# Patient Record
Sex: MALE | Race: WHITE | NOT HISPANIC OR LATINO | Employment: OTHER | URBAN - METROPOLITAN AREA
[De-identification: names, ages, dates, MRNs, and addresses within clinical notes are randomized per-mention and may not be internally consistent; named-entity substitution may affect disease eponyms.]

---

## 2017-02-10 RX ORDER — PEN NEEDLE, DIABETIC 29 G X1/2"
NEEDLE, DISPOSABLE MISCELLANEOUS
Qty: 100 EACH | Refills: 3 | Status: SHIPPED | OUTPATIENT
Start: 2017-02-10 | End: 2018-03-23 | Stop reason: SDUPTHER

## 2017-02-17 RX ORDER — INSULIN GLARGINE 100 [IU]/ML
INJECTION, SOLUTION SUBCUTANEOUS
Qty: 10 VIAL | Refills: 11 | Status: SHIPPED | OUTPATIENT
Start: 2017-02-17 | End: 2018-03-14 | Stop reason: SDUPTHER

## 2017-02-25 RX ORDER — AMLODIPINE BESYLATE 5 MG/1
TABLET ORAL
Qty: 90 TABLET | Refills: 3 | Status: SHIPPED | OUTPATIENT
Start: 2017-02-25 | End: 2018-02-22 | Stop reason: SDUPTHER

## 2017-02-25 RX ORDER — ATORVASTATIN CALCIUM 40 MG/1
TABLET, FILM COATED ORAL
Qty: 90 TABLET | Refills: 3 | Status: SHIPPED | OUTPATIENT
Start: 2017-02-25 | End: 2018-02-22 | Stop reason: SDUPTHER

## 2017-03-06 ENCOUNTER — TELEPHONE (OUTPATIENT)
Dept: INTERNAL MEDICINE | Facility: CLINIC | Age: 77
End: 2017-03-06

## 2017-03-06 NOTE — TELEPHONE ENCOUNTER
----- Message from Ellie Shirley sent at 3/6/2017 11:16 AM CST -----  Contact: Gurmeet from [facility]   _  1st Request  _  2nd Request  _  3rd Request        Who: Gurmeet from [facility]     Why: Gurmeet would like a order for patient diabetic supplies for lancets and strips faxed over to [phone]. Thanks!    What Number to Call Back: [phone]    When to Expect a call back: (Before the end of the day)   -- if the call is after 12:00, the call back will be tomorrow.

## 2017-03-13 DIAGNOSIS — E11.9 TYPE 2 DIABETES MELLITUS WITHOUT COMPLICATION: ICD-10-CM

## 2017-04-09 RX ORDER — FENOFIBRATE 145 MG/1
TABLET, FILM COATED ORAL
Qty: 90 TABLET | Refills: 3 | Status: SHIPPED | OUTPATIENT
Start: 2017-04-09 | End: 2018-04-03 | Stop reason: SDUPTHER

## 2017-04-27 DIAGNOSIS — I10 ESSENTIAL HYPERTENSION: ICD-10-CM

## 2017-05-02 RX ORDER — HYDRALAZINE HYDROCHLORIDE 100 MG/1
TABLET, FILM COATED ORAL
Qty: 60 TABLET | Refills: 6 | Status: SHIPPED | OUTPATIENT
Start: 2017-05-02 | End: 2017-06-27 | Stop reason: SDUPTHER

## 2017-05-23 DIAGNOSIS — N18.4 CHRONIC KIDNEY DISEASE, STAGE IV (SEVERE): Primary | ICD-10-CM

## 2017-05-29 ENCOUNTER — LAB VISIT (OUTPATIENT)
Dept: LAB | Facility: OTHER | Age: 77
End: 2017-05-29
Attending: INTERNAL MEDICINE
Payer: MEDICARE

## 2017-05-29 ENCOUNTER — OFFICE VISIT (OUTPATIENT)
Dept: INTERNAL MEDICINE | Facility: CLINIC | Age: 77
End: 2017-05-29
Payer: MEDICARE

## 2017-05-29 VITALS
HEART RATE: 74 BPM | SYSTOLIC BLOOD PRESSURE: 132 MMHG | HEIGHT: 62 IN | OXYGEN SATURATION: 98 % | BODY MASS INDEX: 35.22 KG/M2 | DIASTOLIC BLOOD PRESSURE: 84 MMHG | WEIGHT: 191.38 LBS

## 2017-05-29 DIAGNOSIS — M10.9 GOUT, ARTHRITIS: ICD-10-CM

## 2017-05-29 DIAGNOSIS — E11.21 DIABETIC NEPHROPATHY ASSOCIATED WITH TYPE 2 DIABETES MELLITUS: Chronic | ICD-10-CM

## 2017-05-29 DIAGNOSIS — N18.4 CKD (CHRONIC KIDNEY DISEASE) STAGE 4, GFR 15-29 ML/MIN: ICD-10-CM

## 2017-05-29 DIAGNOSIS — I10 ESSENTIAL HYPERTENSION: Primary | Chronic | ICD-10-CM

## 2017-05-29 DIAGNOSIS — N40.1 BPH WITH URINARY OBSTRUCTION: ICD-10-CM

## 2017-05-29 DIAGNOSIS — E78.01 HYPERLIPIDEMIA TYPE II: ICD-10-CM

## 2017-05-29 DIAGNOSIS — N13.8 BPH WITH URINARY OBSTRUCTION: ICD-10-CM

## 2017-05-29 LAB
ALBUMIN SERPL BCP-MCNC: 3.6 G/DL
ALP SERPL-CCNC: 28 U/L
ALT SERPL W/O P-5'-P-CCNC: 16 U/L
ANION GAP SERPL CALC-SCNC: 10 MMOL/L
AST SERPL-CCNC: 24 U/L
BASOPHILS # BLD AUTO: 0.01 K/UL
BASOPHILS NFR BLD: 0.1 %
BILIRUB SERPL-MCNC: 0.6 MG/DL
BUN SERPL-MCNC: 38 MG/DL
CALCIUM SERPL-MCNC: 9.9 MG/DL
CHLORIDE SERPL-SCNC: 104 MMOL/L
CO2 SERPL-SCNC: 25 MMOL/L
CREAT SERPL-MCNC: 2.3 MG/DL
DIFFERENTIAL METHOD: ABNORMAL
EOSINOPHIL # BLD AUTO: 0.1 K/UL
EOSINOPHIL NFR BLD: 1.5 %
ERYTHROCYTE [DISTWIDTH] IN BLOOD BY AUTOMATED COUNT: 14.4 %
EST. GFR  (AFRICAN AMERICAN): 31 ML/MIN/1.73 M^2
EST. GFR  (NON AFRICAN AMERICAN): 27 ML/MIN/1.73 M^2
GLUCOSE SERPL-MCNC: 105 MG/DL
HCT VFR BLD AUTO: 46.9 %
HGB BLD-MCNC: 15.8 G/DL
LYMPHOCYTES # BLD AUTO: 2.5 K/UL
LYMPHOCYTES NFR BLD: 31.1 %
MCH RBC QN AUTO: 31.2 PG
MCHC RBC AUTO-ENTMCNC: 33.7 %
MCV RBC AUTO: 93 FL
MONOCYTES # BLD AUTO: 0.7 K/UL
MONOCYTES NFR BLD: 7.9 %
NEUTROPHILS # BLD AUTO: 4.9 K/UL
NEUTROPHILS NFR BLD: 59.3 %
PLATELET # BLD AUTO: 226 K/UL
PMV BLD AUTO: 10.6 FL
POTASSIUM SERPL-SCNC: 4.3 MMOL/L
PROT SERPL-MCNC: 7 G/DL
RBC # BLD AUTO: 5.06 M/UL
SODIUM SERPL-SCNC: 139 MMOL/L
WBC # BLD AUTO: 8.18 K/UL

## 2017-05-29 PROCEDURE — 36415 COLL VENOUS BLD VENIPUNCTURE: CPT

## 2017-05-29 PROCEDURE — 85025 COMPLETE CBC W/AUTO DIFF WBC: CPT

## 2017-05-29 PROCEDURE — 1159F MED LIST DOCD IN RCRD: CPT | Mod: S$GLB,,, | Performed by: INTERNAL MEDICINE

## 2017-05-29 PROCEDURE — 99214 OFFICE O/P EST MOD 30 MIN: CPT | Mod: S$GLB,,, | Performed by: INTERNAL MEDICINE

## 2017-05-29 PROCEDURE — 3066F NEPHROPATHY DOC TX: CPT | Mod: S$GLB,,, | Performed by: INTERNAL MEDICINE

## 2017-05-29 PROCEDURE — 80053 COMPREHEN METABOLIC PANEL: CPT

## 2017-05-29 PROCEDURE — 99999 PR PBB SHADOW E&M-EST. PATIENT-LVL III: CPT | Mod: PBBFAC,,, | Performed by: INTERNAL MEDICINE

## 2017-05-29 PROCEDURE — 83036 HEMOGLOBIN GLYCOSYLATED A1C: CPT

## 2017-05-29 PROCEDURE — 99499 UNLISTED E&M SERVICE: CPT | Mod: S$PBB,,, | Performed by: INTERNAL MEDICINE

## 2017-05-29 PROCEDURE — 3045F PR MOST RECENT HEMOGLOBIN A1C LEVEL 7.0-9.0%: CPT | Mod: S$GLB,,, | Performed by: INTERNAL MEDICINE

## 2017-05-29 PROCEDURE — 1126F AMNT PAIN NOTED NONE PRSNT: CPT | Mod: S$GLB,,, | Performed by: INTERNAL MEDICINE

## 2017-05-29 RX ORDER — TAMSULOSIN HYDROCHLORIDE 0.4 MG/1
0.4 CAPSULE ORAL 2 TIMES DAILY
Refills: 0 | COMMUNITY
Start: 2017-03-17 | End: 2017-09-21 | Stop reason: SDUPTHER

## 2017-05-29 NOTE — PROGRESS NOTES
Subjective:       Patient ID: Giovanni Guerrero is a 77 y.o. male.    Chief Complaint: Diabetes and Hypertension    Pt here for f/u. Feeling well and has no c/o. Counseled on exercise goals.     DM2 is well controlled. Most recent A1C was 7.2. Pt reports sugars are in 90s-110s fasting, prandial sugars are generally less than 140. He has not had lows. He has scheduled eye exam. He does have nephropathy with CKD IV and microalbuminuria and his creatinine has been stable at 2.2-2.5. He is followed by nephrology but is due for f/u. He does not have neuropathy but is not bothersome and manages without meds.      Pt's BP is well controlled. Tolerating meds well. Home readings are also controlled. Pt denies cp/sob/ha/vision or neuro changes.     HLD is tx with tricor and zocor which he tolerates well. Prior labs reviewed.     Gout has been quiet. No recent attacks.      He has BPH with urinary obstruction but does well with flomax twice daily. He follows with urology regularly.         Diabetes   Pertinent negatives for hypoglycemia include no headaches. Pertinent negatives for diabetes include no chest pain and no polyuria.   Hypertension   Pertinent negatives include no chest pain, headaches or shortness of breath.     Review of Systems   Constitutional: Negative for appetite change, fever and unexpected weight change.   Eyes: Negative for visual disturbance.   Respiratory: Negative for shortness of breath.    Cardiovascular: Negative for chest pain.   Gastrointestinal: Negative for abdominal pain, blood in stool, constipation and diarrhea.   Endocrine: Negative for polyuria.   Genitourinary: Negative for frequency.   Neurological: Negative for light-headedness and headaches.   Psychiatric/Behavioral: Negative for dysphoric mood.       Objective:      Physical Exam   Constitutional: He is oriented to person, place, and time. He appears well-developed and well-nourished.   HENT:   Head: Normocephalic and atraumatic.   Eyes:  EOM are normal. Pupils are equal, round, and reactive to light.   Neck: Neck supple. Carotid bruit is not present. No thyromegaly present.   Cardiovascular: Normal rate, regular rhythm, S1 normal, S2 normal and normal heart sounds.    No murmur heard.  Pulmonary/Chest: Effort normal and breath sounds normal. He has no wheezes.   Abdominal: Soft. Bowel sounds are normal. He exhibits no mass. There is no hepatosplenomegaly. There is no tenderness.   Musculoskeletal: He exhibits no edema.   Protective Sensation (w/ 10 gram monofilament):  Right: Intact at 6/6 sites tested  Left: Intact at 4/6 sites tested    Visual Inspection:  Normal -  Bilateral    Pedal Pulses:   Right: Present  Left: Present    Posterior tibialis:   Right:Present  Left: Present     Lymphadenopathy:     He has no cervical adenopathy.   Neurological: He is alert and oriented to person, place, and time. No cranial nerve deficit.   Psychiatric: He has a normal mood and affect. His behavior is normal.       Assessment:       1. Essential hypertension    2. BPH with urinary obstruction    3. CKD (chronic kidney disease) stage 4, GFR 15-29 ml/min    4. Diabetic nephropathy associated with type 2 diabetes mellitus    5. Gout, arthritis    6. Hyperlipidemia type II        Plan:       1. Appropriate labs     2. Continue current meds  3. Continue with home BS and BP readings  4. Keep f/u with specialists  5. F/u 6 mos with NP

## 2017-05-30 ENCOUNTER — TELEPHONE (OUTPATIENT)
Dept: INTERNAL MEDICINE | Facility: CLINIC | Age: 77
End: 2017-05-30

## 2017-05-30 LAB
ESTIMATED AVG GLUCOSE: 143 MG/DL
HBA1C MFR BLD HPLC: 6.6 %

## 2017-05-30 NOTE — TELEPHONE ENCOUNTER
Spoke with pt  Advised per Dr. Webster  that labs look good. Kidney function is impaired but stable. He should keep f/u with nephrology but no other changes are needed. Pt verbalized understanding

## 2017-05-30 NOTE — TELEPHONE ENCOUNTER
Inform pt that labs look good. Kidney function is impaired but stable. He should keep f/u with nephrology but no other changes are needed.

## 2017-06-27 DIAGNOSIS — I10 ESSENTIAL HYPERTENSION: ICD-10-CM

## 2017-06-29 RX ORDER — HYDRALAZINE HYDROCHLORIDE 100 MG/1
100 TABLET, FILM COATED ORAL EVERY 12 HOURS
Qty: 60 TABLET | Refills: 6 | Status: SHIPPED | OUTPATIENT
Start: 2017-06-29 | End: 2018-07-16 | Stop reason: SDUPTHER

## 2017-07-28 ENCOUNTER — LAB VISIT (OUTPATIENT)
Dept: LAB | Facility: OTHER | Age: 77
End: 2017-07-28
Attending: INTERNAL MEDICINE
Payer: MEDICARE

## 2017-07-28 DIAGNOSIS — N18.4 CHRONIC KIDNEY DISEASE, STAGE IV (SEVERE): ICD-10-CM

## 2017-07-28 LAB
BILIRUB UR QL STRIP: NEGATIVE
CLARITY UR: CLEAR
COLOR UR: YELLOW
CREAT UR-MCNC: 87.1 MG/DL
GLUCOSE UR QL STRIP: NEGATIVE
HGB UR QL STRIP: NEGATIVE
KETONES UR QL STRIP: NEGATIVE
LEUKOCYTE ESTERASE UR QL STRIP: NEGATIVE
NITRITE UR QL STRIP: NEGATIVE
PH UR STRIP: 6 [PH] (ref 5–8)
PROT UR QL STRIP: ABNORMAL
PROT UR-MCNC: 26 MG/DL
PROT/CREAT RATIO, UR: 0.3
SP GR UR STRIP: 1.02 (ref 1–1.03)
URN SPEC COLLECT METH UR: ABNORMAL
UROBILINOGEN UR STRIP-ACNC: 1 EU/DL

## 2017-07-28 PROCEDURE — 84156 ASSAY OF PROTEIN URINE: CPT

## 2017-07-28 PROCEDURE — 81003 URINALYSIS AUTO W/O SCOPE: CPT

## 2017-08-01 ENCOUNTER — OFFICE VISIT (OUTPATIENT)
Dept: NEPHROLOGY | Facility: CLINIC | Age: 77
End: 2017-08-01
Payer: MEDICARE

## 2017-08-01 VITALS
BODY MASS INDEX: 35.25 KG/M2 | SYSTOLIC BLOOD PRESSURE: 134 MMHG | HEIGHT: 62 IN | WEIGHT: 191.56 LBS | OXYGEN SATURATION: 97 % | HEART RATE: 87 BPM | DIASTOLIC BLOOD PRESSURE: 80 MMHG

## 2017-08-01 DIAGNOSIS — I10 ESSENTIAL HYPERTENSION: ICD-10-CM

## 2017-08-01 DIAGNOSIS — R80.9 PROTEINURIA, UNSPECIFIED TYPE: ICD-10-CM

## 2017-08-01 DIAGNOSIS — N18.30 CHRONIC KIDNEY DISEASE (CKD), STAGE III (MODERATE): Primary | ICD-10-CM

## 2017-08-01 DIAGNOSIS — E11.21 DIABETIC NEPHROPATHY ASSOCIATED WITH TYPE 2 DIABETES MELLITUS: ICD-10-CM

## 2017-08-01 DIAGNOSIS — N40.0 BENIGN PROSTATIC HYPERPLASIA, PRESENCE OF LOWER URINARY TRACT SYMPTOMS UNSPECIFIED: ICD-10-CM

## 2017-08-01 PROCEDURE — 99999 PR PBB SHADOW E&M-EST. PATIENT-LVL III: CPT | Mod: PBBFAC,,, | Performed by: INTERNAL MEDICINE

## 2017-08-01 PROCEDURE — 99499 UNLISTED E&M SERVICE: CPT | Mod: S$GLB,,, | Performed by: INTERNAL MEDICINE

## 2017-08-01 NOTE — PROGRESS NOTES
Patient is here today for lab data review; baseline 2.2-2.5 mg/dl; His most recent lab (7/28/17) Cr 2.0 mg/dl; eGFR; 32 ml/min; potassium 4.1 mmol.

## 2017-08-19 RX ORDER — ATENOLOL 100 MG/1
TABLET ORAL
Qty: 90 TABLET | Refills: 3 | Status: SHIPPED | OUTPATIENT
Start: 2017-08-19 | End: 2018-09-19 | Stop reason: SDUPTHER

## 2017-09-21 ENCOUNTER — OFFICE VISIT (OUTPATIENT)
Dept: UROLOGY | Facility: CLINIC | Age: 77
End: 2017-09-21
Payer: MEDICARE

## 2017-09-21 VITALS
DIASTOLIC BLOOD PRESSURE: 67 MMHG | HEART RATE: 82 BPM | BODY MASS INDEX: 34.57 KG/M2 | WEIGHT: 189 LBS | SYSTOLIC BLOOD PRESSURE: 139 MMHG

## 2017-09-21 DIAGNOSIS — N13.8 BPH WITH URINARY OBSTRUCTION: Primary | ICD-10-CM

## 2017-09-21 DIAGNOSIS — N40.1 BPH WITH URINARY OBSTRUCTION: Primary | ICD-10-CM

## 2017-09-21 PROCEDURE — 99999 PR PBB SHADOW E&M-EST. PATIENT-LVL III: CPT | Mod: PBBFAC,,, | Performed by: NURSE PRACTITIONER

## 2017-09-21 PROCEDURE — 3078F DIAST BP <80 MM HG: CPT | Mod: S$GLB,,, | Performed by: NURSE PRACTITIONER

## 2017-09-21 PROCEDURE — 3008F BODY MASS INDEX DOCD: CPT | Mod: S$GLB,,, | Performed by: NURSE PRACTITIONER

## 2017-09-21 PROCEDURE — 99214 OFFICE O/P EST MOD 30 MIN: CPT | Mod: S$GLB,,, | Performed by: NURSE PRACTITIONER

## 2017-09-21 PROCEDURE — 1159F MED LIST DOCD IN RCRD: CPT | Mod: S$GLB,,, | Performed by: NURSE PRACTITIONER

## 2017-09-21 PROCEDURE — 3075F SYST BP GE 130 - 139MM HG: CPT | Mod: S$GLB,,, | Performed by: NURSE PRACTITIONER

## 2017-09-21 PROCEDURE — 1126F AMNT PAIN NOTED NONE PRSNT: CPT | Mod: S$GLB,,, | Performed by: NURSE PRACTITIONER

## 2017-09-21 RX ORDER — TAMSULOSIN HYDROCHLORIDE 0.4 MG/1
0.4 CAPSULE ORAL 2 TIMES DAILY
Qty: 180 CAPSULE | Refills: 3 | Status: SHIPPED | OUTPATIENT
Start: 2017-09-21 | End: 2018-12-04 | Stop reason: SDUPTHER

## 2017-09-21 NOTE — PATIENT INSTRUCTIONS
BPH (Enlarged Prostate)  The prostate is a gland at the base of the bladder. As some men get older, the prostate may get bigger in size. This problem is called benign prostatic hyperplasia (BPH). BPH puts pressure on the urethra. This is the tube that carries urine from the bladder to the penis. It may interfere with the flow of urine. It may also keep the bladder from emptying fully.    Symptoms of BPH include trouble starting urination and feeling as though the bladder isnt emptying all the way. It also includes a weak urine stream, dribbling and leaking of urine, and frequent and urgent urination (especially at night). BPH can increase the risk of urinary infections. It can also block off urine flow completely. If this occurs, a thin tube (catheter) may be passed into the bladder to help drain urine.  If symptoms are mild, no treatment may be needed right now. If symptoms are more severe, treatment is likely needed. The goal of treatment is to improve urine flow and reduce symptoms. Treatments can include medicine and procedures. Your healthcare provider will discuss treatment options with you as needed.  Home care  The following guidelines will help you care for yourself at home:  · Urinate as soon as you feel the urge. Don't try to hold your urine.  · Don't limit your fluid intake during the day. Drink 6 to 8 glasses of water or liquids a day. This prevents bacteria from building up in the bladder.  · Avoid drinking fluids after dinner to help reduce urination during the night.  · Avoid medicines that can worsen your symptoms. These include certain cold and allergy medicines and antidepressants. Diuretics used for high blood pressure can also worsen symptoms. Talk to your doctor about the medicines you take. Other choices may work better for you.  Prostate cancer screening  BPH does not increase the risk of prostate cancer. But because prostate cancer is a common cancer in men, screening is sometimes  recommended. This may help detect the cancer in its early stages when treatment is most effective. Factors that can increase the risk of prostate cancer include being -American or having a father or brother who had prostate cancer. A high-fat diet may also increase the risk of prostate cancer. Talk to your healthcare provider to see whether you should be screened for prostate cancer.  Follow-up care  Follow up with your healthcare provider, or as advised  To learn more, go to:  · National Kidney & Urologic Diseases Information Clearinghouse  kidney.niddk.nih.gov, 127.304.1407  When to seek medical advice  Call your healthcare provider right away if any of these occur:  · Fever of 100.4°F (38.0°C) or higher, or as advised  · Unable to pass urine for 8 hours  · Increasing pressure or pain in your bladder (lower abdomen)  · Blood in the urine  · Increasing low back pain, not related to injury  · Symptoms of urinary infection (increased urge to urinate, burning when passing urine, foul-smelling urine)  Date Last Reviewed: 7/1/2016 © 2000-2017 [employer]. [address] [place] [address], [place] [address]. All rights reserved. This information is not intended as a substitute for professional medical care. Always follow your healthcare professional's instructions.

## 2017-09-21 NOTE — PROGRESS NOTES
Subjective:       Patient ID: Giovanni Guerrero is a 77 y.o. male.    Chief Complaint: Benign Prostatic Hypertrophy and Annual Exam    Giovanni Guerrero is a 77 y.o. Male with BPH with VANN.  Last office visit was 09/21/2016    He has been taking Flomax TWICE DAILY   FOS is good.  Nocturia 0-1x.  Overall doing well.    He lost his wife in Dec '15; 52 yrs of marriage.  Very active with his kids and grandkids                                  PSA                      1.1                 04/15/2014                 PSA                      1.88                08/22/2012                 PSA                      1.4                 08/08/2011                 PSA                      1.2                 08/05/2010                 PSA                      1.5                 08/07/2009                 PSA                      1.3                 08/29/2008                 PSA                      1.5                 01/08/2007                 PSA                      1.2                 03/06/2006                    Past Medical History:  No date: Allergy  No date: Colon polyp  No date: Diabetes mellitus type II  No date: Gout, unspecified  No date: Hyperlipidemia  No date: Hypertension  No date: Renal insufficiency    Past Surgical History:  No date: APPENDECTOMY  No date: HERNIA REPAIR  No date: TONSILLECTOMY    Review of patient's family history indicates:    Cancer                         Mother                      Comment: ovarian cancer    Heart disease                  Father                      Comment: MI at 57    Diabetes                       Father                    Cerebral aneurysm              Sister                    Heart disease                  Brother                     Comment: MI at 60    Heart disease                  Brother                     Comment: CABG      Social History    Marital status:              Spouse name:                       Years of education:                 Number of  children:               Occupational History    None on file    Social History Main Topics    Smoking status: Never Smoker                                                                   Smokeless tobacco: Not on file                       Alcohol use: No              Drug use: No              Sexual activity: Not Currently        Other Topics            Concern    None on file    Social History Narrative     52 yrs, 3 childre (two living), 11 grandchildren & 4 great grandchildren        Allergies:  Actos (pioglitazone)    Medications:  Current Outpatient Prescriptions:   amlodipine (NORVASC) 5 MG tablet, take 1 tablet by mouth once daily, Disp: 90 tablet, Rfl: 3  aspirin (ECOTRIN) 81 MG EC tablet, Take 81 mg by mouth once daily., Disp: , Rfl:   atenolol (TENORMIN) 100 MG tablet, take 1 tablet by mouth once daily, Disp: 90 tablet, Rfl: 3  atorvastatin (LIPITOR) 40 MG tablet, take 1 tablet by mouth once daily, Disp: 90 tablet, Rfl: 3  BD INSULIN SYRINGE ULTRA-FINE 0.5 mL 31 gauge x 5/16 Syrg, USE WITH LANTUS INSULIN, Disp: 100 each, Rfl: 3  blood sugar diagnostic Strp, Check sugar tid as directed, Disp: 300 each, Rfl: 3  cholecalciferol, vitamin D3, (VITAMIN D3) 2,000 unit Tab, Take by mouth once daily., Disp: , Rfl:   fenofibrate (TRICOR) 145 MG tablet, take 1 tablet by mouth once daily, Disp: 90 tablet, Rfl: 3  hydrALAZINE (APRESOLINE) 100 MG tablet, Take 1 tablet (100 mg total) by mouth every 12 (twelve) hours., Disp: 60 tablet, Rfl: 6  LANTUS 100 unit/mL injection, INJECT 15 UNITS INTO THE SKIN EVERY EVENING, Disp: 10 vial, Rfl: 11  OMEGA-3 ACID ETHYL ESTERS (OMACOR) 1 gram Cap, Take 1 g by mouth. 1 Capsule Oral Twice a day, Disp: , Rfl:   tamsulosin (FLOMAX) 0.4 mg Cp24, Take 0.4 mg by mouth 2 (two) times daily. , Disp: , Rfl: 0  blood-glucose meter Misc, 1 kit by Misc.(Non-Drug; Combo Route) route 3 (three) times daily. Pt is to test blood sugar TID, Disp: 1 each, Rfl: 0          Review of  Systems   Constitutional: Negative.  Negative for activity change, appetite change and fever.   HENT: Negative.  Negative for facial swelling and trouble swallowing.    Eyes: Negative.    Respiratory: Negative.  Negative for shortness of breath.    Cardiovascular: Negative.  Negative for chest pain and palpitations.   Gastrointestinal: Negative for abdominal pain, constipation, diarrhea, nausea and vomiting.   Genitourinary: Positive for nocturia. Negative for difficulty urinating, dysuria, enuresis, flank pain, frequency, genital sores, hematuria, penile pain, scrotal swelling, testicular pain and urgency.        He is pleased with how he urinates.  Nocturia 1x     Musculoskeletal: Negative for back pain, gait problem and neck stiffness.   Skin: Negative.  Negative for wound.   Neurological: Negative for dizziness, tremors, seizures, syncope, speech difficulty, light-headedness and headaches.   Hematological: Does not bruise/bleed easily.   Psychiatric/Behavioral: Negative for confusion and hallucinations. The patient is not nervous/anxious.        Objective:      Physical Exam   Nursing note and vitals reviewed.  Constitutional: He is oriented to person, place, and time. Vital signs are normal. He appears well-developed and well-nourished. He is active and cooperative.  Non-toxic appearance. He does not have a sickly appearance.   Urine dipped clear of infection in the office today.     HENT:   Head: Normocephalic and atraumatic.   Right Ear: External ear normal.   Left Ear: External ear normal.   Nose: Nose normal.   Mouth/Throat: Mucous membranes are normal.   Eyes: Conjunctivae and lids are normal. No scleral icterus.   Neck: Trachea normal, normal range of motion and full passive range of motion without pain. Neck supple. No JVD present. No tracheal deviation present.   Cardiovascular: Normal rate, regular rhythm, S1 normal and S2 normal.    Pulmonary/Chest: Effort normal and breath sounds normal. No  respiratory distress. He exhibits no tenderness.   Abdominal: Soft. Normal appearance and bowel sounds are normal. There is no hepatosplenomegaly. There is no tenderness. There is no rebound, no guarding and no CVA tenderness.   Genitourinary: Rectum normal, testes normal and penis normal. Rectal exam shows no external hemorrhoid, no mass and no tenderness. Prostate is enlarged. Prostate is not tender. No penile tenderness.       Musculoskeletal: Normal range of motion.   Lymphadenopathy: No inguinal adenopathy noted on the right or left side.   Neurological: He is alert and oriented to person, place, and time. He has normal strength.   Skin: Skin is warm, dry and intact.     Psychiatric: He has a normal mood and affect. His behavior is normal. Judgment and thought content normal.       Assessment:       1. BPH with urinary obstruction        Plan:         I spent 25 minutes with the patient of which more than half was spent in direct consultation with the patient in regards to our treatment and plan.    Education and recommendations of today's plan of care including home remedies.  We discussed diet modifications; avoiding bladder irritants.  He is very active with his children and grandchildren.  Will continue flomax twice a day.  Any issues then RTC.  Voiced appreciation

## 2017-11-10 DIAGNOSIS — E11.9 TYPE 2 DIABETES MELLITUS WITHOUT COMPLICATION: ICD-10-CM

## 2017-11-15 ENCOUNTER — TELEPHONE (OUTPATIENT)
Dept: INTERNAL MEDICINE | Facility: CLINIC | Age: 77
End: 2017-11-15

## 2017-11-15 ENCOUNTER — PATIENT OUTREACH (OUTPATIENT)
Dept: INTERNAL MEDICINE | Facility: CLINIC | Age: 77
End: 2017-11-15

## 2017-11-15 NOTE — PROGRESS NOTES
[facility] is committed to your overall health.  To help you get the most out of each of your visits, we will review your information to make sure you are up to date on all of your recommended tests and/or procedures.       Your PCP  Alan Webster MD   found that you may be due for:       Health Maintenance Due   Topic Date Due    TETANUS VACCINE  02/09/1960    Zoster Vaccine  02/09/2002    Eye Exam  11/06/2016    Influenza Vaccine  08/01/2017    Hemoglobin A1c  08/29/2017    Lipid Panel  11/01/2017     You will be scheduled for a eye cam (eye exam) retina scan on the same day of your scheduled visit with your Alan Webster MD.  NO eye drop dilation will take place. You can drive after the scan.  This will take no longer than ten minutes. This will take place in the same office area as your visit.   Medicare does not cover all immunizations to be given in the clinic. Check your benefits to ensure that you do not need to receive your immunizations at the pharmacy.  You are more than welcome to visit our pharmacy here on campus to receive your vaccinations on the same day of your upcoming scheduled visit.           If you have had any of the above done at another facility, please bring the records or information with you so that your record at [facility] will be complete.  If you would like to schedule any of these, please contact me.     If you are currently taking medication, please bring it with you to your appointment for review.     Also, if you have any type of Advanced Directives, please bring them with you to your office visit so we may scan them into your chart.     Thank you for Choosing [facility] for your healthcare needs.      Additional Information  If you have questions, you can email [email or web address] or call [phone]  to talk to our [facility] staff. Remember, [facility] is NOT to be used for urgent needs. For medical emergencies, dial 911.

## 2017-11-27 ENCOUNTER — CLINICAL SUPPORT (OUTPATIENT)
Dept: INTERNAL MEDICINE | Facility: CLINIC | Age: 77
End: 2017-11-27
Payer: MEDICARE

## 2017-11-27 PROCEDURE — 90662 IIV NO PRSV INCREASED AG IM: CPT | Mod: S$GLB,,, | Performed by: INTERNAL MEDICINE

## 2017-11-27 PROCEDURE — G0008 ADMIN INFLUENZA VIRUS VAC: HCPCS | Mod: S$GLB,,, | Performed by: INTERNAL MEDICINE

## 2017-11-27 NOTE — PROGRESS NOTES
"Patient was given vaccine information sheet for the Flu Vaccine. The area of injection was palpated using the acromion process as a landmark. This area was cleaned with alcohol. Using a 25g 1" safety needle, 0.5mL of the vaccine was placed into the left deltoid muscle. The injection site was dressed with a bandage. Patient experienced no complications and was discharged in stable condition. Fluzone vaccine Lot: [id number] Exp: [id number]    "

## 2017-11-29 ENCOUNTER — OFFICE VISIT (OUTPATIENT)
Dept: INTERNAL MEDICINE | Facility: CLINIC | Age: 77
End: 2017-11-29
Payer: MEDICARE

## 2017-11-29 ENCOUNTER — LAB VISIT (OUTPATIENT)
Dept: LAB | Facility: OTHER | Age: 77
End: 2017-11-29
Attending: INTERNAL MEDICINE
Payer: MEDICARE

## 2017-11-29 ENCOUNTER — TELEPHONE (OUTPATIENT)
Dept: INTERNAL MEDICINE | Facility: CLINIC | Age: 77
End: 2017-11-29

## 2017-11-29 VITALS
OXYGEN SATURATION: 97 % | SYSTOLIC BLOOD PRESSURE: 110 MMHG | BODY MASS INDEX: 33.55 KG/M2 | HEIGHT: 63 IN | HEART RATE: 84 BPM | WEIGHT: 189.38 LBS | DIASTOLIC BLOOD PRESSURE: 76 MMHG

## 2017-11-29 DIAGNOSIS — N40.1 BPH WITH URINARY OBSTRUCTION: ICD-10-CM

## 2017-11-29 DIAGNOSIS — I10 ESSENTIAL HYPERTENSION: Primary | Chronic | ICD-10-CM

## 2017-11-29 DIAGNOSIS — I10 ESSENTIAL HYPERTENSION: Chronic | ICD-10-CM

## 2017-11-29 DIAGNOSIS — E78.01 HYPERLIPIDEMIA TYPE II: ICD-10-CM

## 2017-11-29 DIAGNOSIS — N18.4 CKD (CHRONIC KIDNEY DISEASE) STAGE 4, GFR 15-29 ML/MIN: ICD-10-CM

## 2017-11-29 DIAGNOSIS — E11.9 TYPE 2 DIABETES MELLITUS WITHOUT COMPLICATION: ICD-10-CM

## 2017-11-29 DIAGNOSIS — E11.42 DIABETIC POLYNEUROPATHY ASSOCIATED WITH TYPE 2 DIABETES MELLITUS: Chronic | ICD-10-CM

## 2017-11-29 DIAGNOSIS — M10.9 GOUT, ARTHRITIS: ICD-10-CM

## 2017-11-29 DIAGNOSIS — E11.21 DIABETIC NEPHROPATHY ASSOCIATED WITH TYPE 2 DIABETES MELLITUS: Chronic | ICD-10-CM

## 2017-11-29 DIAGNOSIS — N13.8 BPH WITH URINARY OBSTRUCTION: ICD-10-CM

## 2017-11-29 LAB
ANION GAP SERPL CALC-SCNC: 6 MMOL/L
BUN SERPL-MCNC: 38 MG/DL
CALCIUM SERPL-MCNC: 9.2 MG/DL
CHLORIDE SERPL-SCNC: 104 MMOL/L
CHOLEST SERPL-MCNC: 103 MG/DL
CHOLEST/HDLC SERPL: 4.3 {RATIO}
CO2 SERPL-SCNC: 29 MMOL/L
CREAT SERPL-MCNC: 2.1 MG/DL
EST. GFR  (AFRICAN AMERICAN): 35 ML/MIN/1.73 M^2
EST. GFR  (NON AFRICAN AMERICAN): 30 ML/MIN/1.73 M^2
ESTIMATED AVG GLUCOSE: 137 MG/DL
GLUCOSE SERPL-MCNC: 102 MG/DL
HBA1C MFR BLD HPLC: 6.4 %
HDLC SERPL-MCNC: 24 MG/DL
HDLC SERPL: 23.3 %
LDLC SERPL CALC-MCNC: 60.8 MG/DL
NONHDLC SERPL-MCNC: 79 MG/DL
POTASSIUM SERPL-SCNC: 4.1 MMOL/L
SODIUM SERPL-SCNC: 139 MMOL/L
TRIGL SERPL-MCNC: 91 MG/DL

## 2017-11-29 PROCEDURE — 80061 LIPID PANEL: CPT

## 2017-11-29 PROCEDURE — 99999 PR PBB SHADOW E&M-EST. PATIENT-LVL III: CPT | Mod: PBBFAC,,, | Performed by: INTERNAL MEDICINE

## 2017-11-29 PROCEDURE — 83036 HEMOGLOBIN GLYCOSYLATED A1C: CPT

## 2017-11-29 PROCEDURE — 99499 UNLISTED E&M SERVICE: CPT | Mod: S$PBB,,, | Performed by: INTERNAL MEDICINE

## 2017-11-29 PROCEDURE — 36415 COLL VENOUS BLD VENIPUNCTURE: CPT

## 2017-11-29 PROCEDURE — 99214 OFFICE O/P EST MOD 30 MIN: CPT | Mod: S$GLB,,, | Performed by: INTERNAL MEDICINE

## 2017-11-29 PROCEDURE — 80048 BASIC METABOLIC PNL TOTAL CA: CPT

## 2017-11-29 NOTE — TELEPHONE ENCOUNTER
Spoke with pt to inform him that labs look good and no changes needed. Pt verbalized understanding no concerns or questions.

## 2017-11-29 NOTE — PROGRESS NOTES
Subjective:       Patient ID: Giovanni Guerrero is a 77 y.o. male.    Chief Complaint: Hypertension and Diabetes    Pt here for f/u. Feeling well and has no c/o. Counseled on exercise goals.     DM2 is well controlled. Most recent A1C was 6.6. Pt reports sugars are in 90s-100s fasting, prandial sugars are generally less than 140. He has not had lows. He is on lantus monotherapy at 15 units qhs. He has scheduled eye exam. He does have nephropathy with CKD IV and microalbuminuria and his creatinine has been stable at 2.2-2.5. He is followed by nephrology but is due for f/u. He does have neuropathy but is not bothersome and manages without meds.      Pt's BP is well controlled. Tolerating meds well. Home readings are also controlled. Pt denies cp/sob/ha/vision or neuro changes.     HLD is tx with tricor and zocor which he tolerates well. Prior labs reviewed.     Gout has been quiet. No recent attacks.      He has BPH with urinary obstruction but does well with flomax twice daily. He follows with urology regularly.         Diabetes   Pertinent negatives for hypoglycemia include no headaches. Pertinent negatives for diabetes include no chest pain and no polyuria.   Hypertension   Pertinent negatives include no chest pain, headaches or shortness of breath.     Review of Systems   Constitutional: Negative for appetite change, fever and unexpected weight change.   Eyes: Negative for visual disturbance.   Respiratory: Negative for shortness of breath.    Cardiovascular: Negative for chest pain.   Gastrointestinal: Negative for abdominal pain, blood in stool, constipation and diarrhea.   Endocrine: Negative for polyuria.   Genitourinary: Negative for frequency.   Neurological: Negative for light-headedness and headaches.   Psychiatric/Behavioral: Negative for dysphoric mood.       Objective:      Physical Exam   Constitutional: He is oriented to person, place, and time. He appears well-developed and well-nourished.   HENT:    Head: Normocephalic and atraumatic.   Eyes: EOM are normal. Pupils are equal, round, and reactive to light.   Neck: Neck supple. Carotid bruit is not present. No thyromegaly present.   Cardiovascular: Normal rate, regular rhythm, S1 normal, S2 normal and normal heart sounds.    No murmur heard.  Pulmonary/Chest: Effort normal and breath sounds normal. He has no wheezes.   Abdominal: Soft. Bowel sounds are normal. He exhibits no mass. There is no hepatosplenomegaly. There is no tenderness.   Musculoskeletal: He exhibits no edema.   Lymphadenopathy:     He has no cervical adenopathy.   Neurological: He is alert and oriented to person, place, and time. No cranial nerve deficit.   Psychiatric: He has a normal mood and affect. His behavior is normal.       Assessment:       1. Essential hypertension    2. Hyperlipidemia type II    3. BPH with urinary obstruction    4. CKD (chronic kidney disease) stage 4, GFR 15-29 ml/min    5. Diabetic nephropathy associated with type 2 diabetes mellitus    6. Gout, arthritis    7. Diabetic polyneuropathy associated with type 2 diabetes mellitus        Plan:       1. Appropriate labs     2. Continue current meds  3. Continue with home BS and BP readings  4. Keep f/u with specialists

## 2017-12-05 ENCOUNTER — OFFICE VISIT (OUTPATIENT)
Dept: NEPHROLOGY | Facility: CLINIC | Age: 77
End: 2017-12-05
Payer: MEDICARE

## 2017-12-05 VITALS
HEIGHT: 63 IN | HEART RATE: 68 BPM | WEIGHT: 190.69 LBS | SYSTOLIC BLOOD PRESSURE: 110 MMHG | BODY MASS INDEX: 33.79 KG/M2 | OXYGEN SATURATION: 98 % | DIASTOLIC BLOOD PRESSURE: 70 MMHG

## 2017-12-05 DIAGNOSIS — E11.21 DIABETIC NEPHROPATHY ASSOCIATED WITH TYPE 2 DIABETES MELLITUS: ICD-10-CM

## 2017-12-05 DIAGNOSIS — I10 ESSENTIAL HYPERTENSION: ICD-10-CM

## 2017-12-05 DIAGNOSIS — N18.30 CHRONIC KIDNEY DISEASE (CKD), STAGE III (MODERATE): Primary | ICD-10-CM

## 2017-12-05 DIAGNOSIS — R80.9 PROTEINURIA, UNSPECIFIED TYPE: ICD-10-CM

## 2017-12-05 PROCEDURE — 99213 OFFICE O/P EST LOW 20 MIN: CPT | Mod: S$GLB,,, | Performed by: INTERNAL MEDICINE

## 2017-12-05 PROCEDURE — 99999 PR PBB SHADOW E&M-EST. PATIENT-LVL III: CPT | Mod: PBBFAC,,, | Performed by: INTERNAL MEDICINE

## 2017-12-05 PROCEDURE — 99499 UNLISTED E&M SERVICE: CPT | Mod: S$PBB,,, | Performed by: INTERNAL MEDICINE

## 2017-12-05 NOTE — PROGRESS NOTES
Patient is here today for follow up evaluation of CKD III. Last seen in renal office 8/1/17 Baseline  Cr 2.2-2.5 mg/dl His most recent lab( 11/29/17) Cr 2.1 mg/dl; eGFR; 30 ml/min; potassium 4.1 mmol/L There is a hx of diabetes; complicated by nephropathy; last A1c; 6.4%. Today he has no new complaints    ROS;   Pleasant gentleman; no acute distress; oriented x 3  Mood and Affect;  Appropriate  Otherwise non-contributory  Exam  HEENT; Grossly Intact  CHEST; Clear P&A; no rales or rhonchi  HEART; RR; S1&S2 no murmur rub gallop  ABD; BS(+) non-tender; (-)CVAT  EXT; (-) Edema    Impression  CKD III Stable  Hypertension Satisfactory control  Diabetes At or below goal A1c    Plan  Return Visit; 4 mo

## 2018-02-16 ENCOUNTER — HOSPITAL ENCOUNTER (EMERGENCY)
Facility: OTHER | Age: 78
Discharge: HOME | End: 2018-02-17
Attending: EMERGENCY MEDICINE
Payer: MEDICARE

## 2018-02-16 VITALS
WEIGHT: 180 LBS | OXYGEN SATURATION: 96 % | HEIGHT: 63 IN | BODY MASS INDEX: 31.89 KG/M2 | DIASTOLIC BLOOD PRESSURE: 74 MMHG | RESPIRATION RATE: 14 BRPM | TEMPERATURE: 98 F | HEART RATE: 83 BPM | SYSTOLIC BLOOD PRESSURE: 150 MMHG

## 2018-02-16 DIAGNOSIS — R33.9 URINARY RETENTION: Primary | ICD-10-CM

## 2018-02-16 LAB
BILIRUB UR QL STRIP: NEGATIVE
CLARITY UR: CLEAR
COLOR UR: YELLOW
GLUCOSE UR QL STRIP: NEGATIVE
HGB UR QL STRIP: NEGATIVE
KETONES UR QL STRIP: NEGATIVE
LEUKOCYTE ESTERASE UR QL STRIP: NEGATIVE
NITRITE UR QL STRIP: NEGATIVE
PH UR STRIP: 7 [PH] (ref 5–8)
PROT UR QL STRIP: NEGATIVE
SP GR UR STRIP: 1.01 (ref 1–1.03)
URN SPEC COLLECT METH UR: NORMAL
UROBILINOGEN UR STRIP-ACNC: NEGATIVE EU/DL

## 2018-02-16 PROCEDURE — 51702 INSERT TEMP BLADDER CATH: CPT

## 2018-02-16 PROCEDURE — 99283 EMERGENCY DEPT VISIT LOW MDM: CPT

## 2018-02-16 PROCEDURE — 81003 URINALYSIS AUTO W/O SCOPE: CPT

## 2018-02-17 NOTE — ED NOTES
Pt c/o urinary retention >12 hrs. Pt states this happens from time to time, last time was 3 yrs ago. Last time he saw his urologist was <6 months ago and everything was fine. Pt states he does not have an enlarged prostate. NO trauma. Pt is A & O x 3, denies SOB, fever, chills and N/V/D. Skin is warm, dry and pink. VS. BRIDGET x 3mm. BBS- CTA. Abd- distended and tender to palpation. PSM x 4 exts. Pt immediately catheterized w/ #16 fr lester. Pt states he felt immediate relief. MD @ BS, states we will send urine to lab and if everything is good, pt will be discharged w/ a leg bag. Pt is smiling and agreeable to MD orders. Will continue to monitor closely.

## 2018-02-17 NOTE — ED PROVIDER NOTES
Encounter Date: 2/16/2018       History     Chief Complaint   Patient presents with    Urinary Retention     Patient c/o urinary retention for approx 3-4 hrs.  Patient stated this happened before and he had to get a catheter and followup with a urologist.      A 78-year-old male with history of urinary retention 3 years ago presents with urinary retention over the last 12 hours.  Patient describes significant lower abdominal pain and cramping.She had nausea vomiting or chest pain.  Nothing made this better.  He is unable to void.          Review of patient's allergies indicates:   Allergen Reactions    Actos [pioglitazone] Other (See Comments)     constipation     Past Medical History:   Diagnosis Date    Allergy     Colon polyp     Diabetes mellitus type II     Gout, unspecified     Hyperlipidemia     Hypertension     Renal insufficiency      Past Surgical History:   Procedure Laterality Date    APPENDECTOMY      HERNIA REPAIR      TONSILLECTOMY       Family History   Problem Relation Age of Onset    Cancer Mother      ovarian cancer    Heart disease Father      MI at 57    Diabetes Father     Cerebral aneurysm Sister     Heart disease Brother      MI at 60    Heart disease Brother      CABG     Social History   Substance Use Topics    Smoking status: Never Smoker    Smokeless tobacco: Never Used    Alcohol use No     Review of Systems   Constitutional: Negative for activity change, appetite change, chills and fever.   HENT: Negative for congestion and sinus pressure.    Eyes: Negative for discharge.   Respiratory: Negative for cough, choking, chest tightness and shortness of breath.    Cardiovascular: Negative for chest pain.   Gastrointestinal: Negative for abdominal pain, diarrhea and vomiting.   Genitourinary: Positive for difficulty urinating. Negative for dysuria.   Musculoskeletal: Negative for arthralgias.   Skin: Negative for color change.   Neurological: Negative for dizziness and  headaches.   Hematological: Does not bruise/bleed easily.       Physical Exam     Initial Vitals [02/16/18 2302]   BP Pulse Resp Temp SpO2   (!) 150/74 83 14 97.6 °F (36.4 °C) 96 %      MAP       99.33         Physical Exam    Nursing note and vitals reviewed.  Constitutional: He appears well-developed and well-nourished.  Non-toxic appearance. He does not have a sickly appearance. No distress.   HENT:   Head: Normocephalic and atraumatic.   Mouth/Throat: Oropharynx is clear and moist.   Eyes: Conjunctivae, EOM and lids are normal. Pupils are equal, round, and reactive to light. Right eye exhibits no nystagmus. Left eye exhibits no nystagmus.   Neck: Trachea normal, normal range of motion and phonation normal. Neck supple.   Cardiovascular: Normal rate, regular rhythm and normal heart sounds. Exam reveals no gallop and no friction rub.    No murmur heard.  Pulmonary/Chest: Breath sounds normal. No respiratory distress. He has no wheezes. He has no rhonchi. He has no rales.   Abdominal: Soft. Normal appearance and bowel sounds are normal. There is no tenderness. There is no rigidity, no rebound, no guarding, no tenderness at McBurney's point and negative Ford's sign.   Musculoskeletal: Normal range of motion.   Neurological: He is alert and oriented to person, place, and time. He has normal strength and normal reflexes. He displays normal reflexes. No cranial nerve deficit or sensory deficit. He displays a negative Romberg sign.   Skin: Skin is warm, dry and intact. Capillary refill takes less than 2 seconds. No rash noted. No pallor.         ED Course   Procedures  Labs Reviewed   URINALYSIS             Medical Decision Making:   ED Management:  78-year-old male with urinary retention.  When I got the bedside Lozano catheter just been placed.  The patient stated all his pain is relieved.  The urinalysis.  Do not feel any blood work is indicated.  This happened 3 years ago.  He sees Dr. Vargas for urology.  Have him  follow-up as an outpatient.    11:55 PM urinalysis is negative.  We'll discharge the patient with leg bag to follow-up with urology. Cindi Evans NP.                      Clinical Impression:     1. Urinary retention                                Roge Peña,   02/16/18 1628

## 2018-02-22 RX ORDER — ATORVASTATIN CALCIUM 40 MG/1
TABLET, FILM COATED ORAL
Qty: 90 TABLET | Refills: 3 | Status: SHIPPED | OUTPATIENT
Start: 2018-02-22 | End: 2019-03-08 | Stop reason: SDUPTHER

## 2018-02-22 RX ORDER — AMLODIPINE BESYLATE 5 MG/1
TABLET ORAL
Qty: 90 TABLET | Refills: 3 | Status: SHIPPED | OUTPATIENT
Start: 2018-02-22 | End: 2019-03-08 | Stop reason: SDUPTHER

## 2018-02-23 ENCOUNTER — OFFICE VISIT (OUTPATIENT)
Dept: UROLOGY | Facility: CLINIC | Age: 78
End: 2018-02-23
Payer: MEDICARE

## 2018-02-23 VITALS
DIASTOLIC BLOOD PRESSURE: 87 MMHG | BODY MASS INDEX: 34.48 KG/M2 | HEIGHT: 62 IN | HEART RATE: 71 BPM | SYSTOLIC BLOOD PRESSURE: 140 MMHG | WEIGHT: 187.38 LBS

## 2018-02-23 DIAGNOSIS — R33.9 URINARY RETENTION: ICD-10-CM

## 2018-02-23 DIAGNOSIS — N13.8 BPH WITH URINARY OBSTRUCTION: Primary | ICD-10-CM

## 2018-02-23 DIAGNOSIS — N40.1 BPH WITH URINARY OBSTRUCTION: Primary | ICD-10-CM

## 2018-02-23 DIAGNOSIS — Z46.6 ENCOUNTER FOR FOLEY CATHETER REMOVAL: ICD-10-CM

## 2018-02-23 PROCEDURE — 1126F AMNT PAIN NOTED NONE PRSNT: CPT | Mod: S$GLB,,, | Performed by: NURSE PRACTITIONER

## 2018-02-23 PROCEDURE — 1159F MED LIST DOCD IN RCRD: CPT | Mod: S$GLB,,, | Performed by: NURSE PRACTITIONER

## 2018-02-23 PROCEDURE — 99999 PR PBB SHADOW E&M-EST. PATIENT-LVL III: CPT | Mod: PBBFAC,,, | Performed by: NURSE PRACTITIONER

## 2018-02-23 PROCEDURE — 99214 OFFICE O/P EST MOD 30 MIN: CPT | Mod: S$GLB,,, | Performed by: NURSE PRACTITIONER

## 2018-02-23 PROCEDURE — 3008F BODY MASS INDEX DOCD: CPT | Mod: S$GLB,,, | Performed by: NURSE PRACTITIONER

## 2018-02-23 NOTE — PROGRESS NOTES
Subjective:       Patient ID: Giovanni Guerrero is a 78 y.o. male.    Chief Complaint: Urinary Retention (lester removal) and Benign Prostatic Hypertrophy    Giovanni Guerrero is a 78 y.o. Male with BPH with VANN.  History of AUR;  He managing his BPH/VANN with Flomax TWICE DAILY   Normally FOS is good; nocturia 1-2x.  Last office visit was 09/21/2016    Here today after being seen in ER for Urinary rentention and lester placement.  He states he started taking Robitussin DM every 3-4 hour for cough.  FOS became weaker and then unable to urinate.  Lester placed and [facility]; urine was clear of infection.    Here today for lester removal.   No complaints.  Lester draining well.        He lost his wife in Dec '15; 52 yrs of marriage.  His grandson lives with him.  Very active with his kids and grandkids                                  PSA                      1.1                 04/15/2014                 PSA                      1.88                08/22/2012                 PSA                      1.4                 08/08/2011                 PSA                      1.2                 08/05/2010                 PSA                      1.5                 08/07/2009                 PSA                      1.3                 08/29/2008                 PSA                      1.5                 01/08/2007                 PSA                      1.2                 03/06/2006                    Past Medical History:  No date: Allergy  No date: Colon polyp  No date: Diabetes mellitus type II  No date: Gout, unspecified  No date: Hyperlipidemia  No date: Hypertension  No date: Renal insufficiency    Past Surgical History:  No date: APPENDECTOMY  No date: HERNIA REPAIR  No date: TONSILLECTOMY    Review of patient's family history indicates:    Cancer                         Mother                      Comment: ovarian cancer    Heart disease                  Father                      Comment: MI at 57    Diabetes                        Father                    Cerebral aneurysm              Sister                    Heart disease                  Brother                     Comment: MI at 60    Heart disease                  Brother                     Comment: CABG      Social History    Marital status:              Spouse name:                       Years of education:                 Number of children:               Occupational History    None on file    Social History Main Topics    Smoking status: Never Smoker                                                                   Smokeless tobacco: Not on file                       Alcohol use: No              Drug use: No              Sexual activity: Not Currently        Other Topics            Concern    None on file    Social History Narrative     52 yrs, 3 childre (two living), 11 grandchildren & 4 great grandchildren        Allergies:  Actos (pioglitazone)    Medications:  Current Outpatient Prescriptions:   amlodipine (NORVASC) 5 MG tablet, take 1 tablet by mouth once daily, Disp: 90 tablet, Rfl: 3  aspirin (ECOTRIN) 81 MG EC tablet, Take 81 mg by mouth once daily., Disp: , Rfl:   atenolol (TENORMIN) 100 MG tablet, take 1 tablet by mouth once daily, Disp: 90 tablet, Rfl: 3  atorvastatin (LIPITOR) 40 MG tablet, take 1 tablet by mouth once daily, Disp: 90 tablet, Rfl: 3  BD INSULIN SYRINGE ULTRA-FINE 0.5 mL 31 gauge x 5/16 Syrg, USE WITH LANTUS INSULIN, Disp: 100 each, Rfl: 3  blood sugar diagnostic Strp, Check sugar tid as directed, Disp: 300 each, Rfl: 3  cholecalciferol, vitamin D3, (VITAMIN D3) 2,000 unit Tab, Take by mouth once daily., Disp: , Rfl:   fenofibrate (TRICOR) 145 MG tablet, take 1 tablet by mouth once daily, Disp: 90 tablet, Rfl: 3  hydrALAZINE (APRESOLINE) 100 MG tablet, Take 1 tablet (100 mg total) by mouth every 12 (twelve) hours., Disp: 60 tablet, Rfl: 6  LANTUS 100 unit/mL injection, INJECT 15 UNITS INTO THE SKIN EVERY  EVENING, Disp: 10 vial, Rfl: 11  OMEGA-3 ACID ETHYL ESTERS (OMACOR) 1 gram Cap, Take 1 g by mouth. 1 Capsule Oral Twice a day, Disp: , Rfl:   tamsulosin (FLOMAX) 0.4 mg Cp24, Take 0.4 mg by mouth 2 (two) times daily. , Disp: , Rfl: 0  blood-glucose meter Misc, 1 kit by Misc.(Non-Drug; Combo Route) route 3 (three) times daily. Pt is to test blood sugar TID, Disp: 1 each, Rfl: 0          Review of Systems   Constitutional: Negative for activity change, appetite change, chills and fever.   HENT: Negative for facial swelling and trouble swallowing.    Eyes: Negative for visual disturbance.   Respiratory: Negative for chest tightness and shortness of breath.    Cardiovascular: Negative for chest pain and palpitations.   Gastrointestinal: Negative.  Negative for abdominal pain, constipation, diarrhea, nausea and vomiting.   Genitourinary: Positive for difficulty urinating. Negative for dysuria, flank pain, hematuria, penile pain, penile swelling, scrotal swelling and testicular pain.        Lozano draining well     Musculoskeletal: Negative for back pain, gait problem, myalgias and neck stiffness.   Skin: Negative for rash.   Neurological: Negative for dizziness and speech difficulty.   Hematological: Does not bruise/bleed easily.   Psychiatric/Behavioral: Negative for behavioral problems.       Objective:      Physical Exam   Nursing note and vitals reviewed.  Constitutional: He is oriented to person, place, and time. Vital signs are normal. He appears well-developed and well-nourished. He is active and cooperative.  Non-toxic appearance. He does not have a sickly appearance.   HENT:   Head: Normocephalic and atraumatic.   Right Ear: External ear normal.   Left Ear: External ear normal.   Nose: Nose normal.   Mouth/Throat: Mucous membranes are normal.   Eyes: Conjunctivae and lids are normal. No scleral icterus.   Neck: Trachea normal, normal range of motion and full passive range of motion without pain. Neck supple.  No JVD present. No tracheal deviation present.   Cardiovascular: Normal rate, S1 normal and S2 normal.    Pulmonary/Chest: Effort normal. No respiratory distress. He exhibits no tenderness.   Abdominal: Soft. Normal appearance and bowel sounds are normal. There is no hepatosplenomegaly. There is no tenderness. There is no CVA tenderness.   Genitourinary: Testes normal and penis normal. No penile tenderness.   Musculoskeletal: Normal range of motion.   Neurological: He is alert and oriented to person, place, and time. He has normal strength.   Skin: Skin is warm, dry and intact.     Psychiatric: He has a normal mood and affect. His behavior is normal. Judgment and thought content normal.       Assessment:       1. BPH with urinary obstruction    2. Encounter for Lester catheter removal    3. Urinary retention        Plan:         I spent 30 minutes with the patient of which more than half was spent in direct consultation with the patient in regards to our treatment and plan.    Education and recommendations of today's plan of care including home remedies.  VT done in the office and failed.  We discussed the possible contributory factors for his AUR; mostly like cold meds/Robitussin DM every 3 hours.  Continue flomax BID  Expectations after lester removal.  F/u as previously scheduled.  Voiced appreciation.

## 2018-03-14 ENCOUNTER — TELEPHONE (OUTPATIENT)
Dept: INTERNAL MEDICINE | Facility: CLINIC | Age: 78
End: 2018-03-14

## 2018-03-14 RX ORDER — INSULIN DEGLUDEC 100 U/ML
15 INJECTION, SOLUTION SUBCUTANEOUS NIGHTLY
Qty: 9 ML | Refills: 3 | Status: SHIPPED | OUTPATIENT
Start: 2018-03-14 | End: 2018-07-27 | Stop reason: SDUPTHER

## 2018-03-14 RX ORDER — INSULIN GLARGINE 100 [IU]/ML
INJECTION, SOLUTION SUBCUTANEOUS
Qty: 10 ML | Refills: 3 | Status: SHIPPED | OUTPATIENT
Start: 2018-03-14 | End: 2018-03-14

## 2018-03-14 NOTE — TELEPHONE ENCOUNTER
----- Message from Mayela Hernadez sent at 3/14/2018  3:06 PM CDT -----  Contact: self  x_  1st Request  _  2nd Request  _  3rd Request    Who: pt    Why: pr is calling to speak with a nurse in regards to which insulin medication is covered by insurance... Please advise...    What Number to Call Back: [phone]  When to Expect a call back: (Before the end of the day)   -- if call after 3:00 call back will be tomorrow.

## 2018-03-14 NOTE — TELEPHONE ENCOUNTER
Pt informed of rx change.  States verbal understanding.  Patient has no further questions or concerns.

## 2018-03-14 NOTE — TELEPHONE ENCOUNTER
----- Message from Mayela Hernadez sent at 3/14/2018  3:06 PM CDT -----  Contact: self  x_  1st Request  _  2nd Request  _  3rd Request    Who: pt    Why: pr is calling to speak with a nurse in regards to which insulin medication is covered by insurance... Please advise...    What Number to Call Back: [phone]  When to Expect a call back: (Before the end of the day)   -- if call after 3:00 call back will be tomorrow.

## 2018-03-23 RX ORDER — PEN NEEDLE, DIABETIC 29 G X1/2"
NEEDLE, DISPOSABLE MISCELLANEOUS
Qty: 100 EACH | Refills: 3 | Status: SHIPPED | OUTPATIENT
Start: 2018-03-23

## 2018-04-03 RX ORDER — FENOFIBRATE 145 MG/1
TABLET, FILM COATED ORAL
Qty: 90 TABLET | Refills: 3 | Status: SHIPPED | OUTPATIENT
Start: 2018-04-03 | End: 2019-01-16

## 2018-05-10 ENCOUNTER — LAB VISIT (OUTPATIENT)
Dept: LAB | Facility: OTHER | Age: 78
End: 2018-05-10
Attending: INTERNAL MEDICINE
Payer: MEDICARE

## 2018-05-10 ENCOUNTER — OFFICE VISIT (OUTPATIENT)
Dept: INTERNAL MEDICINE | Facility: CLINIC | Age: 78
End: 2018-05-10
Payer: MEDICARE

## 2018-05-10 ENCOUNTER — TELEPHONE (OUTPATIENT)
Dept: INTERNAL MEDICINE | Facility: CLINIC | Age: 78
End: 2018-05-10

## 2018-05-10 VITALS
BODY MASS INDEX: 35.06 KG/M2 | HEART RATE: 72 BPM | SYSTOLIC BLOOD PRESSURE: 130 MMHG | WEIGHT: 190.5 LBS | DIASTOLIC BLOOD PRESSURE: 70 MMHG | HEIGHT: 62 IN

## 2018-05-10 DIAGNOSIS — E11.42 TYPE 2 DIABETES MELLITUS WITH DIABETIC POLYNEUROPATHY, WITH LONG-TERM CURRENT USE OF INSULIN: ICD-10-CM

## 2018-05-10 DIAGNOSIS — N18.4 CKD (CHRONIC KIDNEY DISEASE) STAGE 4, GFR 15-29 ML/MIN: ICD-10-CM

## 2018-05-10 DIAGNOSIS — E11.42 DIABETIC POLYNEUROPATHY ASSOCIATED WITH TYPE 2 DIABETES MELLITUS: Primary | ICD-10-CM

## 2018-05-10 DIAGNOSIS — Z79.4 TYPE 2 DIABETES MELLITUS WITH DIABETIC POLYNEUROPATHY, WITH LONG-TERM CURRENT USE OF INSULIN: ICD-10-CM

## 2018-05-10 DIAGNOSIS — I10 ESSENTIAL HYPERTENSION: Chronic | ICD-10-CM

## 2018-05-10 DIAGNOSIS — N40.1 BPH WITH URINARY OBSTRUCTION: ICD-10-CM

## 2018-05-10 DIAGNOSIS — E11.21 DIABETIC NEPHROPATHY ASSOCIATED WITH TYPE 2 DIABETES MELLITUS: Chronic | ICD-10-CM

## 2018-05-10 DIAGNOSIS — M10.9 GOUT, ARTHRITIS: ICD-10-CM

## 2018-05-10 DIAGNOSIS — E11.42 DIABETIC POLYNEUROPATHY ASSOCIATED WITH TYPE 2 DIABETES MELLITUS: ICD-10-CM

## 2018-05-10 DIAGNOSIS — E78.01 HYPERLIPIDEMIA TYPE II: ICD-10-CM

## 2018-05-10 DIAGNOSIS — N13.8 BPH WITH URINARY OBSTRUCTION: ICD-10-CM

## 2018-05-10 LAB
ALBUMIN SERPL BCP-MCNC: 3.4 G/DL
ALP SERPL-CCNC: 27 U/L
ALT SERPL W/O P-5'-P-CCNC: 16 U/L
ANION GAP SERPL CALC-SCNC: 9 MMOL/L
AST SERPL-CCNC: 26 U/L
BASOPHILS # BLD AUTO: 0.03 K/UL
BASOPHILS NFR BLD: 0.5 %
BILIRUB SERPL-MCNC: 0.7 MG/DL
BUN SERPL-MCNC: 37 MG/DL
CALCIUM SERPL-MCNC: 9.5 MG/DL
CHLORIDE SERPL-SCNC: 102 MMOL/L
CO2 SERPL-SCNC: 25 MMOL/L
CREAT SERPL-MCNC: 2.2 MG/DL
DIFFERENTIAL METHOD: ABNORMAL
EOSINOPHIL # BLD AUTO: 0.2 K/UL
EOSINOPHIL NFR BLD: 2.8 %
ERYTHROCYTE [DISTWIDTH] IN BLOOD BY AUTOMATED COUNT: 14.8 %
EST. GFR  (AFRICAN AMERICAN): 32 ML/MIN/1.73 M^2
EST. GFR  (NON AFRICAN AMERICAN): 28 ML/MIN/1.73 M^2
ESTIMATED AVG GLUCOSE: 131 MG/DL
GLUCOSE SERPL-MCNC: 105 MG/DL
HBA1C MFR BLD HPLC: 6.2 %
HCT VFR BLD AUTO: 46.1 %
HGB BLD-MCNC: 15.5 G/DL
LYMPHOCYTES # BLD AUTO: 2.5 K/UL
LYMPHOCYTES NFR BLD: 39.1 %
MCH RBC QN AUTO: 31.6 PG
MCHC RBC AUTO-ENTMCNC: 33.6 G/DL
MCV RBC AUTO: 94 FL
MONOCYTES # BLD AUTO: 0.5 K/UL
MONOCYTES NFR BLD: 7.6 %
NEUTROPHILS # BLD AUTO: 3.2 K/UL
NEUTROPHILS NFR BLD: 49.8 %
PLATELET # BLD AUTO: 230 K/UL
PMV BLD AUTO: 10.1 FL
POTASSIUM SERPL-SCNC: 4.3 MMOL/L
PROT SERPL-MCNC: 6.8 G/DL
RBC # BLD AUTO: 4.91 M/UL
SODIUM SERPL-SCNC: 136 MMOL/L
WBC # BLD AUTO: 6.35 K/UL

## 2018-05-10 PROCEDURE — 99999 PR PBB SHADOW E&M-EST. PATIENT-LVL III: CPT | Mod: PBBFAC,,, | Performed by: INTERNAL MEDICINE

## 2018-05-10 PROCEDURE — 83036 HEMOGLOBIN GLYCOSYLATED A1C: CPT

## 2018-05-10 PROCEDURE — 85025 COMPLETE CBC W/AUTO DIFF WBC: CPT

## 2018-05-10 PROCEDURE — 80053 COMPREHEN METABOLIC PANEL: CPT

## 2018-05-10 PROCEDURE — 36415 COLL VENOUS BLD VENIPUNCTURE: CPT

## 2018-05-10 PROCEDURE — 3075F SYST BP GE 130 - 139MM HG: CPT | Mod: CPTII,S$GLB,, | Performed by: INTERNAL MEDICINE

## 2018-05-10 PROCEDURE — 3078F DIAST BP <80 MM HG: CPT | Mod: CPTII,S$GLB,, | Performed by: INTERNAL MEDICINE

## 2018-05-10 PROCEDURE — 99214 OFFICE O/P EST MOD 30 MIN: CPT | Mod: S$GLB,,, | Performed by: INTERNAL MEDICINE

## 2018-05-10 NOTE — PROGRESS NOTES
Subjective:       Patient ID: Giovanni Guerrero is a 78 y.o. male.    Chief Complaint: Diabetes    Pt here for f/u. Feeling well and has no c/o. Counseled on exercise goals.     DM2 is well controlled. Most recent A1C was 6.4. Pt reports sugars are in 90s-100s fasting, prandial sugars are generally less than 130. He has not had lows. He is on tresiba monotherapy at 15 units qhs. He has scheduled eye exam. He does have nephropathy with CKD IV and microalbuminuria and his creatinine has been stable at 2.2-2.5. He is followed by nephrology but is due for f/u. He does have neuropathy but is not bothersome and manages without meds.      Pt's BP is well controlled. Tolerating meds well. Home readings are also controlled. Pt denies cp/sob/ha/vision or neuro changes.     HLD is tx with tricor and zocor which he tolerates well. Prior labs reviewed.     Gout has been quiet. No recent attacks.      He has BPH with urinary obstruction but does well with flomax twice daily. He follows with urology regularly.         Diabetes   Pertinent negatives for hypoglycemia include no headaches. Pertinent negatives for diabetes include no chest pain and no polyuria.   Hypertension   Pertinent negatives include no chest pain, headaches or shortness of breath.     Review of Systems   Constitutional: Negative for appetite change, fever and unexpected weight change.   Eyes: Negative for visual disturbance.   Respiratory: Negative for shortness of breath.    Cardiovascular: Negative for chest pain.   Gastrointestinal: Negative for abdominal pain, blood in stool, constipation and diarrhea.   Endocrine: Negative for polyuria.   Genitourinary: Negative for frequency.   Neurological: Negative for light-headedness and headaches.   Psychiatric/Behavioral: Negative for dysphoric mood.       Objective:      Physical Exam   Constitutional: He is oriented to person, place, and time. He appears well-developed and well-nourished.   HENT:   Head:  Normocephalic and atraumatic.   Eyes: EOM are normal. Pupils are equal, round, and reactive to light.   Neck: Neck supple. Carotid bruit is not present. No thyromegaly present.   Cardiovascular: Normal rate, regular rhythm, S1 normal, S2 normal and normal heart sounds.    No murmur heard.  Pulmonary/Chest: Effort normal and breath sounds normal. He has no wheezes.   Abdominal: Soft. Bowel sounds are normal. He exhibits no mass. There is no hepatosplenomegaly. There is no tenderness.   Musculoskeletal: He exhibits no edema.   Protective Sensation (w/ 10 gram monofilament):  Right: Intact  Left: Intact    Visual Inspection:  Normal -  Bilateral    Pedal Pulses:   Right: Present  Left: Present    Posterior tibialis:   Right:Present  Left: Present     Lymphadenopathy:     He has no cervical adenopathy.   Neurological: He is alert and oriented to person, place, and time. No cranial nerve deficit.   Psychiatric: He has a normal mood and affect. His behavior is normal.       Assessment:       1. Diabetic polyneuropathy associated with type 2 diabetes mellitus    2. Essential hypertension    3. Hyperlipidemia type II    4. BPH with urinary obstruction    5. CKD (chronic kidney disease) stage 4, GFR 15-29 ml/min    6. Diabetic nephropathy associated with type 2 diabetes mellitus    7. Gout, arthritis        Plan:       1. Appropriate labs     2. Continue current meds  3. Continue with home BS and BP readings  4. Keep f/u with specialists

## 2018-06-13 ENCOUNTER — OFFICE VISIT (OUTPATIENT)
Dept: NEPHROLOGY | Facility: CLINIC | Age: 78
End: 2018-06-13
Payer: MEDICARE

## 2018-06-13 VITALS
HEART RATE: 72 BPM | OXYGEN SATURATION: 95 % | HEIGHT: 62 IN | DIASTOLIC BLOOD PRESSURE: 68 MMHG | SYSTOLIC BLOOD PRESSURE: 118 MMHG | WEIGHT: 189.81 LBS | BODY MASS INDEX: 34.93 KG/M2

## 2018-06-13 DIAGNOSIS — I10 ESSENTIAL HYPERTENSION: ICD-10-CM

## 2018-06-13 DIAGNOSIS — N18.30 CHRONIC KIDNEY DISEASE (CKD), STAGE III (MODERATE): Primary | ICD-10-CM

## 2018-06-13 DIAGNOSIS — E11.21 DIABETIC NEPHROPATHY ASSOCIATED WITH TYPE 2 DIABETES MELLITUS: ICD-10-CM

## 2018-06-13 PROCEDURE — 99499 UNLISTED E&M SERVICE: CPT | Mod: S$GLB,,, | Performed by: INTERNAL MEDICINE

## 2018-06-13 PROCEDURE — 99213 OFFICE O/P EST LOW 20 MIN: CPT | Mod: S$GLB,,, | Performed by: INTERNAL MEDICINE

## 2018-06-13 PROCEDURE — 3074F SYST BP LT 130 MM HG: CPT | Mod: CPTII,S$GLB,, | Performed by: INTERNAL MEDICINE

## 2018-06-13 PROCEDURE — 99999 PR PBB SHADOW E&M-EST. PATIENT-LVL III: CPT | Mod: PBBFAC,,, | Performed by: INTERNAL MEDICINE

## 2018-06-13 PROCEDURE — 3078F DIAST BP <80 MM HG: CPT | Mod: CPTII,S$GLB,, | Performed by: INTERNAL MEDICINE

## 2018-06-13 NOTE — PROGRESS NOTES
Patient is here today is here today for follow up evaluation of CKD III. Last seen in renal office 12/5/17. Baseline Cr 2.2-2.5 mg/dl His most recent lab is listed below;   Lab Results   Component Value Date    K 4.3 05/10/2018    CREATININE 2.2 (H) 05/10/2018    ESTGFRAFRICA 32 (A) 05/10/2018    EGFRNONAA 28 (A) 05/10/2018   There is a hx of hypertension and diabetes complicated by nephropathy; last A1c; 6.2%.  Today he has no new complaints  Physical Exam  Elderly gentleman; no acute distress; oriented x 3  HEENT; Grossly Intact  CHEST; Clear P&A; no rales or rhonchi  HEART; RR; S1&S2 no murmur rub gallop  ABD; bs(+) non-tender; (-)CVAT;   EXT; (-)Edema    Impression:  CKD III. At baseline; excellent glycemic control  Hypertension Satisfactory control    Plan  Return Visit; 6 mo

## 2018-07-16 ENCOUNTER — TELEPHONE (OUTPATIENT)
Dept: NEPHROLOGY | Facility: CLINIC | Age: 78
End: 2018-07-16

## 2018-07-16 DIAGNOSIS — I10 ESSENTIAL HYPERTENSION: ICD-10-CM

## 2018-07-16 RX ORDER — HYDRALAZINE HYDROCHLORIDE 100 MG/1
100 TABLET, FILM COATED ORAL EVERY 12 HOURS
Qty: 60 TABLET | Refills: 6 | Status: SHIPPED | OUTPATIENT
Start: 2018-07-16 | End: 2019-01-16 | Stop reason: SDUPTHER

## 2018-07-16 NOTE — TELEPHONE ENCOUNTER
Pt was schedule too soon. Pt was just seen last month 6/2018. Pt only made this visit due to medication refill. I talk with pt to inform him that he was returning too early. Pt said he know its just he wanted a refill on his medication and he was nerves and worry. So pt appt got cancel for todays visit and a refill 7/16/18

## 2018-07-27 RX ORDER — INSULIN DEGLUDEC 100 U/ML
INJECTION, SOLUTION SUBCUTANEOUS
Qty: 9 ML | Refills: 1 | Status: SHIPPED | OUTPATIENT
Start: 2018-07-27 | End: 2018-12-07 | Stop reason: SDUPTHER

## 2018-09-19 DIAGNOSIS — I10 ESSENTIAL HYPERTENSION: Primary | Chronic | ICD-10-CM

## 2018-09-19 RX ORDER — ATENOLOL 100 MG/1
100 TABLET ORAL DAILY
Qty: 90 TABLET | Refills: 3 | Status: SHIPPED | OUTPATIENT
Start: 2018-09-19 | End: 2019-08-13 | Stop reason: SDUPTHER

## 2018-09-19 NOTE — TELEPHONE ENCOUNTER
----- Message from Dinorah Shepherd sent at 9/19/2018 11:27 AM CDT -----  Contact: Pt   Can the clinic reply in [facility]:LU[facility]MA [[id number]]      Please refill the medication(s) listed below. Please call the patient when the prescription(s) is ready for  at the phone number [phone]    Medication #1:atenolol (TENORMIN) 100 MG tablet    Medication #2:      Preferred Pharmacy: [facility] [address] [place] [address] [address] [employer] [employer]

## 2018-10-25 DIAGNOSIS — E11.42 TYPE 2 DIABETES MELLITUS WITH DIABETIC POLYNEUROPATHY, WITH LONG-TERM CURRENT USE OF INSULIN: Primary | ICD-10-CM

## 2018-10-25 DIAGNOSIS — Z79.4 TYPE 2 DIABETES MELLITUS WITH DIABETIC POLYNEUROPATHY, WITH LONG-TERM CURRENT USE OF INSULIN: Primary | ICD-10-CM

## 2018-11-20 ENCOUNTER — OFFICE VISIT (OUTPATIENT)
Dept: INTERNAL MEDICINE | Facility: CLINIC | Age: 78
End: 2018-11-20
Payer: MEDICARE

## 2018-11-20 VITALS
SYSTOLIC BLOOD PRESSURE: 110 MMHG | DIASTOLIC BLOOD PRESSURE: 70 MMHG | BODY MASS INDEX: 35.22 KG/M2 | HEART RATE: 76 BPM | WEIGHT: 191.38 LBS | HEIGHT: 62 IN

## 2018-11-20 DIAGNOSIS — E11.42 TYPE 2 DIABETES MELLITUS WITH DIABETIC POLYNEUROPATHY, WITH LONG-TERM CURRENT USE OF INSULIN: ICD-10-CM

## 2018-11-20 DIAGNOSIS — N18.4 CKD (CHRONIC KIDNEY DISEASE) STAGE 4, GFR 15-29 ML/MIN: ICD-10-CM

## 2018-11-20 DIAGNOSIS — N13.8 BPH WITH URINARY OBSTRUCTION: ICD-10-CM

## 2018-11-20 DIAGNOSIS — N40.1 BPH WITH URINARY OBSTRUCTION: ICD-10-CM

## 2018-11-20 DIAGNOSIS — E11.21 DIABETIC NEPHROPATHY ASSOCIATED WITH TYPE 2 DIABETES MELLITUS: Chronic | ICD-10-CM

## 2018-11-20 DIAGNOSIS — M10.9 GOUT, ARTHRITIS: ICD-10-CM

## 2018-11-20 DIAGNOSIS — Z79.4 TYPE 2 DIABETES MELLITUS WITH DIABETIC POLYNEUROPATHY, WITH LONG-TERM CURRENT USE OF INSULIN: ICD-10-CM

## 2018-11-20 DIAGNOSIS — I10 ESSENTIAL HYPERTENSION: Chronic | ICD-10-CM

## 2018-11-20 DIAGNOSIS — E78.01 HYPERLIPIDEMIA TYPE II: ICD-10-CM

## 2018-11-20 DIAGNOSIS — E11.42 DIABETIC POLYNEUROPATHY ASSOCIATED WITH TYPE 2 DIABETES MELLITUS: Primary | Chronic | ICD-10-CM

## 2018-11-20 PROCEDURE — 3074F SYST BP LT 130 MM HG: CPT | Mod: CPTII,S$GLB,, | Performed by: INTERNAL MEDICINE

## 2018-11-20 PROCEDURE — 1101F PT FALLS ASSESS-DOCD LE1/YR: CPT | Mod: CPTII,S$GLB,, | Performed by: INTERNAL MEDICINE

## 2018-11-20 PROCEDURE — 99999 PR PBB SHADOW E&M-EST. PATIENT-LVL III: CPT | Mod: PBBFAC,,, | Performed by: INTERNAL MEDICINE

## 2018-11-20 PROCEDURE — 3078F DIAST BP <80 MM HG: CPT | Mod: CPTII,S$GLB,, | Performed by: INTERNAL MEDICINE

## 2018-11-20 PROCEDURE — 99214 OFFICE O/P EST MOD 30 MIN: CPT | Mod: S$GLB,,, | Performed by: INTERNAL MEDICINE

## 2018-11-20 PROCEDURE — 99499 UNLISTED E&M SERVICE: CPT | Mod: S$GLB,,, | Performed by: INTERNAL MEDICINE

## 2018-11-20 NOTE — PROGRESS NOTES
Subjective:       Patient ID: Giovanni Guerrero is a 78 y.o. male.    Chief Complaint: Diabetes    Pt here for f/u. Feeling well and has no c/o. Counseled on exercise goals.     DM2 is well controlled. Most recent A1C was 6.2. Pt reports sugars are in 90s-100s fasting, prandial sugars are generally less than 130. He has not had lows. He is on tresiba monotherapy at 15 units qhs. He does have nephropathy with CKD IV and microalbuminuria and his creatinine has been stable at 2.2-2.5. He is followed by nephrology but is due for f/u. He does have neuropathy but is not bothersome and manages without meds.      Pt's BP is well controlled. Tolerating meds well. Home readings are also controlled. Pt denies cp/sob/ha/vision or neuro changes.     HLD is tx with tricor and zocor which he tolerates well.    Gout has been quiet. No recent attacks.      He has BPH with urinary obstruction but does well with flomax twice daily. He follows with urology regularly.         Diabetes   Pertinent negatives for hypoglycemia include no headaches. Pertinent negatives for diabetes include no chest pain and no polyuria.   Hypertension   Pertinent negatives include no chest pain, headaches or shortness of breath.     Review of Systems   Constitutional: Negative for appetite change, fever and unexpected weight change.   Eyes: Negative for visual disturbance.   Respiratory: Negative for shortness of breath.    Cardiovascular: Negative for chest pain.   Gastrointestinal: Negative for abdominal pain, blood in stool, constipation and diarrhea.   Endocrine: Negative for polyuria.   Genitourinary: Negative for frequency.   Neurological: Negative for light-headedness and headaches.   Psychiatric/Behavioral: Negative for dysphoric mood.       Objective:      Physical Exam   Constitutional: He is oriented to person, place, and time. He appears well-developed and well-nourished.   HENT:   Head: Normocephalic and atraumatic.   Eyes: EOM are normal. Pupils  are equal, round, and reactive to light.   Neck: Neck supple. Carotid bruit is not present. No thyromegaly present.   Cardiovascular: Normal rate, regular rhythm, S1 normal, S2 normal and normal heart sounds.   No murmur heard.  Pulmonary/Chest: Effort normal and breath sounds normal. He has no wheezes.   Abdominal: Soft. Bowel sounds are normal. He exhibits no mass. There is no hepatosplenomegaly. There is no tenderness.   Musculoskeletal: He exhibits no edema.        Lymphadenopathy:     He has no cervical adenopathy.   Neurological: He is alert and oriented to person, place, and time. No cranial nerve deficit.   Psychiatric: He has a normal mood and affect. His behavior is normal.       Assessment:       1. Diabetic polyneuropathy associated with type 2 diabetes mellitus    2. Essential hypertension    3. Hyperlipidemia type II    4. BPH with urinary obstruction    5. CKD (chronic kidney disease) stage 4, GFR 15-29 ml/min    6. Diabetic nephropathy associated with type 2 diabetes mellitus    7. Type 2 diabetes mellitus with diabetic polyneuropathy, with long-term current use of insulin    8. Gout, arthritis        Plan:       1. Appropriate labs     2. Continue current meds  3. Continue with home BS and BP readings  4. Keep f/u with specialists

## 2018-11-20 NOTE — PROGRESS NOTES
Patient, Giovanni Guerrero (MRN #[id number]), presented with a recorded BMI of 35 kg/m^2 and a documented comorbidity(s):  - Diabetes Mellitus Type 2  - Hypertension  - Hyperlipidemia  to which the severe obesity is a contributing factor. This is consistent with the definition of severe obesity (BMI 35.0-39.9) with comorbidity (ICD-10 E66.01, Z68.35). The patient's severe obesity was monitored, evaluated, addressed and/or treated. This addendum to the medical record is made on 11/20/2018.

## 2018-11-21 ENCOUNTER — TELEPHONE (OUTPATIENT)
Dept: INTERNAL MEDICINE | Facility: CLINIC | Age: 78
End: 2018-11-21

## 2018-11-21 ENCOUNTER — LAB VISIT (OUTPATIENT)
Dept: LAB | Facility: OTHER | Age: 78
End: 2018-11-21
Attending: INTERNAL MEDICINE
Payer: MEDICARE

## 2018-11-21 DIAGNOSIS — N18.30 CHRONIC KIDNEY DISEASE (CKD), STAGE III (MODERATE): ICD-10-CM

## 2018-11-21 LAB
ALBUMIN SERPL BCP-MCNC: 3.3 G/DL
ANION GAP SERPL CALC-SCNC: 9 MMOL/L
BUN SERPL-MCNC: 40 MG/DL
CALCIUM SERPL-MCNC: 9.5 MG/DL
CHLORIDE SERPL-SCNC: 104 MMOL/L
CO2 SERPL-SCNC: 26 MMOL/L
CREAT SERPL-MCNC: 2 MG/DL
EST. GFR  (AFRICAN AMERICAN): 36 ML/MIN/1.73 M^2
EST. GFR  (NON AFRICAN AMERICAN): 31 ML/MIN/1.73 M^2
GLUCOSE SERPL-MCNC: 62 MG/DL
PHOSPHATE SERPL-MCNC: 3.1 MG/DL
POTASSIUM SERPL-SCNC: 4.1 MMOL/L
SODIUM SERPL-SCNC: 139 MMOL/L

## 2018-11-21 PROCEDURE — 80069 RENAL FUNCTION PANEL: CPT

## 2018-11-21 NOTE — TELEPHONE ENCOUNTER
Called pt and informed him that cholesterol looks good. No changes are needed at this time.  pt showed verbal understanding

## 2018-12-03 ENCOUNTER — IMMUNIZATION (OUTPATIENT)
Dept: PHARMACY | Facility: CLINIC | Age: 78
End: 2018-12-03
Payer: MEDICARE

## 2018-12-04 DIAGNOSIS — N40.1 BPH WITH URINARY OBSTRUCTION: ICD-10-CM

## 2018-12-04 DIAGNOSIS — N13.8 BPH WITH URINARY OBSTRUCTION: ICD-10-CM

## 2018-12-04 RX ORDER — TAMSULOSIN HYDROCHLORIDE 0.4 MG/1
CAPSULE ORAL
Qty: 180 CAPSULE | Refills: 3 | Status: SHIPPED | OUTPATIENT
Start: 2018-12-04 | End: 2019-11-11 | Stop reason: SDUPTHER

## 2018-12-07 RX ORDER — INSULIN DEGLUDEC 100 U/ML
INJECTION, SOLUTION SUBCUTANEOUS
Qty: 9 ML | Refills: 3 | Status: SHIPPED | OUTPATIENT
Start: 2018-12-07 | End: 2019-08-13 | Stop reason: SDUPTHER

## 2019-01-16 ENCOUNTER — OFFICE VISIT (OUTPATIENT)
Dept: NEPHROLOGY | Facility: CLINIC | Age: 79
End: 2019-01-16
Payer: MEDICARE

## 2019-01-16 VITALS
WEIGHT: 189 LBS | DIASTOLIC BLOOD PRESSURE: 80 MMHG | BODY MASS INDEX: 34.78 KG/M2 | HEIGHT: 62 IN | HEART RATE: 70 BPM | OXYGEN SATURATION: 97 % | SYSTOLIC BLOOD PRESSURE: 112 MMHG

## 2019-01-16 DIAGNOSIS — N18.4 CKD (CHRONIC KIDNEY DISEASE) STAGE 4, GFR 15-29 ML/MIN: Primary | ICD-10-CM

## 2019-01-16 DIAGNOSIS — N13.8 BPH WITH URINARY OBSTRUCTION: ICD-10-CM

## 2019-01-16 DIAGNOSIS — I10 ESSENTIAL HYPERTENSION: Chronic | ICD-10-CM

## 2019-01-16 DIAGNOSIS — E78.01 HYPERLIPIDEMIA TYPE II: ICD-10-CM

## 2019-01-16 DIAGNOSIS — M89.8X9 METABOLIC BONE DISEASE: ICD-10-CM

## 2019-01-16 DIAGNOSIS — N40.1 BPH WITH URINARY OBSTRUCTION: ICD-10-CM

## 2019-01-16 PROCEDURE — 99999 PR PBB SHADOW E&M-EST. PATIENT-LVL III: CPT | Mod: PBBFAC,,, | Performed by: INTERNAL MEDICINE

## 2019-01-16 PROCEDURE — 3079F DIAST BP 80-89 MM HG: CPT | Mod: CPTII,S$GLB,, | Performed by: INTERNAL MEDICINE

## 2019-01-16 PROCEDURE — 3079F PR MOST RECENT DIASTOLIC BLOOD PRESSURE 80-89 MM HG: ICD-10-PCS | Mod: CPTII,S$GLB,, | Performed by: INTERNAL MEDICINE

## 2019-01-16 PROCEDURE — 99999 PR PBB SHADOW E&M-EST. PATIENT-LVL III: ICD-10-PCS | Mod: PBBFAC,,, | Performed by: INTERNAL MEDICINE

## 2019-01-16 PROCEDURE — 3074F PR MOST RECENT SYSTOLIC BLOOD PRESSURE < 130 MM HG: ICD-10-PCS | Mod: CPTII,S$GLB,, | Performed by: INTERNAL MEDICINE

## 2019-01-16 PROCEDURE — 99213 PR OFFICE/OUTPT VISIT, EST, LEVL III, 20-29 MIN: ICD-10-PCS | Mod: S$GLB,,, | Performed by: INTERNAL MEDICINE

## 2019-01-16 PROCEDURE — 99499 RISK ADDL DX/OHS AUDIT: ICD-10-PCS | Mod: S$GLB,,, | Performed by: INTERNAL MEDICINE

## 2019-01-16 PROCEDURE — 99499 UNLISTED E&M SERVICE: CPT | Mod: S$GLB,,, | Performed by: INTERNAL MEDICINE

## 2019-01-16 PROCEDURE — 99213 OFFICE O/P EST LOW 20 MIN: CPT | Mod: S$GLB,,, | Performed by: INTERNAL MEDICINE

## 2019-01-16 PROCEDURE — 3074F SYST BP LT 130 MM HG: CPT | Mod: CPTII,S$GLB,, | Performed by: INTERNAL MEDICINE

## 2019-01-16 PROCEDURE — 1101F PT FALLS ASSESS-DOCD LE1/YR: CPT | Mod: CPTII,S$GLB,, | Performed by: INTERNAL MEDICINE

## 2019-01-16 PROCEDURE — 1101F PR PT FALLS ASSESS DOC 0-1 FALLS W/OUT INJ PAST YR: ICD-10-PCS | Mod: CPTII,S$GLB,, | Performed by: INTERNAL MEDICINE

## 2019-01-16 RX ORDER — HYDRALAZINE HYDROCHLORIDE 100 MG/1
100 TABLET, FILM COATED ORAL EVERY 12 HOURS
Qty: 60 TABLET | Refills: 6 | Status: SHIPPED | OUTPATIENT
Start: 2019-01-16 | End: 2019-02-12 | Stop reason: SDUPTHER

## 2019-01-16 RX ORDER — PEN NEEDLE, DIABETIC 31 GX5/16"
NEEDLE, DISPOSABLE MISCELLANEOUS
Refills: 3 | COMMUNITY
Start: 2019-01-06 | End: 2019-05-10 | Stop reason: SDUPTHER

## 2019-01-16 NOTE — PROGRESS NOTES
Subjective:       Patient ID: Giovanni Guerrero is a 78 y.o. White male who presents for follow-up evaluation of renal function  Patient of Dr. Welsh , first visit with me.    HPI   77 yo WM with HTN, DM, CKD 3- 4  Baseline crt 2.1-2.2 gfr 30 cc/min, currently 2.0 gfr 36  proteinuria Hbg A1c 6.4  bilateral medical disease on renal US 2014 with L>R kidney, UPRCT 0.3 in 2017  Not on RAAS inhibition and on fenofibrate 145 mg daily   The patient has no symptoms of fatigue, shortness of breath, chest pain, peripheral edema, flank pain, dysuria, hematuria, foamy urine, orange colored urine, nephrolithiasis,  diarrhea, constipation, lower extremity leg cramping, headache, nausea and vomiting, or weakness.            Review of Systems   Constitutional: Negative for activity change, appetite change, chills, diaphoresis, fatigue, fever and unexpected weight change.   HENT: Negative for congestion, ear discharge, ear pain, facial swelling, hearing loss, nosebleeds, sinus pressure, sore throat and trouble swallowing.    Eyes: Negative for photophobia, pain, discharge, redness, itching and visual disturbance.   Respiratory: Negative for apnea, cough, chest tightness, shortness of breath and wheezing.    Cardiovascular: Negative for chest pain, palpitations and leg swelling.   Gastrointestinal: Negative for abdominal distention, abdominal pain, constipation, diarrhea and vomiting.   Endocrine: Negative for cold intolerance, heat intolerance, polydipsia and polyuria.   Genitourinary: Negative for decreased urine volume, difficulty urinating, dysuria, flank pain, frequency, hematuria, scrotal swelling, testicular pain and urgency.   Musculoskeletal: Negative for arthralgias, back pain, gait problem, joint swelling, myalgias, neck pain and neck stiffness.   Skin: Negative for color change, pallor, rash and wound.   Allergic/Immunologic: Negative for environmental allergies and food allergies.   Neurological: Negative for dizziness,  "tremors, seizures, syncope, facial asymmetry, speech difficulty, weakness, light-headedness, numbness and headaches.   Hematological: Negative for adenopathy. Does not bruise/bleed easily.   Psychiatric/Behavioral: Negative for agitation, behavioral problems, dysphoric mood and sleep disturbance. The patient is not nervous/anxious.        Objective:     Blood pressure 112/80, pulse 70, height 5' 2" (1.575 m), weight 85.7 kg (189 lb), SpO2 97 %.      Physical Exam   Constitutional: He is oriented to person, place, and time. He appears well-developed and well-nourished. No distress.   nad    HENT:   Head: Normocephalic and atraumatic.   Mouth/Throat: Oropharynx is clear and moist. No oropharyngeal exudate.   Eyes: Conjunctivae and EOM are normal. Pupils are equal, round, and reactive to light. Right eye exhibits no discharge. Left eye exhibits no discharge. No scleral icterus.   Neck: Normal range of motion. Neck supple. No JVD present. No tracheal deviation present. No thyromegaly present.   Cardiovascular: Normal rate, regular rhythm and normal heart sounds. Exam reveals no gallop and no friction rub.   No murmur heard.  No peripheral edema    Pulmonary/Chest: Effort normal and breath sounds normal. No stridor. No respiratory distress. He has no wheezes. He has no rales. He exhibits no tenderness.   Abdominal: Soft. Bowel sounds are normal. He exhibits no distension and no mass. There is no tenderness. There is no rebound and no guarding. No hernia.   Musculoskeletal: Normal range of motion. He exhibits no edema or tenderness.   Lymphadenopathy:     He has no cervical adenopathy.   Neurological: He is alert and oriented to person, place, and time. No cranial nerve deficit. He exhibits normal muscle tone. Coordination normal.   Skin: Skin is warm and dry. No rash noted. He is not diaphoretic. No erythema. No pallor.   Psychiatric: He has a normal mood and affect. His behavior is normal. Judgment and thought content " normal.   Nursing note and vitals reviewed.      Assessment:       No diagnosis found.    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

## 2019-01-16 NOTE — PATIENT INSTRUCTIONS
Stop fenofibrate     Speak with PCP about alternative medicines    Labs in 4 mo    ad rtc in 4 mo

## 2019-02-12 ENCOUNTER — TELEPHONE (OUTPATIENT)
Dept: NEPHROLOGY | Facility: CLINIC | Age: 79
End: 2019-02-12

## 2019-02-12 DIAGNOSIS — I10 ESSENTIAL HYPERTENSION: Chronic | ICD-10-CM

## 2019-02-12 RX ORDER — HYDRALAZINE HYDROCHLORIDE 100 MG/1
100 TABLET, FILM COATED ORAL EVERY 12 HOURS
Qty: 60 TABLET | Refills: 0 | Status: SHIPPED | OUTPATIENT
Start: 2019-02-12 | End: 2019-05-15 | Stop reason: SDUPTHER

## 2019-02-12 NOTE — TELEPHONE ENCOUNTER
"Pharmacy Authorization   Message Contents   Kristen PRESTON Staff   Caller: pt (Today,  9:49 AM)             OUT OF MED     FEELING EFFECTS OF BEING WITHOUT  MED     THIS WAS OK'ED IN January   BUT DIDN'T TRANSMIT    Says "Print"     Please call in today     ___________________________________________________________________________   hydrALAZINE (APRESOLINE) 100 MG tablet   Medication   Date: 1/16/2019 Department: [facility] - Nephrology Ordering/Authorizing: Emily Parker MD   Order Providers     Prescribing Provider Encounter Provider   MD Emily Walton MD   Medication Detail     Disp Refills Start End   hydrALAZINE (APRESOLINE) 100 MG tablet 60 tablet 6 1/16/2019   Sig - Route: Take 1 tablet (100 mg total) by mouth every 12 (twelve) hours. - Oral   Class: Print   Associated Diagnoses     Essential hypertension     Pharmacy     [id number] - [place] [address] AT [address] [facility] Gave pharmacy a call placed on hold several times VM was left for pharmacy medication refill 3 times medication orders was escript on 1/16 pharmacy stated they never received med escript or vm for refill.       "

## 2019-03-08 RX ORDER — AMLODIPINE BESYLATE 5 MG/1
5 TABLET ORAL DAILY
Qty: 90 TABLET | Refills: 3 | Status: SHIPPED | OUTPATIENT
Start: 2019-03-08 | End: 2019-09-30

## 2019-03-08 RX ORDER — ATORVASTATIN CALCIUM 40 MG/1
40 TABLET, FILM COATED ORAL DAILY
Qty: 90 TABLET | Refills: 3 | Status: SHIPPED | OUTPATIENT
Start: 2019-03-08 | End: 2020-03-10

## 2019-03-14 ENCOUNTER — PES CALL (OUTPATIENT)
Dept: ADMINISTRATIVE | Facility: CLINIC | Age: 79
End: 2019-03-14

## 2019-04-30 ENCOUNTER — TELEPHONE (OUTPATIENT)
Dept: INTERNAL MEDICINE | Facility: CLINIC | Age: 79
End: 2019-04-30

## 2019-04-30 NOTE — TELEPHONE ENCOUNTER
----- Message from Mirna Connor sent at 4/30/2019  9:49 AM CDT -----  Contact: Jenna/CHAVA  Name of Who is Calling:Jenna        What is the request in detail: Rep is requesting pt's clinic note, medical necessity and order for diabetic supplies.           Can the clinic reply by [facility]: N    What Number to Call Back if not in [facility]: Fax [phone]

## 2019-05-09 ENCOUNTER — PATIENT MESSAGE (OUTPATIENT)
Dept: ADMINISTRATIVE | Facility: HOSPITAL | Age: 79
End: 2019-05-09

## 2019-05-10 RX ORDER — PEN NEEDLE, DIABETIC 31 GX5/16"
NEEDLE, DISPOSABLE MISCELLANEOUS
Qty: 100 EACH | Refills: 0 | Status: SHIPPED | OUTPATIENT
Start: 2019-05-10 | End: 2019-08-13 | Stop reason: SDUPTHER

## 2019-05-15 DIAGNOSIS — I10 ESSENTIAL HYPERTENSION: Chronic | ICD-10-CM

## 2019-05-15 RX ORDER — HYDRALAZINE HYDROCHLORIDE 100 MG/1
100 TABLET, FILM COATED ORAL EVERY 12 HOURS
Qty: 180 TABLET | Refills: 3 | Status: SHIPPED | OUTPATIENT
Start: 2019-05-15 | End: 2019-09-30

## 2019-05-23 ENCOUNTER — OFFICE VISIT (OUTPATIENT)
Dept: INTERNAL MEDICINE | Facility: CLINIC | Age: 79
End: 2019-05-23
Payer: MEDICARE

## 2019-05-23 ENCOUNTER — LAB VISIT (OUTPATIENT)
Dept: LAB | Facility: OTHER | Age: 79
End: 2019-05-23
Attending: INTERNAL MEDICINE
Payer: MEDICARE

## 2019-05-23 VITALS
SYSTOLIC BLOOD PRESSURE: 132 MMHG | HEIGHT: 62 IN | BODY MASS INDEX: 35.62 KG/M2 | DIASTOLIC BLOOD PRESSURE: 84 MMHG | HEART RATE: 67 BPM | WEIGHT: 193.56 LBS

## 2019-05-23 DIAGNOSIS — E78.01 HYPERLIPIDEMIA TYPE II: ICD-10-CM

## 2019-05-23 DIAGNOSIS — N18.4 CKD (CHRONIC KIDNEY DISEASE) STAGE 4, GFR 15-29 ML/MIN: ICD-10-CM

## 2019-05-23 DIAGNOSIS — E11.42 DIABETIC POLYNEUROPATHY ASSOCIATED WITH TYPE 2 DIABETES MELLITUS: Chronic | ICD-10-CM

## 2019-05-23 DIAGNOSIS — M89.8X9 METABOLIC BONE DISEASE: ICD-10-CM

## 2019-05-23 DIAGNOSIS — E11.21 DIABETIC NEPHROPATHY ASSOCIATED WITH TYPE 2 DIABETES MELLITUS: Chronic | ICD-10-CM

## 2019-05-23 DIAGNOSIS — E11.42 TYPE 2 DIABETES MELLITUS WITH DIABETIC POLYNEUROPATHY, WITH LONG-TERM CURRENT USE OF INSULIN: ICD-10-CM

## 2019-05-23 DIAGNOSIS — M10.9 GOUT, ARTHRITIS: ICD-10-CM

## 2019-05-23 DIAGNOSIS — N13.8 BPH WITH URINARY OBSTRUCTION: ICD-10-CM

## 2019-05-23 DIAGNOSIS — Z79.4 TYPE 2 DIABETES MELLITUS WITH DIABETIC POLYNEUROPATHY, WITH LONG-TERM CURRENT USE OF INSULIN: ICD-10-CM

## 2019-05-23 DIAGNOSIS — I10 ESSENTIAL HYPERTENSION: Chronic | ICD-10-CM

## 2019-05-23 DIAGNOSIS — N40.1 BPH WITH URINARY OBSTRUCTION: ICD-10-CM

## 2019-05-23 DIAGNOSIS — I10 ESSENTIAL HYPERTENSION: Primary | Chronic | ICD-10-CM

## 2019-05-23 LAB
ALBUMIN SERPL BCP-MCNC: 3.6 G/DL (ref 3.5–5.2)
ALP SERPL-CCNC: 87 U/L (ref 55–135)
ALT SERPL W/O P-5'-P-CCNC: 20 U/L (ref 10–44)
ANION GAP SERPL CALC-SCNC: 8 MMOL/L (ref 8–16)
AST SERPL-CCNC: 20 U/L (ref 10–40)
BASOPHILS # BLD AUTO: 0.03 K/UL (ref 0–0.2)
BASOPHILS NFR BLD: 0.4 % (ref 0–1.9)
BILIRUB SERPL-MCNC: 0.9 MG/DL (ref 0.1–1)
BUN SERPL-MCNC: 33 MG/DL (ref 8–23)
CALCIUM SERPL-MCNC: 9.6 MG/DL (ref 8.7–10.5)
CHLORIDE SERPL-SCNC: 101 MMOL/L (ref 95–110)
CHOLEST SERPL-MCNC: 100 MG/DL (ref 120–199)
CHOLEST/HDLC SERPL: 3.3 {RATIO} (ref 2–5)
CO2 SERPL-SCNC: 27 MMOL/L (ref 23–29)
CREAT SERPL-MCNC: 1.8 MG/DL (ref 0.5–1.4)
DIFFERENTIAL METHOD: ABNORMAL
EOSINOPHIL # BLD AUTO: 0.2 K/UL (ref 0–0.5)
EOSINOPHIL NFR BLD: 3.3 % (ref 0–8)
ERYTHROCYTE [DISTWIDTH] IN BLOOD BY AUTOMATED COUNT: 13.3 % (ref 11.5–14.5)
EST. GFR  (AFRICAN AMERICAN): 41 ML/MIN/1.73 M^2
EST. GFR  (NON AFRICAN AMERICAN): 36 ML/MIN/1.73 M^2
ESTIMATED AVG GLUCOSE: 131 MG/DL (ref 68–131)
GLUCOSE SERPL-MCNC: 121 MG/DL (ref 70–110)
HBA1C MFR BLD HPLC: 6.2 % (ref 4–5.6)
HCT VFR BLD AUTO: 45.7 % (ref 40–54)
HDLC SERPL-MCNC: 30 MG/DL (ref 40–75)
HDLC SERPL: 30 % (ref 20–50)
HGB BLD-MCNC: 15.6 G/DL (ref 14–18)
LDLC SERPL CALC-MCNC: 47.6 MG/DL (ref 63–159)
LYMPHOCYTES # BLD AUTO: 2.7 K/UL (ref 1–4.8)
LYMPHOCYTES NFR BLD: 37.5 % (ref 18–48)
MCH RBC QN AUTO: 31.3 PG (ref 27–31)
MCHC RBC AUTO-ENTMCNC: 34.1 G/DL (ref 32–36)
MCV RBC AUTO: 92 FL (ref 82–98)
MONOCYTES # BLD AUTO: 0.9 K/UL (ref 0.3–1)
MONOCYTES NFR BLD: 11.8 % (ref 4–15)
NEUTROPHILS # BLD AUTO: 3.4 K/UL (ref 1.8–7.7)
NEUTROPHILS NFR BLD: 46.9 % (ref 38–73)
NONHDLC SERPL-MCNC: 70 MG/DL
PLATELET # BLD AUTO: 193 K/UL (ref 150–350)
PMV BLD AUTO: 9.9 FL (ref 9.2–12.9)
POTASSIUM SERPL-SCNC: 4.2 MMOL/L (ref 3.5–5.1)
PROT SERPL-MCNC: 7.1 G/DL (ref 6–8.4)
RBC # BLD AUTO: 4.98 M/UL (ref 4.6–6.2)
SODIUM SERPL-SCNC: 136 MMOL/L (ref 136–145)
TRIGL SERPL-MCNC: 112 MG/DL (ref 30–150)
WBC # BLD AUTO: 7.18 K/UL (ref 3.9–12.7)

## 2019-05-23 PROCEDURE — 1101F PR PT FALLS ASSESS DOC 0-1 FALLS W/OUT INJ PAST YR: ICD-10-PCS | Mod: CPTII,S$GLB,, | Performed by: INTERNAL MEDICINE

## 2019-05-23 PROCEDURE — 36415 COLL VENOUS BLD VENIPUNCTURE: CPT

## 2019-05-23 PROCEDURE — 99999 PR PBB SHADOW E&M-EST. PATIENT-LVL III: ICD-10-PCS | Mod: PBBFAC,,, | Performed by: INTERNAL MEDICINE

## 2019-05-23 PROCEDURE — 1101F PT FALLS ASSESS-DOCD LE1/YR: CPT | Mod: CPTII,S$GLB,, | Performed by: INTERNAL MEDICINE

## 2019-05-23 PROCEDURE — 83036 HEMOGLOBIN GLYCOSYLATED A1C: CPT

## 2019-05-23 PROCEDURE — 3075F PR MOST RECENT SYSTOLIC BLOOD PRESS GE 130-139MM HG: ICD-10-PCS | Mod: CPTII,S$GLB,, | Performed by: INTERNAL MEDICINE

## 2019-05-23 PROCEDURE — 3079F DIAST BP 80-89 MM HG: CPT | Mod: CPTII,S$GLB,, | Performed by: INTERNAL MEDICINE

## 2019-05-23 PROCEDURE — 99999 PR PBB SHADOW E&M-EST. PATIENT-LVL III: CPT | Mod: PBBFAC,,, | Performed by: INTERNAL MEDICINE

## 2019-05-23 PROCEDURE — 99214 OFFICE O/P EST MOD 30 MIN: CPT | Mod: S$GLB,,, | Performed by: INTERNAL MEDICINE

## 2019-05-23 PROCEDURE — 99214 PR OFFICE/OUTPT VISIT, EST, LEVL IV, 30-39 MIN: ICD-10-PCS | Mod: S$GLB,,, | Performed by: INTERNAL MEDICINE

## 2019-05-23 PROCEDURE — 99499 UNLISTED E&M SERVICE: CPT | Mod: S$GLB,,, | Performed by: INTERNAL MEDICINE

## 2019-05-23 PROCEDURE — 3075F SYST BP GE 130 - 139MM HG: CPT | Mod: CPTII,S$GLB,, | Performed by: INTERNAL MEDICINE

## 2019-05-23 PROCEDURE — 80053 COMPREHEN METABOLIC PANEL: CPT

## 2019-05-23 PROCEDURE — 3079F PR MOST RECENT DIASTOLIC BLOOD PRESSURE 80-89 MM HG: ICD-10-PCS | Mod: CPTII,S$GLB,, | Performed by: INTERNAL MEDICINE

## 2019-05-23 PROCEDURE — 80061 LIPID PANEL: CPT

## 2019-05-23 PROCEDURE — 85025 COMPLETE CBC W/AUTO DIFF WBC: CPT

## 2019-05-23 PROCEDURE — 99499 RISK ADDL DX/OHS AUDIT: ICD-10-PCS | Mod: S$GLB,,, | Performed by: INTERNAL MEDICINE

## 2019-05-23 NOTE — PROGRESS NOTES
Patient, Giovanni Guerrero (MRN #[id number]), presented with a recorded BMI of 35.4 kg/m^2 and a documented comorbidity(s):  - Diabetes Mellitus Type 2  - Hypertension  - Hyperlipidemia  to which the severe obesity is a contributing factor. This is consistent with the definition of severe obesity (BMI 35.0-39.9) with comorbidity (ICD-10 E66.01, Z68.35). The patient's severe obesity was monitored, evaluated, addressed and/or treated. This addendum to the medical record is made on 05/23/2019.

## 2019-06-26 ENCOUNTER — HOSPITAL ENCOUNTER (EMERGENCY)
Facility: OTHER | Age: 79
Discharge: HOME | End: 2019-06-26
Attending: EMERGENCY MEDICINE
Payer: MEDICARE

## 2019-06-26 VITALS
WEIGHT: 191 LBS | BODY MASS INDEX: 35.15 KG/M2 | OXYGEN SATURATION: 95 % | DIASTOLIC BLOOD PRESSURE: 77 MMHG | TEMPERATURE: 98 F | SYSTOLIC BLOOD PRESSURE: 144 MMHG | HEIGHT: 62 IN | RESPIRATION RATE: 22 BRPM | HEART RATE: 84 BPM

## 2019-06-26 DIAGNOSIS — R19.7 DIARRHEA, UNSPECIFIED TYPE: ICD-10-CM

## 2019-06-26 DIAGNOSIS — R33.9 URINARY RETENTION: Primary | ICD-10-CM

## 2019-06-26 LAB
ALBUMIN SERPL BCP-MCNC: 3.7 G/DL (ref 3.5–5.2)
ALP SERPL-CCNC: 103 U/L (ref 55–135)
ALT SERPL W/O P-5'-P-CCNC: 25 U/L (ref 10–44)
ANION GAP SERPL CALC-SCNC: 13 MMOL/L (ref 8–16)
AST SERPL-CCNC: 25 U/L (ref 10–40)
BASOPHILS # BLD AUTO: 0.03 K/UL (ref 0–0.2)
BASOPHILS NFR BLD: 0.3 % (ref 0–1.9)
BILIRUB SERPL-MCNC: 1.2 MG/DL (ref 0.1–1)
BILIRUB UR QL STRIP: NEGATIVE
BUN SERPL-MCNC: 39 MG/DL (ref 8–23)
CALCIUM SERPL-MCNC: 10.1 MG/DL (ref 8.7–10.5)
CHLORIDE SERPL-SCNC: 100 MMOL/L (ref 95–110)
CLARITY UR: CLEAR
CO2 SERPL-SCNC: 20 MMOL/L (ref 23–29)
COLOR UR: YELLOW
CREAT SERPL-MCNC: 1.8 MG/DL (ref 0.5–1.4)
DIFFERENTIAL METHOD: ABNORMAL
EOSINOPHIL # BLD AUTO: 0.1 K/UL (ref 0–0.5)
EOSINOPHIL NFR BLD: 0.7 % (ref 0–8)
ERYTHROCYTE [DISTWIDTH] IN BLOOD BY AUTOMATED COUNT: 13.4 % (ref 11.5–14.5)
EST. GFR  (AFRICAN AMERICAN): 41 ML/MIN/1.73 M^2
EST. GFR  (NON AFRICAN AMERICAN): 36 ML/MIN/1.73 M^2
GLUCOSE SERPL-MCNC: 177 MG/DL (ref 70–110)
GLUCOSE UR QL STRIP: NEGATIVE
HCT VFR BLD AUTO: 47 % (ref 40–54)
HGB BLD-MCNC: 16.5 G/DL (ref 14–18)
HGB UR QL STRIP: NEGATIVE
KETONES UR QL STRIP: NEGATIVE
LEUKOCYTE ESTERASE UR QL STRIP: NEGATIVE
LYMPHOCYTES # BLD AUTO: 1.7 K/UL (ref 1–4.8)
LYMPHOCYTES NFR BLD: 14.9 % (ref 18–48)
MCH RBC QN AUTO: 30.7 PG (ref 27–31)
MCHC RBC AUTO-ENTMCNC: 35.1 G/DL (ref 32–36)
MCV RBC AUTO: 88 FL (ref 82–98)
MONOCYTES # BLD AUTO: 0.6 K/UL (ref 0.3–1)
MONOCYTES NFR BLD: 5.1 % (ref 4–15)
NEUTROPHILS # BLD AUTO: 8.9 K/UL (ref 1.8–7.7)
NEUTROPHILS NFR BLD: 79 % (ref 38–73)
NITRITE UR QL STRIP: NEGATIVE
PH UR STRIP: 5 [PH] (ref 5–8)
PLATELET # BLD AUTO: 217 K/UL (ref 150–350)
PMV BLD AUTO: 10.1 FL (ref 9.2–12.9)
POTASSIUM SERPL-SCNC: 4.3 MMOL/L (ref 3.5–5.1)
PROT SERPL-MCNC: 7.7 G/DL (ref 6–8.4)
PROT UR QL STRIP: NEGATIVE
RBC # BLD AUTO: 5.37 M/UL (ref 4.6–6.2)
SODIUM SERPL-SCNC: 133 MMOL/L (ref 136–145)
SP GR UR STRIP: 1.02 (ref 1–1.03)
URN SPEC COLLECT METH UR: ABNORMAL
UROBILINOGEN UR STRIP-ACNC: ABNORMAL EU/DL
WBC # BLD AUTO: 11.31 K/UL (ref 3.9–12.7)

## 2019-06-26 PROCEDURE — 51702 INSERT TEMP BLADDER CATH: CPT

## 2019-06-26 PROCEDURE — 85025 COMPLETE CBC W/AUTO DIFF WBC: CPT

## 2019-06-26 PROCEDURE — 96360 HYDRATION IV INFUSION INIT: CPT | Mod: 59

## 2019-06-26 PROCEDURE — 99284 EMERGENCY DEPT VISIT MOD MDM: CPT | Mod: 25

## 2019-06-26 PROCEDURE — 25000003 PHARM REV CODE 250: Performed by: EMERGENCY MEDICINE

## 2019-06-26 PROCEDURE — 81003 URINALYSIS AUTO W/O SCOPE: CPT

## 2019-06-26 PROCEDURE — 80053 COMPREHEN METABOLIC PANEL: CPT

## 2019-06-26 RX ORDER — CIPROFLOXACIN 500 MG/1
500 TABLET ORAL 2 TIMES DAILY
Qty: 10 TABLET | Refills: 0 | Status: SHIPPED | OUTPATIENT
Start: 2019-06-26 | End: 2019-07-01

## 2019-06-26 RX ORDER — LOPERAMIDE HYDROCHLORIDE 2 MG/1
2 CAPSULE ORAL 4 TIMES DAILY PRN
Qty: 12 CAPSULE | Refills: 0 | Status: SHIPPED | OUTPATIENT
Start: 2019-06-26 | End: 2019-07-06

## 2019-06-26 RX ORDER — LOPERAMIDE HYDROCHLORIDE 2 MG/1
4 CAPSULE ORAL
Status: COMPLETED | OUTPATIENT
Start: 2019-06-26 | End: 2019-06-26

## 2019-06-26 RX ADMIN — LOPERAMIDE HYDROCHLORIDE 4 MG: 2 CAPSULE ORAL at 03:06

## 2019-06-26 RX ADMIN — SODIUM CHLORIDE 1000 ML: 0.9 INJECTION, SOLUTION INTRAVENOUS at 01:06

## 2019-06-26 NOTE — ED PROVIDER NOTES
"Encounter Date: 6/26/2019    SCRIBE #1 NOTE: I, Nigel Pike, am scribing for, and in the presence of, Dr. Shetty.       History     Chief Complaint   Patient presents with    Urinary Retention     " The last time I could pee was at 3AM this morning. I had diarrhea every hour since 3AM too". Denies abd pains, cramping or discomfort. No fever or chills     Time seen by provider: 1:11 PM    This is a 79 y.o. male who presents with complaint of urinary retention that began approximately ten hours ago. The patient reports that he has had similar symptoms approximately one year ago. He states that his rentention was caused by his enlarged prostate. He reports that he was given a catheter and had it in for nine days. The patient reports that she is also experiencing diarrhea that began today. He denies eating anything abnormal, recent travel, and antibiotic use. She denies fever, sore throat, chest pain, shortness of breath, nausea, and dysuria.     The history is provided by the patient.     Review of patient's allergies indicates:   Allergen Reactions    Actos [pioglitazone] Other (See Comments)     constipation     Past Medical History:   Diagnosis Date    Allergy     Colon polyp     Diabetes mellitus type II     Gout, unspecified     Hyperlipidemia     Hypertension     Renal insufficiency      Past Surgical History:   Procedure Laterality Date    APPENDECTOMY      EYE SURGERY      cataract    HERNIA REPAIR      TONSILLECTOMY       Family History   Problem Relation Age of Onset    Cancer Mother         ovarian cancer    Heart disease Father         MI at 57    Diabetes Father     Cerebral aneurysm Sister     Heart disease Brother         MI at 60    Heart disease Brother         CABG     Social History     Tobacco Use    Smoking status: Never Smoker    Smokeless tobacco: Never Used   Substance Use Topics    Alcohol use: No    Drug use: No     Review of Systems   Constitutional: Negative for " fever.   HENT: Negative for sore throat.    Respiratory: Negative for shortness of breath.    Cardiovascular: Negative for chest pain.   Gastrointestinal: Negative for nausea.   Genitourinary: Negative for dysuria.        Positive for urinary retention.    Musculoskeletal: Negative for back pain.   Skin: Negative for rash.   Neurological: Negative for weakness.   Hematological: Does not bruise/bleed easily.       Physical Exam     Initial Vitals [06/26/19 1300]   BP Pulse Resp Temp SpO2   (!) 141/64 84 (!) 22 97.6 °F (36.4 °C) 95 %      MAP       --         Physical Exam    Nursing note and vitals reviewed.  Constitutional: He appears well-developed and well-nourished. He is not diaphoretic. No distress.   HENT:   Head: Normocephalic and atraumatic.   Mouth/Throat: Oropharynx is clear and moist.   Mildly dehydrated.    Eyes: Conjunctivae and EOM are normal. Pupils are equal, round, and reactive to light.   Neck: Normal range of motion.   Cardiovascular: Normal rate, regular rhythm and normal heart sounds. Exam reveals no gallop and no friction rub.    No murmur heard.  Pulmonary/Chest: Breath sounds normal. No respiratory distress. He has no wheezes. He has no rhonchi. He has no rales.   Abdominal: Soft. There is no tenderness. There is no rebound and no guarding.   Fullness of the lower abdomen.    Musculoskeletal: Normal range of motion. He exhibits no edema or tenderness.   Neurological: He is alert and oriented to person, place, and time.   Skin: Skin is warm and dry. No rash and no abscess noted. No erythema. No pallor.   Psychiatric: He has a normal mood and affect. His behavior is normal. Judgment and thought content normal.         ED Course   Procedures  Labs Reviewed   CBC W/ AUTO DIFFERENTIAL - Abnormal; Notable for the following components:       Result Value    Gran # (ANC) 8.9 (*)     Gran% 79.0 (*)     Lymph% 14.9 (*)     All other components within normal limits   COMPREHENSIVE METABOLIC PANEL -  Abnormal; Notable for the following components:    Sodium 133 (*)     CO2 20 (*)     Glucose 177 (*)     BUN, Bld 39 (*)     Creatinine 1.8 (*)     Total Bilirubin 1.2 (*)     eGFR if  41 (*)     eGFR if non  36 (*)     All other components within normal limits   URINALYSIS, REFLEX TO URINE CULTURE - Abnormal; Notable for the following components:    Urobilinogen, UA 2.0-3.0 (*)     All other components within normal limits    Narrative:     Preferred Collection Type->Urine, Clean Catch          Imaging Results    None                     Scribe Attestation:   Scribe #1: I performed the above scribed service and the documentation accurately describes the services I performed. I attest to the accuracy of the note.    Attending Attestation:           Physician Attestation for Scribe:  Physician Attestation Statement for Scribe #1: I, Dr. Shetty, reviewed documentation, as scribed by Nigel Pike  in my presence, and it is both accurate and complete.         Attending ED Notes:   Patient presents with urinary retention which appears to be a recurrent problem. Will check urinalysis and ask nursing to put in a catheter. Patient is also complaining with mild dehydration, will check basic labs and fluids. Abdomen is benign and looks non toxic. No history of recent travel or use of antibiotics.              Clinical Impression:     1. Urinary retention    2. Diarrhea, unspecified type    '    Disposition:   Disposition: Discharged  Condition: Stable                        Chau Shetty MD  06/26/19 1919

## 2019-07-03 ENCOUNTER — OFFICE VISIT (OUTPATIENT)
Dept: UROLOGY | Facility: CLINIC | Age: 79
End: 2019-07-03
Payer: MEDICARE

## 2019-07-03 VITALS
SYSTOLIC BLOOD PRESSURE: 123 MMHG | BODY MASS INDEX: 35.09 KG/M2 | DIASTOLIC BLOOD PRESSURE: 76 MMHG | HEART RATE: 67 BPM | HEIGHT: 62 IN | WEIGHT: 190.69 LBS

## 2019-07-03 DIAGNOSIS — R33.9 URINARY RETENTION: Primary | ICD-10-CM

## 2019-07-03 DIAGNOSIS — N40.1 BPH WITH OBSTRUCTION/LOWER URINARY TRACT SYMPTOMS: ICD-10-CM

## 2019-07-03 DIAGNOSIS — N13.8 BPH WITH OBSTRUCTION/LOWER URINARY TRACT SYMPTOMS: ICD-10-CM

## 2019-07-03 DIAGNOSIS — Z97.8 FOLEY CATHETER PRESENT: ICD-10-CM

## 2019-07-03 DIAGNOSIS — Z46.6 ENCOUNTER FOR FOLEY CATHETER REMOVAL: ICD-10-CM

## 2019-07-03 PROCEDURE — 3078F PR MOST RECENT DIASTOLIC BLOOD PRESSURE < 80 MM HG: ICD-10-PCS | Mod: CPTII,S$GLB,, | Performed by: NURSE PRACTITIONER

## 2019-07-03 PROCEDURE — 99999 PR PBB SHADOW E&M-EST. PATIENT-LVL III: CPT | Mod: PBBFAC,,, | Performed by: NURSE PRACTITIONER

## 2019-07-03 PROCEDURE — 1101F PT FALLS ASSESS-DOCD LE1/YR: CPT | Mod: CPTII,S$GLB,, | Performed by: NURSE PRACTITIONER

## 2019-07-03 PROCEDURE — 99214 PR OFFICE/OUTPT VISIT, EST, LEVL IV, 30-39 MIN: ICD-10-PCS | Mod: 25,S$GLB,, | Performed by: NURSE PRACTITIONER

## 2019-07-03 PROCEDURE — 51700 PR IRRIGATION, BLADDER: ICD-10-PCS | Mod: S$GLB,,, | Performed by: NURSE PRACTITIONER

## 2019-07-03 PROCEDURE — 51700 IRRIGATION OF BLADDER: CPT | Mod: S$GLB,,, | Performed by: NURSE PRACTITIONER

## 2019-07-03 PROCEDURE — 3078F DIAST BP <80 MM HG: CPT | Mod: CPTII,S$GLB,, | Performed by: NURSE PRACTITIONER

## 2019-07-03 PROCEDURE — 99214 OFFICE O/P EST MOD 30 MIN: CPT | Mod: 25,S$GLB,, | Performed by: NURSE PRACTITIONER

## 2019-07-03 PROCEDURE — 1101F PR PT FALLS ASSESS DOC 0-1 FALLS W/OUT INJ PAST YR: ICD-10-PCS | Mod: CPTII,S$GLB,, | Performed by: NURSE PRACTITIONER

## 2019-07-03 PROCEDURE — 99999 PR PBB SHADOW E&M-EST. PATIENT-LVL III: ICD-10-PCS | Mod: PBBFAC,,, | Performed by: NURSE PRACTITIONER

## 2019-07-03 PROCEDURE — 3074F SYST BP LT 130 MM HG: CPT | Mod: CPTII,S$GLB,, | Performed by: NURSE PRACTITIONER

## 2019-07-03 PROCEDURE — 3074F PR MOST RECENT SYSTOLIC BLOOD PRESSURE < 130 MM HG: ICD-10-PCS | Mod: CPTII,S$GLB,, | Performed by: NURSE PRACTITIONER

## 2019-07-03 NOTE — PATIENT INSTRUCTIONS
Patient was instructed to drink plenty of fluids today.    Informed patient to return to clinic or emergency department (if after clinic hours) to have lester catheter put back in if unable to urinate within 5 hours of lester catheter removal or starts to experience bladder pressure/pain, decrease flow, straining/difficulty urinating.      -Continue flomax twice a day  -Avoid constipation  -Avoid Bladder Irritants: Tea, coffee, caffeine, alcohol, artificial sweeteners, citrus, spicy foods, acidic foods,chocolate, tomato-based foods, smoking

## 2019-07-03 NOTE — PROGRESS NOTES
CHIEF COMPLAINT:    Mr. Guerrero is a 79 y.o. male presenting for voiding trial and urinary retention.   PRESENTING ILLNESS:    Giovanni Guerrero is a 79 y.o. male with a PMH of urinary retention and BPH who presents for voiding trial and urinary retention. His last clinic visit was 2/23/18 with DAYANA Evans NP.    Hx of BPH with VANN, AUR  He manages BPH with flomax BID  Normally FOS is good; nocturia x 2-3  He follows nephrology for decreased kidney function.    Patient was seen in ED 6/26/19 for diarrhea and difficulty voiding. He received IVF in ED for possible dehydration. Lester catheter was placed for retention. Negative UA for infection.     Today he reports lester catheter has been draining without difficulty. Denies hematuria, flank pain, fever or chills, abdominal pain, or bladder spasms. Denies leaking of catheter. No complaints today in clinic. Was last seen for retention was 2/2018.      REVIEW OF SYSTEMS:    Review of Systems    Constitutional: Negative for fever and chills.   HENT: Negative for hearing loss.   Eyes: Wears glasses. Negative for eye discharge.   Respiratory: Negative for shortness of breath.   Cardiovascular: Negative for chest pain.   Gastrointestinal: Negative for nausea, vomiting, and constipation.   Genitourinary: See above  Neurological: Negative for dizziness.   Hematological: Does not bruise/bleed easily.   Psychiatric/Behavioral: Negative for confusion.       PATIENT HISTORY:    Past Medical History:   Diagnosis Date    Allergy     Colon polyp     Diabetes mellitus type II     Gout, unspecified     Hyperlipidemia     Hypertension     Renal insufficiency        Past Surgical History:   Procedure Laterality Date    APPENDECTOMY      EYE SURGERY      cataract    HERNIA REPAIR      TONSILLECTOMY         Family History   Problem Relation Age of Onset    Cancer Mother         ovarian cancer    Heart disease Father         MI at 57    Diabetes Father     Cerebral aneurysm Sister      Heart disease Brother         MI at 60    Heart disease Brother         CABG       Social History     Socioeconomic History    Marital status:      Spouse name: Not on file    Number of children: Not on file    Years of education: Not on file    Highest education level: Not on file   Occupational History    Not on file   Social Needs    Financial resource strain: Not on file    Food insecurity:     Worry: Not on file     Inability: Not on file    Transportation needs:     Medical: Not on file     Non-medical: Not on file   Tobacco Use    Smoking status: Never Smoker    Smokeless tobacco: Never Used   Substance and Sexual Activity    Alcohol use: No    Drug use: No    Sexual activity: Not Currently   Lifestyle    Physical activity:     Days per week: Not on file     Minutes per session: Not on file    Stress: Not on file   Relationships    Social connections:     Talks on phone: Not on file     Gets together: Not on file     Attends [employer] service: Not on file     Active member of club or organization: Not on file     Attends meetings of clubs or organizations: Not on file     Relationship status: Not on file   Other Topics Concern    Not on file   Social History Narrative     52 yrs, 3 childre (two living), 11 grandchildren & 4 great grandchildren       Allergies:  Actos [pioglitazone]    Medications:    Current Outpatient Medications:     amLODIPine (NORVASC) 5 MG tablet, Take 1 tablet (5 mg total) by mouth once daily., Disp: 90 tablet, Rfl: 3    aspirin (ECOTRIN) 81 MG EC tablet, Take 81 mg by mouth once daily., Disp: , Rfl:     atenolol (TENORMIN) 100 MG tablet, Take 1 tablet (100 mg total) by mouth once daily., Disp: 90 tablet, Rfl: 3    atorvastatin (LIPITOR) 40 MG tablet, Take 1 tablet (40 mg total) by mouth once daily., Disp: 90 tablet, Rfl: 3    BD INSULIN SYRINGE ULTRA-FINE 0.5 mL 31 gauge x 5/16 Syrg, USE WITH LANTUS INSULIN ONCE DAILY, Disp: 100 each, Rfl: 3     "BD ULTRA-FINE SHORT PEN NEEDLE 31 gauge x 5/16" Ndle, USE WITH LANTUS INSULIN ONCE DAILY, Disp: 100 each, Rfl: 0    blood sugar diagnostic Strp, Check sugar tid as directed, Disp: 300 each, Rfl: 3    cholecalciferol, vitamin D3, (VITAMIN D3) 2,000 unit Tab, Take by mouth once daily., Disp: , Rfl:     hydrALAZINE (APRESOLINE) 100 MG tablet, Take 1 tablet (100 mg total) by mouth every 12 (twelve) hours., Disp: 180 tablet, Rfl: 3    loperamide (IMODIUM) 2 mg capsule, Take 1 capsule (2 mg total) by mouth 4 (four) times daily as needed for Diarrhea., Disp: 12 capsule, Rfl: 0    OMEGA-3 ACID ETHYL ESTERS (OMACOR) 1 gram Cap, Take 1 g by mouth. 1 Capsule Oral Twice a day, Disp: , Rfl:     tamsulosin (FLOMAX) 0.4 mg Cap, TAKE 1 CAPSULE BY MOUTH TWICE A DAY, Disp: 180 capsule, Rfl: 3    TRESIBA FLEXTOUCH U-100 100 unit/mL (3 mL) InPn, INJECT 15 UNITS SUBCUTANEOUSLY EVERY EVENING, Disp: 9 mL, Rfl: 3    blood-glucose meter Misc, 1 kit by Misc.(Non-Drug; Combo Route) route 3 (three) times daily. Pt is to test blood sugar TID, Disp: 1 each, Rfl: 0    PHYSICAL EXAMINATION:    Constitutional: He is oriented to person, place, and time. He appears well-developed and well-nourished.  He is in no apparent distress.    Neck: Normal ROM.     Cardiovascular: Normal rate.      Pulmonary/Chest: Effort normal. No respiratory distress.     Abdominal:  He exhibits no distension or tenderness.  There is no CVA tenderness.     Lymphadenopathy:        Right: No supraclavicular adenopathy present.        Left: No supraclavicular adenopathy present.     Neurological: He is alert and oriented to person, place, and time.     Skin: Skin is warm and dry.     Extremities: Normal ROM    Psych: Cooperative with normal affect.    Genitourinary: Indwelling lester catheter in place.    Physical Exam      LABS:    Lab Results   Component Value Date    PSA 1.1 04/15/2014    PSA 1.88 08/22/2012    PSA 1.4 08/08/2011       IMPRESSION:    Encounter " Diagnoses   Name Primary?    Urinary retention Yes    BPH with obstruction/lower urinary tract symptoms     Encounter for Lester catheter removal     Lester catheter present          PLAN:  -Voiding trial performed by Nurse Thu. 240 ml of sterile water was instilled into bladder.  Lester catheter was removed. Patient urinated 200 ml without difficulty.  Voiding trial passed.  Patient was instructed to drink plenty of fluids today.  Informed patient to return to clinic or emergency department (if after clinic hours) to have lester catheter put back in if unable to urinate within 5 hours of lester catheter removal or starts to experience bladder pressure/pain, decrease flow, straining/difficulty urinating.    Patient voiced understanding  -Continue flomax twice a day  -Avoid constipation  -Avoid Bladder Irritants: Tea, coffee, caffeine, alcohol, artificial sweeteners, citrus, spicy foods, acidic foods,chocolate, tomato-based foods, smoking  -RTC 4 weeks for PVR check and prostate exam    I spent 40 minutes with the patient of which more than half was spent in coordinating the patient's care as well as in direct consultation with the patient in regards to our treatment and plan.

## 2019-07-04 ENCOUNTER — HOSPITAL ENCOUNTER (EMERGENCY)
Facility: OTHER | Age: 79
Discharge: HOME | End: 2019-07-04
Attending: EMERGENCY MEDICINE
Payer: MEDICARE

## 2019-07-04 VITALS
DIASTOLIC BLOOD PRESSURE: 68 MMHG | WEIGHT: 190 LBS | BODY MASS INDEX: 34.75 KG/M2 | TEMPERATURE: 98 F | OXYGEN SATURATION: 96 % | HEART RATE: 64 BPM | SYSTOLIC BLOOD PRESSURE: 147 MMHG | RESPIRATION RATE: 20 BRPM

## 2019-07-04 DIAGNOSIS — R33.8 ACUTE URINARY RETENTION: Primary | ICD-10-CM

## 2019-07-04 LAB
BILIRUB UR QL STRIP: NEGATIVE
CLARITY UR: CLEAR
COLOR UR: YELLOW
GLUCOSE UR QL STRIP: NEGATIVE
HGB UR QL STRIP: ABNORMAL
KETONES UR QL STRIP: NEGATIVE
LEUKOCYTE ESTERASE UR QL STRIP: NEGATIVE
NITRITE UR QL STRIP: NEGATIVE
PH UR STRIP: 6 [PH] (ref 5–8)
PROT UR QL STRIP: NEGATIVE
SP GR UR STRIP: 1.01 (ref 1–1.03)
URN SPEC COLLECT METH UR: ABNORMAL
UROBILINOGEN UR STRIP-ACNC: 1 EU/DL

## 2019-07-04 PROCEDURE — P9612 CATHETERIZE FOR URINE SPEC: HCPCS

## 2019-07-04 PROCEDURE — 81003 URINALYSIS AUTO W/O SCOPE: CPT

## 2019-07-04 PROCEDURE — 51798 US URINE CAPACITY MEASURE: CPT

## 2019-07-04 PROCEDURE — 99283 EMERGENCY DEPT VISIT LOW MDM: CPT | Mod: 25

## 2019-07-04 NOTE — ED PROVIDER NOTES
Encounter Date: 7/4/2019    SCRIBE #1 NOTE: I, María Webber, am scribing for, and in the presence of, Dr. Butler.       History     Chief Complaint   Patient presents with    Urinary Retention     The patient states that he had a catheter removed today and is having trouble passing urine again. He is scheduled for a cystoscope on August 1st. He is having bladder pain at this time. His last urination around 1430. He states that it trikled out. Started having pain around 2200     Time seen by provider: 1:12 AM    This is a 79 y.o. male who presents with complaint of urinary retention today. Pt was seen in this ED on 6/26 for urinary retention and had lester catheter placed at that time. He had it removed today in urology clinic, passing a voiding trial at that time. Pt states that he has not been able to urinate since going home. He reports drinking tea and water. He feels the urge to urinate with pain/pressure to the bladder.  He reports no hematuria. Of note, pt recently had diarrhea treated and resolved with imodium.     The history is provided by the patient, medical records and a relative.     Review of patient's allergies indicates:   Allergen Reactions    Actos [pioglitazone] Other (See Comments)     constipation     Past Medical History:   Diagnosis Date    Allergy     Colon polyp     Diabetes mellitus type II     Gout, unspecified     Hyperlipidemia     Hypertension     Renal insufficiency      Past Surgical History:   Procedure Laterality Date    APPENDECTOMY      EYE SURGERY      cataract    HERNIA REPAIR      TONSILLECTOMY       Family History   Problem Relation Age of Onset    Cancer Mother         ovarian cancer    Heart disease Father         MI at 57    Diabetes Father     Cerebral aneurysm Sister     Heart disease Brother         MI at 60    Heart disease Brother         CABG     Social History     Tobacco Use    Smoking status: Never Smoker    Smokeless tobacco: Never Used    Substance Use Topics    Alcohol use: No    Drug use: No     ROS: As per HPI and below:   General: No fever.   HENT: No facial pain.   Eyes: Negative for eye pain.   Cardiovascular: No chest pain.   Respiratory:  No dyspnea.   GI: Positive for suprapubic abdominal pain/pressure. No nausea. No vomiting. No diarrhea.   : Positive for urinary retention. No hematuria.   Skin: No rashes.   Neuro:  No syncope.  No focal deficits.   Musculoskeletal: No extremity pain.  All other systems reviewed and are negative.    Physical Exam     Initial Vitals [07/04/19 0049]   BP Pulse Resp Temp SpO2   116/74 79 20 97.9 °F (36.6 °C) 97 %      MAP       --         Nursing note and vitals reviewed.  BP (!) 147/68   Pulse 64   Temp 97.9 °F (36.6 °C) (Oral)   Resp 20   Wt 86.2 kg (190 lb)   SpO2 96%   BMI 34.75 kg/m²   Constitutional: AAOx3. No distress.  Eyes: EOMI. No discharge. Anicteric.  HENT:   Mouth/Throat: Oropharynx is clear and moist. Uvula midline.    Neck: Normal range of motion. Neck supple.  Cardiovascular: Normal rate. No murmur, no gallop and no friction rub heard.   Pulmonary/Chest: No respiratory distress. Effort normal. No wheezes, no rales, no rhonchi  Abdominal: Bowel sounds normal. Soft. No distension and no mass. There is no tenderness. There is no rebound, no guarding, no tenderness at McBurney's point and negative Ford's sign.   Genitourinary: Penis normal. Cremasteric reflex is present. Right testis shows no mass, no swelling and no tenderness. Left testis shows no mass, no swelling and no tenderness. No phimosis, paraphimosis or penile erythema. No discharge found.  Exam chaperoned by tech or RN.   Musculoskeletal: Normal range of motion.   Neurological: GCS 15. Alert and oriented to person, place, and time. No gross cranial nerve, light touch or strength deficit. Coordination normal.   Skin: Skin is warm and dry.   Psychiatric: Behavior is normal. Judgment normal.      ED Course   Procedures  Labs  Reviewed   URINALYSIS, REFLEX TO URINE CULTURE - Abnormal; Notable for the following components:       Result Value    Occult Blood UA Trace (*)     All other components within normal limits    Narrative:     Preferred Collection Type->Urine, Clean Catch          Imaging Results    None          Medical Decision Making:   Clinical Tests:   Lab Tests: Ordered and Reviewed            Scribe Attestation:   Scribe #1: I performed the above scribed service and the documentation accurately describes the services I performed. I attest to the accuracy of the note.    Attending Attestation:           Physician Attestation for Scribe:  Physician Attestation Statement for Scribe #1: I, Dr. Butler, reviewed documentation, as scribed by María Webber in my presence, and it is both accurate and complete.         Attending ED Notes:   Patient is a 79-year-old male with hypertension, diabetes, BPH who presents with acute urinary retention after passing trial of void at urology office earlier today.  Presentation suspicious for acute retention.  Differential also included acute urinary tract infection, acute obstruction due to clot of the patient denies any recent hematuria.  Patient had relief of his symptoms after Lozano catheter placement.  We will again discharge patient home with Lozano catheter, have the patient follow up with Urology.  I discussed with patient and/or guardian/caretaker that this evaluation in the ED does not suggest any emergent or life threatening medical condition requiring admission or immediate intervention beyond what was provided in the ED. Regardless, an unremarkable evaluation in the ED does not preclude the development or presence of a serious or life threatening condition. As such, patient was instructed to return immediately for any worsening or change in current symptoms.     I had a detailed discussion with patient  and/or guardian/caretaker regarding findings, plan, return precautions, importance  of medication adherence, need to follow-up with a PCP and specialist. All questions answered.     Note was created using voice recognition software. Note may have occasional typographical errors that may not have been identified and edited despite initial review prior to signing.               Clinical Impression:     1. Acute urinary retention                                 Nathan Butler MD  07/04/19 0457

## 2019-07-04 NOTE — DISCHARGE INSTRUCTIONS
-Continue flomax twice a day  -Avoid constipation  -Avoid Bladder Irritants: Tea, coffee, caffeine, alcohol, artificial sweeteners, citrus, spicy foods, acidic foods,chocolate, tomato-based foods, smoking      Call your primary care doctor to make the first available appointment.     Keep all your medical appointments.     Take your regular medication as prescribed. Contact your primary care provider before running out of medication, or for any problems obtaining them.    Do not drive or operate heavy machinery while taking opioid or muscle relaxing medications. Examples include norco, percocet, xanax, valium, flexeril.     Overuse or long term use of pain and sedating medication may lead to addiction, dependence, organ failure, family and work problems, legal problems, accidental overdose and death.    If you do not have health insurance, you probably qualify for heavily discounted rates:  Call [phone] ([facility] hotline) or go to [email or web address].gov    Your evaluation in the ED does not suggest any emergent or life threatening medical condition requiring admission or immediate intervention beyond that provided in the ED.   However, the signs of a serious problem sometimes take more time to appear.   RETURN TO THE ER if any of the following occur:    Weakness, dizziness, fainting, or loss of consciousness   Fever of 100.4ºF (38ºC) or higher  Any worse symptoms  Any new or concerning symptoms

## 2019-07-11 ENCOUNTER — OFFICE VISIT (OUTPATIENT)
Dept: UROLOGY | Facility: CLINIC | Age: 79
End: 2019-07-11
Payer: MEDICARE

## 2019-07-11 VITALS
BODY MASS INDEX: 34.98 KG/M2 | HEIGHT: 62 IN | WEIGHT: 190.06 LBS | HEART RATE: 73 BPM | DIASTOLIC BLOOD PRESSURE: 69 MMHG | SYSTOLIC BLOOD PRESSURE: 132 MMHG

## 2019-07-11 DIAGNOSIS — N13.8 BPH WITH OBSTRUCTION/LOWER URINARY TRACT SYMPTOMS: Primary | ICD-10-CM

## 2019-07-11 DIAGNOSIS — N40.1 BPH WITH OBSTRUCTION/LOWER URINARY TRACT SYMPTOMS: Primary | ICD-10-CM

## 2019-07-11 DIAGNOSIS — R33.8 ACUTE URINARY RETENTION: ICD-10-CM

## 2019-07-11 PROCEDURE — 99999 PR PBB SHADOW E&M-EST. PATIENT-LVL IV: ICD-10-PCS | Mod: PBBFAC,,, | Performed by: NURSE PRACTITIONER

## 2019-07-11 PROCEDURE — 1101F PT FALLS ASSESS-DOCD LE1/YR: CPT | Mod: CPTII,S$GLB,, | Performed by: NURSE PRACTITIONER

## 2019-07-11 PROCEDURE — 99215 OFFICE O/P EST HI 40 MIN: CPT | Mod: S$GLB,,, | Performed by: NURSE PRACTITIONER

## 2019-07-11 PROCEDURE — 1101F PR PT FALLS ASSESS DOC 0-1 FALLS W/OUT INJ PAST YR: ICD-10-PCS | Mod: CPTII,S$GLB,, | Performed by: NURSE PRACTITIONER

## 2019-07-11 PROCEDURE — 3075F PR MOST RECENT SYSTOLIC BLOOD PRESS GE 130-139MM HG: ICD-10-PCS | Mod: CPTII,S$GLB,, | Performed by: NURSE PRACTITIONER

## 2019-07-11 PROCEDURE — 3075F SYST BP GE 130 - 139MM HG: CPT | Mod: CPTII,S$GLB,, | Performed by: NURSE PRACTITIONER

## 2019-07-11 PROCEDURE — 99215 PR OFFICE/OUTPT VISIT, EST, LEVL V, 40-54 MIN: ICD-10-PCS | Mod: S$GLB,,, | Performed by: NURSE PRACTITIONER

## 2019-07-11 PROCEDURE — 3078F DIAST BP <80 MM HG: CPT | Mod: CPTII,S$GLB,, | Performed by: NURSE PRACTITIONER

## 2019-07-11 PROCEDURE — 99999 PR PBB SHADOW E&M-EST. PATIENT-LVL IV: CPT | Mod: PBBFAC,,, | Performed by: NURSE PRACTITIONER

## 2019-07-11 PROCEDURE — 3078F PR MOST RECENT DIASTOLIC BLOOD PRESSURE < 80 MM HG: ICD-10-PCS | Mod: CPTII,S$GLB,, | Performed by: NURSE PRACTITIONER

## 2019-07-11 RX ORDER — FINASTERIDE 5 MG/1
5 TABLET, FILM COATED ORAL DAILY
Qty: 90 TABLET | Refills: 3 | Status: SHIPPED | OUTPATIENT
Start: 2019-07-11 | End: 2020-08-03 | Stop reason: SDUPTHER

## 2019-07-11 NOTE — PROGRESS NOTES
CHIEF COMPLAINT:    Mr. Guerrero is a 79 y.o. male presenting for VT.    PRESENTING ILLNESS:    Giovanni Guerrero is a 79 y.o. male new patient to me (records of past medical, family and social history personally reviewed by me), w/ h/o DM, HTN, HLD, CKD, gout, and BPH w/ obstruction (on tamsulosin 0.8mg).    8/11/14 cysto w/ SUDS revealed BPH w/ outlet obstruction.    Pt last seen in clinic 7/3/19 w/ DEMETRIA Hensley for urinary retention and VT. Passed VT. Pt subsequently presented to ED on 7/4/19 w/ acute urinary retention. Lester catheter re-inserted.    Today pt returns to clinic w/ daughter (ana cristina) for VT. Reports recalling slow urinary stream x 2-3 wks prior to initial ED visit late June. Reports adherence to tamsulosin as rx'd. Reports lester catheter draining into leg bag appropriately. Denies GH/clots. Denies pelvic/flank pain. Reports BMs q2-3 days.    REVIEW OF SYSTEMS:    Goivanni Guerrero denies headache, blurred vision, fever, nausea, vomiting, chills, abdominal pain, bleeding per rectum, cough, SOB, recent loss of consciousness, recent mental status changes, seizures, dizziness, or upper or lower extremity weakness.    PATIENT HISTORY:    Past Medical History:   Diagnosis Date    Allergy     Colon polyp     Diabetes mellitus type II     Gout, unspecified     Hyperlipidemia     Hypertension     Renal insufficiency        Past Surgical History:   Procedure Laterality Date    APPENDECTOMY      EYE SURGERY      cataract    HERNIA REPAIR      TONSILLECTOMY         Family History   Problem Relation Age of Onset    Cancer Mother         ovarian cancer    Heart disease Father         MI at 57    Diabetes Father     Cerebral aneurysm Sister     Heart disease Brother         MI at 60    Heart disease Brother         CABG       Social History     Socioeconomic History    Marital status:      Spouse name: Not on file    Number of children: Not on file    Years of education: Not on file     "Highest education level: Not on file   Occupational History    Not on file   Social Needs    Financial resource strain: Not on file    Food insecurity:     Worry: Not on file     Inability: Not on file    Transportation needs:     Medical: Not on file     Non-medical: Not on file   Tobacco Use    Smoking status: Never Smoker    Smokeless tobacco: Never Used   Substance and Sexual Activity    Alcohol use: No    Drug use: No    Sexual activity: Not Currently   Lifestyle    Physical activity:     Days per week: Not on file     Minutes per session: Not on file    Stress: Not on file   Relationships    Social connections:     Talks on phone: Not on file     Gets together: Not on file     Attends [employer] service: Not on file     Active member of club or organization: Not on file     Attends meetings of clubs or organizations: Not on file     Relationship status: Not on file   Other Topics Concern    Not on file   Social History Narrative     52 yrs, 3 childre (two living), 11 grandchildren & 4 great grandchildren       Allergies:  Actos [pioglitazone]    Medications:    Current Outpatient Medications:     amLODIPine (NORVASC) 5 MG tablet, Take 1 tablet (5 mg total) by mouth once daily., Disp: 90 tablet, Rfl: 3    aspirin (ECOTRIN) 81 MG EC tablet, Take 81 mg by mouth once daily., Disp: , Rfl:     atenolol (TENORMIN) 100 MG tablet, Take 1 tablet (100 mg total) by mouth once daily., Disp: 90 tablet, Rfl: 3    atorvastatin (LIPITOR) 40 MG tablet, Take 1 tablet (40 mg total) by mouth once daily., Disp: 90 tablet, Rfl: 3    BD INSULIN SYRINGE ULTRA-FINE 0.5 mL 31 gauge x 5/16 Syrg, USE WITH LANTUS INSULIN ONCE DAILY, Disp: 100 each, Rfl: 3    BD ULTRA-FINE SHORT PEN NEEDLE 31 gauge x 5/16" Ndle, USE WITH LANTUS INSULIN ONCE DAILY, Disp: 100 each, Rfl: 0    blood sugar diagnostic Strp, Check sugar tid as directed, Disp: 300 each, Rfl: 3    blood-glucose meter Misc, 1 kit by Misc.(Non-Drug; Combo " Route) route 3 (three) times daily. Pt is to test blood sugar TID, Disp: 1 each, Rfl: 0    cholecalciferol, vitamin D3, (VITAMIN D3) 2,000 unit Tab, Take by mouth once daily., Disp: , Rfl:     finasteride (PROSCAR) 5 mg tablet, Take 1 tablet (5 mg total) by mouth once daily., Disp: 90 tablet, Rfl: 3    hydrALAZINE (APRESOLINE) 100 MG tablet, Take 1 tablet (100 mg total) by mouth every 12 (twelve) hours., Disp: 180 tablet, Rfl: 3    OMEGA-3 ACID ETHYL ESTERS (OMACOR) 1 gram Cap, Take 1 g by mouth. 1 Capsule Oral Twice a day, Disp: , Rfl:     tamsulosin (FLOMAX) 0.4 mg Cap, TAKE 1 CAPSULE BY MOUTH TWICE A DAY, Disp: 180 capsule, Rfl: 3    TRESIBA FLEXTOUCH U-100 100 unit/mL (3 mL) InPn, INJECT 15 UNITS SUBCUTANEOUSLY EVERY EVENING, Disp: 9 mL, Rfl: 3    PHYSICAL EXAMINATION:    The patient generally appears in good health, is appropriately interactive, and is in no apparent distress.     Eyes: anicteric sclerae, moist conjunctivae; no lid-lag; PERRLA     HENT: Atraumatic; oropharynx clear with moist mucous membranes and no mucosal ulcerations;normal hard and soft palate.  No evidence of lymphadenopathy.    Neck: Trachea midline.  No thyromegaly.    Musculoskeletal: No abnormal gait.    Skin: No lesions.    Mental: Cooperative with normal affect.  Is oriented to time, place, and person.    Neuro: Grossly intact.    Chest: Normal inspiratory effort.   No accessory muscles.  No audible wheezes.  Respirations symmetric on inspiration and expiration.    Heart: Regular rhythm.      Abdomen:  Soft, non-tender. No masses or organomegaly. Bladder is not palpable. No evidence of flank discomfort. No evidence of inguinal hernia.    Genitourinary: The penis is uncircumcised with no evidence of plaques or induration. The urethral meatus is normal. 16 Fr lester catheter in place, draining clear yellow urine. The testes, epididymides, and cord structures are normal in size and contour bilaterally. The scrotum is normal in size  and contour.    The prostate is smooth, symmetrical, without nodule or induration. The seminal vesicles were normal and nonpalpable. The prostate was not tender or boggy. Rectal tone was normal no rectal mass was palpable.    Extremities: No clubbing, cyanosis, or edema.    LABS:    Lab Results   Component Value Date    CREATININE 1.8 (H) 06/26/2019     Lab Results   Component Value Date    PSA 1.1 04/15/2014    PSA 1.88 08/22/2012    PSA 1.4 08/08/2011     Hemoglobin A1C   Date Value Ref Range Status   05/23/2019 6.2 (H) 4.0 - 5.6 % Final     Comment:     ADA Screening Guidelines:  5.7-6.4%  Consistent with prediabetes  >or=6.5%  Consistent with diabetes  High levels of fetal hemoglobin interfere with the HbA1C  assay. Heterozygous hemoglobin variants (HbS, HgC, etc)do  not significantly interfere with this assay.   However, presence of multiple variants may affect accuracy.     05/10/2018 6.2 (H) 4.0 - 5.6 % Final     Comment:     According to ADA guidelines, hemoglobin A1c <7.0% represents  optimal control in non-pregnant diabetic patients. Different  metrics may apply to specific patient populations.   Standards of Medical Care in Diabetes-2016.  For the purpose of screening for the presence of diabetes:  <5.7%     Consistent with the absence of diabetes  5.7-6.4%  Consistent with increasing risk for diabetes   (prediabetes)  >or=6.5%  Consistent with diabetes  Currently, no consensus exists for use of hemoglobin A1c  for diagnosis of diabetes for children.  This Hemoglobin A1c assay has significant interference with fetal   hemoglobin   (HbF). The results are invalid for patients with abnormal amounts of   HbF,   including those with known Hereditary Persistence   of Fetal Hemoglobin. Heterozygous hemoglobin variants (HbAS, HbAC,   HbAD, HbAE, HbA2) do not significantly interfere with this assay;   however, presence of multiple variants in a sample may impact the %   interference.     11/29/2017 6.4 (H) 4.0 -  5.6 % Final     Comment:     According to ADA guidelines, hemoglobin A1c <7.0% represents  optimal control in non-pregnant diabetic patients. Different  metrics may apply to specific patient populations.   Standards of Medical Care in Diabetes-2016.  For the purpose of screening for the presence of diabetes:  <5.7%     Consistent with the absence of diabetes  5.7-6.4%  Consistent with increasing risk for diabetes   (prediabetes)  >or=6.5%  Consistent with diabetes  Currently, no consensus exists for use of hemoglobin A1c  for diagnosis of diabetes for children.  This Hemoglobin A1c assay has significant interference with fetal   hemoglobin   (HbF). The results are invalid for patients with abnormal amounts of   HbF,   including those with known Hereditary Persistence   of Fetal Hemoglobin. Heterozygous hemoglobin variants (HbAS, HbAC,   HbAD, HbAE, HbA2) do not significantly interfere with this assay;   however, presence of multiple variants in a sample may impact the %   interference.       IMPRESSION:    Encounter Diagnoses   Name Primary?    BPH with obstruction/lower urinary tract symptoms Yes    Acute urinary retention      PLAN:    I spent 40 minutes with the patient of which more than half was spent in direct consultation with the patient in regards to our treatment and plan.    Discussed and reviewed BPH, and urinary retention. Etiologies and tx options. Recommend continue w/ tamsulosin as rx'd. Discussed trial w/ finasteride, may take up to 6-12 months to reach full efficacy. Pt amenable to plan. Reviewed potential adv effects. Discussed and recommend bowel prep.    Discussed VT process and procedure. Pt amenable to delaying VT until next wk given weather forecast for this weekend. He does not want to end up in ED.    Education sheets provided.    F/u 1 wk for VT.    Patient verbalized and expressed understanding, and agree w/ plan.

## 2019-07-11 NOTE — PATIENT INSTRUCTIONS
Urinary Retention (Male)  Urinary retention is the medical term for difficulty or inability to pass urine, even though your bladder is full.  Causes  The most common cause of urinary retention in men is the bladder outlet being blocked. This can be due to an enlarged prostate gland or a bladder infection. Certain medicines can also cause this problem. This condition is more likely to occur as men get older.    This condition is treated by insertion of a catheter into the bladder to drain the urine. This provides immediate relief. The catheter may need to remain in place for a few days to prevent a recurrence. The catheter has a balloon on the tip which was inflated after insertion. This prevents the catheter from falling out.  Symptoms  Common symptoms of urinary retention include:  · Pain (not experienced by everyone)  · Frequent urination  · Feeling that the bladder is still full after urinating  · Incontinence (not being able to control the release of urine)  · Swollen abdomen  Treatment  This condition is treated by inserting a tube (catheter) into the bladder to drain the urine. This provides immediate relief. The catheter may need to stay in place for a few days. The catheter has a balloon on the tip, which is inflated after insertion. This prevents the catheter from falling out.  Home care  · If you were given antibiotics, take them until they are used up, or your healthcare provider tells you to stop. It is important to finish the antibiotics even though you feel better. This is to make sure your infection has cleared.  · If a catheter was left in place, it is important to keep bacteria from getting into the collection bag. Do not disconnect the catheter from the collection bag.  · Use a leg band to secure the drainage tube, so it does not pull on the catheter. Drain the collection bag when it becomes full using the drain spout at the bottom of the bag.  · Do not pull on or try to remove your catheter.  This will injure your urethra. The catheter must be removed by a healthcare provider.  Follow-up care  Follow up with your healthcare provider, or as advised.  If a catheter was left in place, it can usually be removed within 3 to 7 days. Some conditions require the catheter to stay in longer. Your healthcare provider will tell you when to return to have the catheter removed.  When to seek medical advice  Call your healthcare provider right away if any of these occur:  · Fever of 100.4ºF (38ºC) or higher, or as directed by your healthcare provider  · Bladder or lower-abdominal pain or fullness  · Abdominal swelling, nausea, vomiting, or back pain  · Blood or urine leakage around the catheter  · Bloody urine coming from the catheter (if a new symptom)  · Weakness, dizziness, or fainting  · Confusion or change in usual level of alertness  · If a catheter was left in place, return if:  ¨ Catheter falls out  ¨ Catheter stops draining for 6 hours  Date Last Reviewed: 7/26/2015  © 0089-0530 [employer]. [address] [place] [address]. All rights reserved. This information is not intended as a substitute for professional medical care. Always follow your healthcare professional's instructions.        BPH (Enlarged Prostate)  The prostate is a gland at the base of the bladder. As some men get older, the prostate may get bigger in size. This problem is called benign prostatic hyperplasia (BPH). BPH puts pressure on the urethra. This is the tube that carries urine from the bladder to the penis. It may interfere with the flow of urine. It may also keep the bladder from emptying fully.    Symptoms of BPH include trouble starting urination and feeling as though the bladder isnt emptying all the way. It also includes a weak urine stream, dribbling and leaking of urine, and frequent and urgent urination (especially at night). BPH can increase the risk of urinary infections. It can also block off urine flow  completely. If this occurs, a thin tube (catheter) may be passed into the bladder to help drain urine.  If symptoms are mild, no treatment may be needed right now. If symptoms are more severe, treatment is likely needed. The goal of treatment is to improve urine flow and reduce symptoms. Treatments can include medicine and procedures. Your healthcare provider will discuss treatment options with you as needed.  Home care  The following guidelines will help you care for yourself at home:  · Urinate as soon as you feel the urge. Don't try to hold your urine.  · Don't limit your fluid intake during the day. Drink 6 to 8 glasses of water or liquids a day. This prevents bacteria from building up in the bladder.  · Avoid drinking fluids after dinner to help reduce urination during the night.  · Avoid medicines that can worsen your symptoms. These include certain cold and allergy medicines and antidepressants. Diuretics used for high blood pressure can also worsen symptoms. Talk to your doctor about the medicines you take. Other choices may work better for you.  Prostate cancer screening  BPH does not increase the risk of prostate cancer. But because prostate cancer is a common cancer in men, screening is sometimes recommended. This may help detect the cancer in its early stages when treatment is most effective. Factors that can increase the risk of prostate cancer include being -American or having a father or brother who had prostate cancer. A high-fat diet may also increase the risk of prostate cancer. Talk to your healthcare provider to see whether you should be screened for prostate cancer.  Follow-up care  Follow up with your healthcare provider, or as advised  To learn more, go to:  · National Kidney & Urologic Diseases Information Clearinghouse  kidney.niddk.nih.gov, 631.937.1897  When to seek medical advice  Call your healthcare provider right away if any of these occur:  · Fever of 100.4°F (38.0°C) or  higher, or as advised  · Unable to pass urine for 8 hours  · Increasing pressure or pain in your bladder (lower abdomen)  · Blood in the urine  · Increasing low back pain, not related to injury  · Symptoms of urinary infection (increased urge to urinate, burning when passing urine, foul-smelling urine)  Date Last Reviewed: 7/1/2016  © 9783-2065 [employer]. [address] [place] [address], [place] [id number]. All rights reserved. This information is not intended as a substitute for professional medical care. Always follow your healthcare professional's instructions.

## 2019-07-18 PROBLEM — R33.8 ACUTE URINARY RETENTION: Status: ACTIVE | Noted: 2019-07-18

## 2019-07-18 NOTE — PROGRESS NOTES
CHIEF COMPLAINT:    Mr. Guerrero is a 79 y.o. male presenting for VT.     PRESENTING ILLNESS:    Giovanni Guerrero is a 79 y.o. male w/ h/o DM, HTN, HLD, CKD, gout, and BPH w/ obstruction (on tamsulosin 0.8mg).    8/11/14 cysto w/ SUDS revealed BPH w/ outlet obstruction.    Pt seen in clinic 7/3/19 w/ Shellie NP for urinary retention and VT. Passed VT. Pt subsequently presented to ED on 7/4/19 w/ acute urinary retention. Lester catheter re-inserted.    Pt last seen in clinic 7/11/19 for VT, however it was decided to delay VT given ominous weather forecast.    Today pt returns to clinic w/ daughter (ana cristina) for VT. Reports adherence to tamsulosin as rx'd. Reports lester catheter draining into leg bag appropriately. Denies GH/clots. Denies pelvic/flank pain. Reports BMs q2-3 days.    REVIEW OF SYSTEMS:    Giovanni Guerrero denies headache, blurred vision, fever, nausea, vomiting, chills, abdominal pain, bleeding per rectum, cough, SOB, recent loss of consciousness, recent mental status changes, seizures, dizziness, or upper or lower extremity weakness.    PATIENT HISTORY:    Past Medical History:   Diagnosis Date    Allergy     Colon polyp     Diabetes mellitus type II     Gout, unspecified     Hyperlipidemia     Hypertension     Renal insufficiency        Past Surgical History:   Procedure Laterality Date    APPENDECTOMY      EYE SURGERY      cataract    HERNIA REPAIR      TONSILLECTOMY         Family History   Problem Relation Age of Onset    Cancer Mother         ovarian cancer    Heart disease Father         MI at 57    Diabetes Father     Cerebral aneurysm Sister     Heart disease Brother         MI at 60    Heart disease Brother         CABG       Social History     Socioeconomic History    Marital status:      Spouse name: Not on file    Number of children: Not on file    Years of education: Not on file    Highest education level: Not on file   Occupational History    Not on file  "  Social Needs    Financial resource strain: Not on file    Food insecurity:     Worry: Not on file     Inability: Not on file    Transportation needs:     Medical: Not on file     Non-medical: Not on file   Tobacco Use    Smoking status: Never Smoker    Smokeless tobacco: Never Used   Substance and Sexual Activity    Alcohol use: No    Drug use: No    Sexual activity: Not Currently   Lifestyle    Physical activity:     Days per week: Not on file     Minutes per session: Not on file    Stress: Not on file   Relationships    Social connections:     Talks on phone: Not on file     Gets together: Not on file     Attends [employer] service: Not on file     Active member of club or organization: Not on file     Attends meetings of clubs or organizations: Not on file     Relationship status: Not on file   Other Topics Concern    Not on file   Social History Narrative     52 yrs, 3 childre (two living), 11 grandchildren & 4 great grandchildren     Allergies:  Actos [pioglitazone]    Medications:    Current Outpatient Medications:     amLODIPine (NORVASC) 5 MG tablet, Take 1 tablet (5 mg total) by mouth once daily., Disp: 90 tablet, Rfl: 3    aspirin (ECOTRIN) 81 MG EC tablet, Take 81 mg by mouth once daily., Disp: , Rfl:     atenolol (TENORMIN) 100 MG tablet, Take 1 tablet (100 mg total) by mouth once daily., Disp: 90 tablet, Rfl: 3    atorvastatin (LIPITOR) 40 MG tablet, Take 1 tablet (40 mg total) by mouth once daily., Disp: 90 tablet, Rfl: 3    BD INSULIN SYRINGE ULTRA-FINE 0.5 mL 31 gauge x 5/16 Syrg, USE WITH LANTUS INSULIN ONCE DAILY, Disp: 100 each, Rfl: 3    BD ULTRA-FINE SHORT PEN NEEDLE 31 gauge x 5/16" Ndle, USE WITH LANTUS INSULIN ONCE DAILY, Disp: 100 each, Rfl: 0    blood sugar diagnostic Strp, Check sugar tid as directed, Disp: 300 each, Rfl: 3    blood-glucose meter Misc, 1 kit by Misc.(Non-Drug; Combo Route) route 3 (three) times daily. Pt is to test blood sugar TID, Disp: 1 each, " Rfl: 0    cholecalciferol, vitamin D3, (VITAMIN D3) 2,000 unit Tab, Take by mouth once daily., Disp: , Rfl:     finasteride (PROSCAR) 5 mg tablet, Take 1 tablet (5 mg total) by mouth once daily., Disp: 90 tablet, Rfl: 3    hydrALAZINE (APRESOLINE) 100 MG tablet, Take 1 tablet (100 mg total) by mouth every 12 (twelve) hours., Disp: 180 tablet, Rfl: 3    OMEGA-3 ACID ETHYL ESTERS (OMACOR) 1 gram Cap, Take 1 g by mouth. 1 Capsule Oral Twice a day, Disp: , Rfl:     tamsulosin (FLOMAX) 0.4 mg Cap, TAKE 1 CAPSULE BY MOUTH TWICE A DAY, Disp: 180 capsule, Rfl: 3    TRESIBA FLEXTOUCH U-100 100 unit/mL (3 mL) InPn, INJECT 15 UNITS SUBCUTANEOUSLY EVERY EVENING, Disp: 9 mL, Rfl: 3    PHYSICAL EXAMINATION:    The patient generally appears in good health, is appropriately interactive, and is in no apparent distress.     Eyes: anicteric sclerae, moist conjunctivae; no lid-lag; PERRLA     HENT: Atraumatic; oropharynx clear with moist mucous membranes and no mucosal ulcerations;normal hard and soft palate.  No evidence of lymphadenopathy.    Neck: Trachea midline.  No thyromegaly.    Musculoskeletal: No abnormal gait.    Skin: No lesions.    Mental: Cooperative with normal affect.  Is oriented to time, place, and person.    Neuro: Grossly intact.    Chest: Normal inspiratory effort.   No accessory muscles.  No audible wheezes.  Respirations symmetric on inspiration and expiration.    Heart: Regular rhythm.      Abdomen:  Soft, non-tender. No masses or organomegaly. Bladder is not palpable. No evidence of flank discomfort. No evidence of inguinal hernia.    Genitourinary: The penis is uncircumcised with no evidence of plaques or induration. The urethral meatus is normal. 16 Fr lester catheter in place, draining clear yellow urine. The testes, epididymides, and cord structures are normal in size and contour bilaterally. The scrotum is normal in size and contour.    Extremities: No clubbing, cyanosis, or edema.    LABS:    Lab  Results   Component Value Date    CREATININE 1.8 (H) 06/26/2019     Lab Results   Component Value Date    PSA 1.1 04/15/2014    PSA 1.88 08/22/2012    PSA 1.4 08/08/2011     Hemoglobin A1C   Date Value Ref Range Status   05/23/2019 6.2 (H) 4.0 - 5.6 % Final     Comment:     ADA Screening Guidelines:  5.7-6.4%  Consistent with prediabetes  >or=6.5%  Consistent with diabetes  High levels of fetal hemoglobin interfere with the HbA1C  assay. Heterozygous hemoglobin variants (HbS, HgC, etc)do  not significantly interfere with this assay.   However, presence of multiple variants may affect accuracy.     05/10/2018 6.2 (H) 4.0 - 5.6 % Final     Comment:     According to ADA guidelines, hemoglobin A1c <7.0% represents  optimal control in non-pregnant diabetic patients. Different  metrics may apply to specific patient populations.   Standards of Medical Care in Diabetes-2016.  For the purpose of screening for the presence of diabetes:  <5.7%     Consistent with the absence of diabetes  5.7-6.4%  Consistent with increasing risk for diabetes   (prediabetes)  >or=6.5%  Consistent with diabetes  Currently, no consensus exists for use of hemoglobin A1c  for diagnosis of diabetes for children.  This Hemoglobin A1c assay has significant interference with fetal   hemoglobin   (HbF). The results are invalid for patients with abnormal amounts of   HbF,   including those with known Hereditary Persistence   of Fetal Hemoglobin. Heterozygous hemoglobin variants (HbAS, HbAC,   HbAD, HbAE, HbA2) do not significantly interfere with this assay;   however, presence of multiple variants in a sample may impact the %   interference.     11/29/2017 6.4 (H) 4.0 - 5.6 % Final     Comment:     According to ADA guidelines, hemoglobin A1c <7.0% represents  optimal control in non-pregnant diabetic patients. Different  metrics may apply to specific patient populations.   Standards of Medical Care in Diabetes-2016.  For the purpose of screening for the  presence of diabetes:  <5.7%     Consistent with the absence of diabetes  5.7-6.4%  Consistent with increasing risk for diabetes   (prediabetes)  >or=6.5%  Consistent with diabetes  Currently, no consensus exists for use of hemoglobin A1c  for diagnosis of diabetes for children.  This Hemoglobin A1c assay has significant interference with fetal   hemoglobin   (HbF). The results are invalid for patients with abnormal amounts of   HbF,   including those with known Hereditary Persistence   of Fetal Hemoglobin. Heterozygous hemoglobin variants (HbAS, HbAC,   HbAD, HbAE, HbA2) do not significantly interfere with this assay;   however, presence of multiple variants in a sample may impact the %   interference.       IMPRESSION:    Encounter Diagnoses   Name Primary?    CKD (chronic kidney disease) stage 4, GFR 15-29 ml/min Yes    BPH with urinary obstruction     Acute urinary retention      PLAN:    I spent 40 minutes with the patient of which more than half was spent in direct consultation with the patient in regards to our treatment and plan.    Discussed and reviewed BPH, and urinary retention. Etiologies and tx options. Recommend continue w/ tamsulosin as rx'd. Discussed trial w/ finasteride, may take up to 6-12 months to reach full efficacy. Pt amenable to plan. Reviewed potential adv effects. Discussed and recommend bowel prep.    Discussed and reviewed voiding trial process and procedure.   Discussed and reviewed post-op expections.  Voiding trial performed by Nurse Telles.  0ml of sterile water was instilled into bladder, as pt was having immediate bladder spasms.  Lozano catheter was removed. Pt did not urinate. Patient was instructed to drink plenty of fluids today.  Instructed patient to call to give an update on urine output.  If unable to urinate by 3pm this afternoon to RTC.  Informed patient to return to clinic or emergency department (if after clinic hours) if unable to urinate or starts to experience  bladder pressure/pain, decrease flow, straining/difficulty urinating.    Education sheets provided.    F/u 1 month for urinary retention w/ PVR.    Patient verbalized and expressed understanding, and agree w/ plan.    Addendum:  Pt returned to clinic 2:30pm.  Reports was able to urinate prior to returning to clinic. Reports had good stream. Denies dysuria.  Bladder scan: 201mL.  Offered to re-insert lester catheter, CIC, or return home and wait and see how things go.  Pt opt for returning home, and knows to return to ED emergency department (if after clinic hours) if unable to urinate or starts to experience bladder pressure/pain, decrease flow, straining/difficulty urinating.

## 2019-07-19 ENCOUNTER — OFFICE VISIT (OUTPATIENT)
Dept: UROLOGY | Facility: CLINIC | Age: 79
End: 2019-07-19
Payer: MEDICARE

## 2019-07-19 VITALS
SYSTOLIC BLOOD PRESSURE: 121 MMHG | BODY MASS INDEX: 35.01 KG/M2 | HEIGHT: 62 IN | HEART RATE: 59 BPM | WEIGHT: 190.25 LBS | DIASTOLIC BLOOD PRESSURE: 63 MMHG

## 2019-07-19 DIAGNOSIS — N18.4 CKD (CHRONIC KIDNEY DISEASE) STAGE 4, GFR 15-29 ML/MIN: Primary | ICD-10-CM

## 2019-07-19 DIAGNOSIS — N13.8 BPH WITH URINARY OBSTRUCTION: ICD-10-CM

## 2019-07-19 DIAGNOSIS — N40.1 BPH WITH URINARY OBSTRUCTION: ICD-10-CM

## 2019-07-19 DIAGNOSIS — R33.8 ACUTE URINARY RETENTION: ICD-10-CM

## 2019-07-19 PROCEDURE — 99499 UNLISTED E&M SERVICE: CPT | Mod: S$GLB,,, | Performed by: NURSE PRACTITIONER

## 2019-07-19 PROCEDURE — 3074F SYST BP LT 130 MM HG: CPT | Mod: CPTII,S$GLB,, | Performed by: NURSE PRACTITIONER

## 2019-07-19 PROCEDURE — 51700 PR IRRIGATION, BLADDER: ICD-10-PCS | Mod: S$GLB,,, | Performed by: NURSE PRACTITIONER

## 2019-07-19 PROCEDURE — 99215 PR OFFICE/OUTPT VISIT, EST, LEVL V, 40-54 MIN: ICD-10-PCS | Mod: 25,S$GLB,, | Performed by: NURSE PRACTITIONER

## 2019-07-19 PROCEDURE — 1101F PR PT FALLS ASSESS DOC 0-1 FALLS W/OUT INJ PAST YR: ICD-10-PCS | Mod: CPTII,S$GLB,, | Performed by: NURSE PRACTITIONER

## 2019-07-19 PROCEDURE — 99999 PR PBB SHADOW E&M-EST. PATIENT-LVL III: CPT | Mod: PBBFAC,,, | Performed by: NURSE PRACTITIONER

## 2019-07-19 PROCEDURE — 99999 PR PBB SHADOW E&M-EST. PATIENT-LVL III: ICD-10-PCS | Mod: PBBFAC,,, | Performed by: NURSE PRACTITIONER

## 2019-07-19 PROCEDURE — 3078F DIAST BP <80 MM HG: CPT | Mod: CPTII,S$GLB,, | Performed by: NURSE PRACTITIONER

## 2019-07-19 PROCEDURE — 99215 OFFICE O/P EST HI 40 MIN: CPT | Mod: 25,S$GLB,, | Performed by: NURSE PRACTITIONER

## 2019-07-19 PROCEDURE — 3074F PR MOST RECENT SYSTOLIC BLOOD PRESSURE < 130 MM HG: ICD-10-PCS | Mod: CPTII,S$GLB,, | Performed by: NURSE PRACTITIONER

## 2019-07-19 PROCEDURE — 99499 RISK ADDL DX/OHS AUDIT: ICD-10-PCS | Mod: S$GLB,,, | Performed by: NURSE PRACTITIONER

## 2019-07-19 PROCEDURE — 3078F PR MOST RECENT DIASTOLIC BLOOD PRESSURE < 80 MM HG: ICD-10-PCS | Mod: CPTII,S$GLB,, | Performed by: NURSE PRACTITIONER

## 2019-07-19 PROCEDURE — 1101F PT FALLS ASSESS-DOCD LE1/YR: CPT | Mod: CPTII,S$GLB,, | Performed by: NURSE PRACTITIONER

## 2019-07-19 PROCEDURE — 51700 IRRIGATION OF BLADDER: CPT | Mod: S$GLB,,, | Performed by: NURSE PRACTITIONER

## 2019-07-19 NOTE — PATIENT INSTRUCTIONS
BPH (Enlarged Prostate)  The prostate is a gland at the base of the bladder. As some men get older, the prostate may get bigger in size. This problem is called benign prostatic hyperplasia (BPH). BPH puts pressure on the urethra. This is the tube that carries urine from the bladder to the penis. It may interfere with the flow of urine. It may also keep the bladder from emptying fully.    Symptoms of BPH include trouble starting urination and feeling as though the bladder isnt emptying all the way. It also includes a weak urine stream, dribbling and leaking of urine, and frequent and urgent urination (especially at night). BPH can increase the risk of urinary infections. It can also block off urine flow completely. If this occurs, a thin tube (catheter) may be passed into the bladder to help drain urine.  If symptoms are mild, no treatment may be needed right now. If symptoms are more severe, treatment is likely needed. The goal of treatment is to improve urine flow and reduce symptoms. Treatments can include medicine and procedures. Your healthcare provider will discuss treatment options with you as needed.  Home care  The following guidelines will help you care for yourself at home:  · Urinate as soon as you feel the urge. Don't try to hold your urine.  · Don't limit your fluid intake during the day. Drink 6 to 8 glasses of water or liquids a day. This prevents bacteria from building up in the bladder.  · Avoid drinking fluids after dinner to help reduce urination during the night.  · Avoid medicines that can worsen your symptoms. These include certain cold and allergy medicines and antidepressants. Diuretics used for high blood pressure can also worsen symptoms. Talk to your doctor about the medicines you take. Other choices may work better for you.  Prostate cancer screening  BPH does not increase the risk of prostate cancer. But because prostate cancer is a common cancer in men, screening is sometimes  recommended. This may help detect the cancer in its early stages when treatment is most effective. Factors that can increase the risk of prostate cancer include being -American or having a father or brother who had prostate cancer. A high-fat diet may also increase the risk of prostate cancer. Talk to your healthcare provider to see whether you should be screened for prostate cancer.  Follow-up care  Follow up with your healthcare provider, or as advised  To learn more, go to:  · National Kidney & Urologic Diseases Information Clearinghouse  kidney.niddk.nih.gov, 284.119.1362  When to seek medical advice  Call your healthcare provider right away if any of these occur:  · Fever of 100.4°F (38.0°C) or higher, or as advised  · Unable to pass urine for 8 hours  · Increasing pressure or pain in your bladder (lower abdomen)  · Blood in the urine  · Increasing low back pain, not related to injury  · Symptoms of urinary infection (increased urge to urinate, burning when passing urine, foul-smelling urine)  Date Last Reviewed: 7/1/2016 © 2000-2017 [employer]. [address] [place] [address]. All rights reserved. This information is not intended as a substitute for professional medical care. Always follow your healthcare professional's instructions.        Urinary Retention (Male)  Urinary retention is the medical term for difficulty or inability to pass urine, even though your bladder is full.  Causes  The most common cause of urinary retention in men is the bladder outlet being blocked. This can be due to an enlarged prostate gland or a bladder infection. Certain medicines can also cause this problem. This condition is more likely to occur as men get older.    This condition is treated by insertion of a catheter into the bladder to drain the urine. This provides immediate relief. The catheter may need to remain in place for a few days to prevent a recurrence. The catheter has a balloon on  the tip which was inflated after insertion. This prevents the catheter from falling out.  Symptoms  Common symptoms of urinary retention include:  · Pain (not experienced by everyone)  · Frequent urination  · Feeling that the bladder is still full after urinating  · Incontinence (not being able to control the release of urine)  · Swollen abdomen  Treatment  This condition is treated by inserting a tube (catheter) into the bladder to drain the urine. This provides immediate relief. The catheter may need to stay in place for a few days. The catheter has a balloon on the tip, which is inflated after insertion. This prevents the catheter from falling out.  Home care  · If you were given antibiotics, take them until they are used up, or your healthcare provider tells you to stop. It is important to finish the antibiotics even though you feel better. This is to make sure your infection has cleared.  · If a catheter was left in place, it is important to keep bacteria from getting into the collection bag. Do not disconnect the catheter from the collection bag.  · Use a leg band to secure the drainage tube, so it does not pull on the catheter. Drain the collection bag when it becomes full using the drain spout at the bottom of the bag.  · Do not pull on or try to remove your catheter. This will injure your urethra. The catheter must be removed by a healthcare provider.  Follow-up care  Follow up with your healthcare provider, or as advised.  If a catheter was left in place, it can usually be removed within 3 to 7 days. Some conditions require the catheter to stay in longer. Your healthcare provider will tell you when to return to have the catheter removed.  When to seek medical advice  Call your healthcare provider right away if any of these occur:  · Fever of 100.4ºF (38ºC) or higher, or as directed by your healthcare provider  · Bladder or lower-abdominal pain or fullness  · Abdominal swelling, nausea, vomiting, or back  pain  · Blood or urine leakage around the catheter  · Bloody urine coming from the catheter (if a new symptom)  · Weakness, dizziness, or fainting  · Confusion or change in usual level of alertness  · If a catheter was left in place, return if:  ¨ Catheter falls out  ¨ Catheter stops draining for 6 hours  Date Last Reviewed: 7/26/2015  © 4393-8747 [employer]. [address] [place] [address], [place] [id number]. All rights reserved. This information is not intended as a substitute for professional medical care. Always follow your healthcare professional's instructions.

## 2019-08-13 DIAGNOSIS — I10 ESSENTIAL HYPERTENSION: Chronic | ICD-10-CM

## 2019-08-13 RX ORDER — PEN NEEDLE, DIABETIC 31 GX5/16"
NEEDLE, DISPOSABLE MISCELLANEOUS
Qty: 100 EACH | Refills: 0 | Status: SHIPPED | OUTPATIENT
Start: 2019-08-13 | End: 2019-12-03 | Stop reason: SDUPTHER

## 2019-08-13 RX ORDER — ATENOLOL 100 MG/1
TABLET ORAL
Qty: 90 TABLET | Refills: 2 | Status: ON HOLD | OUTPATIENT
Start: 2019-08-13 | End: 2020-01-28

## 2019-08-13 RX ORDER — ATENOLOL 100 MG/1
TABLET ORAL
Qty: 90 TABLET | Refills: 3 | Status: SHIPPED | OUTPATIENT
Start: 2019-08-13 | End: 2019-08-13

## 2019-08-13 RX ORDER — INSULIN DEGLUDEC 100 U/ML
INJECTION, SOLUTION SUBCUTANEOUS
Qty: 9 ML | Refills: 0 | Status: SHIPPED | OUTPATIENT
Start: 2019-08-13 | End: 2019-10-12 | Stop reason: SDUPTHER

## 2019-09-04 ENCOUNTER — OFFICE VISIT (OUTPATIENT)
Dept: UROLOGY | Facility: CLINIC | Age: 79
End: 2019-09-04
Payer: MEDICARE

## 2019-09-04 VITALS
BODY MASS INDEX: 34.52 KG/M2 | DIASTOLIC BLOOD PRESSURE: 66 MMHG | WEIGHT: 188.69 LBS | SYSTOLIC BLOOD PRESSURE: 128 MMHG | HEART RATE: 74 BPM

## 2019-09-04 DIAGNOSIS — N18.4 CKD (CHRONIC KIDNEY DISEASE) STAGE 4, GFR 15-29 ML/MIN: ICD-10-CM

## 2019-09-04 DIAGNOSIS — N40.1 BPH WITH URINARY OBSTRUCTION: ICD-10-CM

## 2019-09-04 DIAGNOSIS — R33.8 ACUTE URINARY RETENTION: Primary | ICD-10-CM

## 2019-09-04 DIAGNOSIS — N13.8 BPH WITH URINARY OBSTRUCTION: ICD-10-CM

## 2019-09-04 PROCEDURE — 51798 US URINE CAPACITY MEASURE: CPT | Mod: S$GLB,,, | Performed by: NURSE PRACTITIONER

## 2019-09-04 PROCEDURE — 3074F PR MOST RECENT SYSTOLIC BLOOD PRESSURE < 130 MM HG: ICD-10-PCS | Mod: CPTII,S$GLB,, | Performed by: NURSE PRACTITIONER

## 2019-09-04 PROCEDURE — 99214 OFFICE O/P EST MOD 30 MIN: CPT | Mod: 25,S$GLB,, | Performed by: NURSE PRACTITIONER

## 2019-09-04 PROCEDURE — 99999 PR PBB SHADOW E&M-EST. PATIENT-LVL III: ICD-10-PCS | Mod: PBBFAC,,, | Performed by: NURSE PRACTITIONER

## 2019-09-04 PROCEDURE — 99214 PR OFFICE/OUTPT VISIT, EST, LEVL IV, 30-39 MIN: ICD-10-PCS | Mod: 25,S$GLB,, | Performed by: NURSE PRACTITIONER

## 2019-09-04 PROCEDURE — 99999 PR PBB SHADOW E&M-EST. PATIENT-LVL III: CPT | Mod: PBBFAC,,, | Performed by: NURSE PRACTITIONER

## 2019-09-04 PROCEDURE — 1101F PT FALLS ASSESS-DOCD LE1/YR: CPT | Mod: CPTII,S$GLB,, | Performed by: NURSE PRACTITIONER

## 2019-09-04 PROCEDURE — 99499 RISK ADDL DX/OHS AUDIT: ICD-10-PCS | Mod: S$GLB,,, | Performed by: NURSE PRACTITIONER

## 2019-09-04 PROCEDURE — 51798 PR MEAS,POST-VOID RES,US,NON-IMAGING: ICD-10-PCS | Mod: S$GLB,,, | Performed by: NURSE PRACTITIONER

## 2019-09-04 PROCEDURE — 99499 UNLISTED E&M SERVICE: CPT | Mod: S$GLB,,, | Performed by: NURSE PRACTITIONER

## 2019-09-04 PROCEDURE — 3074F SYST BP LT 130 MM HG: CPT | Mod: CPTII,S$GLB,, | Performed by: NURSE PRACTITIONER

## 2019-09-04 PROCEDURE — 3078F DIAST BP <80 MM HG: CPT | Mod: CPTII,S$GLB,, | Performed by: NURSE PRACTITIONER

## 2019-09-04 PROCEDURE — 1101F PR PT FALLS ASSESS DOC 0-1 FALLS W/OUT INJ PAST YR: ICD-10-PCS | Mod: CPTII,S$GLB,, | Performed by: NURSE PRACTITIONER

## 2019-09-04 PROCEDURE — 3078F PR MOST RECENT DIASTOLIC BLOOD PRESSURE < 80 MM HG: ICD-10-PCS | Mod: CPTII,S$GLB,, | Performed by: NURSE PRACTITIONER

## 2019-09-04 NOTE — PROGRESS NOTES
CHIEF COMPLAINT:    Mr. Guerrero is a 79 y.o. male presenting for f/u 1 month PVR s/p urinary retention.    PRESENTING ILLNESS:    Giovanni Guerrero is a 79 y.o. male w/ h/o DM, HTN, HLD, CKD, gout, and BPH w/ obstruction (on tamsulosin 0.8mg).    8/11/14 cysto w/ SUDS revealed BPH w/ outlet obstruction.    Pt last seen in clinic 7/19/19 for VT. Pt returned to clinic same afternoon. Bladder scan revealed 201mL at that time. Offered to re-insert lester catheter, CIC, or return home and wait and see how things go. Pt opt for returning home, and knows to return to ED emergency department (if after clinic hours) if unable to urinate or starts to experience bladder pressure/pain, decrease flow, straining/difficulty urinating.    Today pt returns to clinic for f/u and reports feeling well since last visit. Reports has been voiding w/o irritative/obstructive urinary sxs. Reports feeling the effects of finasteride. FOS has improved significantly.   Reports adherence to tamsulosin as well. Denies dysuria, GH/clots, pelvic/flank pain. Reports taking miralax QOD, and staying regular.    PVR in clinic today: 72mL.    REVIEW OF SYSTEMS:    Giovanni Guerrero denies headache, blurred vision, fever, nausea, vomiting, chills, abdominal pain, bleeding per rectum, cough, SOB, recent loss of consciousness, recent mental status changes, seizures, dizziness, or upper or lower extremity weakness.    PATIENT HISTORY:    Past Medical History:   Diagnosis Date    Allergy     Colon polyp     Diabetes mellitus type II     Gout, unspecified     Hyperlipidemia     Hypertension     Renal insufficiency        Past Surgical History:   Procedure Laterality Date    APPENDECTOMY      EYE SURGERY      cataract    HERNIA REPAIR      TONSILLECTOMY         Family History   Problem Relation Age of Onset    Cancer Mother         ovarian cancer    Heart disease Father         MI at 57    Diabetes Father     Cerebral aneurysm Sister     Heart disease  Brother         MI at 60    Heart disease Brother         CABG       Social History     Socioeconomic History    Marital status:      Spouse name: Not on file    Number of children: Not on file    Years of education: Not on file    Highest education level: Not on file   Occupational History    Not on file   Social Needs    Financial resource strain: Not on file    Food insecurity:     Worry: Not on file     Inability: Not on file    Transportation needs:     Medical: Not on file     Non-medical: Not on file   Tobacco Use    Smoking status: Never Smoker    Smokeless tobacco: Never Used   Substance and Sexual Activity    Alcohol use: No    Drug use: No    Sexual activity: Not Currently   Lifestyle    Physical activity:     Days per week: Not on file     Minutes per session: Not on file    Stress: Not on file   Relationships    Social connections:     Talks on phone: Not on file     Gets together: Not on file     Attends Religion service: Not on file     Active member of club or organization: Not on file     Attends meetings of clubs or organizations: Not on file     Relationship status: Not on file   Other Topics Concern    Not on file   Social History Narrative     52 yrs, 3 childre (two living), 11 grandchildren & 4 great grandchildren     Allergies:  Actos [pioglitazone]    Medications:    Current Outpatient Medications:     amLODIPine (NORVASC) 5 MG tablet, Take 1 tablet (5 mg total) by mouth once daily., Disp: 90 tablet, Rfl: 3    aspirin (ECOTRIN) 81 MG EC tablet, Take 81 mg by mouth once daily., Disp: , Rfl:     atenolol (TENORMIN) 100 MG tablet, TAKE 1 TABLET BY MOUTH DAILY, Disp: 90 tablet, Rfl: 2    atorvastatin (LIPITOR) 40 MG tablet, Take 1 tablet (40 mg total) by mouth once daily., Disp: 90 tablet, Rfl: 3    BD INSULIN SYRINGE ULTRA-FINE 0.5 mL 31 gauge x 5/16 Syrg, USE WITH LANTUS INSULIN ONCE DAILY, Disp: 100 each, Rfl: 3    BD ULTRA-FINE SHORT PEN NEEDLE 31 gauge  "x 5/16" Ndle, USE WITH LANTUS INSULIN ONCE DAILY, Disp: 100 each, Rfl: 0    blood sugar diagnostic Strp, Check sugar tid as directed, Disp: 300 each, Rfl: 3    cholecalciferol, vitamin D3, (VITAMIN D3) 2,000 unit Tab, Take by mouth once daily., Disp: , Rfl:     finasteride (PROSCAR) 5 mg tablet, Take 1 tablet (5 mg total) by mouth once daily., Disp: 90 tablet, Rfl: 3    hydrALAZINE (APRESOLINE) 100 MG tablet, Take 1 tablet (100 mg total) by mouth every 12 (twelve) hours., Disp: 180 tablet, Rfl: 3    OMEGA-3 ACID ETHYL ESTERS (OMACOR) 1 gram Cap, Take 1 g by mouth. 1 Capsule Oral Twice a day, Disp: , Rfl:     tamsulosin (FLOMAX) 0.4 mg Cap, TAKE 1 CAPSULE BY MOUTH TWICE A DAY, Disp: 180 capsule, Rfl: 3    TRESIBA FLEXTOUCH U-100 100 unit/mL (3 mL) InPn, INJECT 15 UNITS UNDER THE SKIN EVERY EVENING, Disp: 9 mL, Rfl: 0    blood-glucose meter Misc, 1 kit by Misc.(Non-Drug; Combo Route) route 3 (three) times daily. Pt is to test blood sugar TID, Disp: 1 each, Rfl: 0    PHYSICAL EXAMINATION:    The patient generally appears in good health, is appropriately interactive, and is in no apparent distress.     Eyes: anicteric sclerae, moist conjunctivae; no lid-lag; PERRLA     HENT: Atraumatic; oropharynx clear with moist mucous membranes and no mucosal ulcerations;normal hard and soft palate.  No evidence of lymphadenopathy.    Neck: Trachea midline.  No thyromegaly.    Musculoskeletal: No abnormal gait.    Skin: No lesions.    Mental: Cooperative with normal affect.  Is oriented to time, place, and person.    Neuro: Grossly intact.    Chest: Normal inspiratory effort.   No accessory muscles.  No audible wheezes.  Respirations symmetric on inspiration and expiration.    Heart: Regular rhythm.      Extremities: No clubbing, cyanosis, or edema.    LABS:    Lab Results   Component Value Date    CREATININE 1.8 (H) 06/26/2019     Lab Results   Component Value Date    PSA 1.1 04/15/2014    PSA 1.88 08/22/2012    PSA 1.4 " 08/08/2011     Hemoglobin A1C   Date Value Ref Range Status   05/23/2019 6.2 (H) 4.0 - 5.6 % Final     Comment:     ADA Screening Guidelines:  5.7-6.4%  Consistent with prediabetes  >or=6.5%  Consistent with diabetes  High levels of fetal hemoglobin interfere with the HbA1C  assay. Heterozygous hemoglobin variants (HbS, HgC, etc)do  not significantly interfere with this assay.   However, presence of multiple variants may affect accuracy.     05/10/2018 6.2 (H) 4.0 - 5.6 % Final     Comment:     According to ADA guidelines, hemoglobin A1c <7.0% represents  optimal control in non-pregnant diabetic patients. Different  metrics may apply to specific patient populations.   Standards of Medical Care in Diabetes-2016.  For the purpose of screening for the presence of diabetes:  <5.7%     Consistent with the absence of diabetes  5.7-6.4%  Consistent with increasing risk for diabetes   (prediabetes)  >or=6.5%  Consistent with diabetes  Currently, no consensus exists for use of hemoglobin A1c  for diagnosis of diabetes for children.  This Hemoglobin A1c assay has significant interference with fetal   hemoglobin   (HbF). The results are invalid for patients with abnormal amounts of   HbF,   including those with known Hereditary Persistence   of Fetal Hemoglobin. Heterozygous hemoglobin variants (HbAS, HbAC,   HbAD, HbAE, HbA2) do not significantly interfere with this assay;   however, presence of multiple variants in a sample may impact the %   interference.     11/29/2017 6.4 (H) 4.0 - 5.6 % Final     Comment:     According to ADA guidelines, hemoglobin A1c <7.0% represents  optimal control in non-pregnant diabetic patients. Different  metrics may apply to specific patient populations.   Standards of Medical Care in Diabetes-2016.  For the purpose of screening for the presence of diabetes:  <5.7%     Consistent with the absence of diabetes  5.7-6.4%  Consistent with increasing risk for diabetes   (prediabetes)  >or=6.5%   Consistent with diabetes  Currently, no consensus exists for use of hemoglobin A1c  for diagnosis of diabetes for children.  This Hemoglobin A1c assay has significant interference with fetal   hemoglobin   (HbF). The results are invalid for patients with abnormal amounts of   HbF,   including those with known Hereditary Persistence   of Fetal Hemoglobin. Heterozygous hemoglobin variants (HbAS, HbAC,   HbAD, HbAE, HbA2) do not significantly interfere with this assay;   however, presence of multiple variants in a sample may impact the %   interference.       IMPRESSION:    Encounter Diagnoses   Name Primary?    Acute urinary retention Yes    CKD (chronic kidney disease) stage 4, GFR 15-29 ml/min     BPH with urinary obstruction      PLAN:    I spent 25 minutes with the patient of which more than half was spent in direct consultation with the patient in regards to our treatment and plan.    Discussed and reviewed BPH, and urinary retention. Recommend to continue w/ tamsulosin and finasteride as rx'd. Pt amenable to plan, he is satisfied w/ results thus far. Reviewed potential adv effects. Discussed and recommend adherence to bowel prep.    Education sheets provided.    F/u 3 months for urinary retention and BPH.    Patient verbalized and expressed understanding, and agree w/ plan.

## 2019-09-04 NOTE — PATIENT INSTRUCTIONS
Urinary Retention (Male)  Urinary retention is the medical term for difficulty or inability to pass urine, even though your bladder is full.  Causes  The most common cause of urinary retention in men is the bladder outlet being blocked. This can be due to an enlarged prostate gland or a bladder infection. Certain medicines can also cause this problem. This condition is more likely to occur as men get older.    This condition is treated by insertion of a catheter into the bladder to drain the urine. This provides immediate relief. The catheter may need to remain in place for a few days to prevent a recurrence. The catheter has a balloon on the tip which was inflated after insertion. This prevents the catheter from falling out.  Symptoms  Common symptoms of urinary retention include:  · Pain (not experienced by everyone)  · Frequent urination  · Feeling that the bladder is still full after urinating  · Incontinence (not being able to control the release of urine)  · Swollen abdomen  Treatment  This condition is treated by inserting a tube (catheter) into the bladder to drain the urine. This provides immediate relief. The catheter may need to stay in place for a few days. The catheter has a balloon on the tip, which is inflated after insertion. This prevents the catheter from falling out.  Home care  · If you were given antibiotics, take them until they are used up, or your healthcare provider tells you to stop. It is important to finish the antibiotics even though you feel better. This is to make sure your infection has cleared.  · If a catheter was left in place, it is important to keep bacteria from getting into the collection bag. Do not disconnect the catheter from the collection bag.  · Use a leg band to secure the drainage tube, so it does not pull on the catheter. Drain the collection bag when it becomes full using the drain spout at the bottom of the bag.  · Do not pull on or try to remove your catheter.  This will injure your urethra. The catheter must be removed by a healthcare provider.  Follow-up care  Follow up with your healthcare provider, or as advised.  If a catheter was left in place, it can usually be removed within 3 to 7 days. Some conditions require the catheter to stay in longer. Your healthcare provider will tell you when to return to have the catheter removed.  When to seek medical advice  Call your healthcare provider right away if any of these occur:  · Fever of 100.4ºF (38ºC) or higher, or as directed by your healthcare provider  · Bladder or lower-abdominal pain or fullness  · Abdominal swelling, nausea, vomiting, or back pain  · Blood or urine leakage around the catheter  · Bloody urine coming from the catheter (if a new symptom)  · Weakness, dizziness, or fainting  · Confusion or change in usual level of alertness  · If a catheter was left in place, return if:  ¨ Catheter falls out  ¨ Catheter stops draining for 6 hours  Date Last Reviewed: 7/26/2015  © 7238-3843 [employer]. [address] [place] [address], [place] [address]. All rights reserved. This information is not intended as a substitute for professional medical care. Always follow your healthcare professional's instructions.

## 2019-09-06 ENCOUNTER — TELEPHONE (OUTPATIENT)
Dept: NEPHROLOGY | Facility: CLINIC | Age: 79
End: 2019-09-06

## 2019-09-20 ENCOUNTER — TELEPHONE (OUTPATIENT)
Dept: NEPHROLOGY | Facility: CLINIC | Age: 79
End: 2019-09-20

## 2019-09-26 ENCOUNTER — PATIENT OUTREACH (OUTPATIENT)
Dept: ADMINISTRATIVE | Facility: OTHER | Age: 79
End: 2019-09-26

## 2019-09-26 ENCOUNTER — TELEPHONE (OUTPATIENT)
Dept: NEPHROLOGY | Facility: CLINIC | Age: 79
End: 2019-09-26

## 2019-09-26 DIAGNOSIS — N18.4 STAGE 4 CHRONIC KIDNEY DISEASE: Primary | ICD-10-CM

## 2019-09-27 ENCOUNTER — HOSPITAL ENCOUNTER (EMERGENCY)
Facility: OTHER | Age: 79
Discharge: HOME | End: 2019-09-27
Attending: EMERGENCY MEDICINE
Payer: MEDICARE

## 2019-09-27 VITALS
HEIGHT: 62 IN | OXYGEN SATURATION: 95 % | DIASTOLIC BLOOD PRESSURE: 73 MMHG | SYSTOLIC BLOOD PRESSURE: 139 MMHG | HEART RATE: 60 BPM | WEIGHT: 180 LBS | TEMPERATURE: 98 F | BODY MASS INDEX: 33.13 KG/M2 | RESPIRATION RATE: 18 BRPM

## 2019-09-27 DIAGNOSIS — R33.9 URINARY RETENTION: Primary | ICD-10-CM

## 2019-09-27 LAB
ALBUMIN SERPL BCP-MCNC: 3.5 G/DL (ref 3.5–5.2)
ANION GAP SERPL CALC-SCNC: 15 MMOL/L (ref 8–16)
ANION GAP SERPL CALC-SCNC: 15 MMOL/L (ref 8–16)
BILIRUB UR QL STRIP: NEGATIVE
BUN SERPL-MCNC: 35 MG/DL (ref 8–23)
BUN SERPL-MCNC: 35 MG/DL (ref 8–23)
CALCIUM SERPL-MCNC: 9.8 MG/DL (ref 8.7–10.5)
CALCIUM SERPL-MCNC: 9.8 MG/DL (ref 8.7–10.5)
CHLORIDE SERPL-SCNC: 98 MMOL/L (ref 95–110)
CHLORIDE SERPL-SCNC: 98 MMOL/L (ref 95–110)
CLARITY UR: CLEAR
CO2 SERPL-SCNC: 20 MMOL/L (ref 23–29)
CO2 SERPL-SCNC: 20 MMOL/L (ref 23–29)
COLOR UR: YELLOW
CREAT SERPL-MCNC: 1.7 MG/DL (ref 0.5–1.4)
CREAT SERPL-MCNC: 1.7 MG/DL (ref 0.5–1.4)
CREAT UR-MCNC: 21.7 MG/DL (ref 23–375)
EST. GFR  (AFRICAN AMERICAN): 44 ML/MIN/1.73 M^2
EST. GFR  (AFRICAN AMERICAN): 44 ML/MIN/1.73 M^2
EST. GFR  (NON AFRICAN AMERICAN): 38 ML/MIN/1.73 M^2
EST. GFR  (NON AFRICAN AMERICAN): 38 ML/MIN/1.73 M^2
GLUCOSE SERPL-MCNC: 134 MG/DL (ref 70–110)
GLUCOSE SERPL-MCNC: 134 MG/DL (ref 70–110)
GLUCOSE UR QL STRIP: NEGATIVE
HGB UR QL STRIP: NEGATIVE
KETONES UR QL STRIP: NEGATIVE
LEUKOCYTE ESTERASE UR QL STRIP: NEGATIVE
NITRITE UR QL STRIP: NEGATIVE
PH UR STRIP: 7 [PH] (ref 5–8)
PHOSPHATE SERPL-MCNC: 3.6 MG/DL (ref 2.7–4.5)
POTASSIUM SERPL-SCNC: 4 MMOL/L (ref 3.5–5.1)
POTASSIUM SERPL-SCNC: 4 MMOL/L (ref 3.5–5.1)
PROT UR QL STRIP: NEGATIVE
PROT UR-MCNC: <7 MG/DL (ref 0–15)
PROT/CREAT UR: ABNORMAL MG/G{CREAT} (ref 0–0.2)
SODIUM SERPL-SCNC: 133 MMOL/L (ref 136–145)
SODIUM SERPL-SCNC: 133 MMOL/L (ref 136–145)
SP GR UR STRIP: <=1.005 (ref 1–1.03)
URN SPEC COLLECT METH UR: ABNORMAL
UROBILINOGEN UR STRIP-ACNC: NEGATIVE EU/DL

## 2019-09-27 PROCEDURE — 51798 US URINE CAPACITY MEASURE: CPT

## 2019-09-27 PROCEDURE — 82570 ASSAY OF URINE CREATININE: CPT

## 2019-09-27 PROCEDURE — 51702 INSERT TEMP BLADDER CATH: CPT

## 2019-09-27 PROCEDURE — 36415 COLL VENOUS BLD VENIPUNCTURE: CPT

## 2019-09-27 PROCEDURE — 81003 URINALYSIS AUTO W/O SCOPE: CPT

## 2019-09-27 PROCEDURE — 80069 RENAL FUNCTION PANEL: CPT

## 2019-09-27 PROCEDURE — 99283 EMERGENCY DEPT VISIT LOW MDM: CPT | Mod: 25

## 2019-09-27 NOTE — ED TRIAGE NOTES
"Pt presents to ER w/ reports of + urinary retention since this AM at 0100. Pt reports frequent urinary retention and hx of prostate issues. + bilateral lower abdominal pains described as pressure".   "

## 2019-09-27 NOTE — ED PROVIDER NOTES
Encounter Date: 9/27/2019       History     Chief Complaint   Patient presents with    Urinary Retention     Unable to urinate since 0100 this am.      Patient is a 79 y.o. male presenting to the emergency department with complaints of inability to urinate.  The patient states he has been unable to urinate since about 1:00 a.m. this morning.  He states he feels urge to go, but cannot.  He admits he has had similar symptoms in the past and has been at least 2 months.  He states that he sees Urology and has known prostate problem.  He denies any new medication use.  He denies any fever chills. He does report abdominal pressure. This is the extent of the patient's complaints at this time.       The history is provided by the patient.     Review of patient's allergies indicates:   Allergen Reactions    Actos [pioglitazone] Other (See Comments)     constipation     Past Medical History:   Diagnosis Date    Allergy     Colon polyp     Diabetes mellitus type II     Gout, unspecified     Hyperlipidemia     Hypertension     Renal insufficiency      Past Surgical History:   Procedure Laterality Date    APPENDECTOMY      EYE SURGERY      cataract    HERNIA REPAIR      TONSILLECTOMY       Family History   Problem Relation Age of Onset    Cancer Mother         ovarian cancer    Heart disease Father         MI at 57    Diabetes Father     Cerebral aneurysm Sister     Heart disease Brother         MI at 60    Heart disease Brother         CABG     Social History     Tobacco Use    Smoking status: Never Smoker    Smokeless tobacco: Never Used   Substance Use Topics    Alcohol use: No    Drug use: No     Review of Systems   Constitutional: Negative for activity change, chills, fatigue and fever.   HENT: Negative for congestion, rhinorrhea and sore throat.    Eyes: Negative for photophobia and visual disturbance.   Respiratory: Negative for cough and shortness of breath.    Cardiovascular: Negative for chest  pain.   Gastrointestinal: Negative for abdominal pain, diarrhea, nausea and vomiting.   Genitourinary: Positive for difficulty urinating. Negative for dysuria, hematuria and urgency.        Retention   Musculoskeletal: Negative for back pain, myalgias and neck pain.   Skin: Negative for color change and wound.   Neurological: Negative for weakness and headaches.   Psychiatric/Behavioral: Negative for agitation and confusion.       Physical Exam     Initial Vitals [09/27/19 0901]   BP Pulse Resp Temp SpO2   123/72 68 18 97.9 °F (36.6 °C) 98 %      MAP       --         Physical Exam    Nursing note and vitals reviewed.  Constitutional: He appears well-developed and well-nourished. He is not diaphoretic. He is cooperative.  Non-toxic appearance. He does not have a sickly appearance. No distress.   Well-appearing,  male accompanied the emergency room.  Speaking clearly in full sentences.  No acute distress.   HENT:   Head: Normocephalic and atraumatic.   Right Ear: External ear normal.   Left Ear: External ear normal.   Nose: Nose normal.   Mouth/Throat: Oropharynx is clear and moist.   Eyes: Conjunctivae and EOM are normal.   Neck: Normal range of motion. Neck supple.   Cardiovascular: Normal rate, regular rhythm and normal heart sounds.   Pulmonary/Chest: Breath sounds normal. No respiratory distress. He has no wheezes.   Abdominal: Soft. Bowel sounds are normal. He exhibits no distension. There is no tenderness. There is no rebound.   Musculoskeletal: Normal range of motion.   Neurological: He is alert and oriented to person, place, and time.   Skin: Skin is warm.   Psychiatric: He has a normal mood and affect. His behavior is normal. Judgment and thought content normal.         ED Course   Procedures  Labs Reviewed   URINALYSIS, REFLEX TO URINE CULTURE - Abnormal; Notable for the following components:       Result Value    Specific Gravity, UA <=1.005 (*)     All other components within normal limits     Narrative:     Preferred Collection Type->Urine, Clean Catch   RENAL FUNCTION PANEL - Abnormal; Notable for the following components:    Glucose 134 (*)     Sodium 133 (*)     CO2 20 (*)     BUN, Bld 35 (*)     Creatinine 1.7 (*)     eGFR if  44 (*)     eGFR if non  38 (*)     All other components within normal limits   BASIC METABOLIC PANEL - Abnormal; Notable for the following components:    Sodium 133 (*)     CO2 20 (*)     Glucose 134 (*)     BUN, Bld 35 (*)     Creatinine 1.7 (*)     eGFR if  44 (*)     eGFR if non  38 (*)     All other components within normal limits   PROTEIN / CREATININE RATIO, URINE             Medical Decision Making:   Initial Assessment:     Urgent evaluation of a 79-year-old male with a past medical history of diabetes, hypertension, presenting to the emergency department for evaluation of urinary retention.  Patient is afebrile, nontoxic appearing, hemodynamically stable. Bladder scan upon arrival to the emergency department revealed over 600 mL of urine in the bladder.  Catheter was placed.  Upon my assessment, patient reported feeling significant improvement.  Will send UA and labs the patient was scheduled for earlier today.    Clinical Tests:   Lab Tests: Ordered and Reviewed  ED Management:    UA shows no evidence of infection.  Chemistry shows elevated BUN creatinine however consistent with patient's baseline.  At this point, do not feel any further testing imaging is warranted.  Patient be discharged home with a leg bag, urged to obtain close follow-up with Urology.  Discharge in stable condition.The patient was instructed to follow up with a primary care provider in 2 days or to return to the emergency department for worsening symptoms. The treatment plan was discussed with the patient who demonstrated understanding and comfort with plan.      This note was created using [employer] Direct. There may be  typographical errors secondary to dictation.                         Clinical Impression:     1. Urinary retention         Disposition:   Disposition: Discharged  Condition: Stable                        Blanquita Henderson PA-C  09/27/19 1000

## 2019-09-27 NOTE — ED NOTES
Patient moved to ED room 9 via triage nurse, patient assisted onto stretcher and changed into a gown. Patient placed on  continuous pulse oximetry and automatic blood pressure cuff. Bed placed in low locked position, side rails up x 2, call light is within reach of patient or family, orientation to room and explanation of wait provided to family and patient, alarms set and turned on for monitor and pulse ox, awaiting MD evaluation and orders, will continue to monitor.

## 2019-09-27 NOTE — ED NOTES
"Patient identifiers verified and correct for Giovanni Guerrero.    LOC: The patient is awake, alert and aware of environment with an appropriate affect, the patient is oriented x 3 and speaking appropriately.  APPEARANCE: Patient resting comfortably and in no acute distress, patient is clean and well groomed, patient's clothing is properly fastened.  SKIN: The skin is warm and dry, color consistent with ethnicity, patient has normal skin turgor and moist mucus membranes, skin intact, no breakdown or bruising noted.  MUSCULOSKELETAL: Patient moving all extremities spontaneously, no obvious swelling or deformities noted.  RESPIRATORY: Airway is open and patent, respirations are spontaneous, patient has a normal effort and rate, no accessory muscle use noted, bilateral breath sounds clear in all lobes.  CARDIAC: Patient has a normal rate and regular rhythm, no periphreal edema noted, capillary refill < 3 seconds.  ABDOMEN: Soft and non tender to palpation. + bilateral lower abdominal pains described as "pressure".   NEUROLOGIC: PERRL, 2 mm bilaterally, eyes open spontaneously, behavior appropriate to situation, follows commands, facial expression symmetrical, bilateral hand grasp equal and even, purposeful motor response noted, normal sensation in all extremities when touched with a finger.  Fall risk band applied to patient.   "

## 2019-09-30 ENCOUNTER — OFFICE VISIT (OUTPATIENT)
Dept: NEPHROLOGY | Facility: CLINIC | Age: 79
End: 2019-09-30
Payer: MEDICARE

## 2019-09-30 VITALS
DIASTOLIC BLOOD PRESSURE: 60 MMHG | HEIGHT: 62 IN | WEIGHT: 185.19 LBS | BODY MASS INDEX: 34.08 KG/M2 | SYSTOLIC BLOOD PRESSURE: 128 MMHG | HEART RATE: 81 BPM | OXYGEN SATURATION: 96 %

## 2019-09-30 DIAGNOSIS — N18.9 ANEMIA OF CHRONIC RENAL FAILURE, UNSPECIFIED CKD STAGE: ICD-10-CM

## 2019-09-30 DIAGNOSIS — E11.21 DIABETIC NEPHROPATHY ASSOCIATED WITH TYPE 2 DIABETES MELLITUS: ICD-10-CM

## 2019-09-30 DIAGNOSIS — N18.30 CHRONIC KIDNEY DISEASE, STAGE III (MODERATE): Primary | ICD-10-CM

## 2019-09-30 DIAGNOSIS — E87.20 ACIDOSIS, METABOLIC: ICD-10-CM

## 2019-09-30 DIAGNOSIS — D63.1 ANEMIA OF CHRONIC RENAL FAILURE, UNSPECIFIED CKD STAGE: ICD-10-CM

## 2019-09-30 DIAGNOSIS — I10 ESSENTIAL HYPERTENSION: ICD-10-CM

## 2019-09-30 PROCEDURE — 1101F PT FALLS ASSESS-DOCD LE1/YR: CPT | Mod: CPTII,S$GLB,, | Performed by: INTERNAL MEDICINE

## 2019-09-30 PROCEDURE — 1101F PR PT FALLS ASSESS DOC 0-1 FALLS W/OUT INJ PAST YR: ICD-10-PCS | Mod: CPTII,S$GLB,, | Performed by: INTERNAL MEDICINE

## 2019-09-30 PROCEDURE — 99999 PR PBB SHADOW E&M-EST. PATIENT-LVL IV: ICD-10-PCS | Mod: PBBFAC,,, | Performed by: INTERNAL MEDICINE

## 2019-09-30 PROCEDURE — 3078F PR MOST RECENT DIASTOLIC BLOOD PRESSURE < 80 MM HG: ICD-10-PCS | Mod: CPTII,S$GLB,, | Performed by: INTERNAL MEDICINE

## 2019-09-30 PROCEDURE — 3074F SYST BP LT 130 MM HG: CPT | Mod: CPTII,S$GLB,, | Performed by: INTERNAL MEDICINE

## 2019-09-30 PROCEDURE — 99499 UNLISTED E&M SERVICE: CPT | Mod: S$GLB,,, | Performed by: INTERNAL MEDICINE

## 2019-09-30 PROCEDURE — 3078F DIAST BP <80 MM HG: CPT | Mod: CPTII,S$GLB,, | Performed by: INTERNAL MEDICINE

## 2019-09-30 PROCEDURE — 99999 PR PBB SHADOW E&M-EST. PATIENT-LVL IV: CPT | Mod: PBBFAC,,, | Performed by: INTERNAL MEDICINE

## 2019-09-30 PROCEDURE — 99214 PR OFFICE/OUTPT VISIT, EST, LEVL IV, 30-39 MIN: ICD-10-PCS | Mod: S$GLB,,, | Performed by: INTERNAL MEDICINE

## 2019-09-30 PROCEDURE — 3074F PR MOST RECENT SYSTOLIC BLOOD PRESSURE < 130 MM HG: ICD-10-PCS | Mod: CPTII,S$GLB,, | Performed by: INTERNAL MEDICINE

## 2019-09-30 PROCEDURE — 99499 RISK ADDL DX/OHS AUDIT: ICD-10-PCS | Mod: S$GLB,,, | Performed by: INTERNAL MEDICINE

## 2019-09-30 PROCEDURE — 99214 OFFICE O/P EST MOD 30 MIN: CPT | Mod: S$GLB,,, | Performed by: INTERNAL MEDICINE

## 2019-09-30 RX ORDER — SODIUM BICARBONATE 325 MG/1
650 TABLET ORAL 2 TIMES DAILY
Qty: 120 TABLET | Refills: 6 | COMMUNITY
Start: 2019-09-30 | End: 2021-06-18

## 2019-09-30 RX ORDER — AMLODIPINE BESYLATE 5 MG/1
5 TABLET ORAL DAILY
Qty: 90 TABLET | Refills: 3 | Status: SHIPPED | OUTPATIENT
Start: 2019-09-30 | End: 2020-12-07

## 2019-09-30 RX ORDER — LISINOPRIL 20 MG/1
10 TABLET ORAL DAILY
Qty: 45 TABLET | Refills: 11 | Status: ON HOLD | OUTPATIENT
Start: 2019-09-30 | End: 2020-02-01 | Stop reason: HOSPADM

## 2019-09-30 NOTE — PATIENT INSTRUCTIONS
Stop Hydralazine and start Lisinopril, check BMP in 1 week, other lab test in 6 months before next visit.

## 2019-09-30 NOTE — PROGRESS NOTES
"Progress Note  Nephrology      Referring physician: Alan Webster MD    Reason for visit: CKD     SUBJECTIVE:   79 y.o. male new to me  has a past medical history of Allergy, Colon polyp, Diabetes mellitus type II, Gout, unspecified, Hyperlipidemia, Hypertension, and Renal insufficiency. who has been following up in renal clinic for ckd by Dr. Welsh, his renal function has been syable for years, currently is being better, suffering from urinary retention for a year or so, went to ER last week and they inserted a lester catheter, has appointment late this week with urology to take it out and follow up, ED reports > 600 cc of urine retention, no NSAIDs intake         OBJECTIVE:     Vitals:    09/30/19 0845   BP: 128/60   Pulse: 81          Physical Exam:  General: no distress, well nourished  HENT: PERRLA, Normal mouth nose and ears.  Neck: no JVD and thyroid not enlarged, symmetric, no tenderness/mass/nodules  Lungs: clear to auscultation bilaterally and normal respiratory effort  Cardiovascular: regular rate and rhythm, S1, S2 normal, no murmur, click, rub or gallop.   Abdomen: soft, non-tender non-distented; bowel sounds normal  Skin: No rashes or lesions  Musculoskeletal:no edema in LE, no deformities.   Lymph Nodes: No cervical or supraclavicular adenopathy  Neurologic: Normal strength and tone. No focal numbness or weakness    Dialysis Access: Not applicable.        Medications:    Current Outpatient Medications:     aspirin (ECOTRIN) 81 MG EC tablet, Take 81 mg by mouth once daily., Disp: , Rfl:     atenolol (TENORMIN) 100 MG tablet, TAKE 1 TABLET BY MOUTH DAILY, Disp: 90 tablet, Rfl: 2    atorvastatin (LIPITOR) 40 MG tablet, Take 1 tablet (40 mg total) by mouth once daily., Disp: 90 tablet, Rfl: 3    BD INSULIN SYRINGE ULTRA-FINE 0.5 mL 31 gauge x 5/16 Syrg, USE WITH LANTUS INSULIN ONCE DAILY, Disp: 100 each, Rfl: 3    BD ULTRA-FINE SHORT PEN NEEDLE 31 gauge x 5/16" Ndle, USE WITH LANTUS INSULIN ONCE " DAILY, Disp: 100 each, Rfl: 0    blood sugar diagnostic Strp, Check sugar tid as directed, Disp: 300 each, Rfl: 3    cholecalciferol, vitamin D3, (VITAMIN D3) 2,000 unit Tab, Take by mouth once daily., Disp: , Rfl:     finasteride (PROSCAR) 5 mg tablet, Take 1 tablet (5 mg total) by mouth once daily., Disp: 90 tablet, Rfl: 3    OMEGA-3 ACID ETHYL ESTERS (OMACOR) 1 gram Cap, Take 1 g by mouth. 1 Capsule Oral Twice a day, Disp: , Rfl:     tamsulosin (FLOMAX) 0.4 mg Cap, TAKE 1 CAPSULE BY MOUTH TWICE A DAY, Disp: 180 capsule, Rfl: 3    TRESIBA FLEXTOUCH U-100 100 unit/mL (3 mL) InPn, INJECT 15 UNITS UNDER THE SKIN EVERY EVENING, Disp: 9 mL, Rfl: 0    amLODIPine (NORVASC) 5 MG tablet, Take 1 tablet (5 mg total) by mouth once daily., Disp: 90 tablet, Rfl: 3    blood-glucose meter Misc, 1 kit by Misc.(Non-Drug; Combo Route) route 3 (three) times daily. Pt is to test blood sugar TID, Disp: 1 each, Rfl: 0    lisinopril (PRINIVIL,ZESTRIL) 20 MG tablet, Take 0.5 tablets (10 mg total) by mouth once daily., Disp: 45 tablet, Rfl: 11    sodium bicarbonate 325 MG tablet, Take 2 tablets (650 mg total) by mouth 2 (two) times daily., Disp: 120 tablet, Rfl: 6         Laboratory:  Lab Results   Component Value Date    CREATININE 1.7 (H) 09/27/2019    CREATININE 1.7 (H) 09/27/2019       Prot/Creat Ratio, Ur   Date Value Ref Range Status   09/27/2019 Unable to calculate 0.00 - 0.20 Final   07/28/2017 0.30 (H) 0.00 - 0.20 Final   01/22/2016 0.26 (H) 0.00 - 0.20 Final       Lab Results   Component Value Date     (L) 09/27/2019     (L) 09/27/2019    K 4.0 09/27/2019    K 4.0 09/27/2019    CO2 20 (L) 09/27/2019    CO2 20 (L) 09/27/2019       last PTH   Lab Results   Component Value Date    CALCIUM 9.8 09/27/2019    CALCIUM 9.8 09/27/2019    PHOS 3.6 09/27/2019       Lab Results   Component Value Date    HGB 16.5 06/26/2019        Lab Results   Component Value Date    HGBA1C 6.2 (H) 05/23/2019       Lab Results    Component Value Date    LDLCALC 47.6 (L) 05/23/2019       Other Labs were reviewed      ASSESSMENT/PLAN:     CKD III B1 - IV  -likely from DMII, HTN and probably recurrent urinary retention from BPH  -Cr baseline ~ 1.7-2.0 with eGFR ~ 35-40 ml/min  -Had Microalbuminuria in the past  -Renal US remotely with ckd and stones    HTN  -well controlled    Anemia  -from ckd  -Hgb goal ~ 10  -Iron stores not available    Secondary Hyperparathyroidism  -Phos/Ca acceptable  -PTH na  -Vit D na    Acid/Base  -Metabolic acidosis    BPH  -with recurrent urinary retention in the last year or so, has lester now, will follow with urology might end up with self cath.            PLAN:  -Stop Hydralazine and start Lisinopril, check BMP in 1 week  -Start Nabicarb  -Avoid NSAIDs intake        RTC in 6 months with labs      JESSE MCNULTY MD  NEPHROLOGY ATTENDING

## 2019-10-01 ENCOUNTER — TELEPHONE (OUTPATIENT)
Dept: UROLOGY | Facility: CLINIC | Age: 79
End: 2019-10-01

## 2019-10-01 NOTE — TELEPHONE ENCOUNTER
----- Message from Nicol Spain sent at 10/1/2019  9:16 AM CDT -----  Contact: [email or web address]  Patient called regarding catherer removal please call patient to schedule.

## 2019-10-03 ENCOUNTER — HOSPITAL ENCOUNTER (EMERGENCY)
Facility: OTHER | Age: 79
Discharge: HOME | End: 2019-10-03
Attending: EMERGENCY MEDICINE
Payer: MEDICARE

## 2019-10-03 VITALS
DIASTOLIC BLOOD PRESSURE: 63 MMHG | WEIGHT: 185 LBS | OXYGEN SATURATION: 98 % | SYSTOLIC BLOOD PRESSURE: 135 MMHG | BODY MASS INDEX: 34.04 KG/M2 | RESPIRATION RATE: 18 BRPM | HEART RATE: 68 BPM | HEIGHT: 62 IN | TEMPERATURE: 98 F

## 2019-10-03 DIAGNOSIS — T83.9XXA FOLEY CATHETER PROBLEM, INITIAL ENCOUNTER: Primary | ICD-10-CM

## 2019-10-03 PROCEDURE — 99282 EMERGENCY DEPT VISIT SF MDM: CPT

## 2019-10-03 NOTE — ED PROVIDER NOTES
Encounter Date: 10/3/2019    SCRIBE #1 NOTE: IMaría am scribing for, and in the presence of, Dr. Robison.       History     Chief Complaint   Patient presents with    catheter check     Pt has catheter with a leg bag. He woke up this am with sheets and pants wet. Pt unsure if bag leaked or if he was leaking around the catheter. Urine is currently flowing well into leg bag. Denies hematuria     Time seen by provider: 8:39 AM    This is a 79 y.o. male with hx of BPH who presents with complaint of lester catheter problem this morning. He states that he emptied the catheter bag about nine hours ago when he urinated last night. He woke up three hours ago with wet underwear and bed. He states that catheter bag was empty. He reports no visible issue with catheter connection. Dr. Vargas is his urologist. He denies any recent surgery or procedure. He denies any abdominal pain, penile pain, hematuria, and dysuria. He reports no recent fever, chills, cough, or sore throat.    The history is provided by the patient.     Review of patient's allergies indicates:   Allergen Reactions    Actos [pioglitazone] Other (See Comments)     constipation     Past Medical History:   Diagnosis Date    Allergy     Colon polyp     Diabetes mellitus type II     Gout, unspecified     Hyperlipidemia     Hypertension     Renal insufficiency      Past Surgical History:   Procedure Laterality Date    APPENDECTOMY      EYE SURGERY      cataract    HERNIA REPAIR      TONSILLECTOMY       Family History   Problem Relation Age of Onset    Cancer Mother         ovarian cancer    Heart disease Father         MI at 57    Diabetes Father     Cerebral aneurysm Sister     Heart disease Brother         MI at 60    Heart disease Brother         CABG     Social History     Tobacco Use    Smoking status: Never Smoker    Smokeless tobacco: Never Used   Substance Use Topics    Alcohol use: No    Drug use: No     Review of Systems    Constitutional: Negative for chills and fever.   HENT: Negative for congestion and sore throat.    Eyes: Negative for visual disturbance.   Respiratory: Negative for cough and shortness of breath.    Cardiovascular: Negative for chest pain and palpitations.   Gastrointestinal: Negative for abdominal pain, diarrhea and vomiting.   Genitourinary: Negative for dysuria, frequency and hematuria.        Positive for leaking lester catheter.   Musculoskeletal: Negative for joint swelling, neck pain and neck stiffness.   Skin: Negative for rash and wound.   Neurological: Negative for weakness, numbness and headaches.   Psychiatric/Behavioral: Negative for behavioral problems and confusion.       Physical Exam     Initial Vitals [10/03/19 0803]   BP Pulse Resp Temp SpO2   135/63 68 18 97.6 °F (36.4 °C) 98 %      MAP       --         Physical Exam    Nursing note and vitals reviewed.  Constitutional: He appears well-developed and well-nourished. No distress.   HENT:   Head: Normocephalic and atraumatic.   Mouth/Throat: Oropharynx is clear and moist.   Eyes: Conjunctivae and EOM are normal. Pupils are equal, round, and reactive to light.   Neck: Normal range of motion. Neck supple.   Cardiovascular: Normal rate, regular rhythm and normal heart sounds.   No murmur heard.  Pulmonary/Chest: Breath sounds normal. No respiratory distress. He has no wheezes. He has no rhonchi. He has no rales.   Abdominal: Soft. Bowel sounds are normal. There is no tenderness.   Genitourinary: Penis normal.   Genitourinary Comments: Leg bag with clear yellow urine.   Musculoskeletal: Normal range of motion.   Neurological: He is alert and oriented to person, place, and time. He has normal strength. No cranial nerve deficit or sensory deficit.   Skin: Skin is warm and dry. No rash noted.   Psychiatric: He has a normal mood and affect. His behavior is normal.         ED Course   Procedures  Labs Reviewed - No data to display          Medical Decision  Making:   History:   Old Medical Records: I decided to obtain old medical records.  ED Management:  Emergent evaluation of 79-year-old male here for Lozano catheter problem.  He reports leakage of urine in the bed into his clothing throughout the night.  At time of my evaluation, Lozano catheter is in place and draining clear yellow urine into the leg bag.  Nursing staff stated 1 of the connections was loose, but they tightened it.  At this time, no urine is draining around the Lozano and everything seems appropriate.  Abdomen soft and nontender. Patient was reassured and discharged in good condition.  He is encouraged close follow-up with Urology to return to the ED for new or worsening symptoms.            Scribe Attestation:   Scribe #1: I performed the above scribed service and the documentation accurately describes the services I performed. I attest to the accuracy of the note.    Attending Attestation:           Physician Attestation for Scribe:  Physician Attestation Statement for Scribe #1: I, Dr. Robison, reviewed documentation, as scribed by María Webber in my presence, and it is both accurate and complete.                    Clinical Impression:     1. Lozano catheter problem, initial encounter                                   Che Robison MD  10/03/19 [id number]

## 2019-10-03 NOTE — ED TRIAGE NOTES
Pt reports that he had a catheter placed last week with a leg bag for urinary retention. He is currently seeing urology. He states he woke up this am with wetness to the sheets and to his pants. He is unsure if his catheter was leaking from the bag or around the catheter. Pt denies hematuria or pain

## 2019-10-07 ENCOUNTER — PATIENT OUTREACH (OUTPATIENT)
Dept: ADMINISTRATIVE | Facility: OTHER | Age: 79
End: 2019-10-07

## 2019-10-07 ENCOUNTER — LAB VISIT (OUTPATIENT)
Dept: LAB | Facility: OTHER | Age: 79
End: 2019-10-07
Payer: MEDICARE

## 2019-10-07 DIAGNOSIS — N18.9 ANEMIA OF CHRONIC RENAL FAILURE, UNSPECIFIED CKD STAGE: ICD-10-CM

## 2019-10-07 DIAGNOSIS — E11.21 DIABETIC NEPHROPATHY ASSOCIATED WITH TYPE 2 DIABETES MELLITUS: ICD-10-CM

## 2019-10-07 DIAGNOSIS — D63.1 ANEMIA OF CHRONIC RENAL FAILURE, UNSPECIFIED CKD STAGE: ICD-10-CM

## 2019-10-07 DIAGNOSIS — E87.20 ACIDOSIS, METABOLIC: ICD-10-CM

## 2019-10-07 DIAGNOSIS — I10 ESSENTIAL HYPERTENSION: ICD-10-CM

## 2019-10-07 DIAGNOSIS — N18.30 CHRONIC KIDNEY DISEASE, STAGE III (MODERATE): ICD-10-CM

## 2019-10-07 LAB
ANION GAP SERPL CALC-SCNC: 9 MMOL/L (ref 8–16)
BUN SERPL-MCNC: 35 MG/DL (ref 8–23)
CALCIUM SERPL-MCNC: 9.3 MG/DL (ref 8.7–10.5)
CHLORIDE SERPL-SCNC: 102 MMOL/L (ref 95–110)
CO2 SERPL-SCNC: 25 MMOL/L (ref 23–29)
CREAT SERPL-MCNC: 1.8 MG/DL (ref 0.5–1.4)
EST. GFR  (AFRICAN AMERICAN): 41 ML/MIN/1.73 M^2
EST. GFR  (NON AFRICAN AMERICAN): 36 ML/MIN/1.73 M^2
GLUCOSE SERPL-MCNC: 99 MG/DL (ref 70–110)
POTASSIUM SERPL-SCNC: 4.5 MMOL/L (ref 3.5–5.1)
SODIUM SERPL-SCNC: 136 MMOL/L (ref 136–145)

## 2019-10-07 PROCEDURE — 80048 BASIC METABOLIC PNL TOTAL CA: CPT

## 2019-10-07 PROCEDURE — 36415 COLL VENOUS BLD VENIPUNCTURE: CPT

## 2019-10-09 NOTE — PROGRESS NOTES
CHIEF COMPLAINT:    Mr. Guerrero is a 79 y.o. male presenting for potential VT s/p acute urinary retention.    PRESENTING ILLNESS:    Giovanni Guerrero is a 79 y.o. male w/ h/o DM, HTN, HLD, CKD, gout, and BPH w/ obstruction (on tamsulosin 0.8mg).    8/11/14 cysto w/ SUDS revealed BPH w/ outlet obstruction.    Pt last seen in clinic 9/4/19 for 1 month f/u w/ PVR s/p urinary retention, at that time PVR 72mL. Advised to continue w/ tamsulosin and finasteride. Has had 2 VTs. Pt subsequently presented to ED 9/27/19 for inability to urinary, BS revealed 600mL, lester catheter placed.    Today pt returns to clinic w/ daughter for reporting irritation w/ lester catheter. Reports lester catheter draining appropriately w/o GH/clots. Reports adherence to finasteride and tamsulosin. Reports taking miralax QOD, and staying regular.    REVIEW OF SYSTEMS:    Giovanni Guerrero denies headache, blurred vision, fever, nausea, vomiting, chills, abdominal pain, bleeding per rectum, cough, SOB, recent loss of consciousness, recent mental status changes, seizures, dizziness, or upper or lower extremity weakness.    PATIENT HISTORY:    Past Medical History:   Diagnosis Date    Allergy     Colon polyp     Diabetes mellitus type II     Gout, unspecified     Hyperlipidemia     Hypertension     Renal insufficiency        Past Surgical History:   Procedure Laterality Date    APPENDECTOMY      CATARACT EXTRACTION Left 2018    EYE SURGERY      cataract    HERNIA REPAIR      TONSILLECTOMY         Family History   Problem Relation Age of Onset    Cancer Mother         ovarian cancer    Heart disease Father         MI at 57    Diabetes Father     Cerebral aneurysm Sister     Heart disease Brother         MI at 60    Heart disease Brother         CABG       Social History     Socioeconomic History    Marital status:      Spouse name: Not on file    Number of children: Not on file    Years of education: Not on file    Highest  "education level: Not on file   Occupational History    Not on file   Social Needs    Financial resource strain: Not on file    Food insecurity:     Worry: Not on file     Inability: Not on file    Transportation needs:     Medical: Not on file     Non-medical: Not on file   Tobacco Use    Smoking status: Never Smoker    Smokeless tobacco: Never Used   Substance and Sexual Activity    Alcohol use: No    Drug use: No    Sexual activity: Not Currently   Lifestyle    Physical activity:     Days per week: Not on file     Minutes per session: Not on file    Stress: Not on file   Relationships    Social connections:     Talks on phone: Not on file     Gets together: Not on file     Attends Congregational service: Not on file     Active member of club or organization: Not on file     Attends meetings of clubs or organizations: Not on file     Relationship status: Not on file   Other Topics Concern    Not on file   Social History Narrative     52 yrs, 3 childre (two living), 11 grandchildren & 4 great grandchildren     Allergies:  Actos [pioglitazone]    Medications:    Current Outpatient Medications:     amLODIPine (NORVASC) 5 MG tablet, Take 1 tablet (5 mg total) by mouth once daily., Disp: 90 tablet, Rfl: 3    aspirin (ECOTRIN) 81 MG EC tablet, Take 81 mg by mouth once daily., Disp: , Rfl:     atenolol (TENORMIN) 100 MG tablet, TAKE 1 TABLET BY MOUTH DAILY, Disp: 90 tablet, Rfl: 2    atorvastatin (LIPITOR) 40 MG tablet, Take 1 tablet (40 mg total) by mouth once daily., Disp: 90 tablet, Rfl: 3    BD INSULIN SYRINGE ULTRA-FINE 0.5 mL 31 gauge x 5/16 Syrg, USE WITH LANTUS INSULIN ONCE DAILY, Disp: 100 each, Rfl: 3    BD ULTRA-FINE SHORT PEN NEEDLE 31 gauge x 5/16" Ndle, USE WITH LANTUS INSULIN ONCE DAILY, Disp: 100 each, Rfl: 0    blood sugar diagnostic Strp, Check sugar tid as directed, Disp: 300 each, Rfl: 3    cholecalciferol, vitamin D3, (VITAMIN D3) 2,000 unit Tab, Take by mouth once daily., Disp: " , Rfl:     lisinopril (PRINIVIL,ZESTRIL) 20 MG tablet, Take 0.5 tablets (10 mg total) by mouth once daily., Disp: 45 tablet, Rfl: 11    OMEGA-3 ACID ETHYL ESTERS (OMACOR) 1 gram Cap, Take 1 g by mouth. 1 Capsule Oral Twice a day, Disp: , Rfl:     sodium bicarbonate 325 MG tablet, Take 2 tablets (650 mg total) by mouth 2 (two) times daily., Disp: 120 tablet, Rfl: 6    tamsulosin (FLOMAX) 0.4 mg Cap, TAKE 1 CAPSULE BY MOUTH TWICE A DAY, Disp: 180 capsule, Rfl: 3    TRESIBA FLEXTOUCH U-100 100 unit/mL (3 mL) InPn, INJECT 15 UNITS UNDER THE SKIN EVERY EVENING, Disp: 9 mL, Rfl: 0    blood-glucose meter Misc, 1 kit by Misc.(Non-Drug; Combo Route) route 3 (three) times daily. Pt is to test blood sugar TID, Disp: 1 each, Rfl: 0    finasteride (PROSCAR) 5 mg tablet, Take 1 tablet (5 mg total) by mouth once daily., Disp: 90 tablet, Rfl: 3    PHYSICAL EXAMINATION:    The patient generally appears in good health, is appropriately interactive, and is in no apparent distress.     Eyes: anicteric sclerae, moist conjunctivae; no lid-lag; PERRLA     HENT: Atraumatic; oropharynx clear with moist mucous membranes and no mucosal ulcerations;normal hard and soft palate.  No evidence of lymphadenopathy.    Neck: Trachea midline.  No thyromegaly.    Musculoskeletal: No abnormal gait.    Skin: No lesions.    Mental: Cooperative with normal affect.  Is oriented to time, place, and person.    Neuro: Grossly intact.    Chest: Normal inspiratory effort.   No accessory muscles.  No audible wheezes.  Respirations symmetric on inspiration and expiration.    Heart: Regular rhythm.      Penis is circumcised. No penile discharge. Meatus w/o stenosis or lesions. 18 Fr lester catheter in place, draining yellow urine, w/o GH/clots. Phallus unremarkable. No scrotal enlargement/edema/swelling/lesions. Epididymis unremarkable bilaterally. Testicles normal w/o nodules or tenderness. No inguinal hernias or lymphadenopathy.    Extremities: No clubbing,  cyanosis, or edema.    LABS:    Lab Results   Component Value Date    CREATININE 1.8 (H) 10/07/2019     Lab Results   Component Value Date    PSA 1.1 04/15/2014    PSA 1.88 08/22/2012    PSA 1.4 08/08/2011     Hemoglobin A1C   Date Value Ref Range Status   05/23/2019 6.2 (H) 4.0 - 5.6 % Final     Comment:     ADA Screening Guidelines:  5.7-6.4%  Consistent with prediabetes  >or=6.5%  Consistent with diabetes  High levels of fetal hemoglobin interfere with the HbA1C  assay. Heterozygous hemoglobin variants (HbS, HgC, etc)do  not significantly interfere with this assay.   However, presence of multiple variants may affect accuracy.     05/10/2018 6.2 (H) 4.0 - 5.6 % Final     Comment:     According to ADA guidelines, hemoglobin A1c <7.0% represents  optimal control in non-pregnant diabetic patients. Different  metrics may apply to specific patient populations.   Standards of Medical Care in Diabetes-2016.  For the purpose of screening for the presence of diabetes:  <5.7%     Consistent with the absence of diabetes  5.7-6.4%  Consistent with increasing risk for diabetes   (prediabetes)  >or=6.5%  Consistent with diabetes  Currently, no consensus exists for use of hemoglobin A1c  for diagnosis of diabetes for children.  This Hemoglobin A1c assay has significant interference with fetal   hemoglobin   (HbF). The results are invalid for patients with abnormal amounts of   HbF,   including those with known Hereditary Persistence   of Fetal Hemoglobin. Heterozygous hemoglobin variants (HbAS, HbAC,   HbAD, HbAE, HbA2) do not significantly interfere with this assay;   however, presence of multiple variants in a sample may impact the %   interference.     11/29/2017 6.4 (H) 4.0 - 5.6 % Final     Comment:     According to ADA guidelines, hemoglobin A1c <7.0% represents  optimal control in non-pregnant diabetic patients. Different  metrics may apply to specific patient populations.   Standards of Medical Care in  Diabetes-2016.  For the purpose of screening for the presence of diabetes:  <5.7%     Consistent with the absence of diabetes  5.7-6.4%  Consistent with increasing risk for diabetes   (prediabetes)  >or=6.5%  Consistent with diabetes  Currently, no consensus exists for use of hemoglobin A1c  for diagnosis of diabetes for children.  This Hemoglobin A1c assay has significant interference with fetal   hemoglobin   (HbF). The results are invalid for patients with abnormal amounts of   HbF,   including those with known Hereditary Persistence   of Fetal Hemoglobin. Heterozygous hemoglobin variants (HbAS, HbAC,   HbAD, HbAE, HbA2) do not significantly interfere with this assay;   however, presence of multiple variants in a sample may impact the %   interference.       IMPRESSION:    Encounter Diagnoses   Name Primary?    Acute urinary retention Yes    CKD (chronic kidney disease) stage 4, GFR 15-29 ml/min     BPH with urinary obstruction     Encounter for Lester catheter replacement      PLAN:    I spent 50 minutes with the patient of which more than half was spent in direct consultation with the patient in regards to our treatment and plan.    Discussed and reviewed BPH, and urinary retention.  Recommend SUDS cysto for further evaluation.  Discussed changing lester catheter given pt's discomfort, and need to collect cx prior to procedures.  Old 18 Fr lester catheter removed w/o difficulty.  New 16 Fr lester catheter placed in standard sterile fashion w/o difficulty.  Catheterized urine collected, and sent for culture.  Recommend to continue w/ tamsulosin and finasteride as rx'd.  Discussed and recommend adherence to bowel prep.    Education sheets provided.    F/u w/ SUDS cysto w/ Dr. Marquis.    Patient verbalized and expressed understanding, and agree w/ plan.

## 2019-10-10 ENCOUNTER — OFFICE VISIT (OUTPATIENT)
Dept: UROLOGY | Facility: CLINIC | Age: 79
End: 2019-10-10
Payer: MEDICARE

## 2019-10-10 VITALS
SYSTOLIC BLOOD PRESSURE: 108 MMHG | HEIGHT: 62 IN | DIASTOLIC BLOOD PRESSURE: 70 MMHG | WEIGHT: 183.88 LBS | BODY MASS INDEX: 33.84 KG/M2 | HEART RATE: 72 BPM

## 2019-10-10 DIAGNOSIS — Z46.6 ENCOUNTER FOR FOLEY CATHETER REPLACEMENT: ICD-10-CM

## 2019-10-10 DIAGNOSIS — R33.8 ACUTE URINARY RETENTION: Primary | ICD-10-CM

## 2019-10-10 DIAGNOSIS — N13.8 BPH WITH URINARY OBSTRUCTION: ICD-10-CM

## 2019-10-10 DIAGNOSIS — N18.4 CKD (CHRONIC KIDNEY DISEASE) STAGE 4, GFR 15-29 ML/MIN: ICD-10-CM

## 2019-10-10 DIAGNOSIS — N40.1 BPH WITH URINARY OBSTRUCTION: ICD-10-CM

## 2019-10-10 PROCEDURE — 87077 CULTURE AEROBIC IDENTIFY: CPT

## 2019-10-10 PROCEDURE — 3074F PR MOST RECENT SYSTOLIC BLOOD PRESSURE < 130 MM HG: ICD-10-PCS | Mod: CPTII,S$GLB,, | Performed by: NURSE PRACTITIONER

## 2019-10-10 PROCEDURE — 1101F PR PT FALLS ASSESS DOC 0-1 FALLS W/OUT INJ PAST YR: ICD-10-PCS | Mod: CPTII,S$GLB,, | Performed by: NURSE PRACTITIONER

## 2019-10-10 PROCEDURE — 87086 URINE CULTURE/COLONY COUNT: CPT

## 2019-10-10 PROCEDURE — 3078F DIAST BP <80 MM HG: CPT | Mod: CPTII,S$GLB,, | Performed by: NURSE PRACTITIONER

## 2019-10-10 PROCEDURE — 51702 INSERT TEMP BLADDER CATH: CPT | Mod: S$GLB,,, | Performed by: NURSE PRACTITIONER

## 2019-10-10 PROCEDURE — 3074F SYST BP LT 130 MM HG: CPT | Mod: CPTII,S$GLB,, | Performed by: NURSE PRACTITIONER

## 2019-10-10 PROCEDURE — 99999 PR PBB SHADOW E&M-EST. PATIENT-LVL IV: CPT | Mod: PBBFAC,,, | Performed by: NURSE PRACTITIONER

## 2019-10-10 PROCEDURE — 99215 PR OFFICE/OUTPT VISIT, EST, LEVL V, 40-54 MIN: ICD-10-PCS | Mod: 25,S$GLB,, | Performed by: NURSE PRACTITIONER

## 2019-10-10 PROCEDURE — 87186 SC STD MICRODIL/AGAR DIL: CPT

## 2019-10-10 PROCEDURE — 99215 OFFICE O/P EST HI 40 MIN: CPT | Mod: 25,S$GLB,, | Performed by: NURSE PRACTITIONER

## 2019-10-10 PROCEDURE — 51702 PR INSERTION OF TEMPORARY INDWELLING BLADDER CATHETER, SIMPLE: ICD-10-PCS | Mod: S$GLB,,, | Performed by: NURSE PRACTITIONER

## 2019-10-10 PROCEDURE — 1101F PT FALLS ASSESS-DOCD LE1/YR: CPT | Mod: CPTII,S$GLB,, | Performed by: NURSE PRACTITIONER

## 2019-10-10 PROCEDURE — 99499 UNLISTED E&M SERVICE: CPT | Mod: S$GLB,,, | Performed by: NURSE PRACTITIONER

## 2019-10-10 PROCEDURE — 87088 URINE BACTERIA CULTURE: CPT

## 2019-10-10 PROCEDURE — 99999 PR PBB SHADOW E&M-EST. PATIENT-LVL IV: ICD-10-PCS | Mod: PBBFAC,,, | Performed by: NURSE PRACTITIONER

## 2019-10-10 PROCEDURE — 3078F PR MOST RECENT DIASTOLIC BLOOD PRESSURE < 80 MM HG: ICD-10-PCS | Mod: CPTII,S$GLB,, | Performed by: NURSE PRACTITIONER

## 2019-10-10 PROCEDURE — 99499 RISK ADDL DX/OHS AUDIT: ICD-10-PCS | Mod: S$GLB,,, | Performed by: NURSE PRACTITIONER

## 2019-10-10 RX ORDER — CIPROFLOXACIN 250 MG/1
500 TABLET, FILM COATED ORAL ONCE
Status: CANCELLED | OUTPATIENT
Start: 2019-10-10 | End: 2019-10-10

## 2019-10-10 RX ORDER — LIDOCAINE HYDROCHLORIDE 20 MG/ML
JELLY TOPICAL ONCE
Status: CANCELLED | OUTPATIENT
Start: 2019-10-10 | End: 2019-10-10

## 2019-10-10 NOTE — PATIENT INSTRUCTIONS
Urinary Retention (Male)  Urinary retention is the medical term for difficulty or inability to pass urine, even though your bladder is full.  Causes  The most common cause of urinary retention in men is the bladder outlet being blocked. This can be due to an enlarged prostate gland or a bladdeSIMPLE URODYNAMIC STUDY (SUDS) & CYSTOSCOPY  UROLOGY CLINIC DISCHARGE INSTRUCTIONS    You have had a procedure that will require time to properly heal. Follow the instructions you have been given on how to care for yourself once you are home. Below is additional information to help in your recovery.    ACTIVITY  · There are no restrictions in activity. Start doing again the things you did before the procedure.  · You may experience a slight burning sensation. You may notice a small amount of blood in your urine. This will clear up within a day. Call the clinic if this continues beyond 48 hours.    DIET  · Continue your normal diet. You may eat the same foods you ate before your procedure.  · Drink plenty of fluids during the first 24-48 hours following your procedure.    MEDICATIONS  · Resume all other previous medications from your prescribing physician.  · Continue any pre=procedure antibiotics until they are all gone.    SIGNS AND SYMPTOMS TO REPORT TO THE DOCTOR  · Chills or fever greater than 101° F within 24 hours of procedure.  · Changes in urination, such as increased bleeding, foul smell, cloudy urine, or painful urination.  · Call your doctor with any questions or concerns.    For any emergency situation, call 911 immediately or go to your nearest emergency room.    [facility]  [phone]    Cystoscopy    Cystoscopy is a procedure that lets your doctor look directly inside your urethra and bladder. It can be used to:  · Help diagnose a problem with your urethra, bladder, or kidneys.  · Take a sample (biopsy) of bladder or urethral tissue.  · Treat certain problems (such as removing kidney  stones).  · Place a stent to bypass an obstruction.  · Take special X-rays of the kidneys.  Based on the findings, your doctor may recommend other tests or treatments.  What is a cystoscope?  A cystoscope is a telescope-like instrument that contains lenses and fiberoptics (small glass wires that make bright light). The cystoscope may be straight and rigid, or flexible to bend around curves in the urethra. The doctor may look directly into the cystoscope, or project the image onto a monitor.  Getting ready  · Ask your doctor if you should stop taking any medicines before the procedure.  · Ask whether you should avoid eating or drinking anything after midnight before the procedure.  · Follow any other instructions your doctor gives you.  Tell your doctor before the exam if you:  · Take any medicines, such as aspirin or blood thinners  · Have allergies to any medicines  · Are pregnant   The procedure  Cystoscopy is done in the doctors office, surgery center, or hospital. The doctor and a nurse are present during the procedure. It takes only a few minutes, longer if a biopsy, X-ray, or treatment needs to be done.  During the procedure:  · You lie on an exam table on your back, knees bent and legs apart. You are covered with a drape.  · Your urethra and the area around it are washed. Anesthetic jelly may be applied to numb the urethra. Other pain medicine is usually not needed. In some cases, you may be offered a mild sedative to help you relax. If a more extensive procedure is to be done, such as a biopsy or kidney stone removal, general anesthesia may be needed.  · The cystoscope is inserted. A sterile fluid is put into the bladder to expand it. You may feel pressure from this fluid.  · When the procedure is done, the cystoscope is removed.  After the procedure  If you had a sedative, general anesthesia, or spinal anesthesia, you must have someone drive you home. Once youre home:  · Drink plenty of fluids.  · You may  have burning or light bleeding when you urinate--this is normal.  · Medicines may be prescribed to ease any discomfort or prevent infection. Take these as directed.  · Call your doctor if you have heavy bleeding or blood clots, burning that lasts more than a day, a fever over 100°F  (38° C), or trouble urinating.  Date Last Reviewed: 1/1/2017  © 4601-5089 [employer]. [address] [place] [address], [place] [address]. All rights reserved. This information is not intended as a substitute for professional medical care. Always follow your healthcare professional's instructions.      r infection. Certain medicines can also cause this problem. This condition is more likely to occur as men get older.    This condition is treated by insertion of a catheter into the bladder to drain the urine. This provides immediate relief. The catheter may need to remain in place for a few days to prevent a recurrence. The catheter has a balloon on the tip which was inflated after insertion. This prevents the catheter from falling out.  Symptoms  Common symptoms of urinary retention include:  · Pain (not experienced by everyone)  · Frequent urination  · Feeling that the bladder is still full after urinating  · Incontinence (not being able to control the release of urine)  · Swollen abdomen  Treatment  This condition is treated by inserting a tube (catheter) into the bladder to drain the urine. This provides immediate relief. The catheter may need to stay in place for a few days. The catheter has a balloon on the tip, which is inflated after insertion. This prevents the catheter from falling out.  Home care  · If you were given antibiotics, take them until they are used up, or your healthcare provider tells you to stop. It is important to finish the antibiotics even though you feel better. This is to make sure your infection has cleared.  · If a catheter was left in place, it is important to keep bacteria from getting into the  collection bag. Do not disconnect the catheter from the collection bag.  · Use a leg band to secure the drainage tube, so it does not pull on the catheter. Drain the collection bag when it becomes full using the drain spout at the bottom of the bag.  · Do not pull on or try to remove your catheter. This will injure your urethra. The catheter must be removed by a healthcare provider.  Follow-up care  Follow up with your healthcare provider, or as advised.  If a catheter was left in place, it can usually be removed within 3 to 7 days. Some conditions require the catheter to stay in longer. Your healthcare provider will tell you when to return to have the catheter removed.  When to seek medical advice  Call your healthcare provider right away if any of these occur:  · Fever of 100.4ºF (38ºC) or higher, or as directed by your healthcare provider  · Bladder or lower-abdominal pain or fullness  · Abdominal swelling, nausea, vomiting, or back pain  · Blood or urine leakage around the catheter  · Bloody urine coming from the catheter (if a new symptom)  · Weakness, dizziness, or fainting  · Confusion or change in usual level of alertness  · If a catheter was left in place, return if:  ¨ Catheter falls out  ¨ Catheter stops draining for 6 hours  Date Last Reviewed: 7/26/2015  © 3768-3259 [employer]. [address] [place] [address], [place] [address]. All rights reserved. This information is not intended as a substitute for professional medical care. Always follow your healthcare professional's instructions.          Urodynamics Studies     The bladder holds urine until it leaves the body through the urethra.     Urodynamics studies are a series of tests that give your doctor a close look at the working of your bladder and urethra. The tests can help your doctor learn about any problems storing urine or voiding (eliminating) urine from your body.  Understanding the lower urinary tract  The lower part of the urinary  tract has several parts.  · The bladder stores urine until youre ready to release it.  · The urethra is the tube that carries urine from the bladder out of the body.  · The sphincter is made up of muscles around the opening of the bladder. The sphincter muscles tighten to hold urine in the bladder. They relax to let urine flow. Signals from the brain tell the sphincter when to tighten and relax. These signals also tell the bladder when to contract to let urine flow out of the body.  Why you need a urodynamics study  This test may be ordered if you:  · Are incontinent (leak urine)  · Have a bladder that does not empty all the way.  · Have symptoms such as the need to urinate often or a constant strong need to urinate  · Have intermittent or weak urine stream  · Have persistent urinary tract infections  Preparing for the study  · Tell your doctor about any medicine youre taking. Ask if you should stop them before the study.  · Keep a diary of your bathroom habits. Do this for a few days before the study. This diary can be a helpful part of the evaluation.  · Ask if you need to arrive for the study with a full bladder.  Date Last Reviewed: 1/1/2017  © 8500-1955 [employer]. [address] [place] [address]. All rights reserved. This information is not intended as a substitute for professional medical care. Always follow your healthcare professional's instructions.          Urodynamic Studies     The equipment used for the study varies depending upon the facility and what tests are done.     Urodynamic studies may be done in your doctors office, a clinic, or a hospital. The studies may take up to an hour or more. This depends on which tests your doctor does. The tests are generally painless. You wont need sedating medicine.  Tests that may be done  Uroflowmetry. This measures the amount and speed of urine you void from your bladder. You urinate into a funnel. Its attached to a computer that  records your urine flow over time. The amount of urine left in your bladder after you void may also be measured right after this test.  Cystometry. This test evaluates how much your bladder can hold. It also measures how strong your bladder muscle is and how well the signals work that tell you when your bladder is full. Your healthcare provider fills your bladder with sterile water or saline solution, through a catheter. Your doctor will instruct you to report any sensations you feel. Mention if theyre similar to symptoms youve felt at home. Your doctor may ask you to cough, stand and walk, or bear down during this test.  Electromyogram. This helps evaluate the muscle contractions that control urination, such as sphincter muscle contractions. Your healthcare provider may place electrode patches or wires near your rectum or urethra to make the recording. He or she may ask you to try to tighten or relax your sphincter muscles during this test.  Pressure flow study. This test measures your detrusor, urethral, and abdominal pressures. Detrusor is the muscle surrounding the bladder walls that relaxes to allow your bladder to fill, and and contracts to squeeze out urine. A pressure flow study is often done after cystometry. Youre asked to urinate while a probe in your urethra measures pressures.  Video cystourethrography. This takes video pictures of urine flow through your urinary tract. It can help identify blockages or other problems. The bladder is filled with an X-ray contrast fluid. Then X-ray video pictures are taken as the fluid is urinated out. Ultrasound imaging may also be combined with routine urodynamic studies.  Ambulatory urodynamics. This test can be used to evaluate you while doing usual activities.  Getting your results  After the study, youll get dressed and return to the consultation room. Test results may be ready soon after the study is finished. Or, you may return to your doctors office in a  few days for your results. Your doctor can talk with you about the study report and your options.   Date Last Reviewed: 1/1/2017  © 4914-8986 [employer]. [address] [place] [address], [place] [address]. All rights reserved. This information is not intended as a substitute for professional medical care. Always follow your healthcare professional's instructions.

## 2019-10-12 RX ORDER — INSULIN DEGLUDEC 100 U/ML
INJECTION, SOLUTION SUBCUTANEOUS
Qty: 9 ML | Refills: 0 | Status: SHIPPED | OUTPATIENT
Start: 2019-10-12 | End: 2019-12-03 | Stop reason: SDUPTHER

## 2019-10-13 LAB — BACTERIA UR CULT: ABNORMAL

## 2019-10-14 DIAGNOSIS — N30.00 ACUTE CYSTITIS WITHOUT HEMATURIA: Primary | ICD-10-CM

## 2019-10-14 RX ORDER — DOXYCYCLINE 100 MG/1
100 CAPSULE ORAL EVERY 12 HOURS
Qty: 14 CAPSULE | Refills: 0 | Status: SHIPPED | OUTPATIENT
Start: 2019-10-14 | End: 2019-10-21

## 2019-10-21 ENCOUNTER — PROCEDURE VISIT (OUTPATIENT)
Dept: UROLOGY | Facility: CLINIC | Age: 79
End: 2019-10-21
Payer: MEDICARE

## 2019-10-21 ENCOUNTER — HOSPITAL ENCOUNTER (EMERGENCY)
Facility: OTHER | Age: 79
Discharge: HOME | End: 2019-10-21
Attending: EMERGENCY MEDICINE
Payer: MEDICARE

## 2019-10-21 VITALS
TEMPERATURE: 97 F | HEART RATE: 74 BPM | WEIGHT: 183 LBS | BODY MASS INDEX: 33.68 KG/M2 | DIASTOLIC BLOOD PRESSURE: 68 MMHG | HEIGHT: 62 IN | SYSTOLIC BLOOD PRESSURE: 128 MMHG

## 2019-10-21 VITALS
BODY MASS INDEX: 33.68 KG/M2 | HEART RATE: 62 BPM | DIASTOLIC BLOOD PRESSURE: 70 MMHG | TEMPERATURE: 98 F | RESPIRATION RATE: 16 BRPM | WEIGHT: 183 LBS | HEIGHT: 62 IN | SYSTOLIC BLOOD PRESSURE: 131 MMHG | OXYGEN SATURATION: 97 %

## 2019-10-21 DIAGNOSIS — R33.9 URINARY RETENTION: Primary | ICD-10-CM

## 2019-10-21 DIAGNOSIS — N13.8 BPH WITH URINARY OBSTRUCTION: ICD-10-CM

## 2019-10-21 DIAGNOSIS — N40.1 BPH WITH URINARY OBSTRUCTION: ICD-10-CM

## 2019-10-21 DIAGNOSIS — R33.8 ACUTE URINARY RETENTION: ICD-10-CM

## 2019-10-21 DIAGNOSIS — N18.4 CKD (CHRONIC KIDNEY DISEASE) STAGE 4, GFR 15-29 ML/MIN: ICD-10-CM

## 2019-10-21 PROCEDURE — 99283 EMERGENCY DEPT VISIT LOW MDM: CPT | Mod: 25

## 2019-10-21 PROCEDURE — 51728 CYSTOMETROGRAM W/VP: CPT | Mod: 26,S$GLB,, | Performed by: UROLOGY

## 2019-10-21 PROCEDURE — 52000 CYSTOURETHROSCOPY: CPT | Mod: 59,S$GLB,, | Performed by: UROLOGY

## 2019-10-21 PROCEDURE — 51741 ELECTRO-UROFLOWMETRY FIRST: CPT | Mod: 26,51,S$GLB, | Performed by: UROLOGY

## 2019-10-21 PROCEDURE — 51702 INSERT TEMP BLADDER CATH: CPT

## 2019-10-21 PROCEDURE — 51728 PR COMPLEX CYSTOMETROGRAM VOIDING PRESSURE STUDIES: ICD-10-PCS | Mod: 26,S$GLB,, | Performed by: UROLOGY

## 2019-10-21 PROCEDURE — 51784 ANAL/URINARY MUSCLE STUDY: CPT | Mod: 26,51,S$GLB, | Performed by: UROLOGY

## 2019-10-21 PROCEDURE — 51741 PR UROFLOWMETRY, COMPLEX: ICD-10-PCS | Mod: 26,51,S$GLB, | Performed by: UROLOGY

## 2019-10-21 PROCEDURE — 51784 PR ANAL/URINARY MUSCLE STUDY: ICD-10-PCS | Mod: 26,51,S$GLB, | Performed by: UROLOGY

## 2019-10-21 PROCEDURE — 52000 PR CYSTOURETHROSCOPY: ICD-10-PCS | Mod: 59,S$GLB,, | Performed by: UROLOGY

## 2019-10-21 RX ORDER — LIDOCAINE HYDROCHLORIDE 20 MG/ML
JELLY TOPICAL ONCE
Status: COMPLETED | OUTPATIENT
Start: 2019-10-21 | End: 2019-10-21

## 2019-10-21 RX ORDER — CIPROFLOXACIN 250 MG/1
500 TABLET, FILM COATED ORAL ONCE
Status: COMPLETED | OUTPATIENT
Start: 2019-10-21 | End: 2019-10-21

## 2019-10-21 RX ADMIN — CIPROFLOXACIN 500 MG: 250 TABLET, FILM COATED ORAL at 01:10

## 2019-10-21 RX ADMIN — LIDOCAINE HYDROCHLORIDE: 20 JELLY TOPICAL at 01:10

## 2019-10-21 NOTE — ED TRIAGE NOTES
Patient presents to ER c/o urinary retention.  Pt states he had a procedure done by Dr. Marquis earlier today where he had a indwelling catheter removed.  Pt states he was told to come to the ER if unable to void after being discharge.  Pt states he was last able to void at 3:15pm.  Pt states he feels like he can void but can't.

## 2019-10-21 NOTE — PROCEDURES
Simple Urodynamics  Date/Time: 10/21/2019 12:45 PM  Performed by: Rafael Marquis Jr., MD  Authorized by: Rafael Marquis Jr., MD   Preparation: Patient was prepped and draped in the usual sterile fashion.  Local anesthesia used: no    Anesthesia:  Local anesthesia used: no    Sedation:  Patient sedated: no    Patient tolerance: Patient tolerated the procedure well with no immediate complications       Preop diagnosis BPH without obstruction and elevated residual urine.  Postop diagnosis same  Procedure simple urodynamics and cystoscopy  Surgeon Dr. Rafael Marquis  The 2 catheters cm G study was performed using subtracted of rectal pressure monitoring.  Sterile water was uses a medium at 30 cc/minute.  First sensation was at 100 cc 1st desire at 1:35 a.m. strong desire at 2:04 a.m. capacity was 200 and 89 cc.  Patient then voided with a maximum flow rate of 39.4 cc/second and a maximum detrusor pressure of 77 cm of water.  He voided to completion.  Compliance appeared normal.  There were no uninhibited bladder contractions.  Flexible cystoscopy was then performed.  The anterior urethra was normal.  No strictures were noted.  Prostatic urethra was 4 cm with lateral lobe obstruction.  There were grade 2 trabeculations with a few cellules.  Both ureteral orifices were normal size shape and position. There were no stones tumors form  Bodies are active infections.  Impression BPH without obstruction  Recommendations patient. I clear goes into and out of urinary retention.  He will continue taking his medications and he would like to have the catheter out.  I will see him back in 2 weeks and we can replace the catheter sooner p.r.n..  He was given a Cipro and has been taking doxycycline prior to today's visit.  I would recommend a laser TURP for this patient.  We will discuss this in clinic

## 2019-10-21 NOTE — ED NOTES
Pt with 118 mls on highest bladder scan. Pt has yet to void. Pt reports he would like catheter replaced where he doesn't end up back here at 2 am. Provider notified

## 2019-10-21 NOTE — ED PROVIDER NOTES
"Encounter Date: 10/21/2019    SCRIBE #1 NOTE: I, Nigel Pike, am scribing for, and in the presence of, Dr. Givens.       History     Chief Complaint   Patient presents with    Urinary Retention     since this morning. Hx of prostate issues. Pt had indewelling cath taken out today because he thought he didn't need it anymore, but wants it placed back in     Time seen by provider: 6:00 PM    This is a 79 y.o. male with a history of DM, HTN, and HLD who presents with complaint of urinary retention that began approximately three hours ago. The patient's daughter reports that he has a history of an enlarged prostate. He was seen by his urologist Dr. Meade today and had his catheter removed. The patient states Dr. Meade telling him to come back is his retention returned. He denies fever, sore throat, chest pain, shortness of breath, abdominal pain, abdominal distension, vomiting, nausea, hematuria, and dysuria. His blood sugar has been running "great" and is compliant with his insulin. He denies smoking, drinking, and illicit drug use.     The history is provided by the patient.     Review of patient's allergies indicates:   Allergen Reactions    Actos [pioglitazone] Other (See Comments)     constipation     Past Medical History:   Diagnosis Date    Allergy     Colon polyp     Diabetes mellitus type II     Gout, unspecified     Hyperlipidemia     Hypertension     Renal insufficiency      Past Surgical History:   Procedure Laterality Date    APPENDECTOMY      CATARACT EXTRACTION Left 2018    EYE SURGERY      cataract    HERNIA REPAIR      TONSILLECTOMY       Family History   Problem Relation Age of Onset    Cancer Mother         ovarian cancer    Heart disease Father         MI at 57    Diabetes Father     Cerebral aneurysm Sister     Heart disease Brother         MI at 60    Heart disease Brother         CABG     Social History     Tobacco Use    Smoking status: Never Smoker    Smokeless tobacco: " Never Used   Substance Use Topics    Alcohol use: No    Drug use: No     Review of Systems   Constitutional: Negative for fever.   HENT: Negative for sore throat.    Respiratory: Negative for shortness of breath.    Cardiovascular: Negative for chest pain.   Gastrointestinal: Negative for nausea.   Genitourinary: Negative for dysuria.   Musculoskeletal: Negative for back pain.   Skin: Negative for rash.   Neurological: Negative for weakness.   Hematological: Does not bruise/bleed easily.       Physical Exam     Initial Vitals [10/21/19 1754]   BP Pulse Resp Temp SpO2   131/70 62 16 97.6 °F (36.4 °C) 97 %      MAP       --         Physical Exam    Nursing note and vitals reviewed.  Constitutional: He appears well-developed and well-nourished. He is not diaphoretic. No distress.   HENT:   Head: Normocephalic and atraumatic.   Right Ear: External ear normal.   Left Ear: External ear normal.   Moist mucous membranes.   Eyes: EOM are normal. Pupils are equal, round, and reactive to light. Right eye exhibits no discharge. Left eye exhibits no discharge.   No pallor or icterus.   Neck: Normal range of motion.   Cardiovascular: Normal rate, regular rhythm and normal heart sounds. Exam reveals no gallop and no friction rub.    No murmur heard.  Pulmonary/Chest: Breath sounds normal. No respiratory distress. He has no wheezes. He has no rhonchi. He has no rales.   Abdominal: Soft. He exhibits no distension. There is tenderness in the suprapubic area. There is no rebound and no guarding.   Mild suprapubic tenderness.    Musculoskeletal: Normal range of motion. He exhibits no edema or tenderness.   Neurological: He is alert and oriented to person, place, and time.   Skin: Skin is warm and dry. No rash and no abscess noted. No erythema. No pallor.   Psychiatric: He has a normal mood and affect. His behavior is normal. Judgment and thought content normal.         ED Course   Procedures  Labs Reviewed - No data to display        Imaging Results    None                     Scribe Attestation:   Scribe #1: I performed the above scribed service and the documentation accurately describes the services I performed. I attest to the accuracy of the note.    Attending Attestation:           Physician Attestation for Scribe:  Physician Attestation Statement for Scribe #1: I, Dr. Givens, reviewed documentation, as scribed by Nigel Pike  in my presence, and it is both accurate and complete.                 ED Course as of Oct 21 2008   Mon Oct 21, 2019   1804 Giovanni Guerrero, 79 y.o.  presented to the ED with c/o urinary retention s/p urology procedure.  Will obtain post residual void bladder scan to determine if catheter is needed to be placed.     Patient seen and medically screened by the Physician Assistant in Triage due to ED crowding.  Appropriate tests and/or medications ordered.  A medical screening exam has been performed.  The care will be assumed by myself or another provider when treatment space becomes available.  I am not assuming care of this patient at this time. 6:04 PM. MML        [MM]      ED Course User Index  [MM] Luci Marie PA-C     Patient presents after seeing his urologist earlier today, having catheter removed.  Over the past 5 hr he has felt the urge to urinate but has not been able to do so.  He is concerned that he has urinary retention again, is requesting replacement of the Lozano catheter.  States that the urologist, Dr. hansen encouraged him to keep the catheter in but he wanted to give a trial of being on his own in reading Urology does note it does indicate that the catheter may be replaced p.r.n. exam he had only 120 cc of urine however is not able to void here and he and his daughter adamant they do not want this to be worsened have to come back in the middle the night will therefore replace the Lozano catheter.  He has not been having any blood in the urine is requesting a smaller gauge due  to irritation he had with the last 1 placed in the ER.  He has follow-up with Dr. Marquis.  Understands return precautions.    Clinical Impression:     1. Urinary retention                                 Matt Givens II, MD  10/21/19 2009

## 2019-10-21 NOTE — PATIENT INSTRUCTIONS
SIMPLE URODYNAMIC STUDY (SUDS) & CYSTOSCOPY  UROLOGY CLINIC DISCHARGE INSTRUCTIONS    You have had a procedure that will require time to properly heal. Follow the instructions you have been given on how to care for yourself once you are home. Below is additional information to help in your recovery.    ACTIVITY  · There are no restrictions in activity. Start doing again the things you did before the procedure.  · You may experience a slight burning sensation. You may notice a small amount of blood in your urine. This will clear up within a day. Call the clinic if this continues beyond 48 hours.    DIET  · Continue your normal diet. You may eat the same foods you ate before your procedure.  · Drink plenty of fluids during the first 24-48 hours following your procedure.    MEDICATIONS  · Resume all other previous medications from your prescribing physician.  · Continue any pre=procedure antibiotics until they are all gone.    SIGNS AND SYMPTOMS TO REPORT TO THE DOCTOR  · Chills or fever greater than 101° F within 24 hours of procedure.  · Changes in urination, such as increased bleeding, foul smell, cloudy urine, or painful urination.  · Call your doctor with any questions or concerns.    For any emergency situation, call [phone] immediately or go to your nearest emergency room.    [facility]  [phone]    _                                                                                                                                                                                             If any problems after hours or weekends, you may call [phone] and ask for the urology resident on call.

## 2019-10-23 ENCOUNTER — PATIENT OUTREACH (OUTPATIENT)
Dept: ADMINISTRATIVE | Facility: OTHER | Age: 79
End: 2019-10-23

## 2019-10-23 DIAGNOSIS — Z12.11 ENCOUNTER FOR COLONOSCOPY DUE TO HISTORY OF COLONIC POLYP: Primary | ICD-10-CM

## 2019-10-23 DIAGNOSIS — Z86.010 ENCOUNTER FOR COLONOSCOPY DUE TO HISTORY OF COLONIC POLYP: Primary | ICD-10-CM

## 2019-10-31 ENCOUNTER — PATIENT OUTREACH (OUTPATIENT)
Dept: ADMINISTRATIVE | Facility: OTHER | Age: 79
End: 2019-10-31

## 2019-11-05 ENCOUNTER — TELEPHONE (OUTPATIENT)
Dept: UROLOGY | Facility: CLINIC | Age: 79
End: 2019-11-05

## 2019-11-05 ENCOUNTER — OFFICE VISIT (OUTPATIENT)
Dept: UROLOGY | Facility: CLINIC | Age: 79
End: 2019-11-05
Payer: MEDICARE

## 2019-11-05 VITALS
HEIGHT: 62 IN | SYSTOLIC BLOOD PRESSURE: 112 MMHG | WEIGHT: 185.19 LBS | HEART RATE: 70 BPM | BODY MASS INDEX: 34.08 KG/M2 | DIASTOLIC BLOOD PRESSURE: 68 MMHG

## 2019-11-05 DIAGNOSIS — N13.8 BPH WITH URINARY OBSTRUCTION: Primary | ICD-10-CM

## 2019-11-05 DIAGNOSIS — N40.1 BPH WITH URINARY OBSTRUCTION: Primary | ICD-10-CM

## 2019-11-05 DIAGNOSIS — N39.0 URINARY TRACT INFECTION WITHOUT HEMATURIA, SITE UNSPECIFIED: Primary | ICD-10-CM

## 2019-11-05 PROCEDURE — 3074F SYST BP LT 130 MM HG: CPT | Mod: CPTII,S$GLB,, | Performed by: UROLOGY

## 2019-11-05 PROCEDURE — 1159F PR MEDICATION LIST DOCUMENTED IN MEDICAL RECORD: ICD-10-PCS | Mod: S$GLB,,, | Performed by: UROLOGY

## 2019-11-05 PROCEDURE — 1101F PR PT FALLS ASSESS DOC 0-1 FALLS W/OUT INJ PAST YR: ICD-10-PCS | Mod: CPTII,S$GLB,, | Performed by: UROLOGY

## 2019-11-05 PROCEDURE — 99213 PR OFFICE/OUTPT VISIT, EST, LEVL III, 20-29 MIN: ICD-10-PCS | Mod: S$GLB,,, | Performed by: UROLOGY

## 2019-11-05 PROCEDURE — 1126F PR PAIN SEVERITY QUANTIFIED, NO PAIN PRESENT: ICD-10-PCS | Mod: S$GLB,,, | Performed by: UROLOGY

## 2019-11-05 PROCEDURE — 3074F PR MOST RECENT SYSTOLIC BLOOD PRESSURE < 130 MM HG: ICD-10-PCS | Mod: CPTII,S$GLB,, | Performed by: UROLOGY

## 2019-11-05 PROCEDURE — 1126F AMNT PAIN NOTED NONE PRSNT: CPT | Mod: S$GLB,,, | Performed by: UROLOGY

## 2019-11-05 PROCEDURE — 1101F PT FALLS ASSESS-DOCD LE1/YR: CPT | Mod: CPTII,S$GLB,, | Performed by: UROLOGY

## 2019-11-05 PROCEDURE — 1159F MED LIST DOCD IN RCRD: CPT | Mod: S$GLB,,, | Performed by: UROLOGY

## 2019-11-05 PROCEDURE — 99213 OFFICE O/P EST LOW 20 MIN: CPT | Mod: S$GLB,,, | Performed by: UROLOGY

## 2019-11-05 PROCEDURE — 3078F PR MOST RECENT DIASTOLIC BLOOD PRESSURE < 80 MM HG: ICD-10-PCS | Mod: CPTII,S$GLB,, | Performed by: UROLOGY

## 2019-11-05 PROCEDURE — 99999 PR PBB SHADOW E&M-EST. PATIENT-LVL IV: ICD-10-PCS | Mod: PBBFAC,,, | Performed by: UROLOGY

## 2019-11-05 PROCEDURE — 3078F DIAST BP <80 MM HG: CPT | Mod: CPTII,S$GLB,, | Performed by: UROLOGY

## 2019-11-05 PROCEDURE — 99999 PR PBB SHADOW E&M-EST. PATIENT-LVL IV: CPT | Mod: PBBFAC,,, | Performed by: UROLOGY

## 2019-11-05 NOTE — PROGRESS NOTES
Subjective:       Patient ID: Giovanni Guerrero is a 79 y.o. male.    Chief Complaint: No chief complaint on file.    HPI   Giovanni Guerrero is a 79 y.o. male with a PMHx of T2DM, BPH with urinary obstruction, CKD, and acute urinary retention who presents to the clinic s/p SUDS/Cysto for acute urinary retention on 10/21/19 here for a 2 week evaluation. Patient currently has a catheter in and does not wish to keep catheterizing. His AUA Sx score is a 16.      Past Medical History:   Diagnosis Date    Allergy     Colon polyp     Diabetes mellitus type II     Gout, unspecified     Hyperlipidemia     Hypertension     Renal insufficiency        Past Surgical History:   Procedure Laterality Date    APPENDECTOMY      CATARACT EXTRACTION Left 2018    EYE SURGERY      cataract    HERNIA REPAIR      TONSILLECTOMY         Family History   Problem Relation Age of Onset    Cancer Mother         ovarian cancer    Heart disease Father         MI at 57    Diabetes Father     Cerebral aneurysm Sister     Heart disease Brother         MI at 60    Heart disease Brother         CABG       Social History     Socioeconomic History    Marital status:      Spouse name: Not on file    Number of children: Not on file    Years of education: Not on file    Highest education level: Not on file   Occupational History    Not on file   Social Needs    Financial resource strain: Not on file    Food insecurity:     Worry: Not on file     Inability: Not on file    Transportation needs:     Medical: Not on file     Non-medical: Not on file   Tobacco Use    Smoking status: Never Smoker    Smokeless tobacco: Never Used   Substance and Sexual Activity    Alcohol use: No    Drug use: No    Sexual activity: Not Currently   Lifestyle    Physical activity:     Days per week: Not on file     Minutes per session: Not on file    Stress: Not on file   Relationships    Social connections:     Talks on phone: Not on file      "Gets together: Not on file     Attends Anabaptist service: Not on file     Active member of club or organization: Not on file     Attends meetings of clubs or organizations: Not on file     Relationship status: Not on file   Other Topics Concern    Not on file   Social History Narrative     52 yrs, 3 childre (two living), 11 grandchildren & 4 great grandchildren       Allergies:  Actos [pioglitazone]    Medications:    Current Outpatient Medications:     amLODIPine (NORVASC) 5 MG tablet, Take 1 tablet (5 mg total) by mouth once daily., Disp: 90 tablet, Rfl: 3    aspirin (ECOTRIN) 81 MG EC tablet, Take 81 mg by mouth once daily., Disp: , Rfl:     atenolol (TENORMIN) 100 MG tablet, TAKE 1 TABLET BY MOUTH DAILY, Disp: 90 tablet, Rfl: 2    atorvastatin (LIPITOR) 40 MG tablet, Take 1 tablet (40 mg total) by mouth once daily., Disp: 90 tablet, Rfl: 3    BD INSULIN SYRINGE ULTRA-FINE 0.5 mL 31 gauge x 5/16 Syrg, USE WITH LANTUS INSULIN ONCE DAILY, Disp: 100 each, Rfl: 3    BD ULTRA-FINE SHORT PEN NEEDLE 31 gauge x 5/16" Ndle, USE WITH LANTUS INSULIN ONCE DAILY, Disp: 100 each, Rfl: 0    blood sugar diagnostic Strp, Check sugar tid as directed, Disp: 300 each, Rfl: 3    cholecalciferol, vitamin D3, (VITAMIN D3) 2,000 unit Tab, Take by mouth once daily., Disp: , Rfl:     lisinopril (PRINIVIL,ZESTRIL) 20 MG tablet, Take 0.5 tablets (10 mg total) by mouth once daily., Disp: 45 tablet, Rfl: 11    OMEGA-3 ACID ETHYL ESTERS (OMACOR) 1 gram Cap, Take 1 g by mouth. 1 Capsule Oral Twice a day, Disp: , Rfl:     sodium bicarbonate 325 MG tablet, Take 2 tablets (650 mg total) by mouth 2 (two) times daily., Disp: 120 tablet, Rfl: 6    tamsulosin (FLOMAX) 0.4 mg Cap, TAKE 1 CAPSULE BY MOUTH TWICE A DAY, Disp: 180 capsule, Rfl: 3    TRESIBA FLEXTOUCH U-100 100 unit/mL (3 mL) InPn, INJECT 15 UNITS UNDER THE SKIN EVERY EVENING, Disp: 9 mL, Rfl: 0    blood-glucose meter Misc, 1 kit by Misc.(Non-Drug; Combo Route) route 3 " (three) times daily. Pt is to test blood sugar TID, Disp: 1 each, Rfl: 0    finasteride (PROSCAR) 5 mg tablet, Take 1 tablet (5 mg total) by mouth once daily., Disp: 90 tablet, Rfl: 3    Review of Systems   Constitutional: Negative for activity change, appetite change, chills, diaphoresis, fatigue, fever and unexpected weight change.   HENT: Negative for congestion, dental problem, hearing loss, mouth sores, postnasal drip, rhinorrhea, sinus pressure and trouble swallowing.    Eyes: Negative for pain, discharge and itching.   Respiratory: Negative for apnea, cough, choking, chest tightness, shortness of breath and wheezing.    Cardiovascular: Negative for chest pain, palpitations and leg swelling.   Gastrointestinal: Negative for abdominal distention, abdominal pain, anal bleeding, blood in stool, constipation, diarrhea, nausea, rectal pain and vomiting.   Endocrine: Negative for polydipsia and polyuria.   Genitourinary: Negative for decreased urine volume, difficulty urinating, discharge, dysuria, enuresis, flank pain, frequency, genital sores, hematuria, penile pain, penile swelling, scrotal swelling and urgency.   Musculoskeletal: Negative for arthralgias and back pain.   Skin: Negative for color change, rash and wound.   Neurological: Negative for dizziness, syncope, speech difficulty, light-headedness and headaches.   Hematological: Negative for adenopathy. Does not bruise/bleed easily.   Psychiatric/Behavioral: Negative for behavioral problems and confusion.       Objective:      Physical Exam   Constitutional: He appears well-developed.   HENT:   Head: Normocephalic.   Neck: Neck supple.   Cardiovascular: Normal rate.    Pulmonary/Chest: Effort normal.   Abdominal: Soft.   Neurological: He is alert.   Skin: Skin is warm.     Psychiatric: He has a normal mood and affect.         Procedures  SUDS/Cysto 10/21/19      Preop diagnosis BPH without obstruction and elevated residual urine.  Postop diagnosis  same  Procedure simple urodynamics and cystoscopy  Surgeon Dr. Rafael Marquis  The 2 catheters cm G study was performed using subtracted of rectal pressure monitoring.  Sterile water was uses a medium at 30 cc/minute.  First sensation was at 100 cc 1st desire at 1:35 a.m. strong desire at 2:04 a.m. capacity was 200 and 89 cc.  Patient then voided with a maximum flow rate of 39.4 cc/second and a maximum detrusor pressure of 77 cm of water.  He voided to completion.  Compliance appeared normal.  There were no uninhibited bladder contractions.  Flexible cystoscopy was then performed.  The anterior urethra was normal.  No strictures were noted.  Prostatic urethra was 4 cm with lateral lobe obstruction.  There were grade 2 trabeculations with a few cellules.  Both ureteral orifices were normal size shape and position. There were no stones tumors form  Bodies are active infections.  Impression BPH without obstruction  Recommendations patient. I clear goes into and out of urinary retention.  He will continue taking his medications and he would like to have the catheter out.  I will see him back in 2 weeks and we can replace the catheter sooner p.r.n..  He was given a Cipro and has been taking doxycycline prior to today's visit.  I would recommend a laser TURP for this patient.  We will discuss this in clinic  Assessment:       1. Urinary tract infection without hematuria, site unspecified        Plan:       Diagnoses and all orders for this visit:    Urinary tract infection without hematuria, site unspecified  -     Urine culture; Future          Send urine for culture  Discussed options of treatment with patient, including continuing use of a catheter vs. laser TURP or bipolar turp  Schedule laser TURP.  All risks explained  Elida RAHMAN, am acting as a scribe on this patient encounter in the presence and under the supervision of Dr. Marquis.    11/05/2019 9:10 AM    Dr. Benitez RAHMAN, personally performed the services  described in this documentation.   All medical record entries made by the scribe were at my direction and in my presence.   I have reviewed the chart and agree that the record is accurate and complete.   Rafael Marquis MD.  9:10 AM 11/05/2019

## 2019-11-06 ENCOUNTER — PATIENT OUTREACH (OUTPATIENT)
Dept: ADMINISTRATIVE | Facility: OTHER | Age: 79
End: 2019-11-06

## 2019-11-06 ENCOUNTER — ANESTHESIA EVENT (OUTPATIENT)
Dept: SURGERY | Facility: HOSPITAL | Age: 79
End: 2019-11-06
Payer: MEDICARE

## 2019-11-06 ENCOUNTER — TELEPHONE (OUTPATIENT)
Dept: INTERNAL MEDICINE | Facility: CLINIC | Age: 79
End: 2019-11-06

## 2019-11-06 DIAGNOSIS — E11.8 TYPE 2 DIABETES MELLITUS WITH UNSPECIFIED COMPLICATIONS: ICD-10-CM

## 2019-11-06 DIAGNOSIS — Z01.818 PREOP TESTING: Primary | ICD-10-CM

## 2019-11-06 NOTE — PRE-PROCEDURE INSTRUCTIONS
Spoke to pt reviewed meds. Pt sounded winded on phone but denies shortness of breath. Messaged Dr. Webster for a clearance. Pt was going to hold all his meds until surgery, explained. I will send him a list of what to take & what to hold. Pt does not use portal.

## 2019-11-06 NOTE — ANESTHESIA PREPROCEDURE EVALUATION
iu    Anesthesia Assessment: Preoperative EQUATION     Planned Procedure: Procedure(s) (LRB):  TURP, USING LASER (N/A)  Requested Anesthesia Type:General  Surgeon: Rafael Marquis Jr., MD  Service: Urology  Known or anticipated Date of Surgery:11/15/2019     Surgeon notes: reviewed     Electronic QUestionnaire Assessment completed via nurse interview with patient.         No Aq           Triage considerations:      The patient has no apparent active cardiac condition (No unstable coronary Syndrome such as severe unstable angina or recent [<1 month] myocardial infarction, decompensated CHF, severe valvular   disease or significant arrhythmia)     Previous anesthesia records:Not available     Last PCP note: 3-6 months ago , within [facility] Dr. VICKIE Webster     Subspecialty notes: Nephrology, urology     Other important co-morbidities:    Allergy      Colon polyp      Diabetes mellitus type II      Gout, unspecified      Hyperlipidemia      Hypertension      Renal insufficiency           Tests already available:  Available tests,  within 1 month , within [facility] .                            Instructions given. (See in Nurse's note)     Optimization:  Anesthesia Preop Clinic Assessment  Indicated    Medical Opinion Indicated                                        Plan:    Testing:  Hematology Profile, A1C and EKG   Pre-anesthesia  visit                                        Visit focus: none                           Consultation:Patient's PCP for a statement of optimization                             Navigation: Tests Scheduled.                         Consults scheduled.                        Results will be tracked by Preop Clinic.                            [facility]  Anesthesia Pre-Operative Evaluation         Patient Name: Giovanni Guerrero  YOB: 1940  MRN: [id number]    SUBJECTIVE:     Pre-operative evaluation for Procedure(s) (LRB):  TURP, USING LASER (N/A)    "  11/11/2019    Giovanni Guerrero is a 79 y.o. male w/ a significant PMHx of DM2 (well-controlled, gout, HTN, HLD, CKD3, BPH with obstruction. He has intermittently required the use of an indwelling catheter for several years, but the need for one has become more frequent. He does not perform self-catheterization.    Patient now presents for the above procedure(s).      LDA:        Urethral Catheter 10/21/19 1929 Double-lumen 16 Fr. (Active)   Number of days: 20       Prev airway: None documented.    Drips: None documented.      Patient Active Problem List   Diagnosis    Essential hypertension    Hyperlipidemia type II    Gout, arthritis    Diabetic nephropathy associated with type 2 diabetes mellitus    CKD (chronic kidney disease) stage 4, GFR 15-29 ml/min    BPH with urinary obstruction    Diabetic polyneuropathy associated with type 2 diabetes mellitus    Type 2 diabetes mellitus with diabetic polyneuropathy, with long-term current use of insulin    Metabolic bone disease    Acute urinary retention       Review of patient's allergies indicates:   Allergen Reactions    Actos [pioglitazone] Other (See Comments)     constipation       Current Inpatient Medications:      Current Outpatient Medications on File Prior to Encounter   Medication Sig Dispense Refill    amLODIPine (NORVASC) 5 MG tablet Take 1 tablet (5 mg total) by mouth once daily. 90 tablet 3    aspirin (ECOTRIN) 81 MG EC tablet Take 81 mg by mouth once daily.      atenolol (TENORMIN) 100 MG tablet TAKE 1 TABLET BY MOUTH DAILY 90 tablet 2    atorvastatin (LIPITOR) 40 MG tablet Take 1 tablet (40 mg total) by mouth once daily. 90 tablet 3    BD INSULIN SYRINGE ULTRA-FINE 0.5 mL 31 gauge x 5/16 Syrg USE WITH LANTUS INSULIN ONCE DAILY 100 each 3    BD ULTRA-FINE SHORT PEN NEEDLE 31 gauge x 5/16" Ndle USE WITH LANTUS INSULIN ONCE DAILY 100 each 0    blood sugar diagnostic Strp Check sugar tid as directed 300 each 3    blood-glucose meter Misc " 1 kit by Misc.(Non-Drug; Combo Route) route 3 (three) times daily. Pt is to test blood sugar TID 1 each 0    cholecalciferol, vitamin D3, (VITAMIN D3) 2,000 unit Tab Take by mouth once daily.      finasteride (PROSCAR) 5 mg tablet Take 1 tablet (5 mg total) by mouth once daily. 90 tablet 3    lisinopril (PRINIVIL,ZESTRIL) 20 MG tablet Take 0.5 tablets (10 mg total) by mouth once daily. 45 tablet 11    OMEGA-3 ACID ETHYL ESTERS (OMACOR) 1 gram Cap Take 1 g by mouth. 1 Capsule Oral Twice a day      sodium bicarbonate 325 MG tablet Take 2 tablets (650 mg total) by mouth 2 (two) times daily. 120 tablet 6    tamsulosin (FLOMAX) 0.4 mg Cap TAKE 1 CAPSULE BY MOUTH TWICE A  capsule 3    TRESIBA FLEXTOUCH U-100 100 unit/mL (3 mL) InPn INJECT 15 UNITS UNDER THE SKIN EVERY EVENING 9 mL 0     No current facility-administered medications on file prior to encounter.        Past Surgical History:   Procedure Laterality Date    APPENDECTOMY      CATARACT EXTRACTION Left 2018    EYE SURGERY      cataract    HERNIA REPAIR      TONSILLECTOMY         Social History     Socioeconomic History    Marital status:      Spouse name: Not on file    Number of children: Not on file    Years of education: Not on file    Highest education level: Not on file   Occupational History    Not on file   Social Needs    Financial resource strain: Not on file    Food insecurity:     Worry: Not on file     Inability: Not on file    Transportation needs:     Medical: Not on file     Non-medical: Not on file   Tobacco Use    Smoking status: Never Smoker    Smokeless tobacco: Never Used   Substance and Sexual Activity    Alcohol use: No    Drug use: No    Sexual activity: Not Currently   Lifestyle    Physical activity:     Days per week: Not on file     Minutes per session: Not on file    Stress: Not on file   Relationships    Social connections:     Talks on phone: Not on file     Gets together: Not on file      Attends Advent service: Not on file     Active member of club or organization: Not on file     Attends meetings of clubs or organizations: Not on file     Relationship status: Not on file   Other Topics Concern    Not on file   Social History Narrative     52 yrs, 3 childre (two living), 11 grandchildren & 4 great grandchildren       OBJECTIVE:     Vital Signs Range (Last 24H):         Significant Labs:  Lab Results   Component Value Date    WBC 11.31 06/26/2019    HGB 16.5 06/26/2019    HCT 47.0 06/26/2019     06/26/2019    CHOL 100 (L) 05/23/2019    TRIG 112 05/23/2019    HDL 30 (L) 05/23/2019    ALT 25 06/26/2019    AST 25 06/26/2019     10/07/2019    K 4.5 10/07/2019     10/07/2019    CREATININE 1.8 (H) 10/07/2019    BUN 35 (H) 10/07/2019    CO2 25 10/07/2019    TSH 2.736 11/01/2016    PSA 1.1 04/15/2014    GLUF 138 (H) 09/19/2007    HGBA1C 6.2 (H) 05/23/2019       Diagnostic Studies: No relevant studies.    EKG: No results found for this or any previous visit.    ECHOCARDIOGRAM:  No results found for this or any previous visit.        ASSESSMENT/PLAN:         Anesthesia Evaluation    I have reviewed the Patient Summary Reports.        Review of Systems  Anesthesia Hx:  No problems with previous Anesthesia  History of prior surgery of interest to airway management or planning: Previous anesthesia: General Denies Family Hx of Anesthesia complications.   Denies Personal Hx of Anesthesia complications.   Social:  Non-Smoker    Cardiovascular:   Hypertension    Renal/:   Chronic Renal Disease    Neurological:   Neuromuscular Disease,    Endocrine:   Diabetes, type 2        Physical Exam  General:  Well nourished    Airway/Jaw/Neck:  Airway Findings: Mouth Opening: Normal General Airway Assessment: Adult  Mallampati: II  Improves to I with phonation.  TM Distance: Normal, at least 6 cm  Jaw/Neck Findings:  Neck ROM: Normal ROM      Dental:  Dental Findings:    Chest/Lungs:  Chest/Lungs  Findings: Clear to auscultation, Normal Respiratory Rate     Heart/Vascular:  Heart Findings: Rate: Normal  Rhythm: Regular Rhythm  Sounds: Normal        Mental Status:  Mental Status Findings:  Cooperative, Alert and Oriented         Anesthesia Plan  Type of Anesthesia, risks & benefits discussed:  Anesthesia Type:  general  Patient's Preference:   Intra-op Monitoring Plan: standard ASA monitors  Intra-op Monitoring Plan Comments:   Post Op Pain Control Plan: multimodal analgesia  Post Op Pain Control Plan Comments:   Induction:   IV  Beta Blocker:  Patient is on a Beta-Blocker and has not received dose within the past 24 hours due to non-compliance or for other reasons (Patient should receive a perioperative dose or document why it is withheld).       Informed Consent: Patient understands risks and agrees with Anesthesia plan.  Questions answered. Anesthesia consent signed with patient.  ASA Score: 3     Day of Surgery Review of History & Physical:    H&P update referred to the surgeon.     Anesthesia Plan Notes: Will give intra-op        Ready For Surgery From Anesthesia Perspective.     11/13/19 PCP note:  He was found to be in new onset Afib during his visit with anesthesia, and referred to cardiology. Mr Miranda went to cardiology on 11/12/2019. He will be followed by cardiology but was deemed stable to proceed with surgery. Mr Miranda also has chronic kidney disease, which is followed by nephrology and based on the note from his last visit with them in September, has had stable kidney functions.    Based on physical exam, and review of labs, Mr Miranda is at an acceptable cardiovascular risk for his upcoming procedure.

## 2019-11-06 NOTE — PRE ADMISSION SCREENING
Anesthesia Assessment: Preoperative EQUATION    Planned Procedure: Procedure(s) (LRB):  TURP, USING LASER (N/A)  Requested Anesthesia Type:General  Surgeon: Rafael Marquis Jr., MD  Service: Urology  Known or anticipated Date of Surgery:11/15/2019    Surgeon notes: reviewed    Electronic QUestionnaire Assessment completed via nurse interview with patient.        No Aq        Triage considerations:     The patient has no apparent active cardiac condition (No unstable coronary Syndrome such as severe unstable angina or recent [<1 month] myocardial infarction, decompensated CHF, severe valvular   disease or significant arrhythmia)    Previous anesthesia records:Not available    Last PCP note: 3-6 months ago , within [facility] Dr. VICKIE Webster    Subspecialty notes: Nephrology, urology    Other important co-morbidities:    Allergy      Colon polyp      Diabetes mellitus type II      Gout, unspecified      Hyperlipidemia      Hypertension      Renal insufficiency        Tests already available:  Available tests,  within 1 month , within [facility] .            Instructions given. (See in Nurse's note)    Optimization:  Anesthesia Preop Clinic Assessment  Indicated    Medical Opinion Indicated           Plan:    Testing:  Hematology Profile, A1C and EKG   Pre-anesthesia  visit       Visit focus: none     Consultation:Patient's PCP for a statement of optimization       Navigation: Tests Scheduled.              Consults scheduled.             Results will be tracked by Preop Clinic.

## 2019-11-06 NOTE — TELEPHONE ENCOUNTER
----- Message from Rose Marie Gómez RN sent at 11/6/2019  2:09 PM CST -----  Pt. Is scheduled for a Transurethral resection of prostate by Dr. Marquis 11/15. Requesting a medical clearance please. Had a appoint 11/22 but that would be too late. Pt. Sounds winded on phone but denies any shortness of breath. We would like to schedule hem profile & A1c  If possible  Thank you      LIVIA Gómez RN BC  Pre-op anesthesia

## 2019-11-08 ENCOUNTER — TELEPHONE (OUTPATIENT)
Dept: PREADMISSION TESTING | Facility: HOSPITAL | Age: 79
End: 2019-11-08

## 2019-11-08 NOTE — TELEPHONE ENCOUNTER
Appt. 11/11 POC and EKG . 11/12 LAB spoke with patient and his wife. His 9:40 appt. patient will cancel.

## 2019-11-08 NOTE — TELEPHONE ENCOUNTER
----- Message from Rose Marie Gómez RN sent at 11/6/2019  2:52 PM CST -----  Needs POC, labs, ekg   surg is 11/15

## 2019-11-11 ENCOUNTER — HOSPITAL ENCOUNTER (OUTPATIENT)
Dept: CARDIOLOGY | Facility: CLINIC | Age: 79
Discharge: HOME | End: 2019-11-11
Payer: MEDICARE

## 2019-11-11 ENCOUNTER — HOSPITAL ENCOUNTER (OUTPATIENT)
Dept: PREADMISSION TESTING | Facility: HOSPITAL | Age: 79
Discharge: HOME | End: 2019-11-11
Attending: ANESTHESIOLOGY
Payer: MEDICARE

## 2019-11-11 ENCOUNTER — TELEPHONE (OUTPATIENT)
Dept: PREADMISSION TESTING | Facility: HOSPITAL | Age: 79
End: 2019-11-11

## 2019-11-11 VITALS
HEART RATE: 65 BPM | BODY MASS INDEX: 34.19 KG/M2 | HEIGHT: 62 IN | TEMPERATURE: 98 F | RESPIRATION RATE: 20 BRPM | DIASTOLIC BLOOD PRESSURE: 67 MMHG | OXYGEN SATURATION: 97 % | WEIGHT: 185.81 LBS | SYSTOLIC BLOOD PRESSURE: 121 MMHG

## 2019-11-11 DIAGNOSIS — N40.1 BPH WITH URINARY OBSTRUCTION: ICD-10-CM

## 2019-11-11 DIAGNOSIS — N13.8 BPH WITH URINARY OBSTRUCTION: ICD-10-CM

## 2019-11-11 DIAGNOSIS — Z01.818 PREOP TESTING: ICD-10-CM

## 2019-11-11 PROCEDURE — 93010 ELECTROCARDIOGRAM REPORT: CPT | Mod: S$GLB,,, | Performed by: INTERNAL MEDICINE

## 2019-11-11 PROCEDURE — 93005 EKG 12-LEAD: ICD-10-PCS | Mod: S$GLB,,, | Performed by: ANESTHESIOLOGY

## 2019-11-11 PROCEDURE — 93010 EKG 12-LEAD: ICD-10-PCS | Mod: S$GLB,,, | Performed by: INTERNAL MEDICINE

## 2019-11-11 PROCEDURE — 93005 ELECTROCARDIOGRAM TRACING: CPT | Mod: S$GLB,,, | Performed by: ANESTHESIOLOGY

## 2019-11-11 RX ORDER — TAMSULOSIN HYDROCHLORIDE 0.4 MG/1
CAPSULE ORAL
Qty: 180 CAPSULE | Refills: 0 | Status: SHIPPED | OUTPATIENT
Start: 2019-11-11 | End: 2019-11-22 | Stop reason: SDUPTHER

## 2019-11-11 NOTE — DISCHARGE INSTRUCTIONS
Your surgery has been scheduled for:  Friday, November 15, 2019    You should report to:    ____[facility], located on the [place] side of the first floor of the           [facility] ([phone])      Please Note   - Tell your doctor if you take Aspirin, products containing Aspirin, herbal medications or blood thinners, such as Coumadin, Ticlid, or Plavix.  (Consult your provider regarding holding or stopping before surgery).  - Arrange for someone to drive you home following surgery.  You will not be allowed to leave the surgical facility alone or drive yourself home following sedation and anesthesia.    Before Surgery  - Stop taking all vitamins/ herbal medications 7 days prior to surgery  - No Motrin/Advil (Ibuprofen) 7 days before surgery  - No Aleve (Naproxen) 7 days before surgery  - Stop taking Aspirin, products containing Aspirin 7 days before surgery  - Stop taking blood thinners_______days before surgery  - No Goody's/BC  Powder 7 days before surgery  - Refrain from drinking alcoholic beverages for 24 hours before and after surgery  - Stop or limit smoking 14 days before surgery  - You may take Tylenol for pain    Night before Surgery   Stop ALL solid food, gum, candy (including vitamins) 8 hours before arrival time.  (Please note: If your surgeon gives you different eating and drinking instructions, please follow surgeon's directions.)   Stop all CLOUDY liquids: coffee with creamer, formula, tube feeds, cloudy juices, non-human milk and breast milk with additives, 6 hours prior to arrival time.   Stop plain breast milk 4 hours prior to arrival time.   The patient should be ENCOURAGED to drink carbohydrate-rich clear liquids (sports drinks, clear juices) until 2 hours prior to arrival time.   CLEAR liquids include only water, black coffee NO creamer, clear oral rehydration drinks, clear sports drinks or clear fruit juices (no orange juice, no pulpy juices, no apple  kathy). Advise patients if they can read newsprint through the liquid, it qualifies as clear liquid.    IF IN DOUBT, drink water instead.   - Take a shower or bath (shower is recommended).  Bathe with Hibiclens soap or an antibacterial soap from the neck down.  If not supplied by your surgeon, hibiclens soap will need to be purchased over the counter in pharmacy.  Rinse soap off thoroughly.  - Shampoo your hair with your regular shampoo    The Day of Surgery    NOTHING TO  DRINK 2 hours before arrival time. If you are told to take medication on the morning of surgery, it may be taken with a sip of water.   - Take another bath or shower with hibiclens or any antibacterial soap, to reduce the chance of infection.  - Take heart and blood pressure medications with a small sip of water, as advised by the perioperative team.  - Do not take fluid pills  - You may brush your teeth and rinse your mouth, but do not swallow any additional water.   - Do not apply perfumes, powder, body lotions or deodorant on the day of surgery.  - Nail polish should be removed.  - Do not wear makeup or moisturizer.  - Wear comfortable clothes, such as a button front shirt and loose fitting pants.  - Leave all jewelry, including body piercings, and valuables at home.    - Bring any devices you will neeed after surgery such as crutches or canes.  - If you have sleep apnea, please bring your CPAP machine.    In the event that your physical condition changes including the onset of a cold or respiratory illness, or if you have to delay or cancel your surgery, please notify your surgeon.    Anesthesia: General Anesthesia     You are watched continuously during your procedure by your anesthesia provider.     Youre due to have surgery. During surgery, youll be given medicine called anesthesia or anesthetic. This will keep you comfortable and pain-free. Your anesthesia provider will use general anesthesia.  What is general anesthesia?  General  anesthesia puts you into a state like deep sleep. It goes into the bloodstream (IV anesthetics), into the lungs (gas anesthetics), or both. You feel nothing during the procedure. You will not remember it. During the procedure, the anesthesia provider monitors you continuously. He or she checks your heart rate and rhythm, blood pressure, breathing, and blood oxygen.  · IV anesthetics. IV anesthetics are given through an IV line in your arm. Theyre often given first. This is so you are asleep before a gas anesthetic is started. Some kinds of IV anesthetics relieve pain. Others relax you. Your doctor will decide which kind is best in your case.  · Gas anesthetics. Gas anesthetics are breathed into the lungs. They are often used to keep you asleep. They can be given through a facemask or a tube placed in your larynx or trachea (breathing tube).  ? If you have a facemask, your anesthesia provider will most likely place it over your nose and mouth while youre still awake. Youll breathe oxygen through the mask as your IV anesthetic is started. Gas anesthetic may be added through the mask.  ? If you have a tube in the larynx or trachea, it will be inserted into your throat after youre asleep.  Anesthesia tools and medicines  You will likely have:  · IV anesthetics. These are put into an IV line into your bloodstream.  · Gas anesthetics. You breathe these anesthetics into your lungs, where they pass into your bloodstream.  · Pulse oximeter. This is a small clip that is attached to the end of your finger. This measures your blood oxygen level.  · Electrocardiography leads (electrodes). These are small sticky pads that are placed on your chest. They record your heart rate and rhythm.  · Blood pressure cuff. This reads your blood pressure.  Risks and possible complications  General anesthesia has some risks. These include:  · Breathing problems  · Nausea and vomiting  · Sore throat or hoarseness (usually  temporary)  · Allergic reaction to the anesthetic  · Irregular heartbeat (rare)  · Cardiac arrest (rare)   Anesthesia safety  · Follow all instructions you are given for how long not to eat or drink before your procedure.  · Be sure your doctor knows what medicines and drugs you take. This includes over-the-counter medicines, herbs, supplements, alcohol or other drugs. You will be asked when those were last taken.  · Have an adult family member or friend drive you home after the procedure.  · For the first 24 hours after your surgery:  ? Do not drive or use heavy equipment.  ? Do not make important decisions or sign legal documents. If important decisions or signing legal documents is necessary during the first 24 hours after surgery, have a trusted family member or spouse act on your behalf.  ? Avoid alcohol.  ? Have a responsible adult stay with you. He or she can watch for problems and help keep you safe.  Date Last Reviewed: 12/1/2016  © 3005-8899 [employer]. [address] [place] [address], [place] [address]. All rights reserved. This information is not intended as a substitute for professional medical care. Always follow your healthcare professional's instructions

## 2019-11-11 NOTE — TELEPHONE ENCOUNTER
Dr. Marquis & Mark,     The EKG for Mr. Guerrero revealed:   Atrial fibrillation  Abnormal ECG  When compared with ECG of 11-MAR-2008 12:46,  Atrial fibrillation has replaced Sinus rhythm  Nonspecific T wave abnormality now evident in Inferior leads  Confirmed by Cari Israel MD ([id number]) on 11/11/2019 2:00:22 PM    The patient has been notified and a Cardiology appt has been scheduled for 11/12 at 1700.  At this point it is unclear how this might impact surgery, but we wanted to make you aware.      The patient is aware and agreeable to this plan.    If there is anything additional that you would like to initiate preoperatively, please advise.     Sri

## 2019-11-12 ENCOUNTER — PATIENT OUTREACH (OUTPATIENT)
Dept: ADMINISTRATIVE | Facility: OTHER | Age: 79
End: 2019-11-12

## 2019-11-12 ENCOUNTER — PATIENT MESSAGE (OUTPATIENT)
Dept: PRIMARY CARE CLINIC | Facility: CLINIC | Age: 79
End: 2019-11-12

## 2019-11-12 ENCOUNTER — OFFICE VISIT (OUTPATIENT)
Dept: PRIMARY CARE CLINIC | Facility: CLINIC | Age: 79
End: 2019-11-12
Attending: FAMILY MEDICINE
Payer: MEDICARE

## 2019-11-12 ENCOUNTER — LAB VISIT (OUTPATIENT)
Dept: LAB | Facility: HOSPITAL | Age: 79
End: 2019-11-12
Attending: ANESTHESIOLOGY
Payer: MEDICARE

## 2019-11-12 ENCOUNTER — TELEPHONE (OUTPATIENT)
Dept: PRIMARY CARE CLINIC | Facility: CLINIC | Age: 79
End: 2019-11-12

## 2019-11-12 VITALS
HEIGHT: 62 IN | WEIGHT: 185.88 LBS | HEART RATE: 72 BPM | SYSTOLIC BLOOD PRESSURE: 126 MMHG | BODY MASS INDEX: 34.2 KG/M2 | DIASTOLIC BLOOD PRESSURE: 70 MMHG

## 2019-11-12 DIAGNOSIS — N18.4 CHRONIC KIDNEY DISEASE, STAGE 4 (SEVERE): ICD-10-CM

## 2019-11-12 DIAGNOSIS — N18.4 CKD (CHRONIC KIDNEY DISEASE) STAGE 4, GFR 15-29 ML/MIN: ICD-10-CM

## 2019-11-12 DIAGNOSIS — Z01.818 PREOP TESTING: ICD-10-CM

## 2019-11-12 DIAGNOSIS — Z01.818 PREOPERATIVE CLEARANCE: ICD-10-CM

## 2019-11-12 DIAGNOSIS — E11.42 TYPE 2 DIABETES MELLITUS WITH DIABETIC POLYNEUROPATHY, WITH LONG-TERM CURRENT USE OF INSULIN: Primary | ICD-10-CM

## 2019-11-12 DIAGNOSIS — Z79.4 TYPE 2 DIABETES MELLITUS WITH DIABETIC POLYNEUROPATHY, WITH LONG-TERM CURRENT USE OF INSULIN: Primary | ICD-10-CM

## 2019-11-12 DIAGNOSIS — I10 ESSENTIAL HYPERTENSION: Chronic | ICD-10-CM

## 2019-11-12 DIAGNOSIS — Z01.818 PREOPERATIVE CLEARANCE: Primary | ICD-10-CM

## 2019-11-12 LAB
APTT BLDCRRT: 25.1 SEC (ref 21–32)
ERYTHROCYTE [DISTWIDTH] IN BLOOD BY AUTOMATED COUNT: 13.3 % (ref 11.5–14.5)
HCT VFR BLD AUTO: 49.1 % (ref 40–54)
HGB BLD-MCNC: 16 G/DL (ref 14–18)
INR PPP: 1.1 (ref 0.8–1.2)
MCH RBC QN AUTO: 30.7 PG (ref 27–31)
MCHC RBC AUTO-ENTMCNC: 32.6 G/DL (ref 32–36)
MCV RBC AUTO: 94 FL (ref 82–98)
PLATELET # BLD AUTO: 169 K/UL (ref 150–350)
PMV BLD AUTO: 10.5 FL (ref 9.2–12.9)
PROTHROMBIN TIME: 11.5 SEC (ref 9–12.5)
RBC # BLD AUTO: 5.22 M/UL (ref 4.6–6.2)
WBC # BLD AUTO: 7.62 K/UL (ref 3.9–12.7)

## 2019-11-12 PROCEDURE — 85730 THROMBOPLASTIN TIME PARTIAL: CPT

## 2019-11-12 PROCEDURE — 99999 PR PBB SHADOW E&M-EST. PATIENT-LVL III: CPT | Mod: PBBFAC,,, | Performed by: FAMILY MEDICINE

## 2019-11-12 PROCEDURE — 3074F SYST BP LT 130 MM HG: CPT | Mod: CPTII,S$GLB,, | Performed by: FAMILY MEDICINE

## 2019-11-12 PROCEDURE — 99214 OFFICE O/P EST MOD 30 MIN: CPT | Mod: 25,S$GLB,, | Performed by: FAMILY MEDICINE

## 2019-11-12 PROCEDURE — 3074F PR MOST RECENT SYSTOLIC BLOOD PRESSURE < 130 MM HG: ICD-10-PCS | Mod: CPTII,S$GLB,, | Performed by: FAMILY MEDICINE

## 2019-11-12 PROCEDURE — 99499 RISK ADDL DX/OHS AUDIT: ICD-10-PCS | Mod: S$GLB,,, | Performed by: FAMILY MEDICINE

## 2019-11-12 PROCEDURE — 99999 PR PBB SHADOW E&M-EST. PATIENT-LVL III: ICD-10-PCS | Mod: PBBFAC,,, | Performed by: FAMILY MEDICINE

## 2019-11-12 PROCEDURE — 1101F PT FALLS ASSESS-DOCD LE1/YR: CPT | Mod: CPTII,S$GLB,, | Performed by: FAMILY MEDICINE

## 2019-11-12 PROCEDURE — 99499 UNLISTED E&M SERVICE: CPT | Mod: S$GLB,,, | Performed by: FAMILY MEDICINE

## 2019-11-12 PROCEDURE — 85610 PROTHROMBIN TIME: CPT

## 2019-11-12 PROCEDURE — 36415 COLL VENOUS BLD VENIPUNCTURE: CPT | Mod: PN

## 2019-11-12 PROCEDURE — 85027 COMPLETE CBC AUTOMATED: CPT

## 2019-11-12 PROCEDURE — G0008 ADMIN INFLUENZA VIRUS VAC: HCPCS | Mod: S$GLB,,, | Performed by: FAMILY MEDICINE

## 2019-11-12 PROCEDURE — 90662 FLU VACCINE - HIGH DOSE (65+) PRESERVATIVE FREE IM: ICD-10-PCS | Mod: S$GLB,,, | Performed by: FAMILY MEDICINE

## 2019-11-12 PROCEDURE — 3078F DIAST BP <80 MM HG: CPT | Mod: CPTII,S$GLB,, | Performed by: FAMILY MEDICINE

## 2019-11-12 PROCEDURE — 90662 IIV NO PRSV INCREASED AG IM: CPT | Mod: S$GLB,,, | Performed by: FAMILY MEDICINE

## 2019-11-12 PROCEDURE — 99214 PR OFFICE/OUTPT VISIT, EST, LEVL IV, 30-39 MIN: ICD-10-PCS | Mod: 25,S$GLB,, | Performed by: FAMILY MEDICINE

## 2019-11-12 PROCEDURE — G0008 FLU VACCINE - HIGH DOSE (65+) PRESERVATIVE FREE IM: ICD-10-PCS | Mod: S$GLB,,, | Performed by: FAMILY MEDICINE

## 2019-11-12 PROCEDURE — 1101F PR PT FALLS ASSESS DOC 0-1 FALLS W/OUT INJ PAST YR: ICD-10-PCS | Mod: CPTII,S$GLB,, | Performed by: FAMILY MEDICINE

## 2019-11-12 PROCEDURE — 3078F PR MOST RECENT DIASTOLIC BLOOD PRESSURE < 80 MM HG: ICD-10-PCS | Mod: CPTII,S$GLB,, | Performed by: FAMILY MEDICINE

## 2019-11-12 NOTE — PROGRESS NOTES
Subjective:       Patient ID: Giovanni Guerrero is a 79 y.o. male.    Chief Complaint: Pre-op Exam and Abnormal ECG (cardio today)    Pt of Dr Eleanor shea presents with daughter today for a preop clearance. Per anesthesia Dr Leopold, pt;s EKG was abnormal with new onset Afib noted. Pt denies any symptoms. Pt is due to see cards today at 5 pm. Pt states that if he is not cleared for surgery per cards, he will be ok to do what he needs to do to be safe for the OR  Pt with CKD, not new that is multifactorial and stable per his nephro doc note in Sept.  Pt today is anxious due to EKG findings but otw well    CMP done 9/19, at his baseline  CBC, pt/ptt today      Review of Systems   Constitutional: Negative for activity change and appetite change.   HENT: Negative.    Eyes: Negative for photophobia and visual disturbance.   Respiratory: Negative for chest tightness and shortness of breath.    Cardiovascular: Negative for chest pain, palpitations and leg swelling.   Neurological: Negative for dizziness, weakness and light-headedness.   All other systems reviewed and are negative.      Objective:      Physical Exam   Constitutional: He is oriented to person, place, and time. He appears well-developed and well-nourished. No distress.   HENT:   Head: Normocephalic and atraumatic.   Right Ear: External ear normal.   Left Ear: External ear normal.   Mouth/Throat: Oropharynx is clear and moist. No oropharyngeal exudate.   Eyes: Pupils are equal, round, and reactive to light. Conjunctivae and EOM are normal.   Neck: Normal range of motion. Neck supple. No thyromegaly present.   Cardiovascular: Normal rate, normal heart sounds and intact distal pulses. A regularly irregular rhythm present.   No murmur heard.  Pulmonary/Chest: Effort normal and breath sounds normal. No respiratory distress. He has no wheezes. He has no rales. He exhibits no tenderness.   Abdominal: Soft. Bowel sounds are normal. He exhibits no distension and  no mass. There is no tenderness. There is no rebound and no guarding.   Musculoskeletal: Normal range of motion. He exhibits no edema or tenderness.   Lymphadenopathy:     He has no cervical adenopathy.   Neurological: He is alert and oriented to person, place, and time. He has normal reflexes. He displays normal reflexes. No cranial nerve deficit or sensory deficit. He exhibits normal muscle tone. Coordination normal.   Skin: Skin is warm and dry. He is not diaphoretic. No erythema. No pallor.   Psychiatric: He has a normal mood and affect. His behavior is normal. Judgment and thought content normal.       Assessment:       1. Type 2 diabetes mellitus with diabetic polyneuropathy, with long-term current use of insulin    2. CKD (chronic kidney disease) stage 4, GFR 15-29 ml/min    3. Essential hypertension        Plan:     HTN controlled on meds  Dm controlled on meds  CKD stable and not on NSAIDS followed by nephro  Afib: new onset and pt will need cards clearance to proceed with surgery on Friday    preop clear: pending cards and labs. Pt aware    Greater than 45 mins spent with pt; 50 % face to face  Type 2 diabetes mellitus with diabetic polyneuropathy, with long-term current use of insulin    CKD (chronic kidney disease) stage 4, GFR 15-29 ml/min    Essential hypertension    Other orders  -     Influenza - High Dose (65+) (PF) (IM)

## 2019-11-12 NOTE — TELEPHONE ENCOUNTER
----- Message from Rhonda G Leopold, MD sent at 11/12/2019  8:02 AM CST -----  Regarding: RE: abnormal EKG  Thank you.  Cardiology appointment scheduled tomorrow afternoon.  Has Heme profile scheduled for today at 10AM.  Has a BMP from September (within our 3 month window of acceptability).  That's all we would require.  If you need anything additional, you should be able to add those orders onto his already scheduled lab visit for today.  Thanks again for your reply and your assistance with this patient.    Ana    ----- Message -----  From: Uma Flores MD  Sent: 11/11/2019   4:38 PM CST  To: Rhonda G Leopold, MD  Subject: RE: abnormal EKG                                 Hi Ana, thank you for the message. I would need a cards clearance. I do not know this patient at all and therefore would not feel comfortable to clear him otherwise with an abnormal EKG.    Also, I do work part time and am off on Thursday and Friday.  So, if the procedure is for Friday,  I will not be able to clear him in a day, if he does not have all of his preop labs etc already completed. I did not see them in Epic. Were they done elsewhere?     Thanks,  Uma        ----- Message -----  From: Rhonda G Leopold, MD  Sent: 11/11/2019   3:44 PM CST  To: Uma Flores MD  Subject: abnormal EKG                                      Good afternoon,    Mr. Guerrero is seeing you tomorrow morning for preop evaluation.  He had an EKG done today which shows rate controlled A-fib.  He does not have a history of atrial fibrillation and is asymptomatic.  We can work on setting him up with cardiology; however, wanted to make you aware as well.    Thank you,  Rhonda Leopold, MD  Staff Anesthesiologist

## 2019-11-12 NOTE — PROGRESS NOTES
"Patient was given vaccine information sheet for the Flu Vaccine. The area of injection was palpated using the acromion process as a landmark. This area was cleaned with alcohol. Using a 25g 1" safety needle, 0.5mL of the vaccine was placed into the right muscle. The injection site was dressed with a bandage. Patient experienced no complications and was discharged in stable condition. Fluzone High Dose vaccine Lot: [id number] Exp: 05/25/20  "

## 2019-11-12 NOTE — TELEPHONE ENCOUNTER
Dr. Marquis,     The PCP is going to defer to cardiology for clearance given his abnormal EKG and that he has that appointment scheduled for tomorrow afternoon - surgery on Friday.       The appointment has been changed to 11/13 at 0800 with Dr. Cheney.      If there is anything additional that you would like to initiate preoperatively, please advise.     Sri

## 2019-11-13 ENCOUNTER — OFFICE VISIT (OUTPATIENT)
Dept: CARDIOLOGY | Facility: CLINIC | Age: 79
End: 2019-11-13
Payer: MEDICARE

## 2019-11-13 ENCOUNTER — PATIENT MESSAGE (OUTPATIENT)
Dept: PRIMARY CARE CLINIC | Facility: CLINIC | Age: 79
End: 2019-11-13

## 2019-11-13 ENCOUNTER — LAB VISIT (OUTPATIENT)
Dept: LAB | Facility: HOSPITAL | Age: 79
End: 2019-11-13
Attending: INTERNAL MEDICINE
Payer: MEDICARE

## 2019-11-13 VITALS
WEIGHT: 186.31 LBS | HEART RATE: 74 BPM | OXYGEN SATURATION: 98 % | SYSTOLIC BLOOD PRESSURE: 130 MMHG | HEIGHT: 62 IN | DIASTOLIC BLOOD PRESSURE: 90 MMHG | BODY MASS INDEX: 34.29 KG/M2

## 2019-11-13 DIAGNOSIS — E78.01 HYPERLIPIDEMIA TYPE II: ICD-10-CM

## 2019-11-13 DIAGNOSIS — I48.19 PERSISTENT ATRIAL FIBRILLATION: ICD-10-CM

## 2019-11-13 DIAGNOSIS — I10 ESSENTIAL HYPERTENSION: Chronic | ICD-10-CM

## 2019-11-13 DIAGNOSIS — Z01.810 PREOPERATIVE CARDIOVASCULAR EXAMINATION: ICD-10-CM

## 2019-11-13 DIAGNOSIS — Z79.4 TYPE 2 DIABETES MELLITUS WITH DIABETIC POLYNEUROPATHY, WITH LONG-TERM CURRENT USE OF INSULIN: ICD-10-CM

## 2019-11-13 DIAGNOSIS — N18.30 CKD (CHRONIC KIDNEY DISEASE) STAGE 3, GFR 30-59 ML/MIN: ICD-10-CM

## 2019-11-13 DIAGNOSIS — I48.19 PERSISTENT ATRIAL FIBRILLATION: Primary | ICD-10-CM

## 2019-11-13 DIAGNOSIS — E11.42 TYPE 2 DIABETES MELLITUS WITH DIABETIC POLYNEUROPATHY, WITH LONG-TERM CURRENT USE OF INSULIN: ICD-10-CM

## 2019-11-13 LAB — TSH SERPL DL<=0.005 MIU/L-ACNC: 2.25 UIU/ML (ref 0.4–4)

## 2019-11-13 PROCEDURE — 1101F PT FALLS ASSESS-DOCD LE1/YR: CPT | Mod: CPTII,S$GLB,, | Performed by: INTERNAL MEDICINE

## 2019-11-13 PROCEDURE — 99999 PR PBB SHADOW E&M-EST. PATIENT-LVL IV: CPT | Mod: PBBFAC,,, | Performed by: INTERNAL MEDICINE

## 2019-11-13 PROCEDURE — 84443 ASSAY THYROID STIM HORMONE: CPT

## 2019-11-13 PROCEDURE — 99999 PR PBB SHADOW E&M-EST. PATIENT-LVL IV: ICD-10-PCS | Mod: PBBFAC,,, | Performed by: INTERNAL MEDICINE

## 2019-11-13 PROCEDURE — 99204 OFFICE O/P NEW MOD 45 MIN: CPT | Mod: S$GLB,,, | Performed by: INTERNAL MEDICINE

## 2019-11-13 PROCEDURE — 3080F DIAST BP >= 90 MM HG: CPT | Mod: CPTII,S$GLB,, | Performed by: INTERNAL MEDICINE

## 2019-11-13 PROCEDURE — 3075F PR MOST RECENT SYSTOLIC BLOOD PRESS GE 130-139MM HG: ICD-10-PCS | Mod: CPTII,S$GLB,, | Performed by: INTERNAL MEDICINE

## 2019-11-13 PROCEDURE — 3075F SYST BP GE 130 - 139MM HG: CPT | Mod: CPTII,S$GLB,, | Performed by: INTERNAL MEDICINE

## 2019-11-13 PROCEDURE — 1101F PR PT FALLS ASSESS DOC 0-1 FALLS W/OUT INJ PAST YR: ICD-10-PCS | Mod: CPTII,S$GLB,, | Performed by: INTERNAL MEDICINE

## 2019-11-13 PROCEDURE — 36415 COLL VENOUS BLD VENIPUNCTURE: CPT

## 2019-11-13 PROCEDURE — 99204 PR OFFICE/OUTPT VISIT, NEW, LEVL IV, 45-59 MIN: ICD-10-PCS | Mod: S$GLB,,, | Performed by: INTERNAL MEDICINE

## 2019-11-13 PROCEDURE — 3080F PR MOST RECENT DIASTOLIC BLOOD PRESSURE >= 90 MM HG: ICD-10-PCS | Mod: CPTII,S$GLB,, | Performed by: INTERNAL MEDICINE

## 2019-11-13 NOTE — PROGRESS NOTES
Subjective:   Patient ID:  Giovanni Guerrero is a 79 y.o. male who presents for evaluation of Abnormal ECG      HPI:   Giovanni Guerrero presents for evaluation and management of newly discovered atrial fibrillation and pre p risk assessment . The patient has an indwelling catheter and is to have surgery in 2 days. Noted to have atrial fibrillation with controlled VR on a routine ECG. The patient is active at home climbing stairs and has been asymptomatic. Giovanni Guerrero denies chest pain, shortness of breath, palpitations, presyncope , or syncope. Giovanni Guerrero has hypertension with adequate control. Giovanni Guerrero has dyslipidemia At LDL goal  on high intensity statin . Giovanni Guerrero has diabetes with Stage II CKD...    Review of Systems   Constitution: Negative for malaise/fatigue, weight gain and weight loss.   Eyes: Negative for blurred vision.   Cardiovascular: Negative for chest pain, claudication, cyanosis, dyspnea on exertion, irregular heartbeat, leg swelling, near-syncope, orthopnea, palpitations, paroxysmal nocturnal dyspnea and syncope.   Respiratory: Negative for cough, shortness of breath and wheezing.    Musculoskeletal: Negative for falls and myalgias.   Gastrointestinal: Positive for diarrhea. Negative for abdominal pain, heartburn, nausea and vomiting.   Genitourinary: Positive for incomplete emptying. Negative for hesitancy and nocturia.        Indwelling catheter   Neurological: Negative for brief paralysis, dizziness, focal weakness, headaches, numbness, paresthesias and weakness.   Psychiatric/Behavioral: Negative for altered mental status.       Current Outpatient Medications   Medication Sig    amLODIPine (NORVASC) 5 MG tablet Take 1 tablet (5 mg total) by mouth once daily.    aspirin (ECOTRIN) 81 MG EC tablet Take 81 mg by mouth once daily.    atenolol (TENORMIN) 100 MG tablet TAKE 1 TABLET BY MOUTH DAILY    atorvastatin (LIPITOR) 40 MG tablet Take 1 tablet (40 mg total) by mouth once  "daily.    BD INSULIN SYRINGE ULTRA-FINE 0.5 mL 31 gauge x 5/16 Syrg USE WITH LANTUS INSULIN ONCE DAILY    BD ULTRA-FINE SHORT PEN NEEDLE 31 gauge x 5/16" Ndle USE WITH LANTUS INSULIN ONCE DAILY    blood sugar diagnostic Strp Check sugar tid as directed    cholecalciferol, vitamin D3, (VITAMIN D3) 2,000 unit Tab Take by mouth once daily.    lisinopril (PRINIVIL,ZESTRIL) 20 MG tablet Take 0.5 tablets (10 mg total) by mouth once daily.    OMEGA-3 ACID ETHYL ESTERS (OMACOR) 1 gram Cap Take 1 g by mouth. 1 Capsule Oral Twice a day    sodium bicarbonate 325 MG tablet Take 2 tablets (650 mg total) by mouth 2 (two) times daily.    tamsulosin (FLOMAX) 0.4 mg Cap TAKE 1 CAPSULE BY MOUTH TWICE A DAY    TRESIBA FLEXTOUCH U-100 100 unit/mL (3 mL) InPn INJECT 15 UNITS UNDER THE SKIN EVERY EVENING    apixaban (ELIQUIS) 5 mg Tab Take 1 tablet (5 mg total) by mouth 2 (two) times daily.    blood-glucose meter Misc 1 kit by Misc.(Non-Drug; Combo Route) route 3 (three) times daily. Pt is to test blood sugar TID    finasteride (PROSCAR) 5 mg tablet Take 1 tablet (5 mg total) by mouth once daily.     No current facility-administered medications for this visit.      Objective:   Physical Exam   Constitutional: He is oriented to person, place, and time. He appears well-developed. No distress.   BP (!) 130/90   Pulse 74   Ht 5' 2" (1.575 m)   Wt 84.5 kg (186 lb 4.6 oz)   SpO2 98%   BMI 34.07 kg/m²    HENT:   Head: Normocephalic.   Right Ear: External ear normal.   Left Ear: External ear normal.   Eyes: Pupils are equal, round, and reactive to light. EOM are normal. No scleral icterus.   Neck: Neck supple. No JVD present. No thyromegaly present.   Cardiovascular: Normal rate, normal heart sounds and intact distal pulses. An irregularly irregular rhythm present. PMI is not displaced. Exam reveals no gallop and no friction rub.   No murmur heard.  Pulmonary/Chest: Effort normal and breath sounds normal. No respiratory " distress. He has no wheezes. He has no rales.   Abdominal: Soft. He exhibits no distension. There is no hepatosplenomegaly. There is no tenderness.   Musculoskeletal: He exhibits no edema or tenderness.   Gait normal   Neurological: He is alert and oriented to person, place, and time.   Skin: Skin is warm and dry. No rash noted.   Psychiatric: He has a normal mood and affect. His behavior is normal.       Lab Results   Component Value Date     10/07/2019    K 4.5 10/07/2019     10/07/2019    CO2 25 10/07/2019    BUN 35 (H) 10/07/2019    CREATININE 1.8 (H) 10/07/2019    GLU 99 10/07/2019    HGBA1C 6.2 (H) 05/23/2019    AST 25 06/26/2019    ALT 25 06/26/2019    ALBUMIN 3.5 09/27/2019    PROT 7.7 06/26/2019    BILITOT 1.2 (H) 06/26/2019    WBC 7.62 11/12/2019    HGB 16.0 11/12/2019    HCT 49.1 11/12/2019    MCV 94 11/12/2019     11/12/2019    TSH 2.736 11/01/2016    CHOL 100 (L) 05/23/2019    HDL 30 (L) 05/23/2019    LDLCALC 47.6 (L) 05/23/2019    TRIG 112 05/23/2019       Assessment:     1. Persistent atrial fibrillation : Recently discovered ,rate controlled, asx ( [id number] score= 4 )   2. Preoperative cardiovascular examination    3. Essential hypertension : .adequate controlled   4. Hyperlipidemia type II :  on high intensity statin At LDL goal   5. Type 2 diabetes mellitus with diabetic polyneuropathy, with long-term current use of insulin    6. CKD (chronic kidney disease) stage 3, GFR 30-59 ml/min        Plan:     Giovanni was seen today for abnormal ecg.    Diagnoses and all orders for this visit:    Persistent atrial fibrillation  -     TSH; Future; Expected date: 11/13/2019  -     apixaban (ELIQUIS) 5 mg Tab; Take 1 tablet (5 mg total) by mouth 2 (two) times daily to begin when OK post op.  -     Echo Color Flow Doppler? Yes; Future    Preoperative cardiovascular examination  The patient's Revised Cardiac Risk Index is 6.0 % for significant 30 day CV post op events making him an  an  acceptable cardiovascular risk for the anticipated Urologic  surgery    Essential hypertension  Continue current regimen    Hyperlipidemia type II  Continue current regimen  Mediterranean Diet recommended with carbohydrate restriction  Type 2 diabetes mellitus with diabetic polyneuropathy, with long-term current use of insulin    CKD (chronic kidney disease) stage 3, GFR 30-59 ml/min

## 2019-11-13 NOTE — PATIENT INSTRUCTIONS
Begin Eliquis following surgery when OK with Dr Marquis  Take aspirin with a meal  Continue all other medication  Once able, Graded exercise for 30 minutes a day at least 5 days a week suggested.  Mediterranean Diet recommended with carbohydrate restriction

## 2019-11-13 NOTE — LETTER
November 13, 2019      Rhonda G Leopold, MD  [address]  [place] [address]           [facility]iology  [address]  [facility] [address]  Phone: [phone]          Patient: Giovanni Guerrero   MR Number: [id number]   YOB: 1940   Date of Visit: 11/13/2019       Dear Dr. Rhonda G Leopold:    Thank you for referring Giovanni Guerrero to me for evaluation. Attached you will find relevant portions of my assessment and plan of care.    If you have questions, please do not hesitate to call me. I look forward to following Giovanni Guerrero along with you.    Sincerely,    Jt Cheney MD    Enclosure  CC:  No Recipients    If you would like to receive this communication electronically, please contact [email or web address] or [phone] to request more information on [employer] Link access.    For providers and/or their staff who would like to refer a patient to [facility], please contact us through our one-stop-shop provider referral line, [facility], at [phone].    If you feel you have received this communication in error or would no longer like to receive these types of communications, please e-mail [email or web address]

## 2019-11-14 ENCOUNTER — TELEPHONE (OUTPATIENT)
Dept: UROLOGY | Facility: CLINIC | Age: 79
End: 2019-11-14

## 2019-11-14 NOTE — TELEPHONE ENCOUNTER
Called pt to confirm arrival time of 815am for procedure on 11-15-19. Gave pt NPO instructions and gave pt opportunity to ask questions. Pt verbalized understanding.

## 2019-11-15 ENCOUNTER — TELEPHONE (OUTPATIENT)
Dept: UROLOGY | Facility: CLINIC | Age: 79
End: 2019-11-15

## 2019-11-15 ENCOUNTER — ANESTHESIA (OUTPATIENT)
Dept: SURGERY | Facility: HOSPITAL | Age: 79
End: 2019-11-15
Payer: MEDICARE

## 2019-11-15 ENCOUNTER — HOSPITAL ENCOUNTER (OUTPATIENT)
Facility: HOSPITAL | Age: 79
Discharge: HOME | End: 2019-11-15
Attending: UROLOGY | Admitting: UROLOGY
Payer: MEDICARE

## 2019-11-15 VITALS
SYSTOLIC BLOOD PRESSURE: 154 MMHG | DIASTOLIC BLOOD PRESSURE: 74 MMHG | BODY MASS INDEX: 34.04 KG/M2 | WEIGHT: 185 LBS | HEART RATE: 70 BPM | HEIGHT: 62 IN | RESPIRATION RATE: 20 BRPM | TEMPERATURE: 98 F | OXYGEN SATURATION: 100 %

## 2019-11-15 DIAGNOSIS — N40.1 BPH WITH URINARY OBSTRUCTION: Primary | ICD-10-CM

## 2019-11-15 DIAGNOSIS — N13.8 BPH WITH URINARY OBSTRUCTION: Primary | ICD-10-CM

## 2019-11-15 DIAGNOSIS — N40.1 BPH WITH URINARY OBSTRUCTION: ICD-10-CM

## 2019-11-15 DIAGNOSIS — N13.8 BPH WITH URINARY OBSTRUCTION: ICD-10-CM

## 2019-11-15 LAB — POCT GLUCOSE: 95 MG/DL (ref 70–110)

## 2019-11-15 PROCEDURE — 87088 URINE BACTERIA CULTURE: CPT

## 2019-11-15 PROCEDURE — 63600175 PHARM REV CODE 636 W HCPCS: Performed by: UROLOGY

## 2019-11-15 PROCEDURE — 87077 CULTURE AEROBIC IDENTIFY: CPT

## 2019-11-15 PROCEDURE — 82962 GLUCOSE BLOOD TEST: CPT | Performed by: UROLOGY

## 2019-11-15 PROCEDURE — 87186 SC STD MICRODIL/AGAR DIL: CPT | Mod: 59

## 2019-11-15 PROCEDURE — 87086 URINE CULTURE/COLONY COUNT: CPT

## 2019-11-15 PROCEDURE — 25000003 PHARM REV CODE 250: Performed by: UROLOGY

## 2019-11-15 RX ORDER — FENTANYL CITRATE 50 UG/ML
25 INJECTION, SOLUTION INTRAMUSCULAR; INTRAVENOUS EVERY 5 MIN PRN
Status: CANCELLED | OUTPATIENT
Start: 2019-11-15

## 2019-11-15 RX ORDER — SODIUM CHLORIDE 9 MG/ML
INJECTION, SOLUTION INTRAVENOUS CONTINUOUS
Status: DISCONTINUED | OUTPATIENT
Start: 2019-11-15 | End: 2019-11-15 | Stop reason: HOSPADM

## 2019-11-15 RX ORDER — CIPROFLOXACIN 2 MG/ML
400 INJECTION, SOLUTION INTRAVENOUS
Status: DISCONTINUED | OUTPATIENT
Start: 2019-11-15 | End: 2019-11-15 | Stop reason: HOSPADM

## 2019-11-15 RX ORDER — SODIUM CHLORIDE 0.9 % (FLUSH) 0.9 %
10 SYRINGE (ML) INJECTION
Status: DISCONTINUED | OUTPATIENT
Start: 2019-11-15 | End: 2019-11-15 | Stop reason: HOSPADM

## 2019-11-15 RX ORDER — WITCH HAZEL 50 %
2000 PADS, MEDICATED (EA) TOPICAL DAILY
COMMUNITY
End: 2021-06-18

## 2019-11-15 RX ORDER — ONDANSETRON 2 MG/ML
4 INJECTION INTRAMUSCULAR; INTRAVENOUS DAILY PRN
Status: CANCELLED | OUTPATIENT
Start: 2019-11-15

## 2019-11-15 RX ORDER — LIDOCAINE HYDROCHLORIDE 10 MG/ML
1 INJECTION, SOLUTION EPIDURAL; INFILTRATION; INTRACAUDAL; PERINEURAL ONCE
Status: COMPLETED | OUTPATIENT
Start: 2019-11-15 | End: 2019-11-15

## 2019-11-15 RX ADMIN — SODIUM CHLORIDE: 0.9 INJECTION, SOLUTION INTRAVENOUS at 08:11

## 2019-11-15 RX ADMIN — LIDOCAINE HYDROCHLORIDE 1 MG: 10 INJECTION, SOLUTION EPIDURAL; INFILTRATION; INTRACAUDAL; PERINEURAL at 08:11

## 2019-11-15 NOTE — H&P
Patient's catheterized urine specimen from today was positive for 1+ leuks, nitrites, and 50 of blood. He is asymptomatic including no fevers, chills, suprapubic pain, or flank pain. However, due to concern for urinary tract colonization from indwelling lester, we decided to cancel patient's laser TURP for today. We will send his urine for culture. Will likely reschedule for 12/6 and will start abx prior to procedure. A new 16 Fr two-way lester was placed.    CIRO Quarles MD  Urology, PGY-2  Pager: [phone]

## 2019-11-15 NOTE — TELEPHONE ENCOUNTER
----- Message from Edenilson Quarles MD sent at 11/15/2019  9:53 AM CST -----  Patient's laser TURP was cancelled for today. I talked with Dr. Marquis, can we reschedule for 12/6/19?    Thanks,  CIRO Quarles MD  Urology, PGY-2  Pager: [phone]

## 2019-11-15 NOTE — TELEPHONE ENCOUNTER
Called to confirm with mr reveles his rescheduled procedure that was canceled today. The new date will be on 12-6, he agreed. I will be mailing updated procedure instructions out today.

## 2019-11-15 NOTE — PROGRESS NOTES
Dr. CIRO Quarles states case cancelled for today due to UTI, at bedside to change out indwelling catheter.  Pt to be discharged to home accompanied by daughter. Pt and daughter(at bedside) understand will be called when culture results available, abx ordered and to reschedule.

## 2019-11-15 NOTE — DISCHARGE SUMMARY
[facility]  Discharge Note  Short Stay    Admit Date: 11/15/2019    Discharge Date and Time: 11/15/2019 10:12 AM      Attending Physician: Rafael Marquis Jr., MD     Discharge Provider: Edenilson Quarles    Diagnoses:  Active Hospital Problems    Diagnosis  POA    *BPH with urinary obstruction [N40.1, N13.8]  Yes    Preoperative cardiovascular examination [Z01.810]  Not Applicable    Persistent atrial fibrillation [I48.19]  Yes    Acute urinary retention [R33.8]  Yes    Metabolic bone disease [E88.9, M90.80]  Yes    Type 2 diabetes mellitus with diabetic polyneuropathy, with long-term current use of insulin [E11.42, Z79.4]  Not Applicable    Diabetic polyneuropathy associated with type 2 diabetes mellitus [E11.42]  Yes     Chronic    Diabetic nephropathy associated with type 2 diabetes mellitus [E11.21]  Yes     Chronic    CKD (chronic kidney disease) stage 3, GFR 30-59 ml/min [N18.3]  Yes    Essential hypertension [I10]  Yes     Chronic    Hyperlipidemia type II [E78.01]  Yes    Gout, arthritis [M10.9]  Yes      Resolved Hospital Problems   No resolved problems to display.       Discharged Condition: good    Hospital Course: Patient was admitted for laser TURP but had nitrite, leuk, and blood positive urine on dip. His urine was sent for culture and his procedure was cancelled.The patient was discharged home in good condition on the same day.       Final Diagnoses: Same as principal problem.    Disposition: Home or Self Care    Follow up/Patient Instructions:    Medications:  Reconciled Home Medications:   Current Discharge Medication List      CONTINUE these medications which have NOT CHANGED    Details   amLODIPine (NORVASC) 5 MG tablet Take 1 tablet (5 mg total) by mouth once daily.  Qty: 90 tablet, Refills: 3      aspirin (ECOTRIN) 81 MG EC tablet Take 81 mg by mouth once daily.      atenolol (TENORMIN) 100 MG tablet TAKE 1 TABLET BY MOUTH DAILY  Qty: 90 tablet, Refills: 2    Associated  "Diagnoses: Essential hypertension      atorvastatin (LIPITOR) 40 MG tablet Take 1 tablet (40 mg total) by mouth once daily.  Qty: 90 tablet, Refills: 3      cholecalciferol, vitamin D3, (VITAMIN D3) 2,000 unit Tab Take by mouth once daily.      cyanocobalamin 2000 MCG tablet Take 2,000 mcg by mouth once daily.      finasteride (PROSCAR) 5 mg tablet Take 1 tablet (5 mg total) by mouth once daily.  Qty: 90 tablet, Refills: 3    Associated Diagnoses: BPH with obstruction/lower urinary tract symptoms; Acute urinary retention      lisinopril (PRINIVIL,ZESTRIL) 20 MG tablet Take 0.5 tablets (10 mg total) by mouth once daily.  Qty: 45 tablet, Refills: 11      OMEGA-3 ACID ETHYL ESTERS (OMACOR) 1 gram Cap Take 1 g by mouth. 1 Capsule Oral Twice a day      tamsulosin (FLOMAX) 0.4 mg Cap TAKE 1 CAPSULE BY MOUTH TWICE A DAY  Qty: 180 capsule, Refills: 0    Associated Diagnoses: BPH with urinary obstruction      TRESIBA FLEXTOUCH U-100 100 unit/mL (3 mL) InPn INJECT 15 UNITS UNDER THE SKIN EVERY EVENING  Qty: 9 mL, Refills: 0      apixaban (ELIQUIS) 5 mg Tab Take 1 tablet (5 mg total) by mouth 2 (two) times daily.  Qty: 60 tablet, Refills: 5    Associated Diagnoses: Persistent atrial fibrillation      BD INSULIN SYRINGE ULTRA-FINE 0.5 mL 31 gauge x 5/16 Syrg USE WITH LANTUS INSULIN ONCE DAILY  Qty: 100 each, Refills: 3      BD ULTRA-FINE SHORT PEN NEEDLE 31 gauge x 5/16" Ndle USE WITH LANTUS INSULIN ONCE DAILY  Qty: 100 each, Refills: 0      blood sugar diagnostic Strp Check sugar tid as directed  Qty: 300 each, Refills: 3      blood-glucose meter Misc 1 kit by Misc.(Non-Drug; Combo Route) route 3 (three) times daily. Pt is to test blood sugar TID  Qty: 1 each, Refills: 0    Comments: Please dispense meter, lancets and testing strips.      sodium bicarbonate 325 MG tablet Take 2 tablets (650 mg total) by mouth 2 (two) times daily.  Qty: 120 tablet, Refills: 6           No discharge procedures on file.  Follow-up Information     " Rafael Marquis Jr, MD On 12/6/2019.    Specialty:  Urology  Why:  Follow-up for rescheduled laser TURP.  Contact information:  [address]  [facility] [address] [phone]                   Discharge Procedure Orders (must include Diet, Follow-up, Activity):  No discharge procedures on file.

## 2019-11-18 ENCOUNTER — TELEPHONE (OUTPATIENT)
Dept: UROLOGY | Facility: CLINIC | Age: 79
End: 2019-11-18

## 2019-11-18 ENCOUNTER — HOSPITAL ENCOUNTER (OUTPATIENT)
Dept: CARDIOLOGY | Facility: CLINIC | Age: 79
Discharge: HOME | End: 2019-11-18
Attending: INTERNAL MEDICINE
Payer: MEDICARE

## 2019-11-18 VITALS
WEIGHT: 185 LBS | DIASTOLIC BLOOD PRESSURE: 74 MMHG | HEIGHT: 62 IN | BODY MASS INDEX: 34.04 KG/M2 | SYSTOLIC BLOOD PRESSURE: 120 MMHG | HEART RATE: 88 BPM

## 2019-11-18 DIAGNOSIS — I48.19 PERSISTENT ATRIAL FIBRILLATION: ICD-10-CM

## 2019-11-18 LAB
AORTIC ROOT ANNULUS: 2 CM
ASCENDING AORTA: 2.92 CM
AV INDEX (PROSTH): 0.59
AV MEAN GRADIENT: 3 MMHG
AV PEAK GRADIENT: 6 MMHG
AV VALVE AREA: 2.44 CM2
AV VELOCITY RATIO: 0.61
BACTERIA UR CULT: ABNORMAL
BACTERIA UR CULT: ABNORMAL
BSA FOR ECHO PROCEDURE: 1.92 M2
CV ECHO LV RWT: 0.4 CM
DOP CALC AO PEAK VEL: 1.18 M/S
DOP CALC AO VTI: 24.28 CM
DOP CALC LVOT AREA: 4.1 CM2
DOP CALC LVOT DIAMETER: 2.29 CM
DOP CALC LVOT PEAK VEL: 0.72 M/S
DOP CALC LVOT STROKE VOLUME: 59.24 CM3
DOP CALCLVOT PEAK VEL VTI: 14.39 CM
E WAVE DECELERATION TIME: 178.74 MSEC
E/A RATIO: 4.71
E/E' RATIO: 10.42 M/S
ECHO LV POSTERIOR WALL: 0.95 CM (ref 0.6–1.1)
FRACTIONAL SHORTENING: 34 % (ref 28–44)
INTERVENTRICULAR SEPTUM: 1.03 CM (ref 0.6–1.1)
LA MAJOR: 6.6 CM
LA MINOR: 6.35 CM
LA WIDTH: 4.36 CM
LEFT ATRIUM SIZE: 4.74 CM
LEFT ATRIUM VOLUME INDEX: 61.5 ML/M2
LEFT ATRIUM VOLUME: 113.7 CM3
LEFT INTERNAL DIMENSION IN SYSTOLE: 3.13 CM (ref 2.1–4)
LEFT VENTRICLE DIASTOLIC VOLUME INDEX: 56.87 ML/M2
LEFT VENTRICLE DIASTOLIC VOLUME: 105.17 ML
LEFT VENTRICLE MASS INDEX: 89 G/M2
LEFT VENTRICLE SYSTOLIC VOLUME INDEX: 20.9 ML/M2
LEFT VENTRICLE SYSTOLIC VOLUME: 38.67 ML
LEFT VENTRICULAR INTERNAL DIMENSION IN DIASTOLE: 4.75 CM (ref 3.5–6)
LEFT VENTRICULAR MASS: 165.04 G
LV LATERAL E/E' RATIO: 8.25 M/S
LV SEPTAL E/E' RATIO: 14.14 M/S
MV PEAK A VEL: 0.21 M/S
MV PEAK E VEL: 0.99 M/S
PISA TR MAX VEL: 3.06 M/S
RA MAJOR: 6.27 CM
RA WIDTH: 3.05 CM
RIGHT VENTRICULAR END-DIASTOLIC DIMENSION: 3.1 CM
SINUS: 3.18 CM
STJ: 2.4 CM
TDI LATERAL: 0.12 M/S
TDI SEPTAL: 0.07 M/S
TDI: 0.1 M/S
TR MAX PG: 37 MMHG
TRICUSPID ANNULAR PLANE SYSTOLIC EXCURSION: 1.49 CM

## 2019-11-18 PROCEDURE — 93306 TTE W/DOPPLER COMPLETE: CPT | Mod: S$GLB,,, | Performed by: INTERNAL MEDICINE

## 2019-11-18 PROCEDURE — 93306 ECHO (CUPID ONLY): ICD-10-PCS | Mod: S$GLB,,, | Performed by: INTERNAL MEDICINE

## 2019-11-19 RX ORDER — SULFAMETHOXAZOLE AND TRIMETHOPRIM 800; 160 MG/1; MG/1
1 TABLET ORAL 2 TIMES DAILY
Qty: 60 TABLET | Refills: 1 | Status: SHIPPED | OUTPATIENT
Start: 2019-11-19 | End: 2019-11-26 | Stop reason: CLARIF

## 2019-11-22 ENCOUNTER — LAB VISIT (OUTPATIENT)
Dept: LAB | Facility: OTHER | Age: 79
End: 2019-11-22
Attending: INTERNAL MEDICINE
Payer: MEDICARE

## 2019-11-22 ENCOUNTER — OFFICE VISIT (OUTPATIENT)
Dept: INTERNAL MEDICINE | Facility: CLINIC | Age: 79
End: 2019-11-22
Payer: MEDICARE

## 2019-11-22 ENCOUNTER — TELEPHONE (OUTPATIENT)
Dept: INTERNAL MEDICINE | Facility: CLINIC | Age: 79
End: 2019-11-22

## 2019-11-22 VITALS
SYSTOLIC BLOOD PRESSURE: 104 MMHG | OXYGEN SATURATION: 98 % | DIASTOLIC BLOOD PRESSURE: 58 MMHG | HEART RATE: 68 BPM | HEIGHT: 62 IN | WEIGHT: 185.88 LBS | BODY MASS INDEX: 34.2 KG/M2

## 2019-11-22 DIAGNOSIS — M10.9 GOUT, ARTHRITIS: ICD-10-CM

## 2019-11-22 DIAGNOSIS — N18.30 CKD (CHRONIC KIDNEY DISEASE) STAGE 3, GFR 30-59 ML/MIN: Primary | ICD-10-CM

## 2019-11-22 DIAGNOSIS — E11.42 TYPE 2 DIABETES MELLITUS WITH DIABETIC POLYNEUROPATHY, WITH LONG-TERM CURRENT USE OF INSULIN: ICD-10-CM

## 2019-11-22 DIAGNOSIS — I10 ESSENTIAL HYPERTENSION: Primary | Chronic | ICD-10-CM

## 2019-11-22 DIAGNOSIS — E78.2 MIXED HYPERLIPIDEMIA: Chronic | ICD-10-CM

## 2019-11-22 DIAGNOSIS — Z79.4 TYPE 2 DIABETES MELLITUS WITH DIABETIC POLYNEUROPATHY, WITH LONG-TERM CURRENT USE OF INSULIN: ICD-10-CM

## 2019-11-22 DIAGNOSIS — E11.42 DIABETIC POLYNEUROPATHY ASSOCIATED WITH TYPE 2 DIABETES MELLITUS: Chronic | ICD-10-CM

## 2019-11-22 DIAGNOSIS — I48.19 PERSISTENT ATRIAL FIBRILLATION: ICD-10-CM

## 2019-11-22 DIAGNOSIS — M89.8X9 METABOLIC BONE DISEASE: ICD-10-CM

## 2019-11-22 DIAGNOSIS — I10 ESSENTIAL HYPERTENSION: Chronic | ICD-10-CM

## 2019-11-22 DIAGNOSIS — N40.1 BPH WITH URINARY OBSTRUCTION: ICD-10-CM

## 2019-11-22 DIAGNOSIS — N18.30 CKD (CHRONIC KIDNEY DISEASE) STAGE 3, GFR 30-59 ML/MIN: ICD-10-CM

## 2019-11-22 DIAGNOSIS — E11.21 DIABETIC NEPHROPATHY ASSOCIATED WITH TYPE 2 DIABETES MELLITUS: Chronic | ICD-10-CM

## 2019-11-22 DIAGNOSIS — N13.8 BPH WITH URINARY OBSTRUCTION: ICD-10-CM

## 2019-11-22 LAB
ANION GAP SERPL CALC-SCNC: 12 MMOL/L (ref 8–16)
BUN SERPL-MCNC: 55 MG/DL (ref 8–23)
CALCIUM SERPL-MCNC: 9.6 MG/DL (ref 8.7–10.5)
CHLORIDE SERPL-SCNC: 102 MMOL/L (ref 95–110)
CO2 SERPL-SCNC: 20 MMOL/L (ref 23–29)
CREAT SERPL-MCNC: 2.5 MG/DL (ref 0.5–1.4)
EST. GFR  (AFRICAN AMERICAN): 28 ML/MIN/1.73 M^2
EST. GFR  (NON AFRICAN AMERICAN): 24 ML/MIN/1.73 M^2
GLUCOSE SERPL-MCNC: 129 MG/DL (ref 70–110)
POTASSIUM SERPL-SCNC: 5 MMOL/L (ref 3.5–5.1)
SODIUM SERPL-SCNC: 134 MMOL/L (ref 136–145)

## 2019-11-22 PROCEDURE — 1159F PR MEDICATION LIST DOCUMENTED IN MEDICAL RECORD: ICD-10-PCS | Mod: S$GLB,,, | Performed by: INTERNAL MEDICINE

## 2019-11-22 PROCEDURE — 99214 PR OFFICE/OUTPT VISIT, EST, LEVL IV, 30-39 MIN: ICD-10-PCS | Mod: S$GLB,,, | Performed by: INTERNAL MEDICINE

## 2019-11-22 PROCEDURE — 36415 COLL VENOUS BLD VENIPUNCTURE: CPT

## 2019-11-22 PROCEDURE — 3078F DIAST BP <80 MM HG: CPT | Mod: CPTII,S$GLB,, | Performed by: INTERNAL MEDICINE

## 2019-11-22 PROCEDURE — 99999 PR PBB SHADOW E&M-EST. PATIENT-LVL III: CPT | Mod: PBBFAC,,, | Performed by: INTERNAL MEDICINE

## 2019-11-22 PROCEDURE — 3074F PR MOST RECENT SYSTOLIC BLOOD PRESSURE < 130 MM HG: ICD-10-PCS | Mod: CPTII,S$GLB,, | Performed by: INTERNAL MEDICINE

## 2019-11-22 PROCEDURE — 99499 UNLISTED E&M SERVICE: CPT | Mod: S$GLB,,, | Performed by: INTERNAL MEDICINE

## 2019-11-22 PROCEDURE — 1126F AMNT PAIN NOTED NONE PRSNT: CPT | Mod: S$GLB,,, | Performed by: INTERNAL MEDICINE

## 2019-11-22 PROCEDURE — 1159F MED LIST DOCD IN RCRD: CPT | Mod: S$GLB,,, | Performed by: INTERNAL MEDICINE

## 2019-11-22 PROCEDURE — 3078F PR MOST RECENT DIASTOLIC BLOOD PRESSURE < 80 MM HG: ICD-10-PCS | Mod: CPTII,S$GLB,, | Performed by: INTERNAL MEDICINE

## 2019-11-22 PROCEDURE — 3074F SYST BP LT 130 MM HG: CPT | Mod: CPTII,S$GLB,, | Performed by: INTERNAL MEDICINE

## 2019-11-22 PROCEDURE — 1101F PT FALLS ASSESS-DOCD LE1/YR: CPT | Mod: CPTII,S$GLB,, | Performed by: INTERNAL MEDICINE

## 2019-11-22 PROCEDURE — 1126F PR PAIN SEVERITY QUANTIFIED, NO PAIN PRESENT: ICD-10-PCS | Mod: S$GLB,,, | Performed by: INTERNAL MEDICINE

## 2019-11-22 PROCEDURE — 1101F PR PT FALLS ASSESS DOC 0-1 FALLS W/OUT INJ PAST YR: ICD-10-PCS | Mod: CPTII,S$GLB,, | Performed by: INTERNAL MEDICINE

## 2019-11-22 PROCEDURE — 99499 RISK ADDL DX/OHS AUDIT: ICD-10-PCS | Mod: S$GLB,,, | Performed by: INTERNAL MEDICINE

## 2019-11-22 PROCEDURE — 80048 BASIC METABOLIC PNL TOTAL CA: CPT

## 2019-11-22 PROCEDURE — 99214 OFFICE O/P EST MOD 30 MIN: CPT | Mod: S$GLB,,, | Performed by: INTERNAL MEDICINE

## 2019-11-22 PROCEDURE — 99999 PR PBB SHADOW E&M-EST. PATIENT-LVL III: ICD-10-PCS | Mod: PBBFAC,,, | Performed by: INTERNAL MEDICINE

## 2019-11-22 RX ORDER — TAMSULOSIN HYDROCHLORIDE 0.4 MG/1
CAPSULE ORAL
Qty: 180 CAPSULE | Refills: 1 | Status: SHIPPED | OUTPATIENT
Start: 2019-11-22 | End: 2020-09-06

## 2019-11-22 RX ORDER — DOXYCYCLINE 100 MG/1
100 CAPSULE ORAL EVERY 12 HOURS
Qty: 60 CAPSULE | Refills: 1 | Status: SHIPPED | OUTPATIENT
Start: 2019-11-22 | End: 2019-12-22

## 2019-11-22 NOTE — PROGRESS NOTES
Subjective:       Patient ID: Giovanni Guerrero is a 79 y.o. male.    Chief Complaint: Hypertension and Urinary Tract Infection (has catheter, on antibiotics)    Pt here for f/u. DM2 is well controlled. Most recent A1C was 6.3. Pt reports sugars are in 90s-100s fasting, prandial sugars are generally less than 125. He has not had lows. He is on tresiba monotherapy at 15 units qhs. He does have nephropathy with CKD IV and microalbuminuria and his creatinine has been stable at 2.2-2.5. He is followed by nephrology. He does have neuropathy but is not bothersome and manages without meds.      He was dx with persistent a-fib while having preop EKG for upcoming TURP. He saw cardiology. Was started on eliquis and is doing well. Denies cp/sob/palpitations. Has regular f/u with cardiology.    He has renal metabolic bone disease which is also managed by nephrology and stable.     Pt's BP is well controlled. Tolerating meds well. Home readings are also controlled. Pt denies cp/sob/ha/vision or neuro changes.     HLD is tx with tricor and lipitor which he tolerates well.    Gout has been quiet. No recent attacks.      He has BPH with urinary obstruction for which he takes flomax and finasteride. He is to have a TURP but this was recently delayed due to UTI/prostatitis. He has indwelling lester. He was rx bactrim which he has been on for 4 days. He has already been given ok by cardiology to proceed with surgery although eliquis will have to be held accordingly. Pt is aware. He has f/u with urology.    Diabetes   Pertinent negatives for hypoglycemia include no headaches. Pertinent negatives for diabetes include no chest pain and no polyuria.   Hypertension   Pertinent negatives include no chest pain, headaches or shortness of breath.     Review of Systems   Constitutional: Negative for appetite change, fever and unexpected weight change.   Eyes: Negative for visual disturbance.   Respiratory: Negative for shortness of breath.     Cardiovascular: Negative for chest pain.   Gastrointestinal: Negative for abdominal pain, blood in stool, constipation and diarrhea.   Endocrine: Negative for polyuria.   Genitourinary: Negative for frequency.   Neurological: Negative for light-headedness and headaches.   Psychiatric/Behavioral: Negative for dysphoric mood.       Objective:      Physical Exam   Constitutional: He is oriented to person, place, and time. He appears well-developed and well-nourished.   HENT:   Head: Normocephalic and atraumatic.   Eyes: Pupils are equal, round, and reactive to light. EOM are normal.   Neck: Neck supple. Carotid bruit is not present. No thyromegaly present.   Cardiovascular: Normal rate, regular rhythm, S1 normal, S2 normal and normal heart sounds.   No murmur heard.  Pulmonary/Chest: Effort normal and breath sounds normal. He has no wheezes.   Abdominal: Soft. Bowel sounds are normal. He exhibits no mass. There is no hepatosplenomegaly. There is no tenderness.   Musculoskeletal: He exhibits no edema.   Lymphadenopathy:     He has no cervical adenopathy.   Neurological: He is alert and oriented to person, place, and time. No cranial nerve deficit.   Psychiatric: He has a normal mood and affect. His behavior is normal.       Assessment:       1. Essential hypertension    2. Mixed hyperlipidemia    3. Persistent atrial fibrillation    4. Diabetic polyneuropathy associated with type 2 diabetes mellitus    5. BPH with urinary obstruction    6. CKD (chronic kidney disease) stage 3, GFR 30-59 ml/min    7. Diabetic nephropathy associated with type 2 diabetes mellitus    8. Metabolic bone disease    9. Type 2 diabetes mellitus with diabetic polyneuropathy, with long-term current use of insulin    10. Gout, arthritis        Plan:       1. Recent labs reviewed     2. Continue current meds  3. Continue with home BS and BP readings  4. Keep f/u with specialists  5. He is due for c-scope as last one in 2014 showed small polyps but  he does not want to pursue this and understands r/b of this decision  6. Check BMP today as I have some concern with his renal disease, bactrim use and concurrent use of lisinopril

## 2019-11-23 NOTE — TELEPHONE ENCOUNTER
LVM for EC to have patient give us a call as he was not available.   
Left detailed voice message that doxy was sent in   
Please call pt to stop bactrim as it has made renal function worse. I will send note to his urology and nephrology team to determine next best treatment.   
Please schedule repeat BMP next week.   
Regino Morales

## 2019-11-26 ENCOUNTER — TELEPHONE (OUTPATIENT)
Dept: CARDIOLOGY | Facility: CLINIC | Age: 79
End: 2019-11-26

## 2019-11-26 DIAGNOSIS — Z01.818 PREOP TESTING: Primary | ICD-10-CM

## 2019-11-26 NOTE — ANESTHESIA PAT ROS NOTE
11/25/2019  Giovanni Guerrero is a 79 y.o., male.    12/2/19-Labs reviewed and noted. Per -He can stop aspirin now since he is on Eliquis that is to be stopped 48 hours pre op. No need to resume aspirin post op as long as the Eliquis is resumed ASAP. Patient was notified to hold ASA starting today. CHERI Christie     Pre-op Assessment         Review of Systems  Anesthesia Hx:  No problems with previous Anesthesia  Denies Family Hx of Anesthesia complications.   Denies Personal Hx of Anesthesia complications.   Social:  Non-Smoker, No Alcohol Use    Hematology/Oncology:         -- Anemia: Hematology Comments: Hx-x1 blood transfusion following Appendectomy   EENT/Dental:   Wears glasses-reading   Cardiovascular:   Exercise tolerance: good Hypertension hyperlipidemia Walking daily/climbs stairs daily/housework   Renal/:   Chronic Renal Disease BPH    Neurological:   Neuromuscular Disease, Headaches    Endocrine:   Diabetes, well controlled, type 2, using insulin    Dora Lincoln RN  11/26/19       Anesthesia Assessment: Preoperative EQUATION    Planned Procedure: Procedure(s) (LRB):  TURP, USING LASER (N/A)  Requested Anesthesia Type:General  Surgeon: Rafael Marquis Jr., MD  Service: Urology  Known or anticipated Date of Surgery:12/6/2019    Surgeon notes: reviewed and BPH with urinary obstruction    Previous anesthesia records:Not available and Patient has had several surgeries done at [facility] facilities-No Anesthesia records found int [facility] system    Last PCP note: within 1 month , within [facility] , focused  Subspecialty notes: Cardiology: General, Nephrology    Other important co-morbidities: DM2, HLP, HTN and Renal Insufficiency/Gout/Anticoagulant long-term use      Tests already available:  Results have been reviewed. Labs-11/22/19-BMP/11/18/19-A1c/ 11/15/19-POCT glucose/ 11/13/19-TSH/  "11/12/19-APTT/PT-INR/10/7/19-BMP/9/27/19-BMP/Protein/Creatinine ratio, urine/RFP/UA/EKG-11/11/19      Plan:  Phone Pending      Testing:  BMP-Repeat BMP(rechecking Creatinine level)-(ordered)-Pending---Lab Appt. to be done on 12/3/19 per Dr. Carbajal.        Patient  has previously scheduled Medical Appointment:None    Navigation: Phone Completed              Consults scheduled.Request sent to [facility] Cards- for "Clearance" & Eliquis guidelines-Pending & Request sent to [facility] PCP- for preop guidelines for baby Aspirin-Pending.             Results will be tracked by Preop Clinic.               Dora Lincoln RN  11/26/19    Addendum:[facility] Cards-Dr. Kam "Clearance" , in chart note on 11/13/19 visit. Eliquis preop guidelines  to hold x48 hours prior to surgery to be resumed ASA per , in chart note on 11/26/19. [facility] PCP- for preop guidelines for baby Aspirin-Pending.   Dora Lincoln RN  11/26/19  "

## 2019-11-26 NOTE — TELEPHONE ENCOUNTER
"----- Message from Boo Reardon MD sent at 11/26/2019 12:03 PM CST -----  Regarding: RE: Preop "Clearance" Update & Eliquis guidelines.  See Dr Loyd note of 11/18 for surgical clearemnce. May hold eliquis for 48 hours prior to surgery to be resumed ASA. Annika Raerdon  ----- Message -----  From: Yu Samuel MA  Sent: 11/26/2019   9:08 AM CST  To: Jt Cheney MD  Subject: FW: Preop "Clearance" Update & Eliquis guide#        ----- Message -----  From: Dora Lincoln RN  Sent: 11/25/2019   5:06 PM CST  To: Dora Lincoln RN, Shravan Antonio  Subject: Preop "Clearance" Update & Eliquis guideline#    Patient is having a TURP, Using Laser on 12/6/19, with Dr. Marquis. Surgery was rescheduled from 11/15/19. Fiornet  now taking Eliquis for his A-Fib.  Requesting update on "Clearance" status and Eliquis preop guidelines, prior to the new surgery date. Await your reply. Thank you. Sincerely, Dora Lincoln RN  [facility] ext. [phone]    "

## 2019-11-26 NOTE — PRE-PROCEDURE INSTRUCTIONS
Preop screen completed.  Preop instructions(bathing/fasting/directions/location of surgery/ and preop medication instructions reviewed with patient).  Patient instructed to hold/stop all blood thinning medications, prior to surgery, following the pre-surgery recommended guidelines-per Cards  for Eliquis & Dr. MAY/Eleanor for baby Aspirin.  Patient verbalized understanding.

## 2019-11-27 ENCOUNTER — TELEPHONE (OUTPATIENT)
Dept: INTERNAL MEDICINE | Facility: CLINIC | Age: 79
End: 2019-11-27

## 2019-11-27 ENCOUNTER — TELEPHONE (OUTPATIENT)
Dept: PREADMISSION TESTING | Facility: HOSPITAL | Age: 79
End: 2019-11-27

## 2019-11-27 ENCOUNTER — TELEPHONE (OUTPATIENT)
Dept: CARDIOLOGY | Facility: CLINIC | Age: 79
End: 2019-11-27

## 2019-11-27 ENCOUNTER — LAB VISIT (OUTPATIENT)
Dept: LAB | Facility: OTHER | Age: 79
End: 2019-11-27
Payer: MEDICARE

## 2019-11-27 DIAGNOSIS — E87.5 HYPERKALEMIA: Primary | ICD-10-CM

## 2019-11-27 DIAGNOSIS — N18.30 CKD (CHRONIC KIDNEY DISEASE) STAGE 3, GFR 30-59 ML/MIN: ICD-10-CM

## 2019-11-27 LAB
ANION GAP SERPL CALC-SCNC: 8 MMOL/L (ref 8–16)
BUN SERPL-MCNC: 63 MG/DL (ref 8–23)
CALCIUM SERPL-MCNC: 9.4 MG/DL (ref 8.7–10.5)
CHLORIDE SERPL-SCNC: 104 MMOL/L (ref 95–110)
CO2 SERPL-SCNC: 23 MMOL/L (ref 23–29)
CREAT SERPL-MCNC: 2.3 MG/DL (ref 0.5–1.4)
EST. GFR  (AFRICAN AMERICAN): 31 ML/MIN/1.73 M^2
EST. GFR  (NON AFRICAN AMERICAN): 26 ML/MIN/1.73 M^2
GLUCOSE SERPL-MCNC: 108 MG/DL (ref 70–110)
POTASSIUM SERPL-SCNC: 5.4 MMOL/L (ref 3.5–5.1)
SODIUM SERPL-SCNC: 135 MMOL/L (ref 136–145)

## 2019-11-27 PROCEDURE — 36415 COLL VENOUS BLD VENIPUNCTURE: CPT

## 2019-11-27 PROCEDURE — 80048 BASIC METABOLIC PNL TOTAL CA: CPT

## 2019-11-27 NOTE — TELEPHONE ENCOUNTER
----- Message from Dora Lincoln RN sent at 11/26/2019  1:03 PM CST -----  Regarding: preop appt.  Sx -12/6. Need-Lab-BMP(ordered)-Please schedule on 12/3/19-per Dr. Carbajal request.. Thank you. PJ

## 2019-11-27 NOTE — TELEPHONE ENCOUNTER
"----- Message from Jt Cheney MD sent at 11/26/2019 10:02 PM CST -----  Regarding: RE: Preop "Clearance" Update & Eliquis guidelines.  As per Dr Reardon's note, the patient's revised cardiac risk index is 6.0% for significant 30 day perioperative CV events making him an acceptable cardiovascular risk for the anticipated Urologi surgery. Hold Eliquis 2 days pre op and resume as soon as it is safe post op.  ----- Message -----  From: Yu Samuel MA  Sent: 11/26/2019   9:08 AM CST  To: Jt Cheney MD  Subject: FW: Preop "Clearance" Update & Eliquis guide#        ----- Message -----  From: Dora Lincoln RN  Sent: 11/25/2019   5:06 PM CST  To: Dora Lincoln RN, Shravan [facility] Staff  Subject: Preop "Clearance" Update & Eliquis guideline#    Patient is having a TURP, Using Laser on 12/6/19, with Dr. Marquis. Surgery was rescheduled from 11/15/19. Fiornet  now taking Eliquis for his A-Fib.  Requesting update on "Clearance" status and Eliquis preop guidelines, prior to the new surgery date. Await your reply. Thank you. Sincerely, Dora Lincoln RN  [facility] ext. [phone]    "

## 2019-11-27 NOTE — TELEPHONE ENCOUNTER
Notified patient of results. Patient is already scheduled to have BMP on Tuesday (6 days out). Sent message to Dr. Webster to see if he can have this done then, or to have it done Saturday.

## 2019-11-27 NOTE — TELEPHONE ENCOUNTER
Inform pt that kidney function is slightly better but potassium is up a little. Ensure he is not eating high potassium foods (citrus, tomatoes, greens, potatoes, sweet potatoes, mushrooms, bananas, etc). We will leave meds the same for now. Repeat BMP in 3-5 days.

## 2019-12-02 ENCOUNTER — TELEPHONE (OUTPATIENT)
Dept: PREADMISSION TESTING | Facility: HOSPITAL | Age: 79
End: 2019-12-02

## 2019-12-02 ENCOUNTER — ANESTHESIA EVENT (OUTPATIENT)
Dept: SURGERY | Facility: HOSPITAL | Age: 79
End: 2019-12-02
Payer: MEDICARE

## 2019-12-02 ENCOUNTER — LAB VISIT (OUTPATIENT)
Dept: LAB | Facility: OTHER | Age: 79
End: 2019-12-02
Attending: ANESTHESIOLOGY
Payer: MEDICARE

## 2019-12-02 DIAGNOSIS — Z01.818 PREOP TESTING: ICD-10-CM

## 2019-12-02 LAB
ANION GAP SERPL CALC-SCNC: 7 MMOL/L (ref 8–16)
BUN SERPL-MCNC: 49 MG/DL (ref 8–23)
CALCIUM SERPL-MCNC: 9.7 MG/DL (ref 8.7–10.5)
CHLORIDE SERPL-SCNC: 106 MMOL/L (ref 95–110)
CO2 SERPL-SCNC: 24 MMOL/L (ref 23–29)
CREAT SERPL-MCNC: 1.9 MG/DL (ref 0.5–1.4)
EST. GFR  (AFRICAN AMERICAN): 38 ML/MIN/1.73 M^2
EST. GFR  (NON AFRICAN AMERICAN): 33 ML/MIN/1.73 M^2
GLUCOSE SERPL-MCNC: 92 MG/DL (ref 70–110)
POTASSIUM SERPL-SCNC: 4.9 MMOL/L (ref 3.5–5.1)
SODIUM SERPL-SCNC: 137 MMOL/L (ref 136–145)

## 2019-12-02 PROCEDURE — 36415 COLL VENOUS BLD VENIPUNCTURE: CPT

## 2019-12-02 PROCEDURE — 80048 BASIC METABOLIC PNL TOTAL CA: CPT

## 2019-12-02 NOTE — ANESTHESIA PREPROCEDURE EVALUATION
12/02/2019  Giovanni Guerrero is a 79 y.o., male.    Pre-op Assessment         Review of Systems

## 2019-12-02 NOTE — TELEPHONE ENCOUNTER
----- Message from Jt Cheney MD sent at 12/2/2019 12:47 PM CST -----  He can stop aspirin now since he is on Eliquis that is to be stopped 48 hours pre op. No need to resume aspirin post op as long as the Eliquis is resumed ASAP.  ----- Message -----  From: Ignacia Gamino LPN  Sent: 12/2/2019  12:39 PM CST  To: Jt Cheney MD        ----- Message -----  From: Alan Webster MD  Sent: 12/2/2019  12:05 PM CST  To: Ignacia Gamino LPN    He is on eliquis for a-fib now. Dr Jt Cheney is his cardiologist and can make final determination about stopping this and aspirin. Thanks.  ----- Message -----  From: Ignacia Gamino LPN  Sent: 12/2/2019   9:27 AM CST  To: Alan Webster MD, Eleanor Wyatt Staff    ,    is scheduled to have a ASHLEE NUÑEZ on 12/6 with  I am following up on ASA 81mg guidelines/instructions.    Please advise

## 2019-12-03 RX ORDER — INSULIN DEGLUDEC 100 U/ML
INJECTION, SOLUTION SUBCUTANEOUS
Qty: 9 ML | Refills: 1 | Status: SHIPPED | OUTPATIENT
Start: 2019-12-03 | End: 2020-04-17

## 2019-12-03 RX ORDER — PEN NEEDLE, DIABETIC 31 GX5/16"
NEEDLE, DISPOSABLE MISCELLANEOUS
Qty: 100 EACH | Refills: 3 | Status: SHIPPED | OUTPATIENT
Start: 2019-12-03 | End: 2020-12-31 | Stop reason: SDUPTHER

## 2019-12-05 ENCOUNTER — TELEPHONE (OUTPATIENT)
Dept: UROLOGY | Facility: CLINIC | Age: 79
End: 2019-12-05

## 2019-12-05 PROBLEM — N39.0 UTI (URINARY TRACT INFECTION): Status: ACTIVE | Noted: 2019-12-05

## 2019-12-05 PROBLEM — R33.9 URINARY RETENTION: Status: ACTIVE | Noted: 2019-07-18

## 2019-12-05 NOTE — H&P
Urology ([facility]) H&P  Staff: Rafael Marquis MD    CC: Urinary retention    HPI:  Giovanni Guerrero is a 79 y.o. male with urinary retention presenting for laser TURP.    He had SUDS on 10/21/19 which showed high pressure high flow voiding. Cysto showed 4 cm prostatic urethra with lateral lobe obstruction. He takes flomax and finasteride. He originally was scheduled for laser TURP on 11/15/19 which was cancelled due to UTI. Urine culture grew Citrobacter and Klebsiella. He has been tkaing doxycycline.    He last took ASA 81 and Eliquis on 11/30/19.    ROS:  Neg except per HPI    Past Medical History:   Diagnosis Date    Allergy     Anticoagulant long-term use     Colon polyp     Diabetes mellitus type II     Gout, unspecified     Hx of colonic polyps     Hyperlipidemia     Hypertension     Prostatic hypertrophy     Renal insufficiency        Past Surgical History:   Procedure Laterality Date    APPENDECTOMY      CATARACT EXTRACTION Left 2018    EYE SURGERY      cataract    HERNIA REPAIR      TONSILLECTOMY         Social History     Socioeconomic History    Marital status:      Spouse name: Not on file    Number of children: Not on file    Years of education: Not on file    Highest education level: Not on file   Occupational History    Not on file   Social Needs    Financial resource strain: Not on file    Food insecurity:     Worry: Not on file     Inability: Not on file    Transportation needs:     Medical: Not on file     Non-medical: Not on file   Tobacco Use    Smoking status: Never Smoker    Smokeless tobacco: Never Used   Substance and Sexual Activity    Alcohol use: No    Drug use: No    Sexual activity: Not Currently   Lifestyle    Physical activity:     Days per week: Not on file     Minutes per session: Not on file    Stress: Not on file   Relationships    Social connections:     Talks on phone: Not on file     Gets together: Not on file     Attends Mosque service:  Not on file     Active member of club or organization: Not on file     Attends meetings of clubs or organizations: Not on file     Relationship status: Not on file   Other Topics Concern    Not on file   Social History Narrative     52 yrs, 3 childre (two living), 11 grandchildren & 4 great grandchildren       Family History   Problem Relation Age of Onset    Cancer Mother         ovarian cancer    Heart disease Father         MI at 57    Diabetes Father     Cerebral aneurysm Sister     Heart disease Brother         MI at 60    Heart disease Brother         CABG    Anesthesia problems Neg Hx        Review of patient's allergies indicates:   Allergen Reactions    Actos [pioglitazone] Other (See Comments)     constipation       No current facility-administered medications on file prior to encounter.      Current Outpatient Medications on File Prior to Encounter   Medication Sig Dispense Refill    amLODIPine (NORVASC) 5 MG tablet Take 1 tablet (5 mg total) by mouth once daily. 90 tablet 3    apixaban (ELIQUIS) 5 mg Tab Take 1 tablet (5 mg total) by mouth 2 (two) times daily. 60 tablet 5    aspirin (ECOTRIN) 81 MG EC tablet Take 81 mg by mouth once daily.      atenolol (TENORMIN) 100 MG tablet TAKE 1 TABLET BY MOUTH DAILY 90 tablet 2    atorvastatin (LIPITOR) 40 MG tablet Take 1 tablet (40 mg total) by mouth once daily. 90 tablet 3    BD INSULIN SYRINGE ULTRA-FINE 0.5 mL 31 gauge x 5/16 Syrg USE WITH LANTUS INSULIN ONCE DAILY 100 each 3    blood sugar diagnostic Strp Check sugar tid as directed 300 each 3    blood-glucose meter Misc 1 kit by Misc.(Non-Drug; Combo Route) route 3 (three) times daily. Pt is to test blood sugar TID 1 each 0    cholecalciferol, vitamin D3, (VITAMIN D3) 2,000 unit Tab Take by mouth once daily.      cyanocobalamin 2000 MCG tablet Take 2,000 mcg by mouth once daily.       doxycycline (VIBRAMYCIN) 100 MG Cap Take 1 capsule (100 mg total) by mouth every 12 (twelve)  hours. 60 capsule 1    finasteride (PROSCAR) 5 mg tablet Take 1 tablet (5 mg total) by mouth once daily. 90 tablet 3    lisinopril (PRINIVIL,ZESTRIL) 20 MG tablet Take 0.5 tablets (10 mg total) by mouth once daily. 45 tablet 11    OMEGA-3 ACID ETHYL ESTERS (OMACOR) 1 gram Cap Take 1 g by mouth. 1 Capsule Oral Twice a day      sodium bicarbonate 325 MG tablet Take 2 tablets (650 mg total) by mouth 2 (two) times daily. 120 tablet 6       Anticoagulation:  Yes - ASA 81 / Eliquis    Physical Exam:  General: No acute distress, well developed. AAOx3  Head: Normocephalic, Atraumatic  Eyes: Extra-occular movements intact, No discharge  Neck: supple, symmetrical, trachea midline  Lungs: normal respiratory effort, no respiratory distress, no wheezes  CV: regular rate, 2+ pulses  Abdomen: soft, non-tender, non-distended, no organomegaly  MSK: no edema, no deformities, normal ROM  Skin: skin color, texture, turgor normal.  Neurologic: no focal deficits, sensation intact    Labs:    Urine dipstick today - Negative for all tested components.    Lab Results   Component Value Date    WBC 7.62 11/12/2019    HGB 16.0 11/12/2019    HCT 49.1 11/12/2019    MCV 94 11/12/2019     11/12/2019           BMP  Lab Results   Component Value Date     12/02/2019    K 4.9 12/02/2019     12/02/2019    CO2 24 12/02/2019    BUN 49 (H) 12/02/2019    CREATININE 1.9 (H) 12/02/2019    CALCIUM 9.7 12/02/2019    ANIONGAP 7 (L) 12/02/2019    ESTGFRAFRICA 38 (A) 12/02/2019    EGFRNONAA 33 (A) 12/02/2019       Lab Results   Component Value Date    PSA 1.1 04/15/2014    PSA 1.88 08/22/2012    PSA 1.4 08/08/2011     Assessment: Giovanni Guerrero is a 79 y.o. male with urinary retention presenting for laser TURP.    Plan:     1. To OR today for laser TURP.  2. Consents signed   3. I have explained the risk, benefits, and alternatives of the procedure in detail. The patient voices understanding and all questions have been answered. The  patient agrees to proceed as planned.     Edenilson Qaurles MD

## 2019-12-05 NOTE — TELEPHONE ENCOUNTER
Called pt to confirm arrival time of 745am for procedure on 12-6-19. Gave pt NPO instructions and gave pt opportunity to ask questions. Pt verbalized understanding.  
None

## 2019-12-06 ENCOUNTER — ANESTHESIA (OUTPATIENT)
Dept: SURGERY | Facility: HOSPITAL | Age: 79
End: 2019-12-06
Payer: MEDICARE

## 2019-12-06 ENCOUNTER — HOSPITAL ENCOUNTER (OUTPATIENT)
Facility: HOSPITAL | Age: 79
Discharge: HOME | End: 2019-12-06
Attending: UROLOGY | Admitting: UROLOGY
Payer: MEDICARE

## 2019-12-06 VITALS
HEART RATE: 72 BPM | WEIGHT: 185 LBS | TEMPERATURE: 98 F | HEIGHT: 62 IN | SYSTOLIC BLOOD PRESSURE: 149 MMHG | BODY MASS INDEX: 34.04 KG/M2 | OXYGEN SATURATION: 99 % | DIASTOLIC BLOOD PRESSURE: 67 MMHG | RESPIRATION RATE: 20 BRPM

## 2019-12-06 DIAGNOSIS — R33.9 URINARY RETENTION: ICD-10-CM

## 2019-12-06 DIAGNOSIS — N40.1 BPH WITH URINARY OBSTRUCTION: ICD-10-CM

## 2019-12-06 DIAGNOSIS — N13.8 BPH WITH URINARY OBSTRUCTION: ICD-10-CM

## 2019-12-06 LAB
POCT GLUCOSE: 89 MG/DL (ref 70–110)
POCT GLUCOSE: 90 MG/DL (ref 70–110)

## 2019-12-06 PROCEDURE — D9220A PRA ANESTHESIA: ICD-10-PCS | Mod: ANES,,, | Performed by: ANESTHESIOLOGY

## 2019-12-06 PROCEDURE — 37000008 HC ANESTHESIA 1ST 15 MINUTES: Performed by: UROLOGY

## 2019-12-06 PROCEDURE — 63600175 PHARM REV CODE 636 W HCPCS: Performed by: NURSE ANESTHETIST, CERTIFIED REGISTERED

## 2019-12-06 PROCEDURE — 71000015 HC POSTOP RECOV 1ST HR: Performed by: UROLOGY

## 2019-12-06 PROCEDURE — 82962 GLUCOSE BLOOD TEST: CPT | Performed by: UROLOGY

## 2019-12-06 PROCEDURE — 27200651 HC AIRWAY, LMA: Performed by: NURSE ANESTHETIST, CERTIFIED REGISTERED

## 2019-12-06 PROCEDURE — 71000044 HC DOSC ROUTINE RECOVERY FIRST HOUR: Performed by: UROLOGY

## 2019-12-06 PROCEDURE — 36000706: Performed by: UROLOGY

## 2019-12-06 PROCEDURE — 37000009 HC ANESTHESIA EA ADD 15 MINS: Performed by: UROLOGY

## 2019-12-06 PROCEDURE — 25000003 PHARM REV CODE 250: Performed by: UROLOGY

## 2019-12-06 PROCEDURE — D9220A PRA ANESTHESIA: Mod: ANES,,, | Performed by: ANESTHESIOLOGY

## 2019-12-06 PROCEDURE — D9220A PRA ANESTHESIA: Mod: CRNA,,, | Performed by: NURSE ANESTHETIST, CERTIFIED REGISTERED

## 2019-12-06 PROCEDURE — 63600175 PHARM REV CODE 636 W HCPCS: Performed by: STUDENT IN AN ORGANIZED HEALTH CARE EDUCATION/TRAINING PROGRAM

## 2019-12-06 PROCEDURE — D9220A PRA ANESTHESIA: ICD-10-PCS | Mod: CRNA,,, | Performed by: NURSE ANESTHETIST, CERTIFIED REGISTERED

## 2019-12-06 PROCEDURE — 25000003 PHARM REV CODE 250: Performed by: STUDENT IN AN ORGANIZED HEALTH CARE EDUCATION/TRAINING PROGRAM

## 2019-12-06 PROCEDURE — 52648 LASER SURGERY OF PROSTATE: CPT | Mod: ,,, | Performed by: UROLOGY

## 2019-12-06 PROCEDURE — 25000003 PHARM REV CODE 250: Performed by: NURSE ANESTHETIST, CERTIFIED REGISTERED

## 2019-12-06 PROCEDURE — 63600175 PHARM REV CODE 636 W HCPCS: Performed by: UROLOGY

## 2019-12-06 PROCEDURE — 52648 PR LASER VAPORIZATION SURGERY PROSTATE, COMPLETE: ICD-10-PCS | Mod: ,,, | Performed by: UROLOGY

## 2019-12-06 PROCEDURE — 36000707: Performed by: UROLOGY

## 2019-12-06 RX ORDER — LIDOCAINE HYDROCHLORIDE 10 MG/ML
1 INJECTION, SOLUTION EPIDURAL; INFILTRATION; INTRACAUDAL; PERINEURAL ONCE
Status: COMPLETED | OUTPATIENT
Start: 2019-12-06 | End: 2019-12-06

## 2019-12-06 RX ORDER — ONDANSETRON 2 MG/ML
4 INJECTION INTRAMUSCULAR; INTRAVENOUS DAILY PRN
Status: DISCONTINUED | OUTPATIENT
Start: 2019-12-06 | End: 2019-12-06 | Stop reason: HOSPADM

## 2019-12-06 RX ORDER — METOPROLOL TARTRATE 1 MG/ML
INJECTION, SOLUTION INTRAVENOUS
Status: DISCONTINUED | OUTPATIENT
Start: 2019-12-06 | End: 2019-12-06

## 2019-12-06 RX ORDER — OXYBUTYNIN CHLORIDE 5 MG/1
5 TABLET ORAL 3 TIMES DAILY PRN
Status: DISCONTINUED | OUTPATIENT
Start: 2019-12-06 | End: 2019-12-06 | Stop reason: HOSPADM

## 2019-12-06 RX ORDER — FENTANYL CITRATE 50 UG/ML
INJECTION, SOLUTION INTRAMUSCULAR; INTRAVENOUS
Status: DISCONTINUED | OUTPATIENT
Start: 2019-12-06 | End: 2019-12-06

## 2019-12-06 RX ORDER — FENTANYL CITRATE 50 UG/ML
25 INJECTION, SOLUTION INTRAMUSCULAR; INTRAVENOUS EVERY 5 MIN PRN
Status: DISCONTINUED | OUTPATIENT
Start: 2019-12-06 | End: 2019-12-06 | Stop reason: HOSPADM

## 2019-12-06 RX ORDER — EPHEDRINE SULFATE 50 MG/ML
INJECTION, SOLUTION INTRAVENOUS
Status: DISCONTINUED | OUTPATIENT
Start: 2019-12-06 | End: 2019-12-06

## 2019-12-06 RX ORDER — PROPOFOL 10 MG/ML
VIAL (ML) INTRAVENOUS
Status: DISCONTINUED | OUTPATIENT
Start: 2019-12-06 | End: 2019-12-06

## 2019-12-06 RX ORDER — OXYCODONE HYDROCHLORIDE 5 MG/1
5 TABLET ORAL EVERY 4 HOURS PRN
Status: DISCONTINUED | OUTPATIENT
Start: 2019-12-06 | End: 2019-12-06 | Stop reason: HOSPADM

## 2019-12-06 RX ORDER — POLYETHYLENE GLYCOL 3350 17 G/17G
17 POWDER, FOR SOLUTION ORAL DAILY
Qty: 510 G | Refills: 0 | Status: SHIPPED | OUTPATIENT
Start: 2019-12-06 | End: 2019-12-16 | Stop reason: ALTCHOICE

## 2019-12-06 RX ORDER — ONDANSETRON 2 MG/ML
INJECTION INTRAMUSCULAR; INTRAVENOUS
Status: DISCONTINUED | OUTPATIENT
Start: 2019-12-06 | End: 2019-12-06

## 2019-12-06 RX ORDER — OXYCODONE HYDROCHLORIDE 5 MG/1
5 TABLET ORAL EVERY 6 HOURS PRN
Qty: 11 TABLET | Refills: 0 | Status: SHIPPED | OUTPATIENT
Start: 2019-12-06 | End: 2019-12-16 | Stop reason: ALTCHOICE

## 2019-12-06 RX ORDER — SODIUM CHLORIDE 9 MG/ML
INJECTION, SOLUTION INTRAVENOUS CONTINUOUS
Status: DISCONTINUED | OUTPATIENT
Start: 2019-12-06 | End: 2019-12-06 | Stop reason: HOSPADM

## 2019-12-06 RX ORDER — CEFTRIAXONE 1 G/1
1 INJECTION, POWDER, FOR SOLUTION INTRAMUSCULAR; INTRAVENOUS
Status: COMPLETED | OUTPATIENT
Start: 2019-12-06 | End: 2019-12-06

## 2019-12-06 RX ORDER — OXYBUTYNIN CHLORIDE 5 MG/1
5 TABLET ORAL 3 TIMES DAILY PRN
Qty: 30 TABLET | Refills: 0 | Status: SHIPPED | OUTPATIENT
Start: 2019-12-06 | End: 2019-12-16 | Stop reason: ALTCHOICE

## 2019-12-06 RX ORDER — LIDOCAINE HCL/PF 100 MG/5ML
SYRINGE (ML) INTRAVENOUS
Status: DISCONTINUED | OUTPATIENT
Start: 2019-12-06 | End: 2019-12-06

## 2019-12-06 RX ADMIN — PROPOFOL 60 MG: 10 INJECTION, EMULSION INTRAVENOUS at 09:12

## 2019-12-06 RX ADMIN — CEFTRIAXONE SODIUM 1 G: 1 INJECTION, POWDER, FOR SOLUTION INTRAMUSCULAR; INTRAVENOUS at 09:12

## 2019-12-06 RX ADMIN — METOPROLOL TARTRATE 1 MG: 5 INJECTION, SOLUTION INTRAVENOUS at 09:12

## 2019-12-06 RX ADMIN — SODIUM CHLORIDE 1000 ML: 0.9 INJECTION, SOLUTION INTRAVENOUS at 09:12

## 2019-12-06 RX ADMIN — PROPOFOL 100 MG: 10 INJECTION, EMULSION INTRAVENOUS at 09:12

## 2019-12-06 RX ADMIN — METOPROLOL TARTRATE 1 MG: 5 INJECTION, SOLUTION INTRAVENOUS at 10:12

## 2019-12-06 RX ADMIN — EPHEDRINE SULFATE 10 MG: 50 INJECTION, SOLUTION INTRAMUSCULAR; INTRAVENOUS; SUBCUTANEOUS at 10:12

## 2019-12-06 RX ADMIN — OXYCODONE HYDROCHLORIDE 5 MG: 5 TABLET ORAL at 11:12

## 2019-12-06 RX ADMIN — ONDANSETRON 4 MG: 2 INJECTION INTRAMUSCULAR; INTRAVENOUS at 10:12

## 2019-12-06 RX ADMIN — FENTANYL CITRATE 50 MCG: 50 INJECTION, SOLUTION INTRAMUSCULAR; INTRAVENOUS at 09:12

## 2019-12-06 RX ADMIN — OXYBUTYNIN CHLORIDE 5 MG: 5 TABLET ORAL at 11:12

## 2019-12-06 RX ADMIN — LIDOCAINE HYDROCHLORIDE 5 MG: 10 INJECTION, SOLUTION EPIDURAL; INFILTRATION; INTRACAUDAL; PERINEURAL at 08:12

## 2019-12-06 RX ADMIN — LIDOCAINE HYDROCHLORIDE 50 MG: 20 INJECTION, SOLUTION INTRAVENOUS at 09:12

## 2019-12-06 NOTE — DISCHARGE SUMMARY
[facility]  Discharge Note  Short Stay    Admit Date: 12/6/2019    Discharge Date and Time: 12/6/2019       Attending Physician: Rafael Marquis Jr., MD     Discharge Provider: Jad Payne MD    Diagnoses:  Active Hospital Problems    Diagnosis  POA    *Urinary retention [R33.9]  Yes    UTI (urinary tract infection) [N39.0]  Yes    Persistent atrial fibrillation [I48.19]  Yes    Benign prostatic hyperplasia [N40.0]  Yes    CKD (chronic kidney disease) stage 3, GFR 30-59 ml/min [N18.3]  Yes    Essential hypertension [I10]  Yes     Chronic    Mixed hyperlipidemia [E78.2]  Yes     Chronic    Gout, arthritis [M10.9]  Yes      Resolved Hospital Problems   No resolved problems to display.       Discharged Condition: good    Hospital Course: Patient was admitted for a laser TURP and tolerated the procedure well with no complications.  He will follow up on Monday with an JASON for voiding trial.     Final Diagnoses: Same as principal problem.    Disposition: Home or Self Care    Follow up/Patient Instructions:    Medications:  Reconciled Home Medications:      Medication List      START taking these medications    oxybutynin 5 MG Tab  Commonly known as:  DITROPAN  Take 1 tablet (5 mg total) by mouth 3 (three) times daily as needed.     oxyCODONE 5 MG immediate release tablet  Commonly known as:  ROXICODONE  Take 1 tablet (5 mg total) by mouth every 6 (six) hours as needed for Pain.     polyethylene glycol 17 gram Pwpk  Commonly known as:  GLYCOLAX  Take 17 g by mouth once daily.        CONTINUE taking these medications    amLODIPine 5 MG tablet  Commonly known as:  NORVASC  Take 1 tablet (5 mg total) by mouth once daily.     apixaban 5 mg Tab  Commonly known as:  ELIQUIS  Take 1 tablet (5 mg total) by mouth 2 (two) times daily.     aspirin 81 MG EC tablet  Commonly known as:  ECOTRIN  Take 81 mg by mouth once daily.     atenolol 100 MG tablet  Commonly known as:  TENORMIN  TAKE 1 TABLET BY MOUTH  "DAILY     atorvastatin 40 MG tablet  Commonly known as:  LIPITOR  Take 1 tablet (40 mg total) by mouth once daily.     BD Insulin Syringe Ultra-Fine 0.5 mL 31 gauge x 5/16" Syrg  Generic drug:  insulin syringe-needle U-100  USE WITH LANTUS INSULIN ONCE DAILY     BD Ultra-Fine Short Pen Needle 31 gauge x 5/16" Ndle  Generic drug:  pen needle, diabetic  USE WITH LANTUS INSULIN ONCE DAILY     blood sugar diagnostic Strp  Check sugar tid as directed     blood-glucose meter Misc  1 kit by Misc.(Non-Drug; Combo Route) route 3 (three) times daily. Pt is to test blood sugar TID     cyanocobalamin 2000 MCG tablet  Take 2,000 mcg by mouth once daily.     doxycycline 100 MG Cap  Commonly known as:  VIBRAMYCIN  Take 1 capsule (100 mg total) by mouth every 12 (twelve) hours.     finasteride 5 mg tablet  Commonly known as:  PROSCAR  Take 1 tablet (5 mg total) by mouth once daily.     lisinopril 20 MG tablet  Commonly known as:  PRINIVIL,ZESTRIL  Take 0.5 tablets (10 mg total) by mouth once daily.     Omacor 1 gram capsule  Generic drug:  omega-3 acid ethyl esters  Take 1 g by mouth. 1 Capsule Oral Twice a day     sodium bicarbonate 325 MG tablet  Take 2 tablets (650 mg total) by mouth 2 (two) times daily.     tamsulosin 0.4 mg Cap  Commonly known as:  FLOMAX  TAKE 1 CAPSULE BY MOUTH TWICE A DAY     Tresiba FlexTouch U-100 100 unit/mL (3 mL) Inpn  Generic drug:  insulin degludec  INJECT 15 UNITS UNDER THE SKIN EVERY EVENING     Vitamin D3 2,000 unit Tab  Generic drug:  cholecalciferol (vitamin D3)  Take by mouth once daily.          Discharge Procedure Orders   Call MD for:  temperature >100.4     Call MD for:  persistent nausea and vomiting or diarrhea     Call MD for:  severe uncontrolled pain     Call MD for:  redness, tenderness, or signs of infection (pain, swelling, redness, odor or green/yellow discharge around incision site)     Call MD for:  difficulty breathing or increased cough     Call MD for:  severe persistent " headache     Call MD for:  worsening rash     Call MD for:  persistent dizziness, light-headedness, or visual disturbances     Call MD for:  increased confusion or weakness     No dressing needed     No driving until:   Order Comments: Off narcotic pain medication     Activity as tolerated     Follow-up Information     Rafael Marquis Jr, MD In 1 month.    Specialty:  Urology  Why:  post op laser TURP  Contact information:  [address]  [place] [address]  [phone]             [facility] - Urology 4th Floor On 12/9/2019.    Specialty:  Urology  Why:  voiding trial s/p laser TURP   Contact information:  [address]  [facility] [id number] [phone]  Additional information:  [facility] - 4th Floor

## 2019-12-06 NOTE — PLAN OF CARE
Patient states they are ready to be discharged. Instructions and prescription given to patient and family. Both verbalize understanding. Patient tolerating po liquids with no difficulty. Patient states pain is at a tolerable level for them. Anesthesia consent and surgical consent in chart upon patient's discharge from [facility].

## 2019-12-06 NOTE — OP NOTE
[facility]  Operative Note    Date: 12/06/2019    Pre-Op Diagnosis: BPH    Patient Active Problem List    Diagnosis Date Noted    UTI (urinary tract infection) 12/05/2019    Persistent atrial fibrillation 11/13/2019    Urinary retention 07/18/2019    Metabolic bone disease 01/16/2019    Type 2 diabetes mellitus with diabetic polyneuropathy, with long-term current use of insulin 05/10/2018    Diabetic polyneuropathy associated with type 2 diabetes mellitus 11/29/2017    Benign prostatic hyperplasia 11/01/2016    Diabetic nephropathy associated with type 2 diabetes mellitus 11/11/2013    CKD (chronic kidney disease) stage 3, GFR 30-59 ml/min 11/11/2013    Essential hypertension 10/04/2012    Mixed hyperlipidemia 10/04/2012    Gout, arthritis 10/04/2012         Post-Op Diagnosis: same    Procedure(s) Performed:   1.  Laser vaporization of the prostate  2.  Removal of bladder stone    Specimen(s): bladder stone    Staff Surgeon: Rafael Marquis MD    Assistant Surgeon: Jad Payne MD    Anesthesia: General endotracheal anesthesia    Indications: Giovanni Guerrero is a 79 y.o. male with BPH    Findings:     - trilobar hypertrophy, high bladder neck    Estimated Blood Loss: min    Drains: 22 Fr lester catheter    Procedure in Detail:  After risks, benefits and possible complications of Laser TURP were discussed, the patient elected to undergo the procedure and informed consent was obtained. All questions were answered in the nancy-operative area. The patient was transferred to the cystoscopy suite and placed on the fluoroscopy table in the supine position. Anesthesia was administered.  When the patient was adequately sedated he was placed in the dorsal lithotomy position and prepped and draped in the usual sterile fashion.  Time out was performed, nancy-procedural antibiotics were confirmed.      A 22 Hebrew revolix laser scope was introduced into the bladder per urethra. trilobar hyperplasia  was seen. Formal cystoscopy was performed which revealed no tumors or lesions suspicious for malignancy, one bladder stone, no bladder diverticuli, and no trabeculations.  The right and left ureteral orifices were visualized in the normal anatomic position and clear efflux of urine seen bilaterally.     Our attention was first turned to vaporizing the median lobe of the prostate. This was accomplished with a 800 micro Quanta laser fiber set at 60 cabrera. An incision in the prostate was made with the laser fiber at the bladder neck at the 5 o'clock position and brought just proximal to the verumontanum to the depth of the prostatic capsule. A right counter incision was made in the prostate at the 7 o'clock position and brought to just proximal of the verumontanum. The median lobe was then vaporized. The lateral lobes were then vaporized.      After all hyperplasia had been vaporized the bladder was drained. A good channel was seen. All bleeding was coagulated with the quanta laser and adequate hemostasis was obtained.        The patient tolerated the procedure well and was transferred to the recovery room in stable condition.      Follow up care:  The patient will follow up with Dr. Marquis in 1 month.  He will follow up with an JASON for a voiding trial on Monday.  He was given prescriptions for oxycodone, ditropan, and miralax.     Jad Payne MD

## 2019-12-06 NOTE — DISCHARGE INSTRUCTIONS
Preop screen completed.  Preop instructions(bathing/fasting/directions/location of surgery/ and preop medication instructions reviewed with patient).  Patient instructed to hold/stop all blood thinning medications, prior to surgery, following the pre-surgery recommended guidelines-per Dr. Cheney for Eliquis guidelines & Dr.S. Webster for baby Aspirin guidelines.  Patient verbalized understanding.        Discharge Instructions: Caring for Your Indwelling Urinary Catheter  You have been discharged with an indwelling urinary catheter (also called a Lozano catheter). A catheter is a thin, flexible tube. An indwelling urinary catheter has two parts. The first part is a tube that drains urine from your bladder. The second part is a bag or other device that collects the urine.  The most important thing to remember is that you want to prevent infection. Always wash your hands before handling your catheter bag or tubing.  Draining the bedside bag  · Wash your hands with soap and clean, running water or use an alcohol-based hand  that contains at least 60% alcohol.  · Hold the drainage tube over a toilet or measuring container.  · Unclamp the tube and let the bag drain.  · Dont touch the tip of the drainage tube or let it touch the toilet or container.  Cleaning the drainage tube  · When the bag is empty, clean the tip of the drainage tube with an alcohol wipe.  · Clamp the tube.  · Reinsert the tube into the pocket on the drainage bag.  Cleaning your skin and tubing  · Clean the skin near the catheter with soap and water.  · Wash your genital area from front to back.  · Wash the catheter tubing. Always wash the catheter in the direction away from your body.  · You will be told when and how to change your bag and tubing.  · Dont try to remove the catheter by yourself.  · You may shower with the catheter in place.  Emptying a leg bag  · Wash your hands.  · Remove the stopper on the bag.  · Drain the bag into the toilet  or a measuring container. Dont let the tip of the drainage tube touch anything, including your fingers.  · Clean the tip of the drainage tube with alcohol.  · Replace the stopper.  Follow-up care  Make a follow-up appointment as directed by your healthcare provider  When to call your healthcare provider  Call your healthcare provider right away if you have any of the following:  · Chills or fever above 100.4°F (38°C)  · Leakage around the catheter insertion site  · Increased spasms (uncontrollable twitching) in your legs, abdomen, or bladder. Occasional mild spasms are normal.  · Burning in the urinary tract, penis, or genital area  · Nausea and vomiting  · Aching in the lower back  · Cloudy or bloody (pink or red) urine, sediment or mucus in the urine, or foul-smelling urine   Date Last Reviewed: 1/1/2017 © 2000-2017 [employer]. [address] [place] [address]. All rights reserved. This information is not intended as a substitute for professional medical care. Always follow your healthcare professional's instructions.      Cystoscopy    Cystoscopy is a procedure that lets your doctor look directly inside your urethra and bladder. It can be used to:  · Help diagnose a problem with your urethra, bladder, or kidneys.  · Take a sample (biopsy) of bladder or urethral tissue.  · Treat certain problems (such as removing kidney stones).  · Place a stent to bypass an obstruction.  · Take special X-rays of the kidneys.  Based on the findings, your doctor may recommend other tests or treatments.  What is a cystoscope?  A cystoscope is a telescope-like instrument that contains lenses and fiberoptics (small glass wires that make bright light). The cystoscope may be straight and rigid, or flexible to bend around curves in the urethra. The doctor may look directly into the cystoscope, or project the image onto a monitor.  Getting ready  · Ask your doctor if you should stop taking any medicines before  the procedure.  · Ask whether you should avoid eating or drinking anything after midnight before the procedure.  · Follow any other instructions your doctor gives you.  Tell your doctor before the exam if you:  · Take any medicines, such as aspirin or blood thinners  · Have allergies to any medicines  · Are pregnant   The procedure  Cystoscopy is done in the doctors office, surgery center, or hospital. The doctor and a nurse are present during the procedure. It takes only a few minutes, longer if a biopsy, X-ray, or treatment needs to be done.  During the procedure:  · You lie on an exam table on your back, knees bent and legs apart. You are covered with a drape.  · Your urethra and the area around it are washed. Anesthetic jelly may be applied to numb the urethra. Other pain medicine is usually not needed. In some cases, you may be offered a mild sedative to help you relax. If a more extensive procedure is to be done, such as a biopsy or kidney stone removal, general anesthesia may be needed.  · The cystoscope is inserted. A sterile fluid is put into the bladder to expand it. You may feel pressure from this fluid.  · When the procedure is done, the cystoscope is removed.  After the procedure  If you had a sedative, general anesthesia, or spinal anesthesia, you must have someone drive you home. Once youre home:  · Drink plenty of fluids.  · You may have burning or light bleeding when you urinate--this is normal.  · Medicines may be prescribed to ease any discomfort or prevent infection. Take these as directed.  · Call your doctor if you have heavy bleeding or blood clots, burning that lasts more than a day, a fever over 100°F  (38° C), or trouble urinating.  Date Last Reviewed: 1/1/2017  © 2615-9933 [employer]. [address] [place] [address], [place] [address]. All rights reserved. This information is not intended as a substitute for professional medical care. Always follow your healthcare professional's  instructions.      PATIENT INSTRUCTIONS  POST-ANESTHESIA    IMMEDIATELY FOLLOWING SURGERY:  Do not drive or operate machinery for the first twenty four hours after surgery.  Do not make any important decisions for twenty four hours after surgery or while taking narcotic pain medications or sedatives.  If you develop intractable nausea and vomiting or a severe headache please notify your doctor immediately.    FOLLOW-UP:  Please make an appointment with your surgeon as instructed. You do not need to follow up with anesthesia unless specifically instructed to do so.    WOUND CARE INSTRUCTIONS (if applicable):  Keep a dry clean dressing on the anesthesia/puncture wound site if there is drainage.  Once the wound has quit draining you may leave it open to air.  Generally you should leave the bandage intact for twenty four hours unless there is drainage.  If the epidural site drains for more than 36-48 hours please call the anesthesia department.    QUESTIONS?:  Please feel free to call your physician or the hospital  if you have any questions, and they will be happy to assist you.       [facility] Anesthesia Department  [address] [facility]  [phone]

## 2019-12-06 NOTE — ANESTHESIA PREPROCEDURE EVALUATION
2019  Giovanni Guerrero is a 79 y.o., male.  Pre-operative evaluation for TURP, USING LASER (N/A)    Chief Complaint:BPH    PMH:  persistent a fib on Eliquis     Diabetes mellitus type II with nephropathy, neuropathy      Gout, unspecified      Hyperlipidemia      Hypertension      Renal insufficiency          Past Surgical History:   Procedure Laterality Date    APPENDECTOMY      CATARACT EXTRACTION Left 2018    EYE SURGERY      cataract    HERNIA REPAIR      TONSILLECTOMY           Vital Signs Range (Last 24H):         CBC:     Recent Labs   Lab 19  0845   WBC 7.62   RBC 5.22   HGB 16.0   HCT 49.1      MCV 94   MCH 30.7   MCHC 32.6       CMP:   Recent Labs   Lab 19  0947 19  1120 19  0824   * 135* 137   K 5.0 5.4* 4.9    104 106   CO2 20* 23 24   BUN 55* 63* 49*   CREATININE 2.5* 2.3* 1.9*   * 108 92   CALCIUM 9.6 9.4 9.7       INR:  Recent Labs   Lab 19  0923   INR 1.1   APTT 25.1         Diagnostic Studies:      EKD Echo:  Giovanni Guerrero   Transthoracic echo (TTE) complete   Order# [id number]   Reading physician: Krishan Awan MD; Izaiah Marin III, MD Ordering physician: Jt Cheney MD Study date: 19   Patient Information     Name MRN Description   Giovanni Guerrero [id number] 79 y.o. male   Performed Procedure     TRANSTHORACIC ECHO (TTE) COMPLETE 19   Conclusion     · Normal left ventricular systolic function. The estimated ejection fraction is 60%  · Indeterminate left ventricular diastolic function.  · Aortic valve sclerosis without significant stenosis.  · Mild mitral regurgitation.  · Normal right ventricular systolic function.  · Estimated PA systolic pressure is at least 40mmHg.  · Biatrial enlargement.       Anesthesia Evaluation    I have reviewed the Patient Summary Reports.     I have reviewed the  Nursing Notes.   I have reviewed the Medications.     Review of Systems  Anesthesia Hx:  No problems with previous Anesthesia    Social:  Non-Smoker, No Alcohol Use    Pulmonary:  Pulmonary Normal        Physical Exam  General:  Large Ragland, Well nourished    Airway/Jaw/Neck:  Airway Findings: Mouth Opening: Normal Tongue: Normal  General Airway Assessment: Possible difficult mask airway  Mallampati: I  TM Distance: Normal, at least 6 cm  Jaw/Neck Findings:  Neck ROM: Normal ROM      Dental:  Dental Findings: Periodontal disease, Severe, Prominent Incisors    Chest/Lungs:  Chest/Lungs Findings: Clear to auscultation, Normal Respiratory Rate     Heart/Vascular:  Heart Findings: Rate: Normal  Rhythm: Regular Rhythm  Sounds: Normal        Mental Status:  Mental Status Findings:  Cooperative, Alert and Oriented         Anesthesia Plan  Type of Anesthesia, risks & benefits discussed:  Anesthesia Type:  general  Patient's Preference:   Intra-op Monitoring Plan: standard ASA monitors  Intra-op Monitoring Plan Comments:   Post Op Pain Control Plan:   Post Op Pain Control Plan Comments:   Induction:   IV  Beta Blocker:  Patient is on a Beta-Blocker and has not received dose within the past 24 hours due to non-compliance or for other reasons (Patient should receive a perioperative dose or document why it is withheld).       Informed Consent: Patient understands risks and agrees with Anesthesia plan.  Questions answered. Anesthesia consent signed with patient.  ASA Score: 3     Day of Surgery Review of History & Physical:    H&P update referred to the surgeon.         Ready For Surgery From Anesthesia Perspective.

## 2019-12-06 NOTE — ANESTHESIA POSTPROCEDURE EVALUATION
Anesthesia Post Evaluation    Patient: Giovanni Guerrero    Procedure(s) Performed: Procedure(s) (LRB):  TURP, USING LASER (N/A)    Final Anesthesia Type: general    Patient location during evaluation: PACU  Patient participation: Yes- Able to Participate  Level of consciousness: awake and alert and oriented  Post-procedure vital signs: reviewed and stable  Pain management: adequate  Airway patency: patent    PONV status at discharge: No PONV  Anesthetic complications: no      Cardiovascular status: hemodynamically stable  Respiratory status: unassisted  Hydration status: euvolemic  Follow-up not needed.          Vitals Value Taken Time   /67 12/6/2019 11:02 AM   Temp 36.6 °C (97.9 °F) 12/6/2019 10:50 AM   Pulse 82 12/6/2019 11:05 AM   Resp 27 12/6/2019 11:05 AM   SpO2 98 % 12/6/2019 11:05 AM   Vitals shown include unvalidated device data.      No case tracking events are documented in the log.      Pain/Casper Score: No data recorded

## 2019-12-09 ENCOUNTER — NURSE TRIAGE (OUTPATIENT)
Dept: ADMINISTRATIVE | Facility: CLINIC | Age: 79
End: 2019-12-09

## 2019-12-09 ENCOUNTER — OFFICE VISIT (OUTPATIENT)
Dept: UROLOGY | Facility: CLINIC | Age: 79
End: 2019-12-09
Payer: MEDICARE

## 2019-12-09 VITALS
DIASTOLIC BLOOD PRESSURE: 66 MMHG | SYSTOLIC BLOOD PRESSURE: 107 MMHG | HEIGHT: 62 IN | WEIGHT: 185 LBS | BODY MASS INDEX: 34.04 KG/M2 | HEART RATE: 70 BPM

## 2019-12-09 DIAGNOSIS — R31.0 GROSS HEMATURIA: ICD-10-CM

## 2019-12-09 DIAGNOSIS — Z98.890 POST-OPERATIVE STATE: ICD-10-CM

## 2019-12-09 DIAGNOSIS — N40.1 BPH WITH URINARY OBSTRUCTION: ICD-10-CM

## 2019-12-09 DIAGNOSIS — R33.9 URINARY RETENTION: ICD-10-CM

## 2019-12-09 DIAGNOSIS — Z90.79 S/P TURP: ICD-10-CM

## 2019-12-09 DIAGNOSIS — Z46.6 ENCOUNTER FOR FOLEY CATHETER REMOVAL: Primary | ICD-10-CM

## 2019-12-09 DIAGNOSIS — N13.8 BPH WITH URINARY OBSTRUCTION: ICD-10-CM

## 2019-12-09 LAB — POC RESIDUAL URINE VOLUME: 294 ML (ref 0–100)

## 2019-12-09 PROCEDURE — 51702 PR INSERTION OF TEMPORARY INDWELLING BLADDER CATHETER, SIMPLE: ICD-10-PCS | Mod: S$GLB,,, | Performed by: NURSE PRACTITIONER

## 2019-12-09 PROCEDURE — 99999 PR PBB SHADOW E&M-EST. PATIENT-LVL IV: ICD-10-PCS | Mod: PBBFAC,,, | Performed by: NURSE PRACTITIONER

## 2019-12-09 PROCEDURE — 51798 POCT BLADDER SCAN: ICD-10-PCS | Mod: S$GLB,,, | Performed by: NURSE PRACTITIONER

## 2019-12-09 PROCEDURE — 99024 POSTOP FOLLOW-UP VISIT: CPT | Mod: S$GLB,,, | Performed by: NURSE PRACTITIONER

## 2019-12-09 PROCEDURE — 51702 INSERT TEMP BLADDER CATH: CPT | Mod: S$GLB,,, | Performed by: NURSE PRACTITIONER

## 2019-12-09 PROCEDURE — 51798 US URINE CAPACITY MEASURE: CPT | Mod: S$GLB,,, | Performed by: NURSE PRACTITIONER

## 2019-12-09 PROCEDURE — 99024 PR POST-OP FOLLOW-UP VISIT: ICD-10-PCS | Mod: S$GLB,,, | Performed by: NURSE PRACTITIONER

## 2019-12-09 PROCEDURE — 99999 PR PBB SHADOW E&M-EST. PATIENT-LVL IV: CPT | Mod: PBBFAC,,, | Performed by: NURSE PRACTITIONER

## 2019-12-09 NOTE — TELEPHONE ENCOUNTER
"    Reason for Disposition   Bleeding around catheter (e.g., from penis or female urethra)   Lower abdominal pain or distention    Additional Information   Negative: Shock suspected (e.g., cold/pale/clammy skin, too weak to stand, low BP, rapid pulse)   Negative: Sounds like a life-threatening emergency to the triager   Negative: [1] Catheter was accidentally pulled-out AND [2] bright red continuous bleeding   Negative: SEVERE abdominal pain   Negative: Fever > 100.5 F (38.1 C)   Negative: [1] Drinking very little AND [2] dehydration suspected (e.g., no urine > 12 hours, very dry mouth, very lightheaded)   Negative: Patient sounds very sick or weak to the triager   Negative: Catheter was accidentally pulled-out   Negative: [1] Catheter is broken AND [2] is not usable    Protocols used: URINARY CATHETER SYMPTOMS AND [id number]    Giovanni had TURP 12/06.  This morning he woke up with red blood leaking around the catheter and his underwear was wet with it, and he states his leg bag has 200 ml ("about 4" of red drainage") in it. He states no lower abdominal pain until right before an urge to urinate, then he says he is having bladder spasms.  Per [facility] triage protocol, recommend he be seen within 4 hours.  Of note, he says he did resume his Eliquis the day after surgery with Dr Marquis, per his AVS instructions.  Recommend he hold this AM dose of Eliquis until after he has been seen this morning. He does have appt with Cindi Evans at 0900 on [facility] this morning.  Epic note in chart 12/06/19 as below recommends resume Eliquis after catheter has been removed, per cardiology Dr Payne:     Medication Notes          ERNESTO PAYNE   Fri Dec 6, 2019 10:42 AM  Resume after lester catheter removal              Recommend he go to ED (his daughter is there with him) if bleeding increases.  States he will.  Message to Dr Marquis, and Cindi Evans, NP urology.  Please contact caller directly with any " additional care advice at [phone]

## 2019-12-09 NOTE — PROGRESS NOTES
Subjective:       Patient ID: Giovanni Guerrero is a 79 y.o. male.    Chief Complaint: Post-op Evaluation (s/p TURP) and Hematuria    Giovanni Guerrero is a 79 y.o. male with BPH    12/06/2018 Procedure(s) Performed:   1.  Laser vaporization of the prostate  2.  Removal of bladder stone     Findings:    - trilobar hypertrophy, high bladder neck    He came in today as schedule for a VT and lester removal  He states this AM he woke up and noticed blood bright red blood coming from around his lester.  He having some pain and discomfort.  Lester not draining well.  He did restart hs Eliquis.                  Past Medical History:  No date: Allergy  No date: Anticoagulant long-term use  No date: Colon polyp  No date: Diabetes mellitus type II  No date: Gout, unspecified  No date: Hx of colonic polyps  No date: Hyperlipidemia  No date: Hypertension  No date: Prostatic hypertrophy  No date: Renal insufficiency    Past Surgical History:  No date: APPENDECTOMY  2018: CATARACT EXTRACTION; Left  No date: EYE SURGERY      Comment:  cataract  No date: HERNIA REPAIR  No date: TONSILLECTOMY  12/6/2019: TRANSURETHRAL RESECTION OF PROSTATE (TURP) USING LASER; N/A      Comment:  Procedure: TURP, USING LASER;  Surgeon: Rafael Marquis Jr., MD;  Location: [facility] OR [address] [place] [address];  Service: Urology;                 Laterality: N/A;  75min    Review of patient's family history indicates:  Problem: Cancer      Relation: Mother          Age of Onset: (Not Specified)          Comment: ovarian cancer  Problem: Heart disease      Relation: Father          Age of Onset: (Not Specified)          Comment: MI at 57  Problem: Diabetes      Relation: Father          Age of Onset: (Not Specified)  Problem: Cerebral aneurysm      Relation: Sister          Age of Onset: (Not Specified)  Problem: Heart disease      Relation: Brother          Age of Onset: (Not Specified)          Comment: MI at 60  Problem: Heart disease      Relation: Brother           Age of Onset: (Not Specified)          Comment: CABG  Problem: Anesthesia problems      Relation: Neg Hx          Age of Onset: (Not Specified)      Social History    Socioeconomic History      Marital status:       Spouse name: Not on file      Number of children: Not on file      Years of education: Not on file      Highest education level: Not on file    Occupational History      Not on file    Social Needs      Financial resource strain: Not on file      Food insecurity:        Worry: Not on file        Inability: Not on file      Transportation needs:        Medical: Not on file        Non-medical: Not on file    Tobacco Use      Smoking status: Never Smoker      Smokeless tobacco: Never Used    Substance and Sexual Activity      Alcohol use: No      Drug use: No      Sexual activity: Not Currently    Lifestyle      Physical activity:        Days per week: Not on file        Minutes per session: Not on file      Stress: Not on file    Relationships      Social connections:        Talks on phone: Not on file        Gets together: Not on file        Attends Zoroastrianism service: Not on file        Active member of club or organization: Not on file        Attends meetings of clubs or organizations: Not on file        Relationship status: Not on file    Other Topics      Concerns:        Not on file    Social History Narrative       52 yrs, 3 childre (two living), 11 grandchildren & 4 great grandchildren      Allergies:  Actos (pioglitazone)    Medications:  Current Outpatient Medications:   amLODIPine (NORVASC) 5 MG tablet, Take 1 tablet (5 mg total) by mouth once daily., Disp: 90 tablet, Rfl: 3  apixaban (ELIQUIS) 5 mg Tab, Take 1 tablet (5 mg total) by mouth 2 (two) times daily., Disp: 60 tablet, Rfl: 5  aspirin (ECOTRIN) 81 MG EC tablet, Take 81 mg by mouth once daily., Disp: , Rfl:   atenolol (TENORMIN) 100 MG tablet, TAKE 1 TABLET BY MOUTH DAILY, Disp: 90 tablet, Rfl: 2  atorvastatin (LIPITOR)  "40 MG tablet, Take 1 tablet (40 mg total) by mouth once daily., Disp: 90 tablet, Rfl: 3  BD INSULIN SYRINGE ULTRA-FINE 0.5 mL 31 gauge x 5/16 Syrg, USE WITH LANTUS INSULIN ONCE DAILY, Disp: 100 each, Rfl: 3  BD ULTRA-FINE SHORT PEN NEEDLE 31 gauge x 5/16" Ndle, USE WITH LANTUS INSULIN ONCE DAILY, Disp: 100 each, Rfl: 3  blood sugar diagnostic Strp, Check sugar tid as directed, Disp: 300 each, Rfl: 3  cholecalciferol, vitamin D3, (VITAMIN D3) 2,000 unit Tab, Take by mouth once daily., Disp: , Rfl:   cyanocobalamin 2000 MCG tablet, Take 2,000 mcg by mouth once daily. , Disp: , Rfl:   doxycycline (VIBRAMYCIN) 100 MG Cap, Take 1 capsule (100 mg total) by mouth every 12 (twelve) hours., Disp: 60 capsule, Rfl: 1  lisinopril (PRINIVIL,ZESTRIL) 20 MG tablet, Take 0.5 tablets (10 mg total) by mouth once daily., Disp: 45 tablet, Rfl: 11  OMEGA-3 ACID ETHYL ESTERS (OMACOR) 1 gram Cap, Take 1 g by mouth. 1 Capsule Oral Twice a day, Disp: , Rfl:   oxybutynin (DITROPAN) 5 MG Tab, Take 1 tablet (5 mg total) by mouth 3 (three) times daily as needed., Disp: 30 tablet, Rfl: 0  oxyCODONE (ROXICODONE) 5 MG immediate release tablet, Take 1 tablet (5 mg total) by mouth every 6 (six) hours as needed for Pain., Disp: 11 tablet, Rfl: 0  polyethylene glycol (GLYCOLAX) 17 gram/dose powder, Mix 1 capful (17 g) with liquid and take by mouth once daily., Disp: 510 g, Rfl: 0  sodium bicarbonate 325 MG tablet, Take 2 tablets (650 mg total) by mouth 2 (two) times daily., Disp: 120 tablet, Rfl: 6  tamsulosin (FLOMAX) 0.4 mg Cap, TAKE 1 CAPSULE BY MOUTH TWICE A DAY, Disp: 180 capsule, Rfl: 1  TRESIBA FLEXTOUCH U-100 100 unit/mL (3 mL) InPn, INJECT 15 UNITS UNDER THE SKIN EVERY EVENING, Disp: 9 mL, Rfl: 1  blood-glucose meter Misc, 1 kit by Misc.(Non-Drug; Combo Route) route 3 (three) times daily. Pt is to test blood sugar TID, Disp: 1 each, Rfl: 0  finasteride (PROSCAR) 5 mg tablet, Take 1 tablet (5 mg total) by mouth once daily., " Disp: 90 tablet, Rfl: 3        Review of Systems   Constitutional: Negative for diaphoresis and fever.   Respiratory: Negative for chest tightness and shortness of breath.    Cardiovascular: Negative for chest pain.   Gastrointestinal: Negative for abdominal pain, constipation, diarrhea, nausea and vomiting.   Genitourinary: Positive for difficulty urinating, hematuria, penile pain and urgency.        Bright red blood coming from around the lester catheter.         Objective:      Physical Exam   Nursing note and vitals reviewed.  Constitutional: He is oriented to person, place, and time. Vital signs are normal. He appears well-developed and well-nourished. He is active and cooperative.  Non-toxic appearance. He does not have a sickly appearance.   HENT:   Head: Normocephalic and atraumatic.   Right Ear: External ear normal.   Left Ear: External ear normal.   Nose: Nose normal.   Mouth/Throat: Mucous membranes are normal.   Eyes: Conjunctivae and lids are normal. No scleral icterus.   Neck: Trachea normal, normal range of motion and full passive range of motion without pain. Neck supple. No JVD present. No tracheal deviation present.   Cardiovascular: Normal rate, S1 normal and S2 normal.    Pulmonary/Chest: Effort normal. No respiratory distress. He exhibits no tenderness.   Abdominal: Soft. Normal appearance. There is no hepatosplenomegaly. There is no tenderness. There is no CVA tenderness.   Genitourinary: Testes normal. Right testis shows no swelling and no tenderness. Left testis shows no swelling and no tenderness. Penile tenderness present.   Genitourinary Comments: Lester in place; there is bright red blood coming from around the lester.     Musculoskeletal: Normal range of motion.   Neurological: He is alert and oriented to person, place, and time. He has normal strength.   Skin: Skin is warm, dry and intact.     Psychiatric: He has a normal mood and affect. His behavior is normal. Judgment and thought  content normal.       Assessment:       1. Encounter for Lester catheter removal    2. Post-operative state    3. S/P TURP    4. BPH with urinary obstruction    5. Gross hematuria        Plan:         I spent 60 minutes with the patient of which more than half was spent in direct consultation with the patient in regards to our treatment and plan.    Education and recommendations of today's plan of care including home remedies.  We discussed his LUTS and the contributory factors.  Attempted to irrigate his lester but was unable too.  Able to place 200 cc sterile water in and then he was able to urinate back some clots but then followed by clear urine.  He having some bleeding from the penis.  We discussed that he hold the eliquis. He needs to drink plenty of water today to urinate and flush out the bladder.  RTC unable to urinate.  He returned to clinic with hematuria and lower abdominal pain.  We discussed plan of care  I placed a 24 fr 3 way lester and I continuously irrigated but never became clear; received multiple clots; reported relief from bladder pressure.  Dr. Marquis continued to irrigate til pink; lester was attached to create some tension.   We discussed the expectations.   If clots off tonight then go to the ER; if ok then RTC one week for removal.  RTC sooner with any problems

## 2019-12-11 ENCOUNTER — PATIENT OUTREACH (OUTPATIENT)
Dept: ADMINISTRATIVE | Facility: OTHER | Age: 79
End: 2019-12-11

## 2019-12-12 ENCOUNTER — TELEPHONE (OUTPATIENT)
Dept: UROLOGY | Facility: CLINIC | Age: 79
End: 2019-12-12

## 2019-12-12 ENCOUNTER — HOSPITAL ENCOUNTER (EMERGENCY)
Facility: OTHER | Age: 79
Discharge: HOME | End: 2019-12-12
Attending: EMERGENCY MEDICINE
Payer: MEDICARE

## 2019-12-12 ENCOUNTER — OFFICE VISIT (OUTPATIENT)
Dept: UROLOGY | Facility: CLINIC | Age: 79
End: 2019-12-12
Payer: MEDICARE

## 2019-12-12 VITALS
RESPIRATION RATE: 16 BRPM | TEMPERATURE: 98 F | SYSTOLIC BLOOD PRESSURE: 104 MMHG | OXYGEN SATURATION: 99 % | HEART RATE: 70 BPM | BODY MASS INDEX: 33.11 KG/M2 | WEIGHT: 181 LBS | DIASTOLIC BLOOD PRESSURE: 50 MMHG

## 2019-12-12 VITALS
BODY MASS INDEX: 33.47 KG/M2 | WEIGHT: 181.88 LBS | DIASTOLIC BLOOD PRESSURE: 57 MMHG | SYSTOLIC BLOOD PRESSURE: 100 MMHG | HEIGHT: 62 IN | HEART RATE: 67 BPM

## 2019-12-12 DIAGNOSIS — R31.0 GROSS HEMATURIA: Primary | ICD-10-CM

## 2019-12-12 DIAGNOSIS — Z90.79 S/P TURP (STATUS POST TRANSURETHRAL RESECTION OF PROSTATE): ICD-10-CM

## 2019-12-12 DIAGNOSIS — Z98.890 POST-OPERATIVE STATE: ICD-10-CM

## 2019-12-12 DIAGNOSIS — Z76.89 ENCOUNTER FOR ASSESSMENT OF FOLEY CATHETER: Primary | ICD-10-CM

## 2019-12-12 PROCEDURE — 99999 PR PBB SHADOW E&M-EST. PATIENT-LVL IV: ICD-10-PCS | Mod: PBBFAC,,, | Performed by: PHYSICIAN ASSISTANT

## 2019-12-12 PROCEDURE — 99024 POSTOP FOLLOW-UP VISIT: CPT | Mod: S$GLB,,, | Performed by: PHYSICIAN ASSISTANT

## 2019-12-12 PROCEDURE — 99283 EMERGENCY DEPT VISIT LOW MDM: CPT

## 2019-12-12 PROCEDURE — 99024 PR POST-OP FOLLOW-UP VISIT: ICD-10-PCS | Mod: S$GLB,,, | Performed by: PHYSICIAN ASSISTANT

## 2019-12-12 PROCEDURE — 99999 PR PBB SHADOW E&M-EST. PATIENT-LVL IV: CPT | Mod: PBBFAC,,, | Performed by: PHYSICIAN ASSISTANT

## 2019-12-12 NOTE — ED NOTES
200cc saline instilled intro bladder. Lozano removed. Pt urinated 200cc bloody urine. MD notified.

## 2019-12-12 NOTE — ED NOTES
Two patient identifiers have been checked and are correct.      Appearance: Pt awake, alert & oriented to person, place & time. Pt in no acute distress at present time. Pt is clean and well groomed with clothes appropriately fastened.   Skin: Skin warm, dry & intact. Color consistent with ethnicity. Mucous membranes moist. No breakdown or brusing noted.   Musculoskeletal: Patient moving all extremities well, no obvious swelling or deformities noted.   Respiratory: Respirations spontaneous, even, and non-labored. Visible chest rise noted. Airway is open and patent. No accessory muscle use noted.   Neurologic: Sensation is intact. Speech is clear and appropriate. Eyes open spontaneously, behavior appropriate to situation, follows commands, facial expression symmetrical, bilateral hand grasp equal and even, purposeful motor response noted.  Cardiac: All peripheral pulses present. No Bilateral lower extremity edema. Cap refill is <3 seconds.  Abdomen: Abdomen soft, non-tender to palpation.   : Pt reports no dysuria or hematuria.   Lester draining without difficulty. PT states had prostate surgery 6 days ago. Urologist sent to ED to have lester removed. Pt denies any pain to site. Family at bedside.

## 2019-12-12 NOTE — PROGRESS NOTES
CHIEF COMPLAINT:    Mr. Guerrero is a 79 y.o. male presenting for lester problem.  PRESENTING ILLNESS:    Giovanni Guerrero is a 79 y.o. male who presents for lester problem.    Patient presents today with concerns that his catheter is not draining properly.  He woke up at 3 a.m. to a soaked diaper and empty leg bag.  When we got up 2 hours later his bag was still empty and his diaper was again wet.  His diaper has not gotten wet since his last change at 5:30 this morning.  He states that the appearance of his urine is a lot less bloody.  He is here for further evaluation.   He is taking oxybutynin for bladder spasms.      He had the following procedure on 12/6/19.    Procedure(s) Performed:   1.  Laser vaporization of the prostate  2.  Removal of bladder stone   Specimen(s): bladder stone   Staff Surgeon: Rafael Marquis MD   Findings:    - trilobar hypertrophy, high bladder neck     He was last seen in clinic 3 days ago with blood coming around his catheter.  Also has associated pain and discomfort.  His catheter was not draining well.  KULWANT Evans NP attempted to irrigate lester but was unable to.  200 cc of sterile water was placed in and then he was able to urinate back some clots but then followed by clear urine.  He returned to clinic due to hematuria and lower abdominal pain.  A 24 fr 3 way lester and I continuously irrigated but never became clear; received multiple clots; reported relief from bladder pressure.  Dr. Marquis continued to irrigate til pink; lester was attached to create some tension.     PATIENT HISTORY:    Past Medical History:   Diagnosis Date    Allergy     Anticoagulant long-term use     Colon polyp     Diabetes mellitus type II     Gout, unspecified     Hx of colonic polyps     Hyperlipidemia     Hypertension     Prostatic hypertrophy     Renal insufficiency        Past Surgical History:   Procedure Laterality Date    APPENDECTOMY      CATARACT EXTRACTION Left 2018    EYE SURGERY       cataract    HERNIA REPAIR      TONSILLECTOMY      TRANSURETHRAL RESECTION OF PROSTATE (TURP) USING LASER N/A 12/6/2019    Procedure: TURP, USING LASER;  Surgeon: Rafael Marquis Jr., MD;  Location: [facility] OR [address] [place] [address];  Service: Urology;  Laterality: N/A;  75min       Family History   Problem Relation Age of Onset    Cancer Mother         ovarian cancer    Heart disease Father         MI at 57    Diabetes Father     Cerebral aneurysm Sister     Heart disease Brother         MI at 60    Heart disease Brother         CABG    Anesthesia problems Neg Hx        Social History     Socioeconomic History    Marital status:      Spouse name: Not on file    Number of children: Not on file    Years of education: Not on file    Highest education level: Not on file   Occupational History    Not on file   Social Needs    Financial resource strain: Not on file    Food insecurity:     Worry: Not on file     Inability: Not on file    Transportation needs:     Medical: Not on file     Non-medical: Not on file   Tobacco Use    Smoking status: Never Smoker    Smokeless tobacco: Never Used   Substance and Sexual Activity    Alcohol use: No    Drug use: No    Sexual activity: Not Currently   Lifestyle    Physical activity:     Days per week: Not on file     Minutes per session: Not on file    Stress: Not on file   Relationships    Social connections:     Talks on phone: Not on file     Gets together: Not on file     Attends Anabaptist service: Not on file     Active member of club or organization: Not on file     Attends meetings of clubs or organizations: Not on file     Relationship status: Not on file   Other Topics Concern    Not on file   Social History Narrative     52 yrs, 3 childre (two living), 11 grandchildren & 4 great grandchildren       Allergies:  Actos [pioglitazone]    Medications:    Current Outpatient Medications:     amLODIPine (NORVASC) 5 MG tablet, Take 1 tablet (5 mg total) by  "mouth once daily., Disp: 90 tablet, Rfl: 3    apixaban (ELIQUIS) 5 mg Tab, Take 1 tablet (5 mg total) by mouth 2 (two) times daily., Disp: 60 tablet, Rfl: 5    aspirin (ECOTRIN) 81 MG EC tablet, Take 81 mg by mouth once daily., Disp: , Rfl:     atenolol (TENORMIN) 100 MG tablet, TAKE 1 TABLET BY MOUTH DAILY, Disp: 90 tablet, Rfl: 2    atorvastatin (LIPITOR) 40 MG tablet, Take 1 tablet (40 mg total) by mouth once daily., Disp: 90 tablet, Rfl: 3    BD INSULIN SYRINGE ULTRA-FINE 0.5 mL 31 gauge x 5/16 Syrg, USE WITH LANTUS INSULIN ONCE DAILY, Disp: 100 each, Rfl: 3    BD ULTRA-FINE SHORT PEN NEEDLE 31 gauge x 5/16" Ndle, USE WITH LANTUS INSULIN ONCE DAILY, Disp: 100 each, Rfl: 3    blood sugar diagnostic Strp, Check sugar tid as directed, Disp: 300 each, Rfl: 3    cholecalciferol, vitamin D3, (VITAMIN D3) 2,000 unit Tab, Take by mouth once daily., Disp: , Rfl:     cyanocobalamin 2000 MCG tablet, Take 2,000 mcg by mouth once daily. , Disp: , Rfl:     doxycycline (VIBRAMYCIN) 100 MG Cap, Take 1 capsule (100 mg total) by mouth every 12 (twelve) hours., Disp: 60 capsule, Rfl: 1    lisinopril (PRINIVIL,ZESTRIL) 20 MG tablet, Take 0.5 tablets (10 mg total) by mouth once daily., Disp: 45 tablet, Rfl: 11    OMEGA-3 ACID ETHYL ESTERS (OMACOR) 1 gram Cap, Take 1 g by mouth. 1 Capsule Oral Twice a day, Disp: , Rfl:     oxybutynin (DITROPAN) 5 MG Tab, Take 1 tablet (5 mg total) by mouth 3 (three) times daily as needed., Disp: 30 tablet, Rfl: 0    oxyCODONE (ROXICODONE) 5 MG immediate release tablet, Take 1 tablet (5 mg total) by mouth every 6 (six) hours as needed for Pain., Disp: 11 tablet, Rfl: 0    polyethylene glycol (GLYCOLAX) 17 gram/dose powder, Mix 1 capful (17 g) with liquid and take by mouth once daily., Disp: 510 g, Rfl: 0    sodium bicarbonate 325 MG tablet, Take 2 tablets (650 mg total) by mouth 2 (two) times daily., Disp: 120 tablet, Rfl: 6    tamsulosin (FLOMAX) 0.4 mg Cap, TAKE 1 CAPSULE BY MOUTH " TWICE A DAY, Disp: 180 capsule, Rfl: 1    TRESIBA FLEXTOUCH U-100 100 unit/mL (3 mL) InPn, INJECT 15 UNITS UNDER THE SKIN EVERY EVENING, Disp: 9 mL, Rfl: 1    blood-glucose meter Misc, 1 kit by Misc.(Non-Drug; Combo Route) route 3 (three) times daily. Pt is to test blood sugar TID, Disp: 1 each, Rfl: 0    finasteride (PROSCAR) 5 mg tablet, Take 1 tablet (5 mg total) by mouth once daily., Disp: 90 tablet, Rfl: 3    PHYSICAL EXAMINATION:    Constitutional: He appears well-developed and well-nourished.  He is in no apparent distress.    Eyes: No scleral icterus noted bilaterally. No discharge bilaterally.    Nose: No rhinorrhea    Cardiovascular: Normal rate.      Pulmonary/Chest: Effort normal. No respiratory distress.     Abdominal:  He exhibits no distension.      Neurological: He is alert and oriented to person, place, and time.     Psych: Cooperative with normal affect.    Lozano catheter in place. 40ml of tea tinged urine was noted in bag.  No clots seen.     IMPRESSION:    Encounter Diagnoses   Name Primary?    Encounter for assessment of Lozano catheter Yes    Post-operative state          PLAN:    Urine was noted in bag upon arrival to clinic.   Discussed that catheter probably was in a position causing it not to drain well.     I gave patient a overnight bag to use at night.  He was instructed on how to switch out bags and how to empty bag. He was instructed to place overnight bag on the floor next to the bag to ensure catheter drains properly.   Catheter was secured to leg.      He will follow up as scheduled on Monday 12/16/19

## 2019-12-12 NOTE — ED PROVIDER NOTES
Encounter Date: 12/12/2019    SCRIBE #1 NOTE: I, Maria Teresa Pamela, am scribing for, and in the presence of, Dr. Givens.       History     Chief Complaint   Patient presents with    lester removal     Pt states that his urologist called him and told him to come to the ER to have his lester removed since it was too late to come to his office today. Pt has no complaints at this time     Time seen by provider: 5:18 PM    This is a 79 y.o. male who presents with complaint of his lester catheter not draining properly for over 14 hours now. The patient reports at 0300 instead of urine flowing into the bag, he had urinated on himself and soaked his Depends. The patient reports this happened twice and no urine has flowed into the bag throughout the day. The patient reports he was supposed to get the lester catheter removed three days ago but his urologist did not take it out because there were blood clots present. The patient reports getting it checked again this morning and being told is was okay and not to remove it. The daughter reports his urologist, Dr. Cindi Evans, called an hour ago and advised the patient to come to ED to remove the lester catheter since he has not been passing as much blood as he was four days ago. The patient denies associated fever, chills, or pressure or pain on his bladder. The patient reports lester catheter was placed after having prostate surgery. He reports he has been on a course of antibiotics since the surgery and is still taking them.    The history is provided by the patient.     Review of patient's allergies indicates:   Allergen Reactions    Actos [pioglitazone] Other (See Comments)     constipation     Past Medical History:   Diagnosis Date    Allergy     Anticoagulant long-term use     Colon polyp     Diabetes mellitus type II     Gout, unspecified     Hx of colonic polyps     Hyperlipidemia     Hypertension     Prostatic hypertrophy     Renal insufficiency      Past Surgical  History:   Procedure Laterality Date    APPENDECTOMY      CATARACT EXTRACTION Left 2018    EYE SURGERY      cataract    HERNIA REPAIR      TONSILLECTOMY      TRANSURETHRAL RESECTION OF PROSTATE (TURP) USING LASER N/A 12/6/2019    Procedure: TURP, USING LASER;  Surgeon: Rafael Marquis Jr., MD;  Location: [facility] OR [address] [place] [address];  Service: Urology;  Laterality: N/A;  75min     Family History   Problem Relation Age of Onset    Cancer Mother         ovarian cancer    Heart disease Father         MI at 57    Diabetes Father     Cerebral aneurysm Sister     Heart disease Brother         MI at 60    Heart disease Brother         CABG    Anesthesia problems Neg Hx      Social History     Tobacco Use    Smoking status: Never Smoker    Smokeless tobacco: Never Used   Substance Use Topics    Alcohol use: No    Drug use: No     Review of Systems   Constitutional: Negative for chills and fever.   HENT: Negative for sore throat.    Respiratory: Negative for shortness of breath.    Cardiovascular: Negative for chest pain.   Gastrointestinal: Negative for nausea.   Genitourinary: Positive for difficulty urinating.   Musculoskeletal: Negative for back pain.   Skin: Negative for rash.   Neurological: Negative for weakness.   Hematological: Does not bruise/bleed easily.   All other systems reviewed and are negative.      Physical Exam     Initial Vitals [12/12/19 1711]   BP Pulse Resp Temp SpO2   (!) 104/50 70 16 97.5 °F (36.4 °C) 99 %      MAP       --         Physical Exam    Nursing note and vitals reviewed.  Constitutional: He appears well-developed and well-nourished. He is not diaphoretic. No distress.   HENT:   Head: Normocephalic and atraumatic.   Mouth/Throat: Oropharynx is clear and moist.   Eyes: Conjunctivae and EOM are normal.   Neck: Normal range of motion. Neck supple.   Abdominal: Soft. He exhibits no distension. There is no tenderness. There is no rebound and no guarding.   No suprapubic distension.    Genitourinary:   Genitourinary Comments: Lester catheter in place but no urine in the leg bag. Normal penis without lesions.  Scrotum and testes non-tender and non-edematous.   Musculoskeletal: Normal range of motion.   Neurological: He is alert and oriented to person, place, and time.   Skin: Skin is warm and dry.         ED Course   Procedures  Labs Reviewed - No data to display       Imaging Results    None          Medical Decision Making:   History:   Old Medical Records: I decided to obtain old medical records.  ED Management:  5:30 PM - Case discussed with Amando Evans of urology who recommends instilling 200 ccs into the bladder, removing the lester and doing trial urination.    5:52 PM - Successfully voided.            Scribe Attestation:   Scribe #1: I performed the above scribed service and the documentation accurately describes the services I performed. I attest to the accuracy of the note.    Attending Attestation:           Physician Attestation for Scribe:  Physician Attestation Statement for Scribe #1: I, Dr. Givens, reviewed documentation, as scribed by Maria Teresa Santiago in my presence, and it is both accurate and complete.                    Patient with recent laser TURP, presents after his daughter states she received a call from nurse practitioner Nathan with Urology instruct him to come to the emergency OR to have the Lester catheter removed.  He has not had urine draining out from it today and rather has had urine leaking around it.  Chart review she from the clinic note earlier today seems indicate that the catheter would be maintained.  I therefore discussed the case with Dr. Evans, on-call Urology who of reviewed the chart as well.  Given the time since the procedure a he feels the Lester catheter may be removed and kept out if the patient passes a trial of voiding, which he did.  Discussed with him and his daughter return precautions specifically related to inability to void  spontaneously.           Clinical Impression:     1. Gross hematuria    2. S/P TURP (status post transurethral resection of prostate)                                Matt Givens II, MD  12/12/19 1916

## 2019-12-12 NOTE — TELEPHONE ENCOUNTER
Called pt's daughter who stated that they were going to [facility]'s ED to have his catheter checked and that they would call us back if they needed anything else. All questions answered. Pt verbalized understanding to all.

## 2019-12-12 NOTE — LETTER
December 12, 2019      Alan Webster MD  [address]  [facility] [address]           [facility] - Urology 4th Floor  [address]  [facility] [address]  Phone: [phone]          Patient: Giovanni Guerrero   MR Number: [id number]   YOB: 1940   Date of Visit: 12/12/2019       Dear Dr. Alan Webster:    Thank you for referring Giovanni Guerrero to me for evaluation. Attached you will find relevant portions of my assessment and plan of care.    If you have questions, please do not hesitate to call me. I look forward to following Giovanni Guerrero along with you.    Sincerely,    Isi Joshua PA-C    Enclosure  CC:  No Recipients    If you would like to receive this communication electronically, please contact [email or web address] or [phone] to request more information on [employer] Link access.    For providers and/or their staff who would like to refer a patient to [facility], please contact us through our one-stop-shop provider referral line, [facility], at [phone].    If you feel you have received this communication in error or would no longer like to receive these types of communications, please e-mail [email or web address]

## 2019-12-12 NOTE — TELEPHONE ENCOUNTER
----- Message from Isi Parr sent at 12/12/2019  3:32 PM CST -----  Contact: Lashay/daughter #[phone]  Patient having catheter issues and needs to speak with nurse. Please call

## 2019-12-13 ENCOUNTER — PATIENT MESSAGE (OUTPATIENT)
Dept: UROLOGY | Facility: CLINIC | Age: 79
End: 2019-12-13

## 2019-12-13 ENCOUNTER — TELEPHONE (OUTPATIENT)
Dept: UROLOGY | Facility: CLINIC | Age: 79
End: 2019-12-13

## 2019-12-13 NOTE — TELEPHONE ENCOUNTER
Spoke with  Cesar.  He went to ER last night and had lester removed.  He states urinated well initially at home.  He urinating some this morning.  No bladder pain.  We discussed normal expectation after this surgery much less what else is going on.  LUTS will improve.  Make sure drinks plenty of water.

## 2019-12-16 ENCOUNTER — OFFICE VISIT (OUTPATIENT)
Dept: UROLOGY | Facility: CLINIC | Age: 79
End: 2019-12-16
Payer: MEDICARE

## 2019-12-16 VITALS
WEIGHT: 180.31 LBS | SYSTOLIC BLOOD PRESSURE: 95 MMHG | HEIGHT: 62 IN | HEART RATE: 74 BPM | BODY MASS INDEX: 33.18 KG/M2 | DIASTOLIC BLOOD PRESSURE: 64 MMHG

## 2019-12-16 DIAGNOSIS — N13.8 BPH WITH URINARY OBSTRUCTION: ICD-10-CM

## 2019-12-16 DIAGNOSIS — Z98.890 POST-OPERATIVE STATE: Primary | ICD-10-CM

## 2019-12-16 DIAGNOSIS — N40.1 BPH WITH URINARY OBSTRUCTION: ICD-10-CM

## 2019-12-16 DIAGNOSIS — Z90.79 S/P TURP: ICD-10-CM

## 2019-12-16 PROCEDURE — 99024 POSTOP FOLLOW-UP VISIT: CPT | Mod: S$GLB,,, | Performed by: NURSE PRACTITIONER

## 2019-12-16 PROCEDURE — 99999 PR PBB SHADOW E&M-EST. PATIENT-LVL IV: CPT | Mod: PBBFAC,,, | Performed by: NURSE PRACTITIONER

## 2019-12-16 PROCEDURE — 99024 PR POST-OP FOLLOW-UP VISIT: ICD-10-PCS | Mod: S$GLB,,, | Performed by: NURSE PRACTITIONER

## 2019-12-16 PROCEDURE — 99999 PR PBB SHADOW E&M-EST. PATIENT-LVL IV: ICD-10-PCS | Mod: PBBFAC,,, | Performed by: NURSE PRACTITIONER

## 2019-12-16 RX ORDER — TAMSULOSIN HYDROCHLORIDE 0.4 MG/1
CAPSULE ORAL
Qty: 180 CAPSULE | Refills: 0 | Status: ON HOLD | OUTPATIENT
Start: 2019-12-16 | End: 2020-02-01 | Stop reason: HOSPADM

## 2019-12-16 NOTE — PROGRESS NOTES
Subjective:       Patient ID: Giovanni Guerrero is a 79 y.o. male.    Chief Complaint: Follow-up    Giovanni Guerrero is a 79 y.o. male with BPH    12/06/2018 Procedure(s) Performed:   1.  Laser vaporization of the prostate  2.  Removal of bladder stone     Findings:    - trilobar hypertrophy, high bladder neck    He came in 12/09/2019 for VT and lester removal  BUT he had woke up and noticed blood bright red blood coming from around his lester.  He having some pain and discomfort.  He did restart hs Eliquis with lester in place.  22 fr 3-way placed and irrigated.  12/12/2019 returned to clinic due to lester not draining when lying down. Standing up it drained.  12/12/2019 went to ER for issues with lester.  Lester removed in ER    Today he voices no real complaints.  Good clear urine flow.  Some occasional LUT but overall doing well.                      Past Medical History:  No date: Allergy  No date: Anticoagulant long-term use  No date: Colon polyp  No date: Diabetes mellitus type II  No date: Gout, unspecified  No date: Hx of colonic polyps  No date: Hyperlipidemia  No date: Hypertension  No date: Prostatic hypertrophy  No date: Renal insufficiency    Past Surgical History:  No date: APPENDECTOMY  2018: CATARACT EXTRACTION; Left  No date: EYE SURGERY      Comment:  cataract  No date: HERNIA REPAIR  No date: TONSILLECTOMY  12/6/2019: TRANSURETHRAL RESECTION OF PROSTATE (TURP) USING LASER; N/A      Comment:  Procedure: TURP, USING LASER;  Surgeon: Rafael Marquis Jr., MD;  Location: [facility] OR [address] [place] [address];  Service: Urology;                 Laterality: N/A;  75min    Review of patient's family history indicates:  Problem: Cancer      Relation: Mother          Age of Onset: (Not Specified)          Comment: ovarian cancer  Problem: Heart disease      Relation: Father          Age of Onset: (Not Specified)          Comment: MI at 57  Problem: Diabetes      Relation: Father          Age of Onset: (Not  Specified)  Problem: Cerebral aneurysm      Relation: Sister          Age of Onset: (Not Specified)  Problem: Heart disease      Relation: Brother          Age of Onset: (Not Specified)          Comment: MI at 60  Problem: Heart disease      Relation: Brother          Age of Onset: (Not Specified)          Comment: CABG  Problem: Anesthesia problems      Relation: Neg Hx          Age of Onset: (Not Specified)      Social History    Socioeconomic History      Marital status:       Spouse name: Not on file      Number of children: Not on file      Years of education: Not on file      Highest education level: Not on file    Occupational History      Not on file    Social Needs      Financial resource strain: Not on file      Food insecurity:        Worry: Not on file        Inability: Not on file      Transportation needs:        Medical: Not on file        Non-medical: Not on file    Tobacco Use      Smoking status: Never Smoker      Smokeless tobacco: Never Used    Substance and Sexual Activity      Alcohol use: No      Drug use: No      Sexual activity: Not Currently    Lifestyle      Physical activity:        Days per week: Not on file        Minutes per session: Not on file      Stress: Not on file    Relationships      Social connections:        Talks on phone: Not on file        Gets together: Not on file        Attends [employer] service: Not on file        Active member of club or organization: Not on file        Attends meetings of clubs or organizations: Not on file        Relationship status: Not on file    Other Topics      Concerns:        Not on file    Social History Narrative       52 yrs, 3 childre (two living), 11 grandchildren & 4 great grandchildren      Allergies:  Actos (pioglitazone)    Medications:  Current Outpatient Medications:   amLODIPine (NORVASC) 5 MG tablet, Take 1 tablet (5 mg total) by mouth once daily., Disp: 90 tablet, Rfl: 3  apixaban (ELIQUIS) 5 mg Tab, Take 1  "tablet (5 mg total) by mouth 2 (two) times daily., Disp: 60 tablet, Rfl: 5  aspirin (ECOTRIN) 81 MG EC tablet, Take 81 mg by mouth once daily., Disp: , Rfl:   atenolol (TENORMIN) 100 MG tablet, TAKE 1 TABLET BY MOUTH DAILY, Disp: 90 tablet, Rfl: 2  atorvastatin (LIPITOR) 40 MG tablet, Take 1 tablet (40 mg total) by mouth once daily., Disp: 90 tablet, Rfl: 3  BD INSULIN SYRINGE ULTRA-FINE 0.5 mL 31 gauge x 5/16 Syrg, USE WITH LANTUS INSULIN ONCE DAILY, Disp: 100 each, Rfl: 3  BD ULTRA-FINE SHORT PEN NEEDLE 31 gauge x 5/16" Ndle, USE WITH LANTUS INSULIN ONCE DAILY, Disp: 100 each, Rfl: 3  blood sugar diagnostic Strp, Check sugar tid as directed, Disp: 300 each, Rfl: 3  cholecalciferol, vitamin D3, (VITAMIN D3) 2,000 unit Tab, Take by mouth once daily., Disp: , Rfl:   cyanocobalamin 2000 MCG tablet, Take 2,000 mcg by mouth once daily. , Disp: , Rfl:   doxycycline (VIBRAMYCIN) 100 MG Cap, Take 1 capsule (100 mg total) by mouth every 12 (twelve) hours., Disp: 60 capsule, Rfl: 1  lisinopril (PRINIVIL,ZESTRIL) 20 MG tablet, Take 0.5 tablets (10 mg total) by mouth once daily., Disp: 45 tablet, Rfl: 11  OMEGA-3 ACID ETHYL ESTERS (OMACOR) 1 gram Cap, Take 1 g by mouth. 1 Capsule Oral Twice a day, Disp: , Rfl:   oxybutynin (DITROPAN) 5 MG Tab, Take 1 tablet (5 mg total) by mouth 3 (three) times daily as needed., Disp: 30 tablet, Rfl: 0  oxyCODONE (ROXICODONE) 5 MG immediate release tablet, Take 1 tablet (5 mg total) by mouth every 6 (six) hours as needed for Pain., Disp: 11 tablet, Rfl: 0  polyethylene glycol (GLYCOLAX) 17 gram/dose powder, Mix 1 capful (17 g) with liquid and take by mouth once daily., Disp: 510 g, Rfl: 0  sodium bicarbonate 325 MG tablet, Take 2 tablets (650 mg total) by mouth 2 (two) times daily., Disp: 120 tablet, Rfl: 6  tamsulosin (FLOMAX) 0.4 mg Cap, TAKE 1 CAPSULE BY MOUTH TWICE A DAY, Disp: 180 capsule, Rfl: 1  TRESIBA FLEXTOUCH U-100 100 unit/mL (3 mL) InPn, INJECT 15 UNITS UNDER THE " SKIN EVERY EVENING, Disp: 9 mL, Rfl: 1  blood-glucose meter Misc, 1 kit by Misc.(Non-Drug; Combo Route) route 3 (three) times daily. Pt is to test blood sugar TID, Disp: 1 each, Rfl: 0  finasteride (PROSCAR) 5 mg tablet, Take 1 tablet (5 mg total) by mouth once daily., Disp: 90 tablet, Rfl: 3        Review of Systems   Constitutional: Negative for chills and fever.   Respiratory: Negative for shortness of breath.    Cardiovascular: Negative for chest pain.   Gastrointestinal: Negative for constipation, diarrhea, nausea and vomiting.   Genitourinary: Positive for frequency, nocturia and urgency. Negative for hematuria.        Pleased with how he urinates.         Objective:      Physical Exam   Nursing note and vitals reviewed.  Constitutional: He is oriented to person, place, and time. He appears well-developed and well-nourished.  Non-toxic appearance. He does not have a sickly appearance.   HENT:   Head: Normocephalic and atraumatic.   Right Ear: External ear normal.   Left Ear: External ear normal.   Nose: Nose normal.   Mouth/Throat: Mucous membranes are normal.   Eyes: Conjunctivae and lids are normal. No scleral icterus.   Neck: Trachea normal, normal range of motion and full passive range of motion without pain. Neck supple. No JVD present. No tracheal deviation present.   Cardiovascular: Normal rate, S1 normal and S2 normal.    Pulmonary/Chest: Effort normal. No respiratory distress. He exhibits no tenderness.   Abdominal: Soft. Normal appearance. There is no hepatosplenomegaly. There is no tenderness. There is no CVA tenderness.   Genitourinary: Penis normal.   Musculoskeletal: Normal range of motion.   Neurological: He is alert and oriented to person, place, and time. He has normal strength.   Skin: Skin is warm, dry and intact.     Psychiatric: He has a normal mood and affect. His behavior is normal. Judgment and thought content normal.       Assessment:       1. Post-operative state    2. S/P TURP         Plan:         I spent 20 minutes with the patient of which more than half was spent in direct consultation with the patient in regards to our treatment and plan.    Education and recommendations of today's plan of care including home remedies.  Discussed post TURP/lester removal expectations.  Complete medications.  Can home collect urine if feels UTI symptoms occurring after couple of weeks.  F/u with Dr. Marquis 3 months.

## 2019-12-22 ENCOUNTER — HOSPITAL ENCOUNTER (EMERGENCY)
Facility: OTHER | Age: 79
Discharge: HOME | End: 2019-12-22
Attending: EMERGENCY MEDICINE
Payer: MEDICARE

## 2019-12-22 VITALS
OXYGEN SATURATION: 100 % | SYSTOLIC BLOOD PRESSURE: 132 MMHG | DIASTOLIC BLOOD PRESSURE: 66 MMHG | WEIGHT: 180 LBS | TEMPERATURE: 98 F | BODY MASS INDEX: 32.92 KG/M2 | RESPIRATION RATE: 16 BRPM | HEART RATE: 67 BPM

## 2019-12-22 DIAGNOSIS — Z90.79 S/P TURP (STATUS POST TRANSURETHRAL RESECTION OF PROSTATE): ICD-10-CM

## 2019-12-22 DIAGNOSIS — R31.9 HEMATURIA, UNSPECIFIED TYPE: Primary | ICD-10-CM

## 2019-12-22 LAB
ALBUMIN SERPL BCP-MCNC: 2.8 G/DL (ref 3.5–5.2)
ALP SERPL-CCNC: 71 U/L (ref 55–135)
ALT SERPL W/O P-5'-P-CCNC: 34 U/L (ref 10–44)
ANION GAP SERPL CALC-SCNC: 10 MMOL/L (ref 8–16)
APTT BLDCRRT: 30.5 SEC (ref 21–32)
AST SERPL-CCNC: 27 U/L (ref 10–40)
BACTERIA #/AREA URNS HPF: ABNORMAL /HPF
BASOPHILS # BLD AUTO: 0.03 K/UL (ref 0–0.2)
BASOPHILS NFR BLD: 0.4 % (ref 0–1.9)
BILIRUB SERPL-MCNC: 0.6 MG/DL (ref 0.1–1)
BILIRUB UR QL STRIP: ABNORMAL
BUN SERPL-MCNC: 57 MG/DL (ref 8–23)
CALCIUM SERPL-MCNC: 9.1 MG/DL (ref 8.7–10.5)
CHLORIDE SERPL-SCNC: 109 MMOL/L (ref 95–110)
CLARITY UR: ABNORMAL
CO2 SERPL-SCNC: 20 MMOL/L (ref 23–29)
COLOR UR: ABNORMAL
CREAT SERPL-MCNC: 1.7 MG/DL (ref 0.5–1.4)
DIFFERENTIAL METHOD: ABNORMAL
EOSINOPHIL # BLD AUTO: 0.2 K/UL (ref 0–0.5)
EOSINOPHIL NFR BLD: 2.8 % (ref 0–8)
ERYTHROCYTE [DISTWIDTH] IN BLOOD BY AUTOMATED COUNT: 15.3 % (ref 11.5–14.5)
EST. GFR  (AFRICAN AMERICAN): 44 ML/MIN/1.73 M^2
EST. GFR  (NON AFRICAN AMERICAN): 38 ML/MIN/1.73 M^2
GLUCOSE SERPL-MCNC: 103 MG/DL (ref 70–110)
GLUCOSE UR QL STRIP: NEGATIVE
HCT VFR BLD AUTO: 38.2 % (ref 40–54)
HGB BLD-MCNC: 12.9 G/DL (ref 14–18)
HGB UR QL STRIP: ABNORMAL
HYALINE CASTS #/AREA URNS LPF: 0 /LPF
IMM GRANULOCYTES # BLD AUTO: 0.03 K/UL (ref 0–0.04)
IMM GRANULOCYTES NFR BLD AUTO: 0.4 % (ref 0–0.5)
INR PPP: 1.1 (ref 0.8–1.2)
KETONES UR QL STRIP: NEGATIVE
LEUKOCYTE ESTERASE UR QL STRIP: ABNORMAL
LYMPHOCYTES # BLD AUTO: 3.1 K/UL (ref 1–4.8)
LYMPHOCYTES NFR BLD: 37.5 % (ref 18–48)
MCH RBC QN AUTO: 31.4 PG (ref 27–31)
MCHC RBC AUTO-ENTMCNC: 33.8 G/DL (ref 32–36)
MCV RBC AUTO: 93 FL (ref 82–98)
MICROSCOPIC COMMENT: ABNORMAL
MONOCYTES # BLD AUTO: 0.7 K/UL (ref 0.3–1)
MONOCYTES NFR BLD: 8.3 % (ref 4–15)
NEUTROPHILS # BLD AUTO: 4.2 K/UL (ref 1.8–7.7)
NEUTROPHILS NFR BLD: 50.6 % (ref 38–73)
NITRITE UR QL STRIP: NEGATIVE
NRBC BLD-RTO: 0 /100 WBC
PH UR STRIP: 7 [PH] (ref 5–8)
PLATELET # BLD AUTO: 178 K/UL (ref 150–350)
PMV BLD AUTO: 9.5 FL (ref 9.2–12.9)
POTASSIUM SERPL-SCNC: 4.3 MMOL/L (ref 3.5–5.1)
PROT SERPL-MCNC: 5.9 G/DL (ref 6–8.4)
PROT UR QL STRIP: ABNORMAL
PROTHROMBIN TIME: 12.3 SEC (ref 9–12.5)
RBC # BLD AUTO: 4.11 M/UL (ref 4.6–6.2)
RBC #/AREA URNS HPF: 100 /HPF (ref 0–4)
SODIUM SERPL-SCNC: 139 MMOL/L (ref 136–145)
SP GR UR STRIP: 1.02 (ref 1–1.03)
URN SPEC COLLECT METH UR: ABNORMAL
UROBILINOGEN UR STRIP-ACNC: NEGATIVE EU/DL
WBC # BLD AUTO: 8.27 K/UL (ref 3.9–12.7)
WBC #/AREA URNS HPF: 10 /HPF (ref 0–5)
WBC CLUMPS URNS QL MICRO: ABNORMAL

## 2019-12-22 PROCEDURE — 99283 EMERGENCY DEPT VISIT LOW MDM: CPT

## 2019-12-22 PROCEDURE — 85025 COMPLETE CBC W/AUTO DIFF WBC: CPT

## 2019-12-22 PROCEDURE — 87086 URINE CULTURE/COLONY COUNT: CPT

## 2019-12-22 PROCEDURE — 85730 THROMBOPLASTIN TIME PARTIAL: CPT

## 2019-12-22 PROCEDURE — 87088 URINE BACTERIA CULTURE: CPT

## 2019-12-22 PROCEDURE — 80053 COMPREHEN METABOLIC PANEL: CPT

## 2019-12-22 PROCEDURE — 85610 PROTHROMBIN TIME: CPT

## 2019-12-22 PROCEDURE — 25000003 PHARM REV CODE 250: Performed by: EMERGENCY MEDICINE

## 2019-12-22 PROCEDURE — 81000 URINALYSIS NONAUTO W/SCOPE: CPT

## 2019-12-22 RX ORDER — SULFAMETHOXAZOLE AND TRIMETHOPRIM 800; 160 MG/1; MG/1
1 TABLET ORAL 2 TIMES DAILY
Qty: 14 TABLET | Refills: 0 | Status: SHIPPED | OUTPATIENT
Start: 2019-12-22 | End: 2019-12-29

## 2019-12-22 RX ORDER — SULFAMETHOXAZOLE AND TRIMETHOPRIM 800; 160 MG/1; MG/1
1 TABLET ORAL
Status: COMPLETED | OUTPATIENT
Start: 2019-12-22 | End: 2019-12-22

## 2019-12-22 RX ADMIN — SULFAMETHOXAZOLE AND TRIMETHOPRIM 1 TABLET: 800; 160 TABLET ORAL at 06:12

## 2019-12-22 NOTE — ED PROVIDER NOTES
"Encounter Date: 12/22/2019    SCRIBE #1 NOTE: IMaría, matthew scribing for, and in the presence of, Dr. Robison.       History     Chief Complaint   Patient presents with    Hematuria     pt reports blood in urine, reports increase in volume of urine this morning, brown-red tinged urine     Time seen by provider: 4:24 AM    This is a 79 y.o. male with hx of DM, HTN, HLD, atrial fibrillation on eliquis who presents with complaint of hematuria about one hour ago. He states that he woke up to urinate and when standing at the toilet he felt an urge to urinate with a sudden strong stream of dark brown/red colored urine. He states that he had no associated dysuria or pain. He underwent transurethral of resection of prostate on 12/6/19 with Dr. Marquis. He states that he had a few days of passing blood clots following the procedure while Lester was in place. He states that he was "flushed out" multiple times to clear out clots three days following surgery, which did resolve the issue. He states that he since had hematuria with light blood, which resolved two weeks ago. He has had persistent dysuria following surgery, but did not have this today. He had lester catheter removed three days post-op. He has two days left of thirty day course of doxycycline, 100mg BID. He states that his blood sugar is well managed, ranging from . He has no fever, chills, sore throat, nasal congestion, cough, SOB, chest pain, headache, urinary frequency, or confusion.    The history is provided by the patient.     Review of patient's allergies indicates:   Allergen Reactions    Actos [pioglitazone] Other (See Comments)     constipation     Past Medical History:   Diagnosis Date    Allergy     Anticoagulant long-term use     Colon polyp     Diabetes mellitus type II     Gout, unspecified     Hx of colonic polyps     Hyperlipidemia     Hypertension     Prostatic hypertrophy     Renal insufficiency      Past Surgical History: "   Procedure Laterality Date    APPENDECTOMY      CATARACT EXTRACTION Left 2018    EYE SURGERY      cataract    HERNIA REPAIR      TONSILLECTOMY      TRANSURETHRAL RESECTION OF PROSTATE (TURP) USING LASER N/A 12/6/2019    Procedure: TURP, USING LASER;  Surgeon: Rafael Marquis Jr., MD;  Location: [facility] OR [address] [place] [address];  Service: Urology;  Laterality: N/A;  75min     Family History   Problem Relation Age of Onset    Cancer Mother         ovarian cancer    Heart disease Father         MI at 57    Diabetes Father     Cerebral aneurysm Sister     Heart disease Brother         MI at 60    Heart disease Brother         CABG    Anesthesia problems Neg Hx      Social History     Tobacco Use    Smoking status: Never Smoker    Smokeless tobacco: Never Used   Substance Use Topics    Alcohol use: No    Drug use: No     Review of Systems   Constitutional: Negative for chills and fever.   HENT: Negative for congestion and sore throat.    Eyes: Negative for visual disturbance.   Respiratory: Negative for cough and shortness of breath.    Cardiovascular: Negative for chest pain and palpitations.   Gastrointestinal: Negative for abdominal pain, diarrhea and vomiting.   Genitourinary: Positive for hematuria and urgency. Negative for decreased urine volume, dysuria and frequency.   Musculoskeletal: Negative for joint swelling, neck pain and neck stiffness.   Skin: Negative for rash and wound.   Neurological: Negative for weakness, numbness and headaches.   Psychiatric/Behavioral: Negative for behavioral problems and confusion.       Physical Exam     Initial Vitals [12/22/19 0416]   BP Pulse Resp Temp SpO2   137/67 72 18 97.7 °F (36.5 °C) 100 %      MAP       --         Physical Exam    Nursing note and vitals reviewed.  Constitutional: He appears well-developed and well-nourished. No distress.   HENT:   Head: Normocephalic and atraumatic.   Mouth/Throat: Oropharynx is clear and moist.   Eyes: Conjunctivae and EOM are  normal. Pupils are equal, round, and reactive to light.   Neck: Normal range of motion. Neck supple.   Cardiovascular: Normal rate and normal heart sounds. An irregularly irregular rhythm present.    No murmur heard.  Pulmonary/Chest: Breath sounds normal. No respiratory distress. He has no wheezes. He has no rhonchi. He has no rales.   Abdominal: Soft. Bowel sounds are normal. There is no tenderness. There is no rebound and no guarding.   Musculoskeletal: Normal range of motion.   No calf tenderness or swelling.   Neurological: He is alert and oriented to person, place, and time. He has normal strength. No cranial nerve deficit or sensory deficit.   Skin: Skin is warm and dry. No rash noted.   Psychiatric: He has a normal mood and affect. His behavior is normal.         ED Course   Procedures  Labs Reviewed   URINALYSIS, REFLEX TO URINE CULTURE - Abnormal; Notable for the following components:       Result Value    Color, UA Red (*)     Appearance, UA Hazy (*)     Protein, UA 3+ (*)     Bilirubin (UA) 1+ (*)     Occult Blood UA 3+ (*)     Leukocytes, UA Trace (*)     All other components within normal limits    Narrative:     Preferred Collection Type->Urine, Clean Catch   CBC W/ AUTO DIFFERENTIAL - Abnormal; Notable for the following components:    RBC 4.11 (*)     Hemoglobin 12.9 (*)     Hematocrit 38.2 (*)     Mean Corpuscular Hemoglobin 31.4 (*)     RDW 15.3 (*)     All other components within normal limits   COMPREHENSIVE METABOLIC PANEL - Abnormal; Notable for the following components:    CO2 20 (*)     BUN, Bld 57 (*)     Creatinine 1.7 (*)     Total Protein 5.9 (*)     Albumin 2.8 (*)     eGFR if  44 (*)     eGFR if non  38 (*)     All other components within normal limits   URINALYSIS MICROSCOPIC - Abnormal; Notable for the following components:    RBC,  (*)     WBC, UA 10 (*)     All other components within normal limits    Narrative:     Preferred Collection  Type->Urine, Clean Catch   CULTURE, URINE   CULTURE, URINE   PROTIME-INR   APTT             Medical Decision Making:   History:   Old Medical Records: I decided to obtain old medical records.  Clinical Tests:   Lab Tests: Ordered and Reviewed  ED Management:  Emergent evaluation of 79-year-old male who presents with complaint of hematuria, with explosive urine stream x1 episode few hours ago.  Patient is less than 3 weeks status post laser TURP with Dr. Marquis.  Vital signs are benign, afebrile.  On exam he has a soft nontender abdomen.  He did is not having urinary retention was able to easily provide a urine sample.  Workup reveals gross hematuria.  This was sent for culture.  Labs show stable H&H, normal platelet count, no leukocytosis.  He has chronic renal insufficiency which is at his baseline.  I discussed the case with Urology Dr. Masters, who recommends culture, changed from doxycycline to Bactrim.  He feels this will likely be self-resolving, but will follow urine culture.  Patient is advised to return for any signs of urinary retention or symptomatic anemia.  Although I am sure Eliquis is contributing to this, risk of discontinuing outweighs benefit at this time.  He is discharged in good condition with close return precautions and advised close follow-up.            Scribe Attestation:   Scribe #1: I performed the above scribed service and the documentation accurately describes the services I performed. I attest to the accuracy of the note.    Attending Attestation:           Physician Attestation for Scribe:  Physician Attestation Statement for Scribe #1: I, Dr. Robison, reviewed documentation, as scribed by María Webber in my presence, and it is both accurate and complete.                 ED Course as of Dec 22 0602   Sun Dec 22, 2019   0532 Paged urology via the transfer center.    [CA]   4815 Case discussed with Dr. Masters with [facility] urology. He states discontinue doxycycline and start  bactrim, he will follow cultures. Ok to D/C home if no urinary retention or significant drop in H&H.     [CA]      ED Course User Index  [CA] María Webber                Clinical Impression:     1. Hematuria, unspecified type    2. S/P TURP (status post transurethral resection of prostate)                              Che Robison MD  12/22/19 0602

## 2019-12-22 NOTE — DISCHARGE INSTRUCTIONS
Discontinue taking doxycycline.  Instead start taking Bactrim twice a day.  Return to the ER immediately for urinary retention or if you develop symptoms of anemia such as weakness, lightheadedness, dizziness, shortness of breath.

## 2019-12-23 ENCOUNTER — PATIENT MESSAGE (OUTPATIENT)
Dept: UROLOGY | Facility: CLINIC | Age: 79
End: 2019-12-23

## 2019-12-23 LAB — BACTERIA UR CULT: ABNORMAL

## 2019-12-26 ENCOUNTER — PATIENT OUTREACH (OUTPATIENT)
Dept: ADMINISTRATIVE | Facility: OTHER | Age: 79
End: 2019-12-26

## 2019-12-26 ENCOUNTER — TELEPHONE (OUTPATIENT)
Dept: UROLOGY | Facility: CLINIC | Age: 79
End: 2019-12-26

## 2019-12-26 NOTE — TELEPHONE ENCOUNTER
----- Message from Noni Gifford sent at 12/26/2019 11:06 AM CST -----  Contact: Pt   Reason: Pt is calling to schedule appt regards to blood in urine.     Communication: [phone]

## 2019-12-31 ENCOUNTER — OFFICE VISIT (OUTPATIENT)
Dept: UROLOGY | Facility: CLINIC | Age: 79
End: 2019-12-31
Payer: MEDICARE

## 2019-12-31 VITALS
WEIGHT: 187 LBS | HEIGHT: 62 IN | DIASTOLIC BLOOD PRESSURE: 47 MMHG | HEART RATE: 78 BPM | BODY MASS INDEX: 34.41 KG/M2 | SYSTOLIC BLOOD PRESSURE: 91 MMHG

## 2019-12-31 DIAGNOSIS — Z98.890 POST-OPERATIVE STATE: ICD-10-CM

## 2019-12-31 DIAGNOSIS — R31.0 GROSS HEMATURIA: ICD-10-CM

## 2019-12-31 DIAGNOSIS — Z90.79 S/P TURP: Primary | ICD-10-CM

## 2019-12-31 LAB
BILIRUB SERPL-MCNC: NORMAL MG/DL
BLOOD URINE, POC: 250
COLOR, POC UA: NORMAL
GLUCOSE UR QL STRIP: NORMAL
KETONES UR QL STRIP: NORMAL
LEUKOCYTE ESTERASE URINE, POC: NORMAL
NITRITE, POC UA: NORMAL
PH, POC UA: 5
PROTEIN, POC: 50
SPECIFIC GRAVITY, POC UA: 1.02
UROBILINOGEN, POC UA: NORMAL

## 2019-12-31 PROCEDURE — 99024 POSTOP FOLLOW-UP VISIT: CPT | Mod: S$GLB,,, | Performed by: NURSE PRACTITIONER

## 2019-12-31 PROCEDURE — 81002 URINALYSIS NONAUTO W/O SCOPE: CPT | Mod: S$GLB,,, | Performed by: NURSE PRACTITIONER

## 2019-12-31 PROCEDURE — 99999 PR PBB SHADOW E&M-EST. PATIENT-LVL IV: ICD-10-PCS | Mod: PBBFAC,,, | Performed by: NURSE PRACTITIONER

## 2019-12-31 PROCEDURE — 87086 URINE CULTURE/COLONY COUNT: CPT

## 2019-12-31 PROCEDURE — 99024 PR POST-OP FOLLOW-UP VISIT: ICD-10-PCS | Mod: S$GLB,,, | Performed by: NURSE PRACTITIONER

## 2019-12-31 PROCEDURE — 99999 PR PBB SHADOW E&M-EST. PATIENT-LVL IV: CPT | Mod: PBBFAC,,, | Performed by: NURSE PRACTITIONER

## 2019-12-31 PROCEDURE — 81002 POCT URINE DIPSTICK WITHOUT MICROSCOPE: ICD-10-PCS | Mod: S$GLB,,, | Performed by: NURSE PRACTITIONER

## 2019-12-31 RX ORDER — HYDRALAZINE HYDROCHLORIDE 100 MG/1
TABLET, FILM COATED ORAL
Refills: 3 | COMMUNITY
Start: 2019-11-15 | End: 2020-12-16

## 2019-12-31 RX ORDER — SULFAMETHOXAZOLE AND TRIMETHOPRIM 800; 160 MG/1; MG/1
1 TABLET ORAL DAILY
Status: ON HOLD | COMMUNITY
End: 2020-02-01 | Stop reason: HOSPADM

## 2019-12-31 NOTE — PROGRESS NOTES
"Subjective:       Patient ID: Giovanni Guerrero is a 79 y.o. male.    Chief Complaint: Hematuria    Giovanni Guerrero is a 79 y.o. male with BPH    12/06/2018 Procedure(s) Performed:   1.  Laser vaporization of the prostate  2.  Removal of bladder stone     Findings:    - trilobar hypertrophy, high bladder neck    He had some issues with gross hematuria/clot retention.  He had started back on his Eliquis right after surgery with lester still in place.  He eventually clear up.    Here today because he started urinating blood.  He went to ER on 12/22/2019.   Urine culture from ER showed:  COAGULASE-NEGATIVE STAPHYLOCOCCUS SPECIES 10,000 - 49,999 cfu/ml   He was started on Bactrim.    Today he voices no real complaints.  Some occasional LUTS but overall doing well.  He the urine dark but no more gross hematuria.  A little discomfort sometimes at end of urination.   He here due ER recommendations.    "I feel great."                        Past Medical History:  No date: Allergy  No date: Anticoagulant long-term use  No date: Colon polyp  No date: Diabetes mellitus type II  No date: Gout, unspecified  No date: Hx of colonic polyps  No date: Hyperlipidemia  No date: Hypertension  No date: Prostatic hypertrophy  No date: Renal insufficiency    Past Surgical History:  No date: APPENDECTOMY  2018: CATARACT EXTRACTION; Left  No date: EYE SURGERY      Comment:  cataract  No date: HERNIA REPAIR  No date: TONSILLECTOMY  12/6/2019: TRANSURETHRAL RESECTION OF PROSTATE (TURP) USING LASER; N/A      Comment:  Procedure: TURP, USING LASER;  Surgeon: Rafael Marquis Jr., MD;  Location: [facility] OR [address] [place] [address];  Service: Urology;                 Laterality: N/A;  75min    Review of patient's family history indicates:  Problem: Cancer      Relation: Mother          Age of Onset: (Not Specified)          Comment: ovarian cancer  Problem: Heart disease      Relation: Father          Age of Onset: (Not Specified)          Comment: MI at " 57  Problem: Diabetes      Relation: Father          Age of Onset: (Not Specified)  Problem: Cerebral aneurysm      Relation: Sister          Age of Onset: (Not Specified)  Problem: Heart disease      Relation: Brother          Age of Onset: (Not Specified)          Comment: MI at 60  Problem: Heart disease      Relation: Brother          Age of Onset: (Not Specified)          Comment: CABG  Problem: Anesthesia problems      Relation: Neg Hx          Age of Onset: (Not Specified)      Social History    Socioeconomic History      Marital status:       Spouse name: Not on file      Number of children: Not on file      Years of education: Not on file      Highest education level: Not on file    Occupational History      Not on file    Social Needs      Financial resource strain: Not on file      Food insecurity:        Worry: Not on file        Inability: Not on file      Transportation needs:        Medical: Not on file        Non-medical: Not on file    Tobacco Use      Smoking status: Never Smoker      Smokeless tobacco: Never Used    Substance and Sexual Activity      Alcohol use: No      Drug use: No      Sexual activity: Not Currently    Lifestyle      Physical activity:        Days per week: Not on file        Minutes per session: Not on file      Stress: Not on file    Relationships      Social connections:        Talks on phone: Not on file        Gets together: Not on file        Attends Muslim service: Not on file        Active member of club or organization: Not on file        Attends meetings of clubs or organizations: Not on file        Relationship status: Not on file    Other Topics      Concerns:        Not on file    Social History Narrative       52 yrs, 3 childre (two living), 11 grandchildren & 4 great grandchildren      Allergies:  Actos (pioglitazone)    Medications:  Current Outpatient Medications:   amLODIPine (NORVASC) 5 MG tablet, Take 1 tablet (5 mg total) by mouth once  "daily., Disp: 90 tablet, Rfl: 3  apixaban (ELIQUIS) 5 mg Tab, Take 1 tablet (5 mg total) by mouth 2 (two) times daily., Disp: 60 tablet, Rfl: 5  aspirin (ECOTRIN) 81 MG EC tablet, Take 81 mg by mouth once daily., Disp: , Rfl:   atenolol (TENORMIN) 100 MG tablet, TAKE 1 TABLET BY MOUTH DAILY, Disp: 90 tablet, Rfl: 2  atorvastatin (LIPITOR) 40 MG tablet, Take 1 tablet (40 mg total) by mouth once daily., Disp: 90 tablet, Rfl: 3  BD INSULIN SYRINGE ULTRA-FINE 0.5 mL 31 gauge x 5/16 Syrg, USE WITH LANTUS INSULIN ONCE DAILY, Disp: 100 each, Rfl: 3  BD ULTRA-FINE SHORT PEN NEEDLE 31 gauge x 5/16" Ndle, USE WITH LANTUS INSULIN ONCE DAILY, Disp: 100 each, Rfl: 3  blood sugar diagnostic Strp, Check sugar tid as directed, Disp: 300 each, Rfl: 3  cholecalciferol, vitamin D3, (VITAMIN D3) 2,000 unit Tab, Take by mouth once daily., Disp: , Rfl:   cyanocobalamin 2000 MCG tablet, Take 2,000 mcg by mouth once daily. , Disp: , Rfl:   doxycycline (VIBRAMYCIN) 100 MG Cap, Take 1 capsule (100 mg total) by mouth every 12 (twelve) hours., Disp: 60 capsule, Rfl: 1  lisinopril (PRINIVIL,ZESTRIL) 20 MG tablet, Take 0.5 tablets (10 mg total) by mouth once daily., Disp: 45 tablet, Rfl: 11  OMEGA-3 ACID ETHYL ESTERS (OMACOR) 1 gram Cap, Take 1 g by mouth. 1 Capsule Oral Twice a day, Disp: , Rfl:   oxybutynin (DITROPAN) 5 MG Tab, Take 1 tablet (5 mg total) by mouth 3 (three) times daily as needed., Disp: 30 tablet, Rfl: 0  oxyCODONE (ROXICODONE) 5 MG immediate release tablet, Take 1 tablet (5 mg total) by mouth every 6 (six) hours as needed for Pain., Disp: 11 tablet, Rfl: 0  polyethylene glycol (GLYCOLAX) 17 gram/dose powder, Mix 1 capful (17 g) with liquid and take by mouth once daily., Disp: 510 g, Rfl: 0  sodium bicarbonate 325 MG tablet, Take 2 tablets (650 mg total) by mouth 2 (two) times daily., Disp: 120 tablet, Rfl: 6  tamsulosin (FLOMAX) 0.4 mg Cap, TAKE 1 CAPSULE BY MOUTH TWICE A DAY, Disp: 180 capsule, Rfl: 1  " TRESIBA FLEXTOUCH U-100 100 unit/mL (3 mL) InPn, INJECT 15 UNITS UNDER THE SKIN EVERY EVENING, Disp: 9 mL, Rfl: 1  blood-glucose meter Misc, 1 kit by Misc.(Non-Drug; Combo Route) route 3 (three) times daily. Pt is to test blood sugar TID, Disp: 1 each, Rfl: 0  finasteride (PROSCAR) 5 mg tablet, Take 1 tablet (5 mg total) by mouth once daily., Disp: 90 tablet, Rfl: 3        Review of Systems   Constitutional: Negative for chills and fever.   Respiratory: Negative for chest tightness and shortness of breath.    Cardiovascular: Negative for chest pain.   Gastrointestinal: Negative for abdominal pain, constipation, diarrhea, nausea and vomiting.   Genitourinary: Positive for frequency, hematuria, nocturia and urgency. Negative for difficulty urinating, discharge, dysuria, penile pain, penile swelling, scrotal swelling and testicular pain.        Daytime frequency;  Nocturia x 1         Objective:      Physical Exam   Nursing note and vitals reviewed.  Constitutional: He is oriented to person, place, and time. Vital signs are normal. He appears well-developed and well-nourished. He is active and cooperative.  Non-toxic appearance. He does not have a sickly appearance.   Urine dipped (+) leuks with (-) nitrites; findings expected after Laser TURP.     HENT:   Head: Normocephalic and atraumatic.   Right Ear: External ear normal.   Left Ear: External ear normal.   Nose: Nose normal.   Mouth/Throat: Mucous membranes are normal.   Eyes: Conjunctivae and lids are normal. No scleral icterus.   Neck: Trachea normal, normal range of motion and full passive range of motion without pain. Neck supple. No JVD present. No tracheal deviation present.   Cardiovascular: Normal rate, S1 normal and S2 normal.    Pulmonary/Chest: Effort normal. No respiratory distress. He exhibits no tenderness.   Abdominal: Soft. Normal appearance. There is no hepatosplenomegaly. There is no tenderness. There is no CVA tenderness.   Musculoskeletal:  Normal range of motion.   Neurological: He is alert and oriented to person, place, and time. He has normal strength.   Skin: Skin is warm, dry and intact.     Psychiatric: He has a normal mood and affect. His behavior is normal. Judgment and thought content normal.       Assessment:       1. S/P TURP    2. Gross hematuria    3. Post-operative state        Plan:         I spent 20 minutes with the patient of which more than half was spent in direct consultation with the patient in regards to our treatment and plan.    Education and recommendations of today's plan of care including home remedies.  We discussed his recent urine tests.  Reviewed the expectations after a TURP and especially one who takes Eliquis.  He showed drink plenty of water; continue prostate meds.  Let me know if any problems

## 2020-01-01 LAB — BACTERIA UR CULT: NO GROWTH

## 2020-01-02 ENCOUNTER — OFFICE VISIT (OUTPATIENT)
Dept: CARDIOLOGY | Facility: CLINIC | Age: 80
End: 2020-01-02
Payer: MEDICARE

## 2020-01-02 ENCOUNTER — TELEPHONE (OUTPATIENT)
Dept: ELECTROPHYSIOLOGY | Facility: CLINIC | Age: 80
End: 2020-01-02

## 2020-01-02 ENCOUNTER — PATIENT OUTREACH (OUTPATIENT)
Dept: ADMINISTRATIVE | Facility: OTHER | Age: 80
End: 2020-01-02

## 2020-01-02 VITALS
HEART RATE: 78 BPM | BODY MASS INDEX: 34.69 KG/M2 | SYSTOLIC BLOOD PRESSURE: 122 MMHG | WEIGHT: 188.5 LBS | HEIGHT: 62 IN | DIASTOLIC BLOOD PRESSURE: 59 MMHG

## 2020-01-02 DIAGNOSIS — I10 ESSENTIAL HYPERTENSION: Chronic | ICD-10-CM

## 2020-01-02 DIAGNOSIS — I48.19 PERSISTENT ATRIAL FIBRILLATION: Primary | ICD-10-CM

## 2020-01-02 DIAGNOSIS — E78.2 MIXED HYPERLIPIDEMIA: Chronic | ICD-10-CM

## 2020-01-02 DIAGNOSIS — N18.30 CKD (CHRONIC KIDNEY DISEASE) STAGE 3, GFR 30-59 ML/MIN: ICD-10-CM

## 2020-01-02 DIAGNOSIS — E11.21 DIABETIC NEPHROPATHY ASSOCIATED WITH TYPE 2 DIABETES MELLITUS: Chronic | ICD-10-CM

## 2020-01-02 PROCEDURE — 99214 PR OFFICE/OUTPT VISIT, EST, LEVL IV, 30-39 MIN: ICD-10-PCS | Mod: S$GLB,,, | Performed by: INTERNAL MEDICINE

## 2020-01-02 PROCEDURE — 1126F PR PAIN SEVERITY QUANTIFIED, NO PAIN PRESENT: ICD-10-PCS | Mod: S$GLB,,, | Performed by: INTERNAL MEDICINE

## 2020-01-02 PROCEDURE — 1101F PT FALLS ASSESS-DOCD LE1/YR: CPT | Mod: CPTII,S$GLB,, | Performed by: INTERNAL MEDICINE

## 2020-01-02 PROCEDURE — 99214 OFFICE O/P EST MOD 30 MIN: CPT | Mod: S$GLB,,, | Performed by: INTERNAL MEDICINE

## 2020-01-02 PROCEDURE — 99999 PR PBB SHADOW E&M-EST. PATIENT-LVL III: CPT | Mod: PBBFAC,,, | Performed by: INTERNAL MEDICINE

## 2020-01-02 PROCEDURE — 1159F PR MEDICATION LIST DOCUMENTED IN MEDICAL RECORD: ICD-10-PCS | Mod: S$GLB,,, | Performed by: INTERNAL MEDICINE

## 2020-01-02 PROCEDURE — 3078F PR MOST RECENT DIASTOLIC BLOOD PRESSURE < 80 MM HG: ICD-10-PCS | Mod: CPTII,S$GLB,, | Performed by: INTERNAL MEDICINE

## 2020-01-02 PROCEDURE — 1159F MED LIST DOCD IN RCRD: CPT | Mod: S$GLB,,, | Performed by: INTERNAL MEDICINE

## 2020-01-02 PROCEDURE — 1101F PR PT FALLS ASSESS DOC 0-1 FALLS W/OUT INJ PAST YR: ICD-10-PCS | Mod: CPTII,S$GLB,, | Performed by: INTERNAL MEDICINE

## 2020-01-02 PROCEDURE — 3074F SYST BP LT 130 MM HG: CPT | Mod: CPTII,S$GLB,, | Performed by: INTERNAL MEDICINE

## 2020-01-02 PROCEDURE — 3074F PR MOST RECENT SYSTOLIC BLOOD PRESSURE < 130 MM HG: ICD-10-PCS | Mod: CPTII,S$GLB,, | Performed by: INTERNAL MEDICINE

## 2020-01-02 PROCEDURE — 1126F AMNT PAIN NOTED NONE PRSNT: CPT | Mod: S$GLB,,, | Performed by: INTERNAL MEDICINE

## 2020-01-02 PROCEDURE — 99999 PR PBB SHADOW E&M-EST. PATIENT-LVL III: ICD-10-PCS | Mod: PBBFAC,,, | Performed by: INTERNAL MEDICINE

## 2020-01-02 PROCEDURE — 3078F DIAST BP <80 MM HG: CPT | Mod: CPTII,S$GLB,, | Performed by: INTERNAL MEDICINE

## 2020-01-02 NOTE — PROGRESS NOTES
Cardiology Consults Clinic Note    Subjective:   Chief Complaint: Persistent atrial fibrillation (6 weeks fu)  Last Clinic Visit: 11/13/19    History of Present Illness: Giovanni Guerrero is a 79 y.o. male with controlled hypertension, dyslipidemia, DM2, and CKD stage II.  He was last seen in cardiology clinic on 11/13/19 for pre-op assessment.  At that time he was found to be in atrial fibrillation with controlled VR.  Since last visit, he proceeded with his scheduled urology surgery (s/p TURP on 12/6/19) and has been doing well. Had brief interruption of Eliquis at that time.  He denies chest pain, tightness, or pressure.  No reports of flutter or palpitation. Denies shortness of breath.  No lightheadedness, dizziness, presyncope, or syncope.     His blood pressure remains well controlled. He is compliant with his high intensity statin and LDL is at goal.  Resumed eliquis following TURP. Does report hematuria, although improving.   Does not routinely exercise but stays active with household chores and outings with his daughters.       Review of Systems   Constitution: Negative for decreased appetite, fever, malaise/fatigue and weight gain.   HENT: Negative for congestion, hearing loss and nosebleeds.    Eyes: Negative for visual disturbance.   Cardiovascular: Negative for chest pain, dyspnea on exertion, irregular heartbeat, leg swelling, palpitations and syncope.   Respiratory: Negative for cough, shortness of breath and sleep disturbances due to breathing.    Endocrine: Negative for cold intolerance and heat intolerance.   Hematologic/Lymphatic: Negative for bleeding problem. Bruises/bleeds easily.   Gastrointestinal: Negative for bloating, abdominal pain, change in bowel habit and heartburn.   Genitourinary: Positive for hematuria.        S/p TURP (12/6/19)   Neurological: Negative for dizziness, loss of balance and numbness.   Psychiatric/Behavioral: Negative for altered mental status. The patient is not  "nervous/anxious.      Medications:  Current Outpatient Medications on File Prior to Visit   Medication Sig    amLODIPine (NORVASC) 5 MG tablet Take 1 tablet (5 mg total) by mouth once daily.    apixaban (ELIQUIS) 5 mg Tab Take 1 tablet (5 mg total) by mouth 2 (two) times daily.    aspirin (ECOTRIN) 81 MG EC tablet Take 81 mg by mouth once daily.    atenolol (TENORMIN) 100 MG tablet TAKE 1 TABLET BY MOUTH DAILY    atorvastatin (LIPITOR) 40 MG tablet Take 1 tablet (40 mg total) by mouth once daily.    BD INSULIN SYRINGE ULTRA-FINE 0.5 mL 31 gauge x 5/16 Syrg USE WITH LANTUS INSULIN ONCE DAILY    BD ULTRA-FINE SHORT PEN NEEDLE 31 gauge x 5/16" Ndle USE WITH LANTUS INSULIN ONCE DAILY    blood sugar diagnostic Strp Check sugar tid as directed    blood-glucose meter Misc 1 kit by Misc.(Non-Drug; Combo Route) route 3 (three) times daily. Pt is to test blood sugar TID    cholecalciferol, vitamin D3, (VITAMIN D3) 2,000 unit Tab Take by mouth once daily.    cyanocobalamin 2000 MCG tablet Take 2,000 mcg by mouth once daily.     finasteride (PROSCAR) 5 mg tablet Take 1 tablet (5 mg total) by mouth once daily.    hydrALAZINE (APRESOLINE) 100 MG tablet TK 1 T PO  Q 12 H    lisinopril (PRINIVIL,ZESTRIL) 20 MG tablet Take 0.5 tablets (10 mg total) by mouth once daily.    OMEGA-3 ACID ETHYL ESTERS (OMACOR) 1 gram Cap Take 1 g by mouth. 1 Capsule Oral Twice a day    sodium bicarbonate 325 MG tablet Take 2 tablets (650 mg total) by mouth 2 (two) times daily.    tamsulosin (FLOMAX) 0.4 mg Cap TAKE 1 CAPSULE BY MOUTH TWICE A DAY    TRESIBA FLEXTOUCH U-100 100 unit/mL (3 mL) InPn INJECT 15 UNITS UNDER THE SKIN EVERY EVENING    sulfamethoxazole-trimethoprim 800-160mg (BACTRIM DS) 800-160 mg Tab Take 1 tablet by mouth once daily.    tamsulosin (FLOMAX) 0.4 mg Cap TAKE 1 CAPSULE BY MOUTH TWICE A DAY (Patient not taking: Reported on 1/2/2020)     No current facility-administered medications on file prior to visit.  " "    Family History:  Giovanni's family history includes Cancer in his mother; Cerebral aneurysm in his sister; Diabetes in his father; Heart disease in his brother, brother, and father.    Social History:  Giovanni reports that he has never smoked. He has never used smokeless tobacco. He reports that he does not drink alcohol or use drugs.    Objective:   BP (!) 122/59 (BP Location: Left arm, Patient Position: Sitting, BP Method: Large (Automatic))   Pulse 78   Ht 5' 2" (1.575 m)   Wt 85.5 kg (188 lb 7.9 oz)   BMI 34.48 kg/m²     Physical Exam   Constitutional: He is oriented to person, place, and time. Vital signs are normal. He appears well-developed and well-nourished.   HENT:   Head: Normocephalic.   Eyes: Pupils are equal, round, and reactive to light.   Neck: Normal range of motion. Neck supple. No JVD present. Carotid bruit is not present.   Cardiovascular: Normal rate, S1 normal, S2 normal, normal heart sounds and intact distal pulses. An irregularly irregular rhythm present. PMI is not displaced. Exam reveals no gallop and no friction rub.   No murmur heard.  Pulses:       Radial pulses are 2+ on the right side, and 2+ on the left side.        Dorsalis pedis pulses are 1+ on the right side, and 1+ on the left side.        Posterior tibial pulses are 1+ on the right side, and 1+ on the left side.   Pulmonary/Chest: Effort normal and breath sounds normal. No accessory muscle usage. He has no decreased breath sounds. He has no wheezes.   Abdominal: Soft. Normal appearance and bowel sounds are normal. He exhibits no distension, no fluid wave and no ascites. There is no hepatosplenomegaly. There is no tenderness.   Musculoskeletal: Normal range of motion. He exhibits no edema.   Neurological: He is alert and oriented to person, place, and time. He has normal strength.   Skin: Skin is warm, dry and intact. No ecchymosis and no rash noted. No erythema.   Psychiatric: He has a normal mood and affect. His speech " is normal and behavior is normal. Judgment and thought content normal. Cognition and memory are normal.   Vitals reviewed.      EKG:  My independent visualization of most recent EKG is atrial fibrillation at 68 bpm with nonspecific T wave abnormality. Abnormal EKG.     TTE (11/18/19):  · Normal left ventricular systolic function. The estimated ejection fraction is 60%  · Indeterminate left ventricular diastolic function.  · Aortic valve sclerosis without significant stenosis.  · Mild mitral regurgitation.  · Normal right ventricular systolic function.  · Estimated PA systolic pressure is at least 40mmHg.  · Biatrial enlargement.      Lipids:  Recent Labs   Lab 05/23/19  0840   LDL Cholesterol 47.6 L   HDL 30 L   Cholesterol 100 L      Renal:  Recent Labs   Lab 12/22/19  0451   Creatinine 1.7 H   Potassium 4.3   CO2 20 L   BUN, Bld 57 H     Liver:  Recent Labs   Lab 12/22/19  0451   AST 27   ALT 34       Assessment:     1. Persistent atrial fibrillation; rate controlled, asymptomatic , [id number] score=4, stable on eliquis    2. Essential hypertension; controlled    3. Mixed hyperlipidemia; LDL at goal on high intensity statin   4. Diabetic nephropathy associated with type 2 diabetes mellitus    5. CKD (chronic kidney disease) stage 3, GFR 30-59 ml/min      Plan:     Mr. Guerrero was seen today for management of his atrial fibrillation.    Diagnoses and associated orders include:     Persistent atrial fibrillation  -     Ambulatory Referral to Electrophysiology to consider DCCV  - Continue on current regimen.     Essential hypertension        - Continue on current regimen.        - Recommended to increase CV exercise to at least 30 minutes daily, 5 days/week     Mixed hyperlipidemia        - Continue on current regimen.        - Recommended mediterranean diet with carb restriction.     Diabetic nephropathy associated with type 2 diabetes mellitus    CKD (chronic kidney disease) stage 3, GFR 30-59 ml/min      Follow  up in 3 months.     This patient was seen and discussed with Dr. Cheney.     Aubrie KEVIN, NICOLE  01/02/2020  9:25 AM  The patient has been interviewed and examined . Agree with the above edited note.    Follow-up:     Future Appointments   Date Time Provider Department Center   1/14/2020  8:40 AM Sergio Bertrand MD [facility] ARRHYTH [facility]   2/3/2020  9:30 AM [facility] OIC-US1 MASTER [facility] ULTR [facility]   3/16/2020  9:00 AM Rafael Marquis Jr., MD [facility] UROLOGY [facility]   3/16/2020 11:05 AM LAB, APPOINTMENT [facility] LAB VNP [facility]   3/16/2020 11:10 AM SPECIMEN, [facility] SPECLAB [facility]   3/30/2020 10:00 AM Richelle Baca MD [facility] NEPHRO [facility]   5/18/2020  9:00 AM [facility]   5/19/2020  9:00 AM [facility]

## 2020-01-06 DIAGNOSIS — I49.8 OTHER SPECIFIED CARDIAC ARRHYTHMIAS: Primary | ICD-10-CM

## 2020-01-11 ENCOUNTER — PATIENT OUTREACH (OUTPATIENT)
Dept: ADMINISTRATIVE | Facility: OTHER | Age: 80
End: 2020-01-11

## 2020-01-14 ENCOUNTER — INITIAL CONSULT (OUTPATIENT)
Dept: ELECTROPHYSIOLOGY | Facility: CLINIC | Age: 80
End: 2020-01-14
Payer: MEDICARE

## 2020-01-14 VITALS
HEIGHT: 62 IN | HEART RATE: 59 BPM | DIASTOLIC BLOOD PRESSURE: 66 MMHG | SYSTOLIC BLOOD PRESSURE: 136 MMHG | WEIGHT: 191.38 LBS | BODY MASS INDEX: 35.22 KG/M2

## 2020-01-14 DIAGNOSIS — E78.2 MIXED HYPERLIPIDEMIA: Chronic | ICD-10-CM

## 2020-01-14 DIAGNOSIS — I48.19 PERSISTENT ATRIAL FIBRILLATION: Primary | ICD-10-CM

## 2020-01-14 DIAGNOSIS — I49.8 OTHER SPECIFIED CARDIAC ARRHYTHMIAS: ICD-10-CM

## 2020-01-14 DIAGNOSIS — E11.21 DIABETIC NEPHROPATHY ASSOCIATED WITH TYPE 2 DIABETES MELLITUS: Chronic | ICD-10-CM

## 2020-01-14 DIAGNOSIS — I10 ESSENTIAL HYPERTENSION: Chronic | ICD-10-CM

## 2020-01-14 DIAGNOSIS — N18.30 CKD (CHRONIC KIDNEY DISEASE) STAGE 3, GFR 30-59 ML/MIN: ICD-10-CM

## 2020-01-14 PROCEDURE — 99499 UNLISTED E&M SERVICE: CPT | Mod: S$GLB,,, | Performed by: INTERNAL MEDICINE

## 2020-01-14 PROCEDURE — 99204 PR OFFICE/OUTPT VISIT, NEW, LEVL IV, 45-59 MIN: ICD-10-PCS | Mod: S$GLB,,, | Performed by: INTERNAL MEDICINE

## 2020-01-14 PROCEDURE — 1101F PT FALLS ASSESS-DOCD LE1/YR: CPT | Mod: CPTII,S$GLB,, | Performed by: INTERNAL MEDICINE

## 2020-01-14 PROCEDURE — 1159F PR MEDICATION LIST DOCUMENTED IN MEDICAL RECORD: ICD-10-PCS | Mod: S$GLB,,, | Performed by: INTERNAL MEDICINE

## 2020-01-14 PROCEDURE — 99499 RISK ADDL DX/OHS AUDIT: ICD-10-PCS | Mod: S$GLB,,, | Performed by: INTERNAL MEDICINE

## 2020-01-14 PROCEDURE — 1126F PR PAIN SEVERITY QUANTIFIED, NO PAIN PRESENT: ICD-10-PCS | Mod: S$GLB,,, | Performed by: INTERNAL MEDICINE

## 2020-01-14 PROCEDURE — 99999 PR PBB SHADOW E&M-EST. PATIENT-LVL III: ICD-10-PCS | Mod: PBBFAC,,, | Performed by: INTERNAL MEDICINE

## 2020-01-14 PROCEDURE — 1101F PR PT FALLS ASSESS DOC 0-1 FALLS W/OUT INJ PAST YR: ICD-10-PCS | Mod: CPTII,S$GLB,, | Performed by: INTERNAL MEDICINE

## 2020-01-14 PROCEDURE — 3075F PR MOST RECENT SYSTOLIC BLOOD PRESS GE 130-139MM HG: ICD-10-PCS | Mod: CPTII,S$GLB,, | Performed by: INTERNAL MEDICINE

## 2020-01-14 PROCEDURE — 93010 ELECTROCARDIOGRAM REPORT: CPT | Mod: S$GLB,,, | Performed by: INTERNAL MEDICINE

## 2020-01-14 PROCEDURE — 3075F SYST BP GE 130 - 139MM HG: CPT | Mod: CPTII,S$GLB,, | Performed by: INTERNAL MEDICINE

## 2020-01-14 PROCEDURE — 1126F AMNT PAIN NOTED NONE PRSNT: CPT | Mod: S$GLB,,, | Performed by: INTERNAL MEDICINE

## 2020-01-14 PROCEDURE — 93005 RHYTHM STRIP: ICD-10-PCS | Mod: S$GLB,,, | Performed by: INTERNAL MEDICINE

## 2020-01-14 PROCEDURE — 99204 OFFICE O/P NEW MOD 45 MIN: CPT | Mod: S$GLB,,, | Performed by: INTERNAL MEDICINE

## 2020-01-14 PROCEDURE — 1159F MED LIST DOCD IN RCRD: CPT | Mod: S$GLB,,, | Performed by: INTERNAL MEDICINE

## 2020-01-14 PROCEDURE — 99999 PR PBB SHADOW E&M-EST. PATIENT-LVL III: CPT | Mod: PBBFAC,,, | Performed by: INTERNAL MEDICINE

## 2020-01-14 PROCEDURE — 3078F PR MOST RECENT DIASTOLIC BLOOD PRESSURE < 80 MM HG: ICD-10-PCS | Mod: CPTII,S$GLB,, | Performed by: INTERNAL MEDICINE

## 2020-01-14 PROCEDURE — 93010 RHYTHM STRIP: ICD-10-PCS | Mod: S$GLB,,, | Performed by: INTERNAL MEDICINE

## 2020-01-14 PROCEDURE — 93005 ELECTROCARDIOGRAM TRACING: CPT | Mod: S$GLB,,, | Performed by: INTERNAL MEDICINE

## 2020-01-14 PROCEDURE — 3078F DIAST BP <80 MM HG: CPT | Mod: CPTII,S$GLB,, | Performed by: INTERNAL MEDICINE

## 2020-01-14 NOTE — PROGRESS NOTES
Patient, Giovanni Guerrero (MRN #[id number]), presented with a recorded BMI of 35 kg/m^2 and a documented comorbidity(s):  - Diabetes Mellitus Type 2  - Hypertension  - Hyperlipidemia  - Atrial Fibrillation  to which the severe obesity is a contributing factor. This is consistent with the definition of severe obesity (BMI 35.0-39.9) with comorbidity (ICD-10 E66.01, Z68.35). The patient's severe obesity was monitored, evaluated, addressed and/or treated. This addendum to the medical record is made on 01/14/2020.

## 2020-01-14 NOTE — PROGRESS NOTES
Subjective:    Patient ID:  Giovanni Guerrero is a 79 y.o. male who presents for evaluation of Atrial Fibrillation    HPI  I had the pleasure of seeing Mr Giovanni Guerrero in our electrophysiology clinic today for evaluation of his atrial fibrillation. He has hypertension, DM2, HLD, BPH, and CKD. He was first diagnosed with afib about 1-2 months ago while undergoing pre operative assessment prior to urologic TURP. He was evaluated by general cardiology at that time. Echo shows normal EF. He was able to undergo his TURP. He was started on apixaban for primary stroke prevention by general cardiology. He has not had a recent ischemic assessment. He has no palpitations, diaphoresis, dizziness, or lightheadedness. He has no fatigue or exercise/effort intolerance. He has no leg swelling or orthopnea. He has no PND. He has no chest pain, chest pressure, or tightness. He has no shortness of breath at rest or with exertion. He does not think he has sleep apnea. He wakes up refreshed. He states that his wife has not told him he snores or stops breathing at night.     I have reviewed today's ECG. It shows afib with controlled ventricular rates.     Review of Systems   Constitution: Negative for chills, decreased appetite, diaphoresis, malaise/fatigue and weight loss.   HENT: Negative for congestion, hearing loss, nosebleeds and sore throat.    Eyes: Negative for pain, redness and visual disturbance.   Cardiovascular: Negative for chest pain, dyspnea on exertion, irregular heartbeat, leg swelling, orthopnea, palpitations and syncope.   Respiratory: Negative for cough, shortness of breath, sleep disturbances due to breathing and wheezing.    Endocrine: Negative for cold intolerance and heat intolerance.   Hematologic/Lymphatic: Negative for bleeding problem. Does not bruise/bleed easily.   Skin: Negative for color change, dry skin, poor wound healing and rash.   Musculoskeletal: Negative for arthritis, back pain, falls, joint pain,  muscle weakness and myalgias.   Gastrointestinal: Negative for abdominal pain, change in bowel habit, constipation, diarrhea, hematochezia, melena and vomiting.   Genitourinary: Negative for dysuria, frequency, hematuria, nocturia and urgency.   Neurological: Negative for difficulty with concentration, disturbances in coordination, dizziness, focal weakness, headaches, light-headedness, numbness and weakness.   Psychiatric/Behavioral: Negative for altered mental status. The patient does not have insomnia.         Objective:    Physical Exam   Constitutional: He is oriented to person, place, and time. He appears well-developed and well-nourished. No distress.   HENT:   Head: Normocephalic and atraumatic.   Eyes: Conjunctivae and EOM are normal. Right eye exhibits no discharge. Left eye exhibits no discharge.   Neck: Neck supple. No JVD present.   Cardiovascular: Normal rate, normal heart sounds and intact distal pulses.   No murmur heard.  Irregularly irregular rhythm   Pulmonary/Chest: Effort normal and breath sounds normal. No respiratory distress. He has no wheezes. He has no rales. He exhibits no tenderness.   Abdominal: Soft. He exhibits no distension. There is no tenderness.   Musculoskeletal: Normal range of motion. He exhibits no edema.   Neurological: He is alert and oriented to person, place, and time.   Skin: Skin is warm and dry.   Vitals reviewed.        Assessment:       1. Persistent atrial fibrillation    2. Other specified cardiac arrhythmias    3. Mixed hyperlipidemia    4. Essential hypertension    5. Diabetic nephropathy associated with type 2 diabetes mellitus    6. CKD (chronic kidney disease) stage 3, GFR 30-59 ml/min         Plan:       I had the pleasure of seeing Mr Giovanni Guerrero in our electrophysiology clinic today for evaluation of his atrial fibrillation. He has hypertension, DM2, HLD, BPH, and CKD. He was first diagnosed with afib about 1-2 months ago while undergoing pre operative  assessment prior to urologic TURP.      1. Persistent AFib  - will plan to perform NICOLE with possible DCCV to assess for potential improvement in sinus rhythm  - his [id number] is at least 4 so he needs to continue apixaban for primary stroke prevention.   - he has CKD with a GFR of 30-40. This will limit his antiarrhythmic options in the future. He may be a candidate for either dronedarone or amiodarone if antiarrhythmics are needed in the future.     Return to clinic in 6-8 weeks to reassess symptoms and success of cardioversion. Return earlier as needed for any new or worsening symptoms.

## 2020-01-14 NOTE — LETTER
January 14, 2020      Aubrie Cortes, DEMETRIA  [address]  [facility] [address]           Darrian uJan Carlos - Arrhythmia  [address]  [facility] [address]  Phone: [phone]  Fax: [phone]          Patient: Giovanni Guerrero   MR Number: [id number]   YOB: 1940   Date of Visit: 1/14/2020       Dear Aubrie Cortes:    Thank you for referring Giovanni Guerrero to me for evaluation. Attached you will find relevant portions of my assessment and plan of care.    If you have questions, please do not hesitate to call me. I look forward to following Giovanni Guerrero along with you.    Sincerely,    Sergio Bertrand MD    Enclosure  CC:  No Recipients    If you would like to receive this communication electronically, please contact [email or web address] or [phone] to request more information on [employer] Link access.    For providers and/or their staff who would like to refer a patient to [facility], please contact us through our one-stop-shop provider referral line, [facility], at [phone].    If you feel you have received this communication in error or would no longer like to receive these types of communications, please e-mail [email or web address]

## 2020-01-27 ENCOUNTER — TELEPHONE (OUTPATIENT)
Dept: ELECTROPHYSIOLOGY | Facility: CLINIC | Age: 80
End: 2020-01-27

## 2020-01-27 NOTE — TELEPHONE ENCOUNTER
Spoke to Mr. Guerrero    CONFIRMED procedure arrival time of 0700    Reiterated instructions including:  -Directions to check in desk  -NPO after midnight night prior to procedure  -High importance of taking only half dose of insulin tonight (due to fasting)  -Confirmed compliance of Eliquis and BID dosing   -Pre-procedure LABS to be drawn upon admit  -Confirmed no recent fever, bleeding, infection or skin rash in the past 30 days  -Reviewed 1 week EKG will be 2/4/20 at 0900  -Spoke to Fabrizio Chacon, with [facility].  Test claim price for Multaq 400 mg BID is $45/month.     Mr. Guerrero verbalizes understanding of above and appreciates call.

## 2020-01-28 ENCOUNTER — ANESTHESIA (OUTPATIENT)
Dept: MEDSURG UNIT | Facility: HOSPITAL | Age: 80
DRG: 308 | End: 2020-01-28
Payer: MEDICARE

## 2020-01-28 ENCOUNTER — HOSPITAL ENCOUNTER (INPATIENT)
Facility: HOSPITAL | Age: 80
LOS: 4 days | Discharge: HOME HEALTH | DRG: 308 | End: 2020-02-01
Attending: INTERNAL MEDICINE | Admitting: INTERNAL MEDICINE
Payer: MEDICARE

## 2020-01-28 ENCOUNTER — HOSPITAL ENCOUNTER (OUTPATIENT)
Dept: CARDIOLOGY | Facility: CLINIC | Age: 80
Discharge: HOME | DRG: 308 | End: 2020-01-28
Attending: INTERNAL MEDICINE
Payer: MEDICARE

## 2020-01-28 ENCOUNTER — ANESTHESIA EVENT (OUTPATIENT)
Dept: MEDSURG UNIT | Facility: HOSPITAL | Age: 80
DRG: 308 | End: 2020-01-28
Payer: MEDICARE

## 2020-01-28 VITALS
DIASTOLIC BLOOD PRESSURE: 65 MMHG | WEIGHT: 183 LBS | HEIGHT: 62 IN | BODY MASS INDEX: 33.68 KG/M2 | SYSTOLIC BLOOD PRESSURE: 115 MMHG

## 2020-01-28 DIAGNOSIS — I48.19 PERSISTENT ATRIAL FIBRILLATION: ICD-10-CM

## 2020-01-28 DIAGNOSIS — Z92.89 HISTORY OF CARDIOVERSION: ICD-10-CM

## 2020-01-28 DIAGNOSIS — I48.91 ATRIAL FIBRILLATION: ICD-10-CM

## 2020-01-28 DIAGNOSIS — I48.19 PERSISTENT ATRIAL FIBRILLATION: Primary | ICD-10-CM

## 2020-01-28 PROBLEM — R00.1 JUNCTIONAL BRADYCARDIA: Status: ACTIVE | Noted: 2020-01-28

## 2020-01-28 LAB
ALBUMIN SERPL BCP-MCNC: 3.1 G/DL (ref 3.5–5.2)
ALP SERPL-CCNC: 71 U/L (ref 55–135)
ALT SERPL W/O P-5'-P-CCNC: 15 U/L (ref 10–44)
ANION GAP SERPL CALC-SCNC: 6 MMOL/L (ref 8–16)
ANION GAP SERPL CALC-SCNC: 8 MMOL/L (ref 8–16)
ASCENDING AORTA: 3 CM
AST SERPL-CCNC: 17 U/L (ref 10–40)
BACTERIA #/AREA URNS AUTO: ABNORMAL /HPF
BASOPHILS # BLD AUTO: 0.03 K/UL (ref 0–0.2)
BASOPHILS # BLD AUTO: 0.03 K/UL (ref 0–0.2)
BASOPHILS # BLD AUTO: 0.04 K/UL (ref 0–0.2)
BASOPHILS NFR BLD: 0.3 % (ref 0–1.9)
BASOPHILS NFR BLD: 0.4 % (ref 0–1.9)
BASOPHILS NFR BLD: 0.5 % (ref 0–1.9)
BILIRUB SERPL-MCNC: 0.8 MG/DL (ref 0.1–1)
BILIRUB UR QL STRIP: NEGATIVE
BSA FOR ECHO PROCEDURE: 1.91 M2
BUN SERPL-MCNC: 35 MG/DL (ref 8–23)
BUN SERPL-MCNC: 36 MG/DL (ref 8–23)
CALCIUM SERPL-MCNC: 8.8 MG/DL (ref 8.7–10.5)
CALCIUM SERPL-MCNC: 8.9 MG/DL (ref 8.7–10.5)
CHLORIDE SERPL-SCNC: 105 MMOL/L (ref 95–110)
CHLORIDE SERPL-SCNC: 107 MMOL/L (ref 95–110)
CLARITY UR REFRACT.AUTO: CLEAR
CO2 SERPL-SCNC: 20 MMOL/L (ref 23–29)
CO2 SERPL-SCNC: 23 MMOL/L (ref 23–29)
COLOR UR AUTO: YELLOW
CREAT SERPL-MCNC: 1.6 MG/DL (ref 0.5–1.4)
CREAT SERPL-MCNC: 1.7 MG/DL (ref 0.5–1.4)
DIFFERENTIAL METHOD: ABNORMAL
EOSINOPHIL # BLD AUTO: 0 K/UL (ref 0–0.5)
EOSINOPHIL # BLD AUTO: 0.1 K/UL (ref 0–0.5)
EOSINOPHIL # BLD AUTO: 0.1 K/UL (ref 0–0.5)
EOSINOPHIL NFR BLD: 0.3 % (ref 0–8)
EOSINOPHIL NFR BLD: 1 % (ref 0–8)
EOSINOPHIL NFR BLD: 2.1 % (ref 0–8)
ERYTHROCYTE [DISTWIDTH] IN BLOOD BY AUTOMATED COUNT: 14.6 % (ref 11.5–14.5)
EST. GFR  (AFRICAN AMERICAN): 44 ML/MIN/1.73 M^2
EST. GFR  (AFRICAN AMERICAN): 47.3 ML/MIN/1.73 M^2
EST. GFR  (NON AFRICAN AMERICAN): 38.1 ML/MIN/1.73 M^2
EST. GFR  (NON AFRICAN AMERICAN): 40.9 ML/MIN/1.73 M^2
GLUCOSE SERPL-MCNC: 122 MG/DL (ref 70–110)
GLUCOSE SERPL-MCNC: 95 MG/DL (ref 70–110)
GLUCOSE UR QL STRIP: NEGATIVE
HCT VFR BLD AUTO: 40.3 % (ref 40–54)
HCT VFR BLD AUTO: 41.6 % (ref 40–54)
HCT VFR BLD AUTO: 43.3 % (ref 40–54)
HGB BLD-MCNC: 13.3 G/DL (ref 14–18)
HGB BLD-MCNC: 13.9 G/DL (ref 14–18)
HGB BLD-MCNC: 14.1 G/DL (ref 14–18)
HGB UR QL STRIP: ABNORMAL
IMM GRANULOCYTES # BLD AUTO: 0.01 K/UL (ref 0–0.04)
IMM GRANULOCYTES # BLD AUTO: 0.02 K/UL (ref 0–0.04)
IMM GRANULOCYTES # BLD AUTO: 0.04 K/UL (ref 0–0.04)
IMM GRANULOCYTES NFR BLD AUTO: 0.1 % (ref 0–0.5)
IMM GRANULOCYTES NFR BLD AUTO: 0.3 % (ref 0–0.5)
IMM GRANULOCYTES NFR BLD AUTO: 0.4 % (ref 0–0.5)
INR PPP: 1.4 (ref 0.8–1.2)
KETONES UR QL STRIP: NEGATIVE
LACTATE SERPL-SCNC: 1.5 MMOL/L (ref 0.5–2.2)
LACTATE SERPL-SCNC: 1.6 MMOL/L (ref 0.5–2.2)
LEUKOCYTE ESTERASE UR QL STRIP: NEGATIVE
LYMPHOCYTES # BLD AUTO: 2.3 K/UL (ref 1–4.8)
LYMPHOCYTES # BLD AUTO: 2.6 K/UL (ref 1–4.8)
LYMPHOCYTES # BLD AUTO: 3 K/UL (ref 1–4.8)
LYMPHOCYTES NFR BLD: 18.4 % (ref 18–48)
LYMPHOCYTES NFR BLD: 37.6 % (ref 18–48)
LYMPHOCYTES NFR BLD: 41.4 % (ref 18–48)
MAGNESIUM SERPL-MCNC: 1.5 MG/DL (ref 1.6–2.6)
MCH RBC QN AUTO: 32 PG (ref 27–31)
MCH RBC QN AUTO: 32.6 PG (ref 27–31)
MCH RBC QN AUTO: 33.3 PG (ref 27–31)
MCHC RBC AUTO-ENTMCNC: 32.1 G/DL (ref 32–36)
MCHC RBC AUTO-ENTMCNC: 33 G/DL (ref 32–36)
MCHC RBC AUTO-ENTMCNC: 33.9 G/DL (ref 32–36)
MCV RBC AUTO: 100 FL (ref 82–98)
MCV RBC AUTO: 98 FL (ref 82–98)
MCV RBC AUTO: 99 FL (ref 82–98)
MICROSCOPIC COMMENT: ABNORMAL
MONOCYTES # BLD AUTO: 0.5 K/UL (ref 0.3–1)
MONOCYTES # BLD AUTO: 0.6 K/UL (ref 0.3–1)
MONOCYTES # BLD AUTO: 0.9 K/UL (ref 0.3–1)
MONOCYTES NFR BLD: 6.4 % (ref 4–15)
MONOCYTES NFR BLD: 7.5 % (ref 4–15)
MONOCYTES NFR BLD: 9.6 % (ref 4–15)
NEUTROPHILS # BLD AUTO: 10.6 K/UL (ref 1.8–7.7)
NEUTROPHILS # BLD AUTO: 2.6 K/UL (ref 1.8–7.7)
NEUTROPHILS # BLD AUTO: 4.3 K/UL (ref 1.8–7.7)
NEUTROPHILS NFR BLD: 46 % (ref 38–73)
NEUTROPHILS NFR BLD: 53.4 % (ref 38–73)
NEUTROPHILS NFR BLD: 74.3 % (ref 38–73)
NITRITE UR QL STRIP: NEGATIVE
NRBC BLD-RTO: 0 /100 WBC
PH UR STRIP: 5 [PH] (ref 5–8)
PHOSPHATE SERPL-MCNC: 3.3 MG/DL (ref 2.7–4.5)
PLATELET # BLD AUTO: 171 K/UL (ref 150–350)
PLATELET # BLD AUTO: 172 K/UL (ref 150–350)
PLATELET # BLD AUTO: 208 K/UL (ref 150–350)
PMV BLD AUTO: 10 FL (ref 9.2–12.9)
PMV BLD AUTO: 10 FL (ref 9.2–12.9)
PMV BLD AUTO: 10.5 FL (ref 9.2–12.9)
POCT GLUCOSE: 115 MG/DL (ref 70–110)
POCT GLUCOSE: 158 MG/DL (ref 70–110)
POCT GLUCOSE: 252 MG/DL (ref 70–110)
POCT GLUCOSE: 91 MG/DL (ref 70–110)
POCT GLUCOSE: 93 MG/DL (ref 70–110)
POTASSIUM SERPL-SCNC: 4.6 MMOL/L (ref 3.5–5.1)
POTASSIUM SERPL-SCNC: 5.4 MMOL/L (ref 3.5–5.1)
PROT SERPL-MCNC: 6.1 G/DL (ref 6–8.4)
PROT UR QL STRIP: NEGATIVE
PROTHROMBIN TIME: 13.5 SEC (ref 9–12.5)
RBC # BLD AUTO: 4.08 M/UL (ref 4.6–6.2)
RBC # BLD AUTO: 4.23 M/UL (ref 4.6–6.2)
RBC # BLD AUTO: 4.34 M/UL (ref 4.6–6.2)
RBC #/AREA URNS AUTO: 5 /HPF (ref 0–4)
SINUS: 3 CM
SODIUM SERPL-SCNC: 134 MMOL/L (ref 136–145)
SODIUM SERPL-SCNC: 135 MMOL/L (ref 136–145)
SP GR UR STRIP: 1.01 (ref 1–1.03)
SQUAMOUS #/AREA URNS AUTO: 0 /HPF
STJ: 2.5 CM
TSH SERPL DL<=0.005 MIU/L-ACNC: 2.49 UIU/ML (ref 0.4–4)
URN SPEC COLLECT METH UR: ABNORMAL
WBC # BLD AUTO: 14.23 K/UL (ref 3.9–12.7)
WBC # BLD AUTO: 5.6 K/UL (ref 3.9–12.7)
WBC # BLD AUTO: 8.09 K/UL (ref 3.9–12.7)
WBC #/AREA URNS AUTO: 2 /HPF (ref 0–5)

## 2020-01-28 PROCEDURE — 25000003 PHARM REV CODE 250: Performed by: INTERNAL MEDICINE

## 2020-01-28 PROCEDURE — 81001 URINALYSIS AUTO W/SCOPE: CPT

## 2020-01-28 PROCEDURE — 92960 CARDIOVERSION ELECTRIC EXT: CPT | Performed by: INTERNAL MEDICINE

## 2020-01-28 PROCEDURE — 37000008 HC ANESTHESIA 1ST 15 MINUTES: Performed by: INTERNAL MEDICINE

## 2020-01-28 PROCEDURE — 85025 COMPLETE CBC W/AUTO DIFF WBC: CPT

## 2020-01-28 PROCEDURE — 93010 ELECTROCARDIOGRAM REPORT: CPT | Mod: 76,,, | Performed by: INTERNAL MEDICINE

## 2020-01-28 PROCEDURE — 36620 INSERTION CATHETER ARTERY: CPT | Mod: ,,, | Performed by: STUDENT IN AN ORGANIZED HEALTH CARE EDUCATION/TRAINING PROGRAM

## 2020-01-28 PROCEDURE — D9220A PRA ANESTHESIA: Mod: CRNA,,, | Performed by: NURSE ANESTHETIST, CERTIFIED REGISTERED

## 2020-01-28 PROCEDURE — 87040 BLOOD CULTURE FOR BACTERIA: CPT | Mod: 59

## 2020-01-28 PROCEDURE — D9220A PRA ANESTHESIA: Mod: ANES,,, | Performed by: ANESTHESIOLOGY

## 2020-01-28 PROCEDURE — 63600175 PHARM REV CODE 636 W HCPCS: Performed by: INTERNAL MEDICINE

## 2020-01-28 PROCEDURE — 93325 DOPPLER ECHO COLOR FLOW MAPG: CPT

## 2020-01-28 PROCEDURE — 63600175 PHARM REV CODE 636 W HCPCS: Performed by: STUDENT IN AN ORGANIZED HEALTH CARE EDUCATION/TRAINING PROGRAM

## 2020-01-28 PROCEDURE — 93010 ELECTROCARDIOGRAM REPORT: CPT | Mod: ,,, | Performed by: INTERNAL MEDICINE

## 2020-01-28 PROCEDURE — 63600175 PHARM REV CODE 636 W HCPCS: Performed by: NURSE PRACTITIONER

## 2020-01-28 PROCEDURE — 93325 TRANSESOPHAGEAL ECHO (TEE) (CUPID ONLY): ICD-10-PCS | Mod: 26,,, | Performed by: INTERNAL MEDICINE

## 2020-01-28 PROCEDURE — 93325 DOPPLER ECHO COLOR FLOW MAPG: CPT | Mod: 26,,, | Performed by: INTERNAL MEDICINE

## 2020-01-28 PROCEDURE — 84443 ASSAY THYROID STIM HORMONE: CPT

## 2020-01-28 PROCEDURE — 82962 GLUCOSE BLOOD TEST: CPT

## 2020-01-28 PROCEDURE — 83605 ASSAY OF LACTIC ACID: CPT | Mod: 91

## 2020-01-28 PROCEDURE — 63600175 PHARM REV CODE 636 W HCPCS: Mod: JG | Performed by: INTERNAL MEDICINE

## 2020-01-28 PROCEDURE — 37000009 HC ANESTHESIA EA ADD 15 MINS: Performed by: INTERNAL MEDICINE

## 2020-01-28 PROCEDURE — 25000003 PHARM REV CODE 250

## 2020-01-28 PROCEDURE — 25000003 PHARM REV CODE 250: Performed by: STUDENT IN AN ORGANIZED HEALTH CARE EDUCATION/TRAINING PROGRAM

## 2020-01-28 PROCEDURE — 92960 CARDIOVERSION ELECTRIC EXT: CPT | Mod: ,,, | Performed by: INTERNAL MEDICINE

## 2020-01-28 PROCEDURE — 93320 TRANSESOPHAGEAL ECHO (TEE) (CUPID ONLY): ICD-10-PCS | Mod: 26,,, | Performed by: INTERNAL MEDICINE

## 2020-01-28 PROCEDURE — 93005 ELECTROCARDIOGRAM TRACING: CPT

## 2020-01-28 PROCEDURE — 63600175 PHARM REV CODE 636 W HCPCS: Performed by: NURSE ANESTHETIST, CERTIFIED REGISTERED

## 2020-01-28 PROCEDURE — 99291 CRITICAL CARE FIRST HOUR: CPT | Mod: GC,,, | Performed by: INTERNAL MEDICINE

## 2020-01-28 PROCEDURE — 93312 TRANSESOPHAGEAL ECHO (TEE) (CUPID ONLY): ICD-10-PCS | Mod: 26,,, | Performed by: INTERNAL MEDICINE

## 2020-01-28 PROCEDURE — 82962 GLUCOSE BLOOD TEST: CPT | Performed by: INTERNAL MEDICINE

## 2020-01-28 PROCEDURE — 25000003 PHARM REV CODE 250: Performed by: NURSE ANESTHETIST, CERTIFIED REGISTERED

## 2020-01-28 PROCEDURE — 93010 EKG 12-LEAD: ICD-10-PCS | Mod: ,,, | Performed by: INTERNAL MEDICINE

## 2020-01-28 PROCEDURE — 83605 ASSAY OF LACTIC ACID: CPT

## 2020-01-28 PROCEDURE — 92960 PR CARDIOVERSION, ELECTIVE;EXTERN: ICD-10-PCS | Mod: ,,, | Performed by: INTERNAL MEDICINE

## 2020-01-28 PROCEDURE — 83735 ASSAY OF MAGNESIUM: CPT

## 2020-01-28 PROCEDURE — 80048 BASIC METABOLIC PNL TOTAL CA: CPT

## 2020-01-28 PROCEDURE — 93312 ECHO TRANSESOPHAGEAL: CPT | Mod: 26,,, | Performed by: INTERNAL MEDICINE

## 2020-01-28 PROCEDURE — D9220A PRA ANESTHESIA: ICD-10-PCS | Mod: ANES,,, | Performed by: ANESTHESIOLOGY

## 2020-01-28 PROCEDURE — 36620 ARTERIAL LINE: ICD-10-PCS | Mod: ,,, | Performed by: STUDENT IN AN ORGANIZED HEALTH CARE EDUCATION/TRAINING PROGRAM

## 2020-01-28 PROCEDURE — 93320 DOPPLER ECHO COMPLETE: CPT | Mod: 26,,, | Performed by: INTERNAL MEDICINE

## 2020-01-28 PROCEDURE — 20000000 HC ICU ROOM

## 2020-01-28 PROCEDURE — C9399 UNCLASSIFIED DRUGS OR BIOLOG: HCPCS | Performed by: INTERNAL MEDICINE

## 2020-01-28 PROCEDURE — 99291 PR CRITICAL CARE, E/M 30-74 MINUTES: ICD-10-PCS | Mod: GC,,, | Performed by: INTERNAL MEDICINE

## 2020-01-28 PROCEDURE — 84100 ASSAY OF PHOSPHORUS: CPT

## 2020-01-28 PROCEDURE — 80053 COMPREHEN METABOLIC PANEL: CPT

## 2020-01-28 PROCEDURE — 85610 PROTHROMBIN TIME: CPT

## 2020-01-28 PROCEDURE — D9220A PRA ANESTHESIA: ICD-10-PCS | Mod: CRNA,,, | Performed by: NURSE ANESTHETIST, CERTIFIED REGISTERED

## 2020-01-28 RX ORDER — AMLODIPINE BESYLATE 5 MG/1
5 TABLET ORAL DAILY
Status: DISCONTINUED | OUTPATIENT
Start: 2020-01-29 | End: 2020-01-28

## 2020-01-28 RX ORDER — ATORVASTATIN CALCIUM 20 MG/1
40 TABLET, FILM COATED ORAL DAILY
Status: DISCONTINUED | OUTPATIENT
Start: 2020-01-29 | End: 2020-02-01 | Stop reason: HOSPADM

## 2020-01-28 RX ORDER — VANCOMYCIN HCL IN 5 % DEXTROSE 1G/250ML
1000 PLASTIC BAG, INJECTION (ML) INTRAVENOUS
Status: DISCONTINUED | OUTPATIENT
Start: 2020-01-28 | End: 2020-01-29

## 2020-01-28 RX ORDER — PHENYLEPHRINE HCL IN 0.9% NACL 1 MG/10 ML
SYRINGE (ML) INTRAVENOUS
Status: DISPENSED
Start: 2020-01-28 | End: 2020-01-29

## 2020-01-28 RX ORDER — SODIUM CHLORIDE 0.9 % (FLUSH) 0.9 %
10 SYRINGE (ML) INJECTION
Status: DISCONTINUED | OUTPATIENT
Start: 2020-01-28 | End: 2020-02-01 | Stop reason: HOSPADM

## 2020-01-28 RX ORDER — INSULIN ASPART 100 [IU]/ML
0-5 INJECTION, SOLUTION INTRAVENOUS; SUBCUTANEOUS
Status: DISCONTINUED | OUTPATIENT
Start: 2020-01-28 | End: 2020-01-29

## 2020-01-28 RX ORDER — DOPAMINE HYDROCHLORIDE 160 MG/100ML
2 INJECTION, SOLUTION INTRAVENOUS CONTINUOUS
Status: DISCONTINUED | OUTPATIENT
Start: 2020-01-28 | End: 2020-01-29

## 2020-01-28 RX ORDER — SODIUM CHLORIDE 0.9 % (FLUSH) 0.9 %
3 SYRINGE (ML) INJECTION
Status: DISCONTINUED | OUTPATIENT
Start: 2020-01-28 | End: 2020-02-01 | Stop reason: HOSPADM

## 2020-01-28 RX ORDER — LISINOPRIL 10 MG/1
10 TABLET ORAL DAILY
Status: DISCONTINUED | OUTPATIENT
Start: 2020-01-29 | End: 2020-01-28

## 2020-01-28 RX ORDER — MEPERIDINE HYDROCHLORIDE 50 MG/ML
12.5 INJECTION INTRAMUSCULAR; INTRAVENOUS; SUBCUTANEOUS EVERY 10 MIN PRN
Status: ACTIVE | OUTPATIENT
Start: 2020-01-28 | End: 2020-01-28

## 2020-01-28 RX ORDER — FINASTERIDE 5 MG/1
5 TABLET, FILM COATED ORAL DAILY
Status: DISCONTINUED | OUTPATIENT
Start: 2020-01-29 | End: 2020-01-28

## 2020-01-28 RX ORDER — SODIUM CHLORIDE 9 MG/ML
INJECTION, SOLUTION INTRAVENOUS CONTINUOUS
Status: ACTIVE | OUTPATIENT
Start: 2020-01-28

## 2020-01-28 RX ORDER — LIDOCAINE HYDROCHLORIDE 20 MG/ML
SOLUTION OROPHARYNGEAL
Status: DISCONTINUED | OUTPATIENT
Start: 2020-01-28 | End: 2020-01-28

## 2020-01-28 RX ORDER — PROPOFOL 10 MG/ML
VIAL (ML) INTRAVENOUS CONTINUOUS PRN
Status: DISCONTINUED | OUTPATIENT
Start: 2020-01-28 | End: 2020-01-28

## 2020-01-28 RX ORDER — LIDOCAINE HYDROCHLORIDE 10 MG/ML
INJECTION, SOLUTION EPIDURAL; INFILTRATION; INTRACAUDAL; PERINEURAL
Status: COMPLETED
Start: 2020-01-28 | End: 2020-01-28

## 2020-01-28 RX ORDER — ETOMIDATE 2 MG/ML
INJECTION INTRAVENOUS
Status: DISCONTINUED | OUTPATIENT
Start: 2020-01-28 | End: 2020-01-28

## 2020-01-28 RX ORDER — HYDRALAZINE HYDROCHLORIDE 10 MG/1
10 TABLET, FILM COATED ORAL 2 TIMES DAILY
Status: DISCONTINUED | OUTPATIENT
Start: 2020-01-28 | End: 2020-01-28

## 2020-01-28 RX ORDER — INSULIN ASPART 100 [IU]/ML
2 INJECTION, SOLUTION INTRAVENOUS; SUBCUTANEOUS
Status: DISCONTINUED | OUTPATIENT
Start: 2020-01-28 | End: 2020-01-29

## 2020-01-28 RX ORDER — SILVER SULFADIAZINE 10 G/1000G
CREAM TOPICAL
Status: DISCONTINUED | OUTPATIENT
Start: 2020-01-28 | End: 2020-02-01 | Stop reason: HOSPADM

## 2020-01-28 RX ORDER — NOREPINEPHRINE BITARTRATE/D5W 4MG/250ML
PLASTIC BAG, INJECTION (ML) INTRAVENOUS
Status: DISPENSED
Start: 2020-01-28 | End: 2020-01-29

## 2020-01-28 RX ORDER — PROPOFOL 10 MG/ML
VIAL (ML) INTRAVENOUS
Status: DISCONTINUED | OUTPATIENT
Start: 2020-01-28 | End: 2020-01-28

## 2020-01-28 RX ORDER — GLUCAGON 1 MG
10 KIT INJECTION ONCE
Status: COMPLETED | OUTPATIENT
Start: 2020-01-28 | End: 2020-01-28

## 2020-01-28 RX ORDER — GLYCOPYRROLATE 0.2 MG/ML
INJECTION INTRAMUSCULAR; INTRAVENOUS
Status: DISCONTINUED | OUTPATIENT
Start: 2020-01-28 | End: 2020-01-28

## 2020-01-28 RX ORDER — FENTANYL CITRATE 50 UG/ML
INJECTION, SOLUTION INTRAMUSCULAR; INTRAVENOUS
Status: DISCONTINUED | OUTPATIENT
Start: 2020-01-28 | End: 2020-01-28

## 2020-01-28 RX ORDER — HYDROMORPHONE HYDROCHLORIDE 1 MG/ML
0.2 INJECTION, SOLUTION INTRAMUSCULAR; INTRAVENOUS; SUBCUTANEOUS EVERY 5 MIN PRN
Status: DISCONTINUED | OUTPATIENT
Start: 2020-01-28 | End: 2020-01-30

## 2020-01-28 RX ORDER — CALCIUM CHLORIDE INJECTION 100 MG/ML
1 INJECTION, SOLUTION INTRAVENOUS ONCE
Status: DISCONTINUED | OUTPATIENT
Start: 2020-01-28 | End: 2020-01-28

## 2020-01-28 RX ORDER — SODIUM CHLORIDE 0.9 % (FLUSH) 0.9 %
5 SYRINGE (ML) INJECTION
Status: ACTIVE | OUTPATIENT
Start: 2020-01-28

## 2020-01-28 RX ORDER — ASPIRIN 81 MG/1
81 TABLET ORAL DAILY
Status: DISCONTINUED | OUTPATIENT
Start: 2020-01-29 | End: 2020-02-01 | Stop reason: HOSPADM

## 2020-01-28 RX ORDER — NOREPINEPHRINE BITARTRATE/D5W 4MG/250ML
0.02 PLASTIC BAG, INJECTION (ML) INTRAVENOUS CONTINUOUS
Status: DISCONTINUED | OUTPATIENT
Start: 2020-01-28 | End: 2020-01-28

## 2020-01-28 RX ADMIN — FENTANYL CITRATE 50 MCG: 50 INJECTION, SOLUTION INTRAMUSCULAR; INTRAVENOUS at 09:01

## 2020-01-28 RX ADMIN — CALCIUM GLUCONATE 500 MG: 98 INJECTION, SOLUTION INTRAVENOUS at 05:01

## 2020-01-28 RX ADMIN — INSULIN DETEMIR 5 UNITS: 100 INJECTION, SOLUTION SUBCUTANEOUS at 09:01

## 2020-01-28 RX ADMIN — LIDOCAINE HYDROCHLORIDE 5 ML: 20 SOLUTION OROPHARYNGEAL at 09:01

## 2020-01-28 RX ADMIN — GLUCAGON 10 MG: 1 INJECTION, POWDER, LYOPHILIZED, FOR SOLUTION INTRAMUSCULAR; INTRAVENOUS at 02:01

## 2020-01-28 RX ADMIN — INSULIN ASPART 1 UNITS: 100 INJECTION, SOLUTION INTRAVENOUS; SUBCUTANEOUS at 09:01

## 2020-01-28 RX ADMIN — Medication 0.3 MCG/KG/MIN: at 09:01

## 2020-01-28 RX ADMIN — PROPOFOL 75 MCG/KG/MIN: 10 INJECTION, EMULSION INTRAVENOUS at 09:01

## 2020-01-28 RX ADMIN — ISOPROTERENOL HYDROCHLORIDE 1 MCG/MIN: 0.2 INJECTION, SOLUTION INTRAMUSCULAR; INTRAVENOUS at 09:01

## 2020-01-28 RX ADMIN — LIDOCAINE HYDROCHLORIDE: 10 INJECTION, SOLUTION EPIDURAL; INFILTRATION; INTRACAUDAL at 04:01

## 2020-01-28 RX ADMIN — ETOMIDATE 6 MG: 2 INJECTION, SOLUTION INTRAVENOUS at 09:01

## 2020-01-28 RX ADMIN — PIPERACILLIN SODIUM,TAZOBACTAM SODIUM 4.5 G: 4; .5 INJECTION, POWDER, FOR SOLUTION INTRAVENOUS at 08:01

## 2020-01-28 RX ADMIN — NOREPINEPHRINE BITARTRATE 0.3 MCG/KG/MIN: 1 INJECTION, SOLUTION, CONCENTRATE INTRAVENOUS at 10:01

## 2020-01-28 RX ADMIN — DOPAMINE HYDROCHLORIDE IN DEXTROSE 10 MCG/KG/MIN: 1.6 INJECTION, SOLUTION INTRAVENOUS at 08:01

## 2020-01-28 RX ADMIN — DOPAMINE HYDROCHLORIDE IN DEXTROSE 2 MCG/KG/MIN: 1.6 INJECTION, SOLUTION INTRAVENOUS at 12:01

## 2020-01-28 RX ADMIN — APIXABAN 5 MG: 5 TABLET, FILM COATED ORAL at 08:01

## 2020-01-28 RX ADMIN — GLYCOPYRROLATE 0.1 MG: 0.2 INJECTION, SOLUTION INTRAMUSCULAR; INTRAVENOUS at 09:01

## 2020-01-28 RX ADMIN — SODIUM CHLORIDE: 0.9 INJECTION, SOLUTION INTRAVENOUS at 08:01

## 2020-01-28 RX ADMIN — VANCOMYCIN HYDROCHLORIDE 1000 MG: 1 INJECTION, POWDER, LYOPHILIZED, FOR SOLUTION INTRAVENOUS at 08:01

## 2020-01-28 RX ADMIN — PROPOFOL 30 MG: 10 INJECTION, EMULSION INTRAVENOUS at 09:01

## 2020-01-28 RX ADMIN — SODIUM CHLORIDE 1000 ML: 0.9 INJECTION, SOLUTION INTRAVENOUS at 09:01

## 2020-01-28 RX ADMIN — Medication 0.4 MCG/KG/MIN: at 06:01

## 2020-01-28 NOTE — PLAN OF CARE
SW attempted to meet with pt/family to complete d/c assessment. MD/team at bedside. SW will return later.    Liset Correa, PAULIE [id number]

## 2020-01-28 NOTE — SUBJECTIVE & OBJECTIVE
"Past Medical History:   Diagnosis Date    Allergy     Anticoagulant long-term use     Colon polyp     Diabetes mellitus type II     Gout, unspecified     Hx of colonic polyps     Hyperlipidemia     Hypertension     Prostatic hypertrophy     Renal insufficiency        Past Surgical History:   Procedure Laterality Date    APPENDECTOMY      CATARACT EXTRACTION Left 2018    EYE SURGERY      cataract    HERNIA REPAIR      TONSILLECTOMY      TRANSURETHRAL RESECTION OF PROSTATE (TURP) USING LASER N/A 12/6/2019    Procedure: TURP, USING LASER;  Surgeon: Rafael Marquis Jr., MD;  Location: [facility] OR [address] [place] [address];  Service: Urology;  Laterality: N/A;  75min       Review of patient's allergies indicates:   Allergen Reactions    Actos [pioglitazone] Other (See Comments)     constipation       No current facility-administered medications on file prior to encounter.      Current Outpatient Medications on File Prior to Encounter   Medication Sig    amLODIPine (NORVASC) 5 MG tablet Take 1 tablet (5 mg total) by mouth once daily.    apixaban (ELIQUIS) 5 mg Tab Take 1 tablet (5 mg total) by mouth 2 (two) times daily.    aspirin (ECOTRIN) 81 MG EC tablet Take 81 mg by mouth once daily.    atenolol (TENORMIN) 100 MG tablet TAKE 1 TABLET BY MOUTH DAILY    atorvastatin (LIPITOR) 40 MG tablet Take 1 tablet (40 mg total) by mouth once daily.    BD INSULIN SYRINGE ULTRA-FINE 0.5 mL 31 gauge x 5/16 Syrg USE WITH LANTUS INSULIN ONCE DAILY    BD ULTRA-FINE SHORT PEN NEEDLE 31 gauge x 5/16" Ndle USE WITH LANTUS INSULIN ONCE DAILY    blood sugar diagnostic Strp Check sugar tid as directed    blood-glucose meter Misc 1 kit by Misc.(Non-Drug; Combo Route) route 3 (three) times daily. Pt is to test blood sugar TID    cholecalciferol, vitamin D3, (VITAMIN D3) 2,000 unit Tab Take by mouth once daily.    cyanocobalamin 2000 MCG tablet Take 2,000 mcg by mouth once daily.     finasteride (PROSCAR) 5 mg tablet Take 1 tablet (5 mg " total) by mouth once daily.    hydrALAZINE (APRESOLINE) 100 MG tablet TK 1 T PO  Q 12 H    lisinopril (PRINIVIL,ZESTRIL) 20 MG tablet Take 0.5 tablets (10 mg total) by mouth once daily.    OMEGA-3 ACID ETHYL ESTERS (OMACOR) 1 gram Cap Take 1 g by mouth. 1 Capsule Oral Twice a day    sodium bicarbonate 325 MG tablet Take 2 tablets (650 mg total) by mouth 2 (two) times daily.    sulfamethoxazole-trimethoprim 800-160mg (BACTRIM DS) 800-160 mg Tab Take 1 tablet by mouth once daily.    tamsulosin (FLOMAX) 0.4 mg Cap TAKE 1 CAPSULE BY MOUTH TWICE A DAY    tamsulosin (FLOMAX) 0.4 mg Cap TAKE 1 CAPSULE BY MOUTH TWICE A DAY    TRESIBA FLEXTOUCH U-100 100 unit/mL (3 mL) InPn INJECT 15 UNITS UNDER THE SKIN EVERY EVENING     Family History     Problem Relation (Age of Onset)    Cancer Mother    Cerebral aneurysm Sister    Diabetes Father    Heart disease Father, Brother, Brother        Tobacco Use    Smoking status: Never Smoker    Smokeless tobacco: Never Used   Substance and Sexual Activity    Alcohol use: No    Drug use: No    Sexual activity: Not Currently     Review of Systems   Constitution: Negative for chills, decreased appetite, diaphoresis, fever, malaise/fatigue, night sweats, weight gain and weight loss.   Eyes: Negative.    Cardiovascular: Negative for chest pain, irregular heartbeat, leg swelling, near-syncope, orthopnea, palpitations, paroxysmal nocturnal dyspnea and syncope.   Respiratory: Negative for cough, shortness of breath, sputum production and wheezing.    Endocrine: Negative.    Hematologic/Lymphatic: Negative for bleeding problem.   Skin: Negative for color change, flushing, rash and suspicious lesions.   Musculoskeletal: Negative.    Gastrointestinal: Negative for bloating, abdominal pain, change in bowel habit, constipation, diarrhea, heartburn, melena, nausea and vomiting.   Genitourinary: Negative for flank pain, frequency, hematuria, incomplete emptying and urgency.   Neurological:  Negative for dizziness, focal weakness, headaches, light-headedness, numbness, paresthesias, seizures, sensory change and weakness.   Psychiatric/Behavioral: Negative for altered mental status. The patient is not nervous/anxious.      Objective:     Vital Signs (Most Recent):    Vital Signs (24h Range):           There is no height or weight on file to calculate BMI.            No intake or output data in the 24 hours ending 01/28/20 0720    Lines/Drains/Airways     Drain                 Urethral Catheter 10/21/19 1929 Double-lumen 16 Fr. 98 days         Urethral Catheter 12/06/19 Double-lumen;Latex;Straight-tip 22 Fr. 53 days                Physical Exam   Constitutional: He is oriented to person, place, and time. He appears well-developed and well-nourished. No distress.   HENT:   Head: Normocephalic and atraumatic.   Mouth/Throat: Oropharynx is clear and moist.   Eyes: Pupils are equal, round, and reactive to light. EOM are normal.   Neck: Normal range of motion. Neck supple. No JVD present.   Cardiovascular: Normal rate and regular rhythm. Exam reveals no gallop and no friction rub.   No murmur heard.  Pulmonary/Chest: Effort normal and breath sounds normal. No respiratory distress. He has no wheezes. He has no rales. He exhibits no tenderness.   Abdominal: Soft. Bowel sounds are normal. He exhibits no distension. There is no tenderness.   Musculoskeletal: Normal range of motion. He exhibits no edema.   Lymphadenopathy:     He has no cervical adenopathy.   Neurological: He is alert and oriented to person, place, and time.   Skin: Skin is warm and dry. No erythema.   Psychiatric: He has a normal mood and affect. His behavior is normal. Judgment and thought content normal.       Significant Labs: All pertinent lab results from the last 24 hours have been reviewed.    Significant Imaging: Reviewed in EPIC.

## 2020-01-28 NOTE — ANESTHESIA POSTPROCEDURE EVALUATION
Anesthesia Post Evaluation    Patient: Giovanni Guerrero    Procedure(s) Performed: Procedure(s) (LRB):  CARDIOVERSION (N/A)  Transesophageal echo (NICOLE) intra-procedure log documentation (N/A)    Final Anesthesia Type: general    Patient location during evaluation: PACU  Patient participation: Yes- Able to Participate  Level of consciousness: awake and alert and oriented  Post-procedure vital signs: reviewed and stable  Pain management: adequate  Airway patency: patent    PONV status at discharge: No PONV  Anesthetic complications: no      Cardiovascular status: stable and bradycardic (addressed by primary)  Respiratory status: unassisted, spontaneous ventilation and room air  Hydration status: euvolemic  Follow-up not needed.          Vitals Value Taken Time   BP 95/51 1/28/2020 10:17 AM   Temp 36.2 °C (97.2 °F) 1/28/2020  9:52 AM   Pulse 34 1/28/2020 10:27 AM   Resp 18 1/28/2020 10:15 AM   SpO2 98 % 1/28/2020 10:27 AM   Vitals shown include unvalidated device data.      No case tracking events are documented in the log.      Pain/Casper Score: Casper Score: 7 (1/28/2020 10:15 AM)

## 2020-01-28 NOTE — PLAN OF CARE
SW attempted for a 2nd time to complete d/c assessment; team still at bedside, family not present.    Liset Correa, [facility] [id number]

## 2020-01-28 NOTE — ANESTHESIA PREPROCEDURE EVALUATION
01/28/2020  Giovanni Guerrero is a 79 y.o., male.      Procedures:       CARDIOVERSION (N/A Chest) - AF, NICOLE, DCCV, MAC, SD, 3 Prep      Transesophageal echo (NICOLE) intra-procedure log documentation (N/A )   Anesthesia type: Monitor Anesthesia Care   Diagnosis: Persistent atrial fibrillation [I48.19]           Pre-operative evaluation for Procedure(s) (LRB):  CARDIOVERSION (N/A)  Transesophageal echo (NICOLE) intra-procedure log documentation (N/A)    Encounter Diagnoses   Name Primary?    Persistent atrial fibrillation Yes    Atrial fibrillation     Atrial fibrillation        Review of patient's allergies indicates:   Allergen Reactions    Actos [pioglitazone] Other (See Comments)     constipation       No current facility-administered medications on file prior to encounter.      Current Outpatient Medications on File Prior to Encounter   Medication Sig Dispense Refill    amLODIPine (NORVASC) 5 MG tablet Take 1 tablet (5 mg total) by mouth once daily. 90 tablet 3    apixaban (ELIQUIS) 5 mg Tab Take 1 tablet (5 mg total) by mouth 2 (two) times daily. 60 tablet 5    aspirin (ECOTRIN) 81 MG EC tablet Take 81 mg by mouth once daily.      atenolol (TENORMIN) 100 MG tablet TAKE 1 TABLET BY MOUTH DAILY 90 tablet 2    atorvastatin (LIPITOR) 40 MG tablet Take 1 tablet (40 mg total) by mouth once daily. 90 tablet 3    cholecalciferol, vitamin D3, (VITAMIN D3) 2,000 unit Tab Take by mouth once daily.      cyanocobalamin 2000 MCG tablet Take 2,000 mcg by mouth once daily.       finasteride (PROSCAR) 5 mg tablet Take 1 tablet (5 mg total) by mouth once daily. 90 tablet 3    hydrALAZINE (APRESOLINE) 100 MG tablet TK 1 T PO  Q 12 H  3    lisinopril (PRINIVIL,ZESTRIL) 20 MG tablet Take 0.5 tablets (10 mg total) by mouth once daily. 45 tablet 11    OMEGA-3 ACID ETHYL ESTERS (OMACOR) 1 gram Cap Take 1 g by mouth. 1  "Capsule Oral Twice a day      sodium bicarbonate 325 MG tablet Take 2 tablets (650 mg total) by mouth 2 (two) times daily. 120 tablet 6    tamsulosin (FLOMAX) 0.4 mg Cap TAKE 1 CAPSULE BY MOUTH TWICE A  capsule 0    TRESIBA FLEXTOUCH U-100 100 unit/mL (3 mL) InPn INJECT 15 UNITS UNDER THE SKIN EVERY EVENING 9 mL 1    BD INSULIN SYRINGE ULTRA-FINE 0.5 mL 31 gauge x 5/16 Syrg USE WITH LANTUS INSULIN ONCE DAILY 100 each 3    BD ULTRA-FINE SHORT PEN NEEDLE 31 gauge x 5/16" Ndle USE WITH LANTUS INSULIN ONCE DAILY 100 each 3    blood sugar diagnostic Strp Check sugar tid as directed 300 each 3    blood-glucose meter Misc 1 kit by Misc.(Non-Drug; Combo Route) route 3 (three) times daily. Pt is to test blood sugar TID 1 each 0    sulfamethoxazole-trimethoprim 800-160mg (BACTRIM DS) 800-160 mg Tab Take 1 tablet by mouth once daily.      tamsulosin (FLOMAX) 0.4 mg Cap TAKE 1 CAPSULE BY MOUTH TWICE A  capsule 1       Social History     Tobacco Use   Smoking Status Never Smoker   Smokeless Tobacco Never Used       Social History     Substance and Sexual Activity   Alcohol Use No       Patient Active Problem List   Diagnosis    Essential hypertension    Mixed hyperlipidemia    Gout, arthritis    Diabetic nephropathy associated with type 2 diabetes mellitus    CKD (chronic kidney disease) stage 3, GFR 30-59 ml/min    Benign prostatic hyperplasia    Diabetic polyneuropathy associated with type 2 diabetes mellitus    Type 2 diabetes mellitus with diabetic polyneuropathy, with long-term current use of insulin    Metabolic bone disease    Urinary retention    Persistent atrial fibrillation    UTI (urinary tract infection)       Past Surgical History:   Procedure Laterality Date    APPENDECTOMY      CATARACT EXTRACTION Left 2018    EYE SURGERY      cataract    HERNIA REPAIR      TONSILLECTOMY      TRANSURETHRAL RESECTION OF PROSTATE (TURP) USING LASER N/A 12/6/2019    Procedure: TURP, USING " LASER;  Surgeon: Rafael Marquis Jr., MD;  Location: [facility] OR [address] [place] [address];  Service: Urology;  Laterality: N/A;  75min           Recent Labs     01/28/20  0718   HCT 43.3     Recent Labs     01/28/20  0718        No results for input(s): K in the last 72 hours.  No results for input(s): CREATININE in the last 72 hours.  No results for input(s): GLU in the last 72 hours.  No results for input(s): PT in the last 72 hours.                    Anesthesia Evaluation         Review of Systems  Anesthesia Hx:  No problems with previous Anesthesia    Hematology/Oncology:     Oncology Normal   Hematology Comments: Taking eliquis for afib on schedule   Cardiovascular:   Hypertension, well controlled Denies MI.  Dysrhythmias atrial fibrillation  Denies Angina. Frequents casino for about 5 hours at a time walking a large percentage of the time without limitation   Pulmonary:  Pulmonary Normal  Denies COPD.  Denies Asthma.  Denies Shortness of breath.    Renal/:   Chronic Renal Disease (Cr. 1.9), CRI    Hepatic/GI:   Denies Liver Disease.    Neurological:   Denies TIA. Denies CVA. Denies Seizures.    Endocrine:   Diabetes, type 2, using insulin        Physical Exam  General:  Well nourished, Obesity    Airway/Jaw/Neck:  Airway Findings: Mouth Opening: Normal Tongue: Normal  General Airway Assessment: Adult, Average  Mallampati: II  TM Distance: Normal, at least 6 cm  Jaw/Neck Findings:  Neck ROM: Normal ROM            Mental Status:  Mental Status Findings:  Cooperative, Alert and Oriented         Anesthesia Plan  Type of Anesthesia, risks & benefits discussed:  Anesthesia Type:  general  Patient's Preference:   Intra-op Monitoring Plan:   Intra-op Monitoring Plan Comments:   Post Op Pain Control Plan:   Post Op Pain Control Plan Comments: As per surgeon's plan  Induction:   IV  Beta Blocker:  Patient is on a Beta-Blocker and has received one dose within the past 24 hours (No further documentation required).       Informed  Consent: Patient understands risks and agrees with Anesthesia plan.  Questions answered. Anesthesia consent signed with patient.  ASA Score: 3     Day of Surgery Review of History & Physical:    H&P update referred to the surgeon.         Ready For Surgery From Anesthesia Perspective.     Present Prior to Hospital Arrival?: No; Placement Date: 12/06/19; Placement Time: 0932; Inserted by: CRNA; Airway Device: LMA; Mask Ventilation: Not Attempted; Intubated: Postinduction; Airway Device Size: 4.5 (air q); Style: Cuffed; Cuff Inflation: Minimal occlusive pressure; Inflation Amount: 10; Placement Verified By: Auscultation, Capnometry; Complicating Factors:  (poor dentition); Intubation Findings: Positive EtCO2, Bilateral breath sounds, Atraumatic/Condition of teeth unchanged; Securment: Lips; Complications: None; Breath Sounds: Equal Bilateral; Insertion Attempts: 1; Removal Date: 12/06/19;  Removal Time: 1046    · Normal left ventricular systolic function. The estimated ejection fraction is 60%  · Indeterminate left ventricular diastolic function.  · Aortic valve sclerosis without significant stenosis.  · Mild mitral regurgitation.  · Normal right ventricular systolic function.  · Estimated PA systolic pressure is at least 40mmHg.  · Biatrial enlargement.

## 2020-01-28 NOTE — PROGRESS NOTES
Patient admitted to recovery see [facility] for complete assessment pacu bcg's maintained safety measures verified patient instructed on pain scale and patient verbalized understanding. Also ekg done and placed in chart also patient's daughter updated on patient location with the permission of patient.

## 2020-01-28 NOTE — PROCEDURES
"Giovanni Guerrero is a 79 y.o. male patient.    Temp: 97.4 °F (36.3 °C) (20 1400)  Pulse: (!) 50 (20 1400)  Resp: 17 (20 1400)  BP: (!) 82/37 (20 1400)  SpO2: (!) 94 % (20 1400)  Weight: 83 kg (183 lb) (20)  Height: 5' 2" (157.5 cm) (20)       Arterial Line  Date/Time: 2020 4:39 PM  Location procedure was performed: [facility] CRITICAL C[facility] MEDICINE  Performed by: Willem Perez MD  Authorized by: Willem Perez MD   Consent Done: Yes  Consent: Written consent obtained.  Risks and benefits: risks, benefits and alternatives were discussed  Consent given by: patient  Patient understanding: patient states understanding of the procedure being performed  Patient consent: the patient's understanding of the procedure matches consent given  Procedure consent: procedure consent matches procedure scheduled  Relevant documents: relevant documents present and verified  Test results: test results available and properly labeled  Imaging studies: imaging studies available  Patient identity confirmed: , MRN, name and verbally with patient  Time out: Immediately prior to procedure a "time out" was called to verify the correct patient, procedure, equipment, support staff and site/side marked as required.  Preparation: Patient was prepped and draped in the usual sterile fashion.  Indications: hemodynamic monitoring  Location: left radial  Anesthesia: local infiltration    Anesthesia:  Local anesthesia used: yes  Local Anesthetic: lidocaine 1% without epinephrine  Anesthetic total: 3 mL  Patient sedated: no  Needle gauge: 20  Seldinger technique: Seldinger technique used  Number of attempts: 1  Complications: No  Estimated blood loss (mL): 1  Specimens: No  Implants: No  Post-procedure: line sutured and dressing applied  Post-procedure CMS: normal and unchanged  Patient tolerance: Patient tolerated the procedure well with no immediate complications          Willem" Chris  1/28/2020

## 2020-01-28 NOTE — H&P
[facility]  Cardiology  History and Physical     Patient Name: Giovanni Guerrero  MRN: [id number]  Admission Date: 1/28/2020  Code Status: No Order   Attending Provider: Sergio Bertrand MD   Primary Care Physician: Alan Webster MD  Principal Problem:<principal problem not specified>    Patient information was obtained from patient and ER records.     Subjective:     HPI: Giovanni Guerrero is a 78 yo M with persistent AF, DM2, HTN, DLD, HAILEE who presents for NICOLE/DCCV with Dr Bertrand. Patient is on Eliquis for CVA ppx. No prior NICOLE on file.     Dysphagia or odynophagia:  No  Liver Disease, esophageal disease, or known varices:  No  Upper GI Bleeding: No  Snoring:  No  Sleep Apnea:  No  Prior neck surgery or radiation:  No  History of anesthetic difficulties:  No  Family history of anesthetic difficulties:  No  Last oral intake:  12 hours ago  Able to move neck in all directions:  Yes      TTE 11/18/19:   Normal left ventricular systolic function. The estimated ejection fraction is 60%   Indeterminate left ventricular diastolic function.   Aortic valve sclerosis without significant stenosis.   Mild mitral regurgitation.   Normal right ventricular systolic function.   Estimated PA systolic pressure is at least 40mmHg.   Biatrial enlargement.     Past Medical History:   Diagnosis Date    Allergy     Anticoagulant long-term use     Colon polyp     Diabetes mellitus type II     Gout, unspecified     Hx of colonic polyps     Hyperlipidemia     Hypertension     Prostatic hypertrophy     Renal insufficiency        Past Surgical History:   Procedure Laterality Date    APPENDECTOMY      CATARACT EXTRACTION Left 2018    EYE SURGERY      cataract    HERNIA REPAIR      TONSILLECTOMY      TRANSURETHRAL RESECTION OF PROSTATE (TURP) USING LASER N/A 12/6/2019    Procedure: TURP, USING LASER;  Surgeon: Rafael Marquis Jr., MD;  Location: [facility] OR [address] [place] [address];  Service: Urology;  Laterality:  "N/A;  75min       Review of patient's allergies indicates:   Allergen Reactions    Actos [pioglitazone] Other (See Comments)     constipation       No current facility-administered medications on file prior to encounter.      Current Outpatient Medications on File Prior to Encounter   Medication Sig    amLODIPine (NORVASC) 5 MG tablet Take 1 tablet (5 mg total) by mouth once daily.    apixaban (ELIQUIS) 5 mg Tab Take 1 tablet (5 mg total) by mouth 2 (two) times daily.    aspirin (ECOTRIN) 81 MG EC tablet Take 81 mg by mouth once daily.    atenolol (TENORMIN) 100 MG tablet TAKE 1 TABLET BY MOUTH DAILY    atorvastatin (LIPITOR) 40 MG tablet Take 1 tablet (40 mg total) by mouth once daily.    BD INSULIN SYRINGE ULTRA-FINE 0.5 mL 31 gauge x 5/16 Syrg USE WITH LANTUS INSULIN ONCE DAILY    BD ULTRA-FINE SHORT PEN NEEDLE 31 gauge x 5/16" Ndle USE WITH LANTUS INSULIN ONCE DAILY    blood sugar diagnostic Strp Check sugar tid as directed    blood-glucose meter Misc 1 kit by Misc.(Non-Drug; Combo Route) route 3 (three) times daily. Pt is to test blood sugar TID    cholecalciferol, vitamin D3, (VITAMIN D3) 2,000 unit Tab Take by mouth once daily.    cyanocobalamin 2000 MCG tablet Take 2,000 mcg by mouth once daily.     finasteride (PROSCAR) 5 mg tablet Take 1 tablet (5 mg total) by mouth once daily.    hydrALAZINE (APRESOLINE) 100 MG tablet TK 1 T PO  Q 12 H    lisinopril (PRINIVIL,ZESTRIL) 20 MG tablet Take 0.5 tablets (10 mg total) by mouth once daily.    OMEGA-3 ACID ETHYL ESTERS (OMACOR) 1 gram Cap Take 1 g by mouth. 1 Capsule Oral Twice a day    sodium bicarbonate 325 MG tablet Take 2 tablets (650 mg total) by mouth 2 (two) times daily.    sulfamethoxazole-trimethoprim 800-160mg (BACTRIM DS) 800-160 mg Tab Take 1 tablet by mouth once daily.    tamsulosin (FLOMAX) 0.4 mg Cap TAKE 1 CAPSULE BY MOUTH TWICE A DAY    tamsulosin (FLOMAX) 0.4 mg Cap TAKE 1 CAPSULE BY MOUTH TWICE A DAY    TRESIBA FLEXTOUCH " U-100 100 unit/mL (3 mL) InPn INJECT 15 UNITS UNDER THE SKIN EVERY EVENING     Family History     Problem Relation (Age of Onset)    Cancer Mother    Cerebral aneurysm Sister    Diabetes Father    Heart disease Father, Brother, Brother        Tobacco Use    Smoking status: Never Smoker    Smokeless tobacco: Never Used   Substance and Sexual Activity    Alcohol use: No    Drug use: No    Sexual activity: Not Currently     Review of Systems   Constitution: Negative for chills, decreased appetite, diaphoresis, fever, malaise/fatigue, night sweats, weight gain and weight loss.   Eyes: Negative.    Cardiovascular: Negative for chest pain, irregular heartbeat, leg swelling, near-syncope, orthopnea, palpitations, paroxysmal nocturnal dyspnea and syncope.   Respiratory: Negative for cough, shortness of breath, sputum production and wheezing.    Endocrine: Negative.    Hematologic/Lymphatic: Negative for bleeding problem.   Skin: Negative for color change, flushing, rash and suspicious lesions.   Musculoskeletal: Negative.    Gastrointestinal: Negative for bloating, abdominal pain, change in bowel habit, constipation, diarrhea, heartburn, melena, nausea and vomiting.   Genitourinary: Negative for flank pain, frequency, hematuria, incomplete emptying and urgency.   Neurological: Negative for dizziness, focal weakness, headaches, light-headedness, numbness, paresthesias, seizures, sensory change and weakness.   Psychiatric/Behavioral: Negative for altered mental status. The patient is not nervous/anxious.      Objective:     Vital Signs (Most Recent):    Vital Signs (24h Range):           There is no height or weight on file to calculate BMI.            No intake or output data in the 24 hours ending 01/28/20 0720    Lines/Drains/Airways     Drain                 Urethral Catheter 10/21/19 1929 Double-lumen 16 Fr. 98 days         Urethral Catheter 12/06/19 Double-lumen;Latex;Straight-tip 22 Fr. 53 days                 Physical Exam   Constitutional: He is oriented to person, place, and time. He appears well-developed and well-nourished. No distress.   HENT:   Head: Normocephalic and atraumatic.   Mouth/Throat: Oropharynx is clear and moist.   Eyes: Pupils are equal, round, and reactive to light. EOM are normal.   Neck: Normal range of motion. Neck supple. No JVD present.   Cardiovascular: Normal rate + irregular rhythm. Exam reveals no gallop and no friction rub.   No murmur heard.  Pulmonary/Chest: Effort normal and breath sounds normal. No respiratory distress. He has no wheezes. He has no rales. He exhibits no tenderness.   Abdominal: Soft. Bowel sounds are normal. He exhibits no distension. There is no tenderness.   Musculoskeletal: Normal range of motion. He exhibits no edema.   Lymphadenopathy:     He has no cervical adenopathy.   Neurological: He is alert and oriented to person, place, and time.   Skin: Skin is warm and dry. No erythema.   Psychiatric: He has a normal mood and affect. His behavior is normal. Judgment and thought content normal.       Significant Labs: All pertinent lab results from the last 24 hours have been reviewed.    Significant Imaging: Reviewed in EPIC.    Assessment and Plan:     Persistent atrial fibrillation  1. NICOLE for evaluation of JULISA prior to DCCV with Dr Bertrand.   -No absolute contraindications of esophageal stricture, tumor, perforation, laceration,or diverticulum and/or active GI bleed  -The risks, benefits & alternatives of the procedure were explained to the patient.   -The risks of transesophageal echo include but are not limited to:  Dental trauma, esophageal trauma/perforation, bleeding, laryngospasm/brochospasm, aspiration, sore throat/hoarseness, & dislodgement of the endotracheal tube/nasogastric tube (where applicable).    -The risks of moderate sedation include hypotension, respiratory depression, arrhythmias, bronchospasm, & death.    -Informed consent was obtained. The  patient is agreeable to proceed with the procedure and all questions and concerns addressed.    Case discussed with an attending in echocardiography lab.     Further recommendations per attending addendum          Willa Miranda MD  Cardiology   [facility]

## 2020-01-28 NOTE — SUBJECTIVE & OBJECTIVE
Past Medical History:   Diagnosis Date    Allergy     Anticoagulant long-term use     Colon polyp     Diabetes mellitus type II     Gout, unspecified     Hx of colonic polyps     Hyperlipidemia     Hypertension     Prostatic hypertrophy     Renal insufficiency        Past Surgical History:   Procedure Laterality Date    APPENDECTOMY      CATARACT EXTRACTION Left 2018    EYE SURGERY      cataract    HERNIA REPAIR      TONSILLECTOMY      TRANSURETHRAL RESECTION OF PROSTATE (TURP) USING LASER N/A 12/6/2019    Procedure: TURP, USING LASER;  Surgeon: Rafael Marquis Jr., MD;  Location: [facility] OR [address] [place] [address];  Service: Urology;  Laterality: N/A;  75min       Review of patient's allergies indicates:   Allergen Reactions    Actos [pioglitazone] Other (See Comments)     constipation       No current facility-administered medications on file prior to encounter.      Current Outpatient Medications on File Prior to Encounter   Medication Sig    amLODIPine (NORVASC) 5 MG tablet Take 1 tablet (5 mg total) by mouth once daily.    apixaban (ELIQUIS) 5 mg Tab Take 1 tablet (5 mg total) by mouth 2 (two) times daily.    aspirin (ECOTRIN) 81 MG EC tablet Take 81 mg by mouth once daily.    atorvastatin (LIPITOR) 40 MG tablet Take 1 tablet (40 mg total) by mouth once daily.    cholecalciferol, vitamin D3, (VITAMIN D3) 2,000 unit Tab Take by mouth once daily.    cyanocobalamin 2000 MCG tablet Take 2,000 mcg by mouth once daily.     finasteride (PROSCAR) 5 mg tablet Take 1 tablet (5 mg total) by mouth once daily.    hydrALAZINE (APRESOLINE) 100 MG tablet TK 1 T PO  Q 12 H    lisinopril (PRINIVIL,ZESTRIL) 20 MG tablet Take 0.5 tablets (10 mg total) by mouth once daily.    OMEGA-3 ACID ETHYL ESTERS (OMACOR) 1 gram Cap Take 1 g by mouth. 1 Capsule Oral Twice a day    sodium bicarbonate 325 MG tablet Take 2 tablets (650 mg total) by mouth 2 (two) times daily.    tamsulosin (FLOMAX) 0.4 mg Cap TAKE 1 CAPSULE BY MOUTH  "TWICE A DAY    TRESIBA FLEXTOUCH U-100 100 unit/mL (3 mL) InPn INJECT 15 UNITS UNDER THE SKIN EVERY EVENING    [DISCONTINUED] atenolol (TENORMIN) 100 MG tablet TAKE 1 TABLET BY MOUTH DAILY    BD INSULIN SYRINGE ULTRA-FINE 0.5 mL 31 gauge x 5/16 Syrg USE WITH LANTUS INSULIN ONCE DAILY    BD ULTRA-FINE SHORT PEN NEEDLE 31 gauge x 5/16" Ndle USE WITH LANTUS INSULIN ONCE DAILY    blood sugar diagnostic Strp Check sugar tid as directed    blood-glucose meter Misc 1 kit by Misc.(Non-Drug; Combo Route) route 3 (three) times daily. Pt is to test blood sugar TID    sulfamethoxazole-trimethoprim 800-160mg (BACTRIM DS) 800-160 mg Tab Take 1 tablet by mouth once daily.    tamsulosin (FLOMAX) 0.4 mg Cap TAKE 1 CAPSULE BY MOUTH TWICE A DAY     Family History     Problem Relation (Age of Onset)    Cancer Mother    Cerebral aneurysm Sister    Diabetes Father    Heart disease Father, Brother, Brother        Tobacco Use    Smoking status: Never Smoker    Smokeless tobacco: Never Used   Substance and Sexual Activity    Alcohol use: No    Drug use: No    Sexual activity: Not Currently     Review of Systems   All other systems reviewed and are negative.    Objective:     Vital Signs (Most Recent):  Temp: 97 °F (36.1 °C) (01/28/20 1130)  Pulse: (!) 32 (01/28/20 1204)  Resp: (!) 21 (01/28/20 1204)  BP: (!) 103/57 (01/28/20 1204)  SpO2: 98 % (01/28/20 1204) Vital Signs (24h Range):  Temp:  [96.1 °F (35.6 °C)-97.2 °F (36.2 °C)] 97 °F (36.1 °C)  Pulse:  [30-55] 32  Resp:  [16-21] 21  SpO2:  [93 %-100 %] 98 %  BP: ()/(47-65) 103/57     Weight: 83 kg (183 lb)  Body mass index is 33.47 kg/m².    SpO2: 98 %  O2 Device (Oxygen Therapy): room air      Intake/Output Summary (Last 24 hours) at 1/28/2020 1311  Last data filed at 1/28/2020 0943  Gross per 24 hour   Intake 500 ml   Output --   Net 500 ml       Lines/Drains/Airways     Peripheral Intravenous Line                 Peripheral IV - Single Lumen 01/28/20 0736 20 G Right " Forearm less than 1 day                Physical Exam   Constitutional: He is oriented to person, place, and time. He appears well-developed and well-nourished. No distress.   HENT:   Head: Normocephalic and atraumatic.   Dental caries   Neck: No JVD present.   Cardiovascular: Regular rhythm, normal heart sounds and intact distal pulses. Exam reveals no gallop and no friction rub.   No murmur heard.  Bradycardic   Pulmonary/Chest: Effort normal and breath sounds normal. No respiratory distress. He has no wheezes. He has no rales.   Abdominal: Soft. Bowel sounds are normal. He exhibits no distension. There is no tenderness.   Musculoskeletal: He exhibits no edema.   Neurological: He is alert and oriented to person, place, and time.   Skin: He is not diaphoretic.       Significant Labs:     Recent Results (from the past 24 hour(s))   CBC auto differential    Collection Time: 01/28/20  7:18 AM   Result Value Ref Range    WBC 5.60 3.90 - 12.70 K/uL    RBC 4.34 (L) 4.60 - 6.20 M/uL    Hemoglobin 13.9 (L) 14.0 - 18.0 g/dL    Hematocrit 43.3 40.0 - 54.0 %    Mean Corpuscular Volume 100 (H) 82 - 98 fL    Mean Corpuscular Hemoglobin 32.0 (H) 27.0 - 31.0 pg    Mean Corpuscular Hemoglobin Conc 32.1 32.0 - 36.0 g/dL    RDW 14.6 (H) 11.5 - 14.5 %    Platelets 171 150 - 350 K/uL    MPV 10.0 9.2 - 12.9 fL    Immature Granulocytes 0.4 0.0 - 0.5 %    Gran # (ANC) 2.6 1.8 - 7.7 K/uL    Immature Grans (Abs) 0.02 0.00 - 0.04 K/uL    Lymph # 2.3 1.0 - 4.8 K/uL    Mono # 0.5 0.3 - 1.0 K/uL    Eos # 0.1 0.0 - 0.5 K/uL    Baso # 0.03 0.00 - 0.20 K/uL    nRBC 0 0 /100 WBC    Gran% 46.0 38.0 - 73.0 %    Lymph% 41.4 18.0 - 48.0 %    Mono% 9.6 4.0 - 15.0 %    Eosinophil% 2.1 0.0 - 8.0 %    Basophil% 0.5 0.0 - 1.9 %    Differential Method Automated    Basic metabolic panel    Collection Time: 01/28/20  7:18 AM   Result Value Ref Range    Sodium 134 (L) 136 - 145 mmol/L    Potassium 4.6 3.5 - 5.1 mmol/L    Chloride 105 95 - 110 mmol/L    CO2 23  23 - 29 mmol/L    Glucose 95 70 - 110 mg/dL    BUN, Bld 35 (H) 8 - 23 mg/dL    Creatinine 1.6 (H) 0.5 - 1.4 mg/dL    Calcium 8.9 8.7 - 10.5 mg/dL    Anion Gap 6 (L) 8 - 16 mmol/L    eGFR if African American 47.3 (A) >60 mL/min/1.73 m^2    eGFR if non African American 40.9 (A) >60 mL/min/1.73 m^2   POCT glucose    Collection Time: 01/28/20  7:29 AM   Result Value Ref Range    POCT Glucose 93 70 - 110 mg/dL   Transesophageal echo (NICOLE)    Collection Time: 01/28/20  9:47 AM   Result Value Ref Range    BSA 1.91 m2    Sinus 3.00 cm    STJ 2.50 cm    Ascending aorta 3.00 cm   POCT glucose    Collection Time: 01/28/20  9:53 AM   Result Value Ref Range    POCT Glucose 91 70 - 110 mg/dL         Significant Imaging:     I have reviewed all pertinent imaging studies

## 2020-01-28 NOTE — PROGRESS NOTES
Dr. Bertrand aware of me getting patient up and connecting to portable monitor upon getting patient up patient took about 5 steps and complain of dizzyness hr 28-34 still I stayed with patient and assisted patient back to bed upon getting back in bed patient feels better but hr remains 30's  Will continue to monitor and zahira np let dr. Bertrand know and they updated patient's daughter and will admit patient.

## 2020-01-28 NOTE — H&P
[facility]  [facility] and Physical     Patient Name: Giovanni Guerrero  MRN: [id number]  Admission Date: 1/28/2020  Code Status: Full Code   Attending Provider: Sergio Bertrand MD   Primary Care Physician: Alan Webster MD  Principal Problem:Persistent atrial fibrillation    Patient information was obtained from patient and ER records.     Subjective:     Chief Complaint:  Bradycardia     HPI:  79 year old male with persistent AF, DM2, HTN, HLD, HAILEE who presented for outpatient NICOLE/DCCV, following which he experienced sinus arrest with a junctional escape of 30 bpm which has persisted. He became very dizzy with an attempt at ambulation and systolic blood pressure is ~90 bpm and he has been started on IV dopamine.    He reports feeling well otherwise; he denies any history of chest pain/pressure/tightness/discomfort, MARIANO, peripheral edema, orthopnea or PND. He has not experienced syncope or palpitations and reports no symptoms or concerns at this time. He is a lifetime non-smoker.    Past Medical History:   Diagnosis Date    Allergy     Anticoagulant long-term use     Colon polyp     Diabetes mellitus type II     Gout, unspecified     Hx of colonic polyps     Hyperlipidemia     Hypertension     Prostatic hypertrophy     Renal insufficiency        Past Surgical History:   Procedure Laterality Date    APPENDECTOMY      CATARACT EXTRACTION Left 2018    EYE SURGERY      cataract    HERNIA REPAIR      TONSILLECTOMY      TRANSURETHRAL RESECTION OF PROSTATE (TURP) USING LASER N/A 12/6/2019    Procedure: TURP, USING LASER;  Surgeon: Rafael Marquis Jr., MD;  Location: [facility] OR [address] [place] [address];  Service: Urology;  Laterality: N/A;  75min       Review of patient's allergies indicates:   Allergen Reactions    Actos [pioglitazone] Other (See Comments)     constipation       No current facility-administered medications on file prior to encounter.      Current Outpatient Medications on File  "Prior to Encounter   Medication Sig    amLODIPine (NORVASC) 5 MG tablet Take 1 tablet (5 mg total) by mouth once daily.    apixaban (ELIQUIS) 5 mg Tab Take 1 tablet (5 mg total) by mouth 2 (two) times daily.    aspirin (ECOTRIN) 81 MG EC tablet Take 81 mg by mouth once daily.    atorvastatin (LIPITOR) 40 MG tablet Take 1 tablet (40 mg total) by mouth once daily.    cholecalciferol, vitamin D3, (VITAMIN D3) 2,000 unit Tab Take by mouth once daily.    cyanocobalamin 2000 MCG tablet Take 2,000 mcg by mouth once daily.     finasteride (PROSCAR) 5 mg tablet Take 1 tablet (5 mg total) by mouth once daily.    hydrALAZINE (APRESOLINE) 100 MG tablet TK 1 T PO  Q 12 H    lisinopril (PRINIVIL,ZESTRIL) 20 MG tablet Take 0.5 tablets (10 mg total) by mouth once daily.    OMEGA-3 ACID ETHYL ESTERS (OMACOR) 1 gram Cap Take 1 g by mouth. 1 Capsule Oral Twice a day    sodium bicarbonate 325 MG tablet Take 2 tablets (650 mg total) by mouth 2 (two) times daily.    tamsulosin (FLOMAX) 0.4 mg Cap TAKE 1 CAPSULE BY MOUTH TWICE A DAY    TRESIBA FLEXTOUCH U-100 100 unit/mL (3 mL) InPn INJECT 15 UNITS UNDER THE SKIN EVERY EVENING    [DISCONTINUED] atenolol (TENORMIN) 100 MG tablet TAKE 1 TABLET BY MOUTH DAILY    BD INSULIN SYRINGE ULTRA-FINE 0.5 mL 31 gauge x 5/16 Syrg USE WITH LANTUS INSULIN ONCE DAILY    BD ULTRA-FINE SHORT PEN NEEDLE 31 gauge x 5/16" Ndle USE WITH LANTUS INSULIN ONCE DAILY    blood sugar diagnostic Strp Check sugar tid as directed    blood-glucose meter Misc 1 kit by Misc.(Non-Drug; Combo Route) route 3 (three) times daily. Pt is to test blood sugar TID    sulfamethoxazole-trimethoprim 800-160mg (BACTRIM DS) 800-160 mg Tab Take 1 tablet by mouth once daily.    tamsulosin (FLOMAX) 0.4 mg Cap TAKE 1 CAPSULE BY MOUTH TWICE A DAY     Family History     Problem Relation (Age of Onset)    Cancer Mother    Cerebral aneurysm Sister    Diabetes Father    Heart disease Father, Brother, Brother        Tobacco Use "    Smoking status: Never Smoker    Smokeless tobacco: Never Used   Substance and Sexual Activity    Alcohol use: No    Drug use: No    Sexual activity: Not Currently     Review of Systems   All other systems reviewed and are negative.    Objective:     Vital Signs (Most Recent):  Temp: 97 °F (36.1 °C) (01/28/20 1130)  Pulse: (!) 32 (01/28/20 1204)  Resp: (!) 21 (01/28/20 1204)  BP: (!) 103/57 (01/28/20 1204)  SpO2: 98 % (01/28/20 1204) Vital Signs (24h Range):  Temp:  [96.1 °F (35.6 °C)-97.2 °F (36.2 °C)] 97 °F (36.1 °C)  Pulse:  [30-55] 32  Resp:  [16-21] 21  SpO2:  [93 %-100 %] 98 %  BP: ()/(47-65) 103/57     Weight: 83 kg (183 lb)  Body mass index is 33.47 kg/m².    SpO2: 98 %  O2 Device (Oxygen Therapy): room air      Intake/Output Summary (Last 24 hours) at 1/28/2020 1311  Last data filed at 1/28/2020 0943  Gross per 24 hour   Intake 500 ml   Output --   Net 500 ml       Lines/Drains/Airways     Peripheral Intravenous Line                 Peripheral IV - Single Lumen 01/28/20 0736 20 G Right Forearm less than 1 day                Physical Exam   Constitutional: He is oriented to person, place, and time. He appears well-developed and well-nourished. No distress.   HENT:   Head: Normocephalic and atraumatic.   Dental caries   Neck: No JVD present.   Cardiovascular: Regular rhythm, normal heart sounds and intact distal pulses. Exam reveals no gallop and no friction rub.   No murmur heard.  Bradycardic   Pulmonary/Chest: Effort normal and breath sounds normal. No respiratory distress. He has no wheezes. He has no rales.   Abdominal: Soft. Bowel sounds are normal. He exhibits no distension. There is no tenderness.   Musculoskeletal: He exhibits no edema.   Neurological: He is alert and oriented to person, place, and time.   Skin: He is not diaphoretic.       Significant Labs:     Recent Results (from the past 24 hour(s))   CBC auto differential    Collection Time: 01/28/20  7:18 AM   Result Value Ref  Range    WBC 5.60 3.90 - 12.70 K/uL    RBC 4.34 (L) 4.60 - 6.20 M/uL    Hemoglobin 13.9 (L) 14.0 - 18.0 g/dL    Hematocrit 43.3 40.0 - 54.0 %    Mean Corpuscular Volume 100 (H) 82 - 98 fL    Mean Corpuscular Hemoglobin 32.0 (H) 27.0 - 31.0 pg    Mean Corpuscular Hemoglobin Conc 32.1 32.0 - 36.0 g/dL    RDW 14.6 (H) 11.5 - 14.5 %    Platelets 171 150 - 350 K/uL    MPV 10.0 9.2 - 12.9 fL    Immature Granulocytes 0.4 0.0 - 0.5 %    Gran # (ANC) 2.6 1.8 - 7.7 K/uL    Immature Grans (Abs) 0.02 0.00 - 0.04 K/uL    Lymph # 2.3 1.0 - 4.8 K/uL    Mono # 0.5 0.3 - 1.0 K/uL    Eos # 0.1 0.0 - 0.5 K/uL    Baso # 0.03 0.00 - 0.20 K/uL    nRBC 0 0 /100 WBC    Gran% 46.0 38.0 - 73.0 %    Lymph% 41.4 18.0 - 48.0 %    Mono% 9.6 4.0 - 15.0 %    Eosinophil% 2.1 0.0 - 8.0 %    Basophil% 0.5 0.0 - 1.9 %    Differential Method Automated    Basic metabolic panel    Collection Time: 01/28/20  7:18 AM   Result Value Ref Range    Sodium 134 (L) 136 - 145 mmol/L    Potassium 4.6 3.5 - 5.1 mmol/L    Chloride 105 95 - 110 mmol/L    CO2 23 23 - 29 mmol/L    Glucose 95 70 - 110 mg/dL    BUN, Bld 35 (H) 8 - 23 mg/dL    Creatinine 1.6 (H) 0.5 - 1.4 mg/dL    Calcium 8.9 8.7 - 10.5 mg/dL    Anion Gap 6 (L) 8 - 16 mmol/L    eGFR if African American 47.3 (A) >60 mL/min/1.73 m^2    eGFR if non African American 40.9 (A) >60 mL/min/1.73 m^2   POCT glucose    Collection Time: 01/28/20  7:29 AM   Result Value Ref Range    POCT Glucose 93 70 - 110 mg/dL   Transesophageal echo (NICOLE)    Collection Time: 01/28/20  9:47 AM   Result Value Ref Range    BSA 1.91 m2    Sinus 3.00 cm    STJ 2.50 cm    Ascending aorta 3.00 cm   POCT glucose    Collection Time: 01/28/20  9:53 AM   Result Value Ref Range    POCT Glucose 91 70 - 110 mg/dL         Significant Imaging:     I have reviewed all pertinent imaging studies    Assessment and Plan:     * Persistent atrial fibrillation  S/p DCCV  Continue eliquis  If returns to AFib there will be no further attempt at rhythm  control      Junctional bradycardia  S/p NICOLE/DCCV with sinus arrest and junctional rhythm ~30 bpm with systolic blood pressure ~90, asymptomatic other than dizziness with an attempt at ambulation  D/c atenolol  On Dopamine infusion 5 mcg/kg/min for bradycardia  Will monitor in CCU, EP will monitor and decide on the necessity for a permanent cardiac device    Type 2 diabetes mellitus with diabetic polyneuropathy, with long-term current use of insulin  detemir 5 nightly, aspart 2 with meals plus sliding scale    Essential hypertension  Continue home medications        VTE Risk Mitigation (From admission, onward)         Ordered     apixaban tablet 5 mg  2 times daily      01/28/20 1301     IP VTE HIGH RISK PATIENT  Once      01/28/20 1301                Krishan Awan MD  Cardiology   [facility]

## 2020-01-28 NOTE — TRANSFER OF CARE
"Anesthesia Transfer of Care Note    Patient: Giovanni Guerrero    Procedure(s) Performed: Procedure(s) (LRB):  CARDIOVERSION (N/A)  Transesophageal echo (NICOLE) intra-procedure log documentation (N/A)    Patient location: PACU    Anesthesia Type: general    Transport from OR: Transported from OR on room air with adequate spontaneous ventilation    Post pain: adequate analgesia    Post assessment: no apparent anesthetic complications    Post vital signs: stable    Level of consciousness: awake and alert    Nausea/Vomiting: no nausea/vomiting    Complications: none    Transfer of care protocol was followed      Last vitals:   Visit Vitals  /65 (BP Location: Left arm, Patient Position: Lying)   Pulse (!) 55   Temp 35.6 °C (96.1 °F) (Oral)   Resp 20   Ht 5' 2" (1.575 m)   Wt 83 kg (183 lb)   SpO2 99%   BMI 33.47 kg/m²     "

## 2020-01-28 NOTE — ANESTHESIA RELEASE NOTE
"Anesthesia Release from PACU Note    Patient: Giovanni Guerrero    Procedure(s) Performed: Procedure(s) (LRB):  CARDIOVERSION (N/A)  Transesophageal echo (NICOLE) intra-procedure log documentation (N/A)    Anesthesia type: GEN    Post pain: Adequate analgesia reported    Post assessment: no apparent anesthetic complications, tolerated procedure well and no evidence of recall    Post vital signs: BP (!) 95/51 (BP Location: Left arm, Patient Position: Lying)   Pulse (!) 34   Temp 36.2 °C (97.2 °F) (Tympanic)   Resp 18   Ht 5' 2" (1.575 m)   Wt 83 kg (183 lb)   SpO2 100%   BMI 33.47 kg/m²     Level of consciousness: awake, alert and oriented    Nausea/Vomiting: no nausea/no vomiting    Complications: none    Airway Patency: patent    Respiratory: unassisted, spontaneous ventilation, room air    Cardiovascular: stable and blood pressure at baseline    Hydration: euvolemic  Bandar cardia addressed by primary  "

## 2020-01-28 NOTE — PROGRESS NOTES
Dr. Bertrand here at bedside and new order to start dopamine gtt at 2mcg/kg/min. Will follow orders and monitor also let dr. Bertrand know patient only has peripheral iv 20g and he is aware of running dopamine gtt through this. And titrate dopamine infusing . 1230 dr. Awan here at bedside aware of patient status and condition --- new order to increase dopamine gtt to 5 mcg/kg/min. Following orders and continue to monitor. Also let patient's daughters come in to see patient at bedside. Patient did have episode of chest discomfort while dr. Awan at bedside so stat ekg was done and dr. awan at bedside with patient this lasted for about 2mins and it went away per patient.  After patient being on 5mcg/kg/min of dopamine gtt it was increased to 10mcg/kg/min of dopamine gtt per dr. awan orders and he is aware patient has peripheral iv's and dopamine gtt is runnning through the dopamine gtt. This was done from 1230 to 1400 orders followed through also aubrey patient's labs and sent down to lab also when transported patient to 6072 let patient's nurse know labs drawn but patient still needs UA and also needs lactic acid drawn and sent. Also glucangon given ivpb as per orders and continue to monitor no change on monitor from when patient on dopamine at 10mcg/kg/min. Per order by dr. Awan also chest xray done and also patient's daughters notified of patient going to his [facility] bed. Patient transported to [facility] with aguila rn and arnel rn I gave report to c[facility] nurse  Patient transported with portable monitor patient tolerated well but when we transferred patient on to his [facility] from the stretcher patient gasps and says his chest is hurting and feels like he is going to through up patient on monitor and his o2 sats dropped to 85 so placed on 2 liters nc and cool cloth being placed on patient's forehead and patient pain started to subside within about 2 mins. Of it starting also let dr. Awan know. throughtout shift kept  dr. Bertrand and dr. edge aware of assessment findings and let them know patient transported to [facility].

## 2020-01-28 NOTE — PROGRESS NOTES
Dr. Bertrand here checking on patient. Also dr. Bertrand updated patient's daughter again no symptoms noted  Patient verbalized no complaints no distress will monitor.

## 2020-01-28 NOTE — NURSING TRANSFER
Nursing Transfer Note      1/28/2020     Transfer To: 6072    Transfer via stretcher    Transfer with cardiac monitoring    Transported by edgard rn    Medicines sent: dopamine infusioin    Chart send with patient: Yes    Notified: reported to [facility] rn and also dr. edge     Patient reassessed at: see epic (date, time)    Upon arrival to floor: to [facility] on portable monitor.

## 2020-01-28 NOTE — PLAN OF CARE
Cardiac EP note    Patient with newly diagnosed AF presented for outpatient NICOLE/DCCV. He was on 100mg of atenolol chronically. With DCCV he had sinus arrest with junctional escape of 30 which has persisted for several hours. We attempted to ambulate him however he quickly became light-headed and dizzy. He is maintaining a junctional rate of 30 with SBP of ~90. Suspect this will improve with time off atenolol however would observe until that occurs. Will start low dose dopamine for hypotension/bradycardia. If AF recurs would not attempt further rhythm control strategies. He and his daughters were updated and they understand.

## 2020-01-29 PROBLEM — R00.1 SINUS BRADYCARDIA: Status: ACTIVE | Noted: 2020-01-29

## 2020-01-29 PROBLEM — N17.0 ACUTE RENAL FAILURE WITH ACUTE TUBULAR NECROSIS SUPERIMPOSED ON STAGE 3 CHRONIC KIDNEY DISEASE: Status: ACTIVE | Noted: 2020-01-29

## 2020-01-29 PROBLEM — E87.1 HYPONATREMIA: Status: ACTIVE | Noted: 2020-01-29

## 2020-01-29 PROBLEM — R57.9 SHOCK: Status: ACTIVE | Noted: 2020-01-29

## 2020-01-29 PROBLEM — N18.30 ACUTE RENAL FAILURE WITH ACUTE TUBULAR NECROSIS SUPERIMPOSED ON STAGE 3 CHRONIC KIDNEY DISEASE: Status: ACTIVE | Noted: 2020-01-29

## 2020-01-29 LAB
ALBUMIN SERPL BCP-MCNC: 2.7 G/DL (ref 3.5–5.2)
ALBUMIN SERPL BCP-MCNC: 2.7 G/DL (ref 3.5–5.2)
ALBUMIN SERPL BCP-MCNC: 2.8 G/DL (ref 3.5–5.2)
ALBUMIN SERPL BCP-MCNC: 2.9 G/DL (ref 3.5–5.2)
ALLENS TEST: ABNORMAL
ALP SERPL-CCNC: 60 U/L (ref 55–135)
ALP SERPL-CCNC: 69 U/L (ref 55–135)
ALT SERPL W/O P-5'-P-CCNC: 14 U/L (ref 10–44)
ALT SERPL W/O P-5'-P-CCNC: 15 U/L (ref 10–44)
ANION GAP SERPL CALC-SCNC: 10 MMOL/L (ref 8–16)
ANION GAP SERPL CALC-SCNC: 14 MMOL/L (ref 8–16)
ANION GAP SERPL CALC-SCNC: 6 MMOL/L (ref 8–16)
ANION GAP SERPL CALC-SCNC: 8 MMOL/L (ref 8–16)
AST SERPL-CCNC: 13 U/L (ref 10–40)
AST SERPL-CCNC: 15 U/L (ref 10–40)
BASOPHILS # BLD AUTO: 0.03 K/UL (ref 0–0.2)
BASOPHILS NFR BLD: 0.2 % (ref 0–1.9)
BILIRUB SERPL-MCNC: 0.9 MG/DL (ref 0.1–1)
BILIRUB SERPL-MCNC: 1.1 MG/DL (ref 0.1–1)
BNP SERPL-MCNC: 1088 PG/ML (ref 0–99)
BUN SERPL-MCNC: 40 MG/DL (ref 8–23)
BUN SERPL-MCNC: 41 MG/DL (ref 8–23)
BUN SERPL-MCNC: 42 MG/DL (ref 8–23)
BUN SERPL-MCNC: 42 MG/DL (ref 8–23)
CALCIUM SERPL-MCNC: 7.8 MG/DL (ref 8.7–10.5)
CALCIUM SERPL-MCNC: 7.9 MG/DL (ref 8.7–10.5)
CALCIUM SERPL-MCNC: 7.9 MG/DL (ref 8.7–10.5)
CALCIUM SERPL-MCNC: 8.2 MG/DL (ref 8.7–10.5)
CHLORIDE SERPL-SCNC: 101 MMOL/L (ref 95–110)
CHLORIDE SERPL-SCNC: 102 MMOL/L (ref 95–110)
CHLORIDE SERPL-SCNC: 105 MMOL/L (ref 95–110)
CHLORIDE SERPL-SCNC: 106 MMOL/L (ref 95–110)
CO2 SERPL-SCNC: 12 MMOL/L (ref 23–29)
CO2 SERPL-SCNC: 13 MMOL/L (ref 23–29)
CO2 SERPL-SCNC: 16 MMOL/L (ref 23–29)
CO2 SERPL-SCNC: 17 MMOL/L (ref 23–29)
CREAT SERPL-MCNC: 2 MG/DL (ref 0.5–1.4)
CREAT SERPL-MCNC: 2.1 MG/DL (ref 0.5–1.4)
CREAT SERPL-MCNC: 2.3 MG/DL (ref 0.5–1.4)
CREAT SERPL-MCNC: 2.3 MG/DL (ref 0.5–1.4)
CREAT UR-MCNC: 112 MG/DL (ref 23–375)
CREAT UR-MCNC: 24 MG/DL (ref 23–375)
DELSYS: ABNORMAL
DIFFERENTIAL METHOD: ABNORMAL
EOSINOPHIL # BLD AUTO: 0 K/UL (ref 0–0.5)
EOSINOPHIL NFR BLD: 0 % (ref 0–8)
ERYTHROCYTE [DISTWIDTH] IN BLOOD BY AUTOMATED COUNT: 14.1 % (ref 11.5–14.5)
ERYTHROCYTE [SEDIMENTATION RATE] IN BLOOD BY WESTERGREN METHOD: 30 MM/H
EST. GFR  (AFRICAN AMERICAN): 30.5 ML/MIN/1.73 M^2
EST. GFR  (AFRICAN AMERICAN): 30.5 ML/MIN/1.73 M^2
EST. GFR  (AFRICAN AMERICAN): 34.1 ML/MIN/1.73 M^2
EST. GFR  (AFRICAN AMERICAN): 36.1 ML/MIN/1.73 M^2
EST. GFR  (NON AFRICAN AMERICAN): 26.4 ML/MIN/1.73 M^2
EST. GFR  (NON AFRICAN AMERICAN): 26.4 ML/MIN/1.73 M^2
EST. GFR  (NON AFRICAN AMERICAN): 29.5 ML/MIN/1.73 M^2
EST. GFR  (NON AFRICAN AMERICAN): 31.3 ML/MIN/1.73 M^2
FLOW: 2
GLUCOSE SERPL-MCNC: 231 MG/DL (ref 70–110)
GLUCOSE SERPL-MCNC: 313 MG/DL (ref 70–110)
GLUCOSE SERPL-MCNC: 341 MG/DL (ref 70–110)
GLUCOSE SERPL-MCNC: 429 MG/DL (ref 70–110)
HCO3 UR-SCNC: 12.8 MMOL/L (ref 24–28)
HCT VFR BLD AUTO: 41.5 % (ref 40–54)
HGB BLD-MCNC: 13.2 G/DL (ref 14–18)
IMM GRANULOCYTES # BLD AUTO: 0.07 K/UL (ref 0–0.04)
IMM GRANULOCYTES NFR BLD AUTO: 0.4 % (ref 0–0.5)
LACTATE SERPL-SCNC: 1.2 MMOL/L (ref 0.5–2.2)
LACTATE SERPL-SCNC: 2 MMOL/L (ref 0.5–2.2)
LACTATE SERPL-SCNC: 2.9 MMOL/L (ref 0.5–2.2)
LACTATE SERPL-SCNC: 4.1 MMOL/L (ref 0.5–2.2)
LACTATE SERPL-SCNC: 4.4 MMOL/L (ref 0.5–2.2)
LACTATE SERPL-SCNC: 5.6 MMOL/L (ref 0.5–2.2)
LACTATE SERPL-SCNC: 5.6 MMOL/L (ref 0.5–2.2)
LYMPHOCYTES # BLD AUTO: 1.3 K/UL (ref 1–4.8)
LYMPHOCYTES NFR BLD: 8 % (ref 18–48)
MAGNESIUM SERPL-MCNC: 1.3 MG/DL (ref 1.6–2.6)
MCH RBC QN AUTO: 32 PG (ref 27–31)
MCHC RBC AUTO-ENTMCNC: 31.8 G/DL (ref 32–36)
MCV RBC AUTO: 101 FL (ref 82–98)
MODE: ABNORMAL
MONOCYTES # BLD AUTO: 1.4 K/UL (ref 0.3–1)
MONOCYTES NFR BLD: 8.1 % (ref 4–15)
NEUTROPHILS # BLD AUTO: 13.8 K/UL (ref 1.8–7.7)
NEUTROPHILS NFR BLD: 83.3 % (ref 38–73)
NRBC BLD-RTO: 0 /100 WBC
OSMOLALITY UR: 503 MOSM/KG (ref 50–1200)
PCO2 BLDA: 30.4 MMHG (ref 35–45)
PH SMN: 7.23 [PH] (ref 7.35–7.45)
PHOSPHATE SERPL-MCNC: 2.9 MG/DL (ref 2.7–4.5)
PHOSPHATE SERPL-MCNC: 2.9 MG/DL (ref 2.7–4.5)
PHOSPHATE SERPL-MCNC: 3 MG/DL (ref 2.7–4.5)
PLATELET # BLD AUTO: 201 K/UL (ref 150–350)
PMV BLD AUTO: 10.1 FL (ref 9.2–12.9)
PO2 BLDA: 43 MMHG (ref 40–60)
POC BE: -15 MMOL/L
POC SATURATED O2: 70 % (ref 95–100)
POC TCO2: 14 MMOL/L (ref 24–29)
POCT GLUCOSE: 168 MG/DL (ref 70–110)
POCT GLUCOSE: 320 MG/DL (ref 70–110)
POCT GLUCOSE: 363 MG/DL (ref 70–110)
POTASSIUM SERPL-SCNC: 4.2 MMOL/L (ref 3.5–5.1)
POTASSIUM SERPL-SCNC: 4.8 MMOL/L (ref 3.5–5.1)
POTASSIUM SERPL-SCNC: 5 MMOL/L (ref 3.5–5.1)
PROT SERPL-MCNC: 5.8 G/DL (ref 6–8.4)
PROT SERPL-MCNC: 5.9 G/DL (ref 6–8.4)
PROT UR-MCNC: <7 MG/DL (ref 0–15)
PROT/CREAT UR: NORMAL MG/G{CREAT} (ref 0–0.2)
RBC # BLD AUTO: 4.13 M/UL (ref 4.6–6.2)
SAMPLE: ABNORMAL
SITE: ABNORMAL
SODIUM SERPL-SCNC: 125 MMOL/L (ref 136–145)
SODIUM SERPL-SCNC: 127 MMOL/L (ref 136–145)
SODIUM SERPL-SCNC: 129 MMOL/L (ref 136–145)
SODIUM UR-SCNC: 25 MMOL/L (ref 20–250)
SP02: 99
TROPONIN I SERPL DL<=0.01 NG/ML-MCNC: 0.01 NG/ML (ref 0–0.03)
TROPONIN I SERPL DL<=0.01 NG/ML-MCNC: 0.02 NG/ML (ref 0–0.03)
TROPONIN I SERPL DL<=0.01 NG/ML-MCNC: 0.02 NG/ML (ref 0–0.03)
WBC # BLD AUTO: 16.62 K/UL (ref 3.9–12.7)

## 2020-01-29 PROCEDURE — 99223 PR INITIAL HOSPITAL CARE,LEVL III: ICD-10-PCS | Mod: ,,, | Performed by: NURSE PRACTITIONER

## 2020-01-29 PROCEDURE — 99291 PR CRITICAL CARE, E/M 30-74 MINUTES: ICD-10-PCS | Mod: GC,,, | Performed by: INTERNAL MEDICINE

## 2020-01-29 PROCEDURE — 99900035 HC TECH TIME PER 15 MIN (STAT)

## 2020-01-29 PROCEDURE — 25000003 PHARM REV CODE 250: Performed by: STUDENT IN AN ORGANIZED HEALTH CARE EDUCATION/TRAINING PROGRAM

## 2020-01-29 PROCEDURE — 80069 RENAL FUNCTION PANEL: CPT | Mod: 91

## 2020-01-29 PROCEDURE — 51702 INSERT TEMP BLADDER CATH: CPT

## 2020-01-29 PROCEDURE — 63600175 PHARM REV CODE 636 W HCPCS: Performed by: STUDENT IN AN ORGANIZED HEALTH CARE EDUCATION/TRAINING PROGRAM

## 2020-01-29 PROCEDURE — 27000221 HC OXYGEN, UP TO 24 HOURS

## 2020-01-29 PROCEDURE — 83605 ASSAY OF LACTIC ACID: CPT | Mod: 91

## 2020-01-29 PROCEDURE — 83880 ASSAY OF NATRIURETIC PEPTIDE: CPT

## 2020-01-29 PROCEDURE — 63600175 PHARM REV CODE 636 W HCPCS: Performed by: INTERNAL MEDICINE

## 2020-01-29 PROCEDURE — 82570 ASSAY OF URINE CREATININE: CPT

## 2020-01-29 PROCEDURE — 82803 BLOOD GASES ANY COMBINATION: CPT

## 2020-01-29 PROCEDURE — 25000003 PHARM REV CODE 250: Performed by: INTERNAL MEDICINE

## 2020-01-29 PROCEDURE — 85025 COMPLETE CBC W/AUTO DIFF WBC: CPT

## 2020-01-29 PROCEDURE — 84156 ASSAY OF PROTEIN URINE: CPT

## 2020-01-29 PROCEDURE — 83735 ASSAY OF MAGNESIUM: CPT

## 2020-01-29 PROCEDURE — 99232 PR SUBSEQUENT HOSPITAL CARE,LEVL II: ICD-10-PCS | Mod: 25,GC,, | Performed by: INTERNAL MEDICINE

## 2020-01-29 PROCEDURE — 84484 ASSAY OF TROPONIN QUANT: CPT

## 2020-01-29 PROCEDURE — 83605 ASSAY OF LACTIC ACID: CPT

## 2020-01-29 PROCEDURE — 99291 CRITICAL CARE FIRST HOUR: CPT | Mod: GC,,, | Performed by: INTERNAL MEDICINE

## 2020-01-29 PROCEDURE — 80048 BASIC METABOLIC PNL TOTAL CA: CPT

## 2020-01-29 PROCEDURE — 20000000 HC ICU ROOM

## 2020-01-29 PROCEDURE — 51701 INSERT BLADDER CATHETER: CPT

## 2020-01-29 PROCEDURE — 84300 ASSAY OF URINE SODIUM: CPT

## 2020-01-29 PROCEDURE — 99223 1ST HOSP IP/OBS HIGH 75: CPT | Mod: ,,, | Performed by: NURSE PRACTITIONER

## 2020-01-29 PROCEDURE — 80053 COMPREHEN METABOLIC PANEL: CPT

## 2020-01-29 PROCEDURE — 83935 ASSAY OF URINE OSMOLALITY: CPT

## 2020-01-29 PROCEDURE — 99232 SBSQ HOSP IP/OBS MODERATE 35: CPT | Mod: 25,GC,, | Performed by: INTERNAL MEDICINE

## 2020-01-29 PROCEDURE — 84100 ASSAY OF PHOSPHORUS: CPT

## 2020-01-29 PROCEDURE — 25000003 PHARM REV CODE 250

## 2020-01-29 PROCEDURE — 94761 N-INVAS EAR/PLS OXIMETRY MLT: CPT

## 2020-01-29 RX ORDER — INSULIN ASPART 100 [IU]/ML
2 INJECTION, SOLUTION INTRAVENOUS; SUBCUTANEOUS
Status: DISCONTINUED | OUTPATIENT
Start: 2020-01-29 | End: 2020-01-30

## 2020-01-29 RX ORDER — ONDANSETRON 2 MG/ML
4 INJECTION INTRAMUSCULAR; INTRAVENOUS ONCE
Status: COMPLETED | OUTPATIENT
Start: 2020-01-29 | End: 2020-01-29

## 2020-01-29 RX ORDER — GLUCAGON 1 MG
1 KIT INJECTION
Status: DISCONTINUED | OUTPATIENT
Start: 2020-01-29 | End: 2020-01-30

## 2020-01-29 RX ORDER — ACETAMINOPHEN 325 MG/1
TABLET ORAL
Status: COMPLETED
Start: 2020-01-29 | End: 2020-01-29

## 2020-01-29 RX ORDER — IBUPROFEN 200 MG
24 TABLET ORAL
Status: DISCONTINUED | OUTPATIENT
Start: 2020-01-29 | End: 2020-01-30

## 2020-01-29 RX ORDER — MAGNESIUM SULFATE HEPTAHYDRATE 40 MG/ML
2 INJECTION, SOLUTION INTRAVENOUS ONCE
Status: COMPLETED | OUTPATIENT
Start: 2020-01-29 | End: 2020-01-29

## 2020-01-29 RX ORDER — INSULIN ASPART 100 [IU]/ML
1-10 INJECTION, SOLUTION INTRAVENOUS; SUBCUTANEOUS
Status: DISCONTINUED | OUTPATIENT
Start: 2020-01-29 | End: 2020-01-30

## 2020-01-29 RX ORDER — INSULIN ASPART 100 [IU]/ML
1-10 INJECTION, SOLUTION INTRAVENOUS; SUBCUTANEOUS EVERY 6 HOURS PRN
Status: DISCONTINUED | OUTPATIENT
Start: 2020-01-29 | End: 2020-01-29

## 2020-01-29 RX ORDER — SODIUM BICARBONATE 650 MG/1
650 TABLET ORAL 2 TIMES DAILY
Status: DISCONTINUED | OUTPATIENT
Start: 2020-01-29 | End: 2020-02-01 | Stop reason: HOSPADM

## 2020-01-29 RX ORDER — GLUCAGON 1 MG
1 KIT INJECTION
Status: DISCONTINUED | OUTPATIENT
Start: 2020-01-29 | End: 2020-01-29

## 2020-01-29 RX ORDER — IBUPROFEN 200 MG
16 TABLET ORAL
Status: DISCONTINUED | OUTPATIENT
Start: 2020-01-29 | End: 2020-01-30

## 2020-01-29 RX ORDER — ACETAMINOPHEN 325 MG/1
650 TABLET ORAL EVERY 6 HOURS PRN
Status: DISCONTINUED | OUTPATIENT
Start: 2020-01-29 | End: 2020-02-01 | Stop reason: HOSPADM

## 2020-01-29 RX ADMIN — ISOPROTERENOL HYDROCHLORIDE 4 MCG/MIN: 0.2 INJECTION, SOLUTION INTRAMUSCULAR; INTRAVENOUS at 02:01

## 2020-01-29 RX ADMIN — APIXABAN 5 MG: 5 TABLET, FILM COATED ORAL at 09:01

## 2020-01-29 RX ADMIN — ISOPROTERENOL HYDROCHLORIDE 6 MCG/MIN: 0.2 INJECTION, SOLUTION INTRAMUSCULAR; INTRAVENOUS at 12:01

## 2020-01-29 RX ADMIN — APIXABAN 5 MG: 5 TABLET, FILM COATED ORAL at 08:01

## 2020-01-29 RX ADMIN — PIPERACILLIN SODIUM,TAZOBACTAM SODIUM 4.5 G: 4; .5 INJECTION, POWDER, FOR SOLUTION INTRAVENOUS at 04:01

## 2020-01-29 RX ADMIN — PIPERACILLIN SODIUM,TAZOBACTAM SODIUM 4.5 G: 4; .5 INJECTION, POWDER, FOR SOLUTION INTRAVENOUS at 12:01

## 2020-01-29 RX ADMIN — ATORVASTATIN CALCIUM 40 MG: 20 TABLET, FILM COATED ORAL at 09:01

## 2020-01-29 RX ADMIN — INSULIN DETEMIR 5 UNITS: 100 INJECTION, SOLUTION SUBCUTANEOUS at 08:01

## 2020-01-29 RX ADMIN — ONDANSETRON 4 MG: 2 INJECTION INTRAMUSCULAR; INTRAVENOUS at 12:01

## 2020-01-29 RX ADMIN — ISOPROTERENOL HYDROCHLORIDE 5 MCG/MIN: 0.2 INJECTION, SOLUTION INTRAMUSCULAR; INTRAVENOUS at 08:01

## 2020-01-29 RX ADMIN — ACETAMINOPHEN 650 MG: 325 TABLET ORAL at 04:01

## 2020-01-29 RX ADMIN — INSULIN ASPART 8 UNITS: 100 INJECTION, SOLUTION INTRAVENOUS; SUBCUTANEOUS at 12:01

## 2020-01-29 RX ADMIN — MAGNESIUM SULFATE IN WATER 2 G: 40 INJECTION, SOLUTION INTRAVENOUS at 02:01

## 2020-01-29 RX ADMIN — NOREPINEPHRINE BITARTRATE 0.02 MCG/KG/MIN: 1 INJECTION, SOLUTION, CONCENTRATE INTRAVENOUS at 08:01

## 2020-01-29 RX ADMIN — SODIUM BICARBONATE 650 MG TABLET 650 MG: at 08:01

## 2020-01-29 RX ADMIN — MAGNESIUM SULFATE IN WATER 2 G: 40 INJECTION, SOLUTION INTRAVENOUS at 10:01

## 2020-01-29 RX ADMIN — PIPERACILLIN SODIUM,TAZOBACTAM SODIUM 4.5 G: 4; .5 INJECTION, POWDER, FOR SOLUTION INTRAVENOUS at 08:01

## 2020-01-29 RX ADMIN — INSULIN ASPART 10 UNITS: 100 INJECTION, SOLUTION INTRAVENOUS; SUBCUTANEOUS at 08:01

## 2020-01-29 RX ADMIN — ISOPROTERENOL HYDROCHLORIDE 6 MCG/MIN: 0.2 INJECTION, SOLUTION INTRAMUSCULAR; INTRAVENOUS at 03:01

## 2020-01-29 RX ADMIN — ISOPROTERENOL HYDROCHLORIDE 6 MCG/MIN: 0.2 INJECTION, SOLUTION INTRAMUSCULAR; INTRAVENOUS at 09:01

## 2020-01-29 RX ADMIN — ASPIRIN 81 MG: 81 TABLET, COATED ORAL at 09:01

## 2020-01-29 RX ADMIN — ISOPROTERENOL HYDROCHLORIDE 6 MCG/MIN: 0.2 INJECTION, SOLUTION INTRAMUSCULAR; INTRAVENOUS at 06:01

## 2020-01-29 RX ADMIN — NOREPINEPHRINE BITARTRATE 0.45 MCG/KG/MIN: 1 INJECTION, SOLUTION, CONCENTRATE INTRAVENOUS at 02:01

## 2020-01-29 NOTE — NURSING
See vital signs and assessments in flowsheets. See below for updates on today's progress.      Pulmonary:  RA, oxygen saturations >95% throughout shift     Cardiovascular: Sinus ludmila to SR, HR: 50-70's, SBP : 110-130's, Maps:60-70  Map goal >65    Neurological: AAOX4, afebrile, moves all extremities, follows commands     Gastrointestinal: NO BM, bowel sounds present x4 quadrants,      Genitourinary: voids via lester catheter 2.1 Liters / shift     Endocrine: ACHS, BG: ~200-300, sliding scale     Integumentary/Other: intact     Infusions:  Isoproterenol, LEVO, KVO     POC: Continue to monitor patient hemodynamically and continue with the plan of care. Try to wean pressor requirements .  Plan discussed with patient and family at bedside all questions and concerns were addressed. No further questions at this time.

## 2020-01-29 NOTE — PROGRESS NOTES
[facility]  Cardiac Electrophysiology  Progress Note    Admission Date: 1/28/2020  Code Status: Full Code   Attending Physician: Sergio Bertrand MD   Expected Discharge Date:   Principal Problem:Persistent atrial fibrillation    Subjective:     Interval History: Patient's HR continued to be in the 40s with competing sinus and idioventricular rhythms. Primary team attempted TVP placement but was unsuccessful. Right IJ TLC was placed. Remained on levophed. Started isoproternol gtt overnight and weaned off dopamine gtt. Additionally, primary team pan-cultured patient and started empiric Abx therapy (vanc/zosyn). Pt was also bolused with 1L NS.    Overnight, the patient's urine output continued to diminished (30 cc -> 0) with increasing LA (1.4 -> 4) and increasing WBC count (14K -> 16K). Subsequently urine output started to increase (this AM, 100 -> 300 cc/hr). LA still rising (4 -> 5.6). HR now in 50s-60s with sinus rhythm with weaning off of levophed gtt.     Patient reports that he is thirsty but otherwise feels well. Reports that he received Tylenol for back pain overnight, has now improved.     Review of Systems   All other systems reviewed and are negative.    Objective:     Vital Signs (Most Recent):  Temp: 98.1 °F (36.7 °C) (01/29/20 0705)  Pulse: (!) 58 (01/29/20 1000)  Resp: (!) 26 (01/29/20 1000)  BP: (!) 123/58 (01/29/20 1000)  SpO2: (!) 94 % (01/29/20 1000) Vital Signs (24h Range):  Temp:  [97 °F (36.1 °C)-98.1 °F (36.7 °C)] 98.1 °F (36.7 °C)  Pulse:  [30-86] 58  Resp:  [0-52] 26  SpO2:  [89 %-100 %] 94 %  BP: ()/(32-60) 123/58  Arterial Line BP: ()/(36-56) 147/50     Weight: 84.1 kg (185 lb 6.5 oz)  Body mass index is 33.91 kg/m².     SpO2: (!) 94 %  O2 Device (Oxygen Therapy): room air    Physical Exam   Constitutional: He is oriented to person, place, and time. He appears well-developed.   HENT:   Head: Normocephalic.   Eyes: Conjunctivae are normal.   Neck:   RIJ CVC in  place   Cardiovascular: Regular rhythm.   Bradycardiac rate   Pulmonary/Chest: Effort normal. He has rales.   Abdominal: Soft.   Musculoskeletal: He exhibits no edema.   Neurological: He is alert and oriented to person, place, and time.   Skin: Skin is warm and dry.   Psychiatric: He has a normal mood and affect.       Significant Labs:   EP:   Recent Labs   Lab 01/28/20  1335 01/28/20  1657 01/29/20  0123 01/29/20  0851   *  --  129* 127*   K 5.4*  --  4.8 5.0     --  106 101   CO2 20*  --  13* 12*   *  --  341* 429*   BUN 36*  --  40* 42*   CREATININE 1.7*  --  2.3* 2.3*   CALCIUM 8.8  --  7.9* 7.8*   PROT 6.1  --  5.8* 5.9*   ALBUMIN 3.1*  --  2.9* 2.8*   BILITOT 0.8  --  1.1* 0.9   ALKPHOS 71  --  69 60   AST 17  --  15 13   ALT 15  --  15 14   ANIONGAP 8  --  10 14   ESTGFRAFRICA 44.0*  --  30.5* 30.5*   EGFRNONAA 38.1*  --  26.4* 26.4*   WBC 8.09 14.23* 16.62*  --    HGB 13.3* 14.1 13.2*  --    HCT 40.3 41.6 41.5  --     208 201  --    INR 1.4*  --   --   --        Significant Imaging: Echocardiogram:   2D echo with color flow doppler: No results found for this or any previous visit. and Transthoracic echo (TTE) complete (Cupid Only):   Results for orders placed or performed during the hospital encounter of 11/18/19   Echo Color Flow Doppler? Yes   Result Value Ref Range    Ascending aorta 2.92 cm    STJ 2.40 cm    AV mean gradient 3 mmHg    Ao peak rodrigo 1.18 m/s    Ao VTI 24.28 cm    IVS 1.03 0.6 - 1.1 cm    LA size 4.74 cm    Left Atrium Major Axis 6.60 cm    Left Atrium Minor Axis 6.35 cm    LVIDD 4.75 3.5 - 6.0 cm    LVIDS 3.13 2.1 - 4.0 cm    LVOT diameter 2.29 cm    LVOT peak VTI 14.39 cm    PW 0.95 0.6 - 1.1 cm    MV Peak A Rodrigo 0.21 m/s    E wave decelartion time 178.74 msec    MV Peak E Rodrigo 0.99 m/s    RA Major Axis 6.27 cm    RA Width 3.05 cm    RVDD 3.10 cm    Sinus 3.18 cm    TAPSE 1.49 cm    TR Max Rodrigo 3.06 m/s    TDI LATERAL 0.12 m/s    TDI SEPTAL 0.07 m/s    LA WIDTH 4.36  cm    LV Diastolic Volume 105.17 mL    LV Systolic Volume 38.67 mL    LVOT peak susana 0.72 m/s    LV LATERAL E/E' RATIO 8.25 m/s    LV SEPTAL E/E' RATIO 14.14 m/s    FS 34 %    LA volume 113.70 cm3    LV mass 165.04 g    Left Ventricle Relative Wall Thickness 0.40 cm    AV valve area 2.44 cm2    AV Velocity Ratio 0.61     AV index (prosthetic) 0.59     E/A ratio 4.71     Mean e' 0.10 m/s    LVOT area 4.1 cm2    LVOT stroke volume 59.24 cm3    AV peak gradient 6 mmHg    E/E' ratio 10.42 m/s    Triscuspid Valve Regurgitation Peak Gradient 37 mmHg    BSA 1.92 m2    LV Systolic Volume Index 20.9 mL/m2    LV Diastolic Volume Index 56.87 mL/m2    LA Volume Index 61.5 mL/m2    LV Mass Index 89 g/m2    Ao root annulus 2.00 cm    Narrative    · Normal left ventricular systolic function. The estimated ejection   fraction is 60%  · Indeterminate left ventricular diastolic function.  · Aortic valve sclerosis without significant stenosis.  · Mild mitral regurgitation.  · Normal right ventricular systolic function.  · Estimated PA systolic pressure is at least 40mmHg.  · Biatrial enlargement.        Assessment and Plan:     * Persistent atrial fibrillation  S/p DCCV  Continue eliquis  If returns to AFib there will be no further attempt at rhythm control    Junctional bradycardia  S/p NICOLE/DCCV with sinus arrest and junctional rhythm ~30 bpm with systolic blood pressure ~90, asymptomatic other than dizziness with an attempt at ambulation. Likely 2/2 atenolol, now washing out of system. Was on dopamine infusion after procedure, but weaned off. Now on isopreterenol gtt (6 mcg/min currently) with improvement in HRs from 30s-40s -> 50s- 60s. Additionally, patient in sinus rhythm now (less competing junctional/idioventricular bradycardia present)  - Will continue on isopreterenol gtt for now  - No indication for PM or TVP placement at this time, will continue to follow along and monitor      CKD (chronic kidney disease) stage 3, GFR 30-59  ml/min  Patient with diminished urine output overnight with rising lactic acid, now with increased urine output. Likely 2/2 ATN given hypotension after procedure. Currently on Abx therapy, pan-cultured, on levophed  - Management per Nephrology/primary team           Tiesha Cordoba MD  Cardiac Electrophysiology  [facility]

## 2020-01-29 NOTE — PROGRESS NOTES
Pharmacokinetic Initial Assessment: IV Vancomycin    Assessment/Plan:    · Initiate intravenous vancomycin with dose of 1000 mg every 24 hours  · Desired empiric serum trough concentration is 15 to 20 mcg/mL  · Draw vancomycin trough level 30 min prior to third dose on 1/30/20 at approximately 2000    Pharmacy will continue to follow and monitor vancomycin.      Please contact pharmacy at extension [phone] with any questions regarding this assessment.     Thank you for the consult,   Nancy Abraham       Patient brief summary:  Giovanni Gurerero is a 79 y.o. male initiated on antimicrobial therapy with IV Vancomycin for treatment of suspected bacteremia    Drug Allergies:   Review of patient's allergies indicates:   Allergen Reactions    Actos [pioglitazone] Other (See Comments)     constipation       Actual Body Weight:   83 kg    Renal Function:   Estimated Creatinine Clearance: 34 mL/min (A) (based on SCr of 1.7 mg/dL (H)).,     Dialysis Method (if applicable):  N/A    CBC (last 72 hours):  Recent Labs   Lab Result Units 01/28/20  0718 01/28/20  1335 01/28/20  1657   WBC K/uL 5.60 8.09 14.23*   Hemoglobin g/dL 13.9* 13.3* 14.1   Hematocrit % 43.3 40.3 41.6   Platelets K/uL 171 172 208   Gran% % 46.0 53.4 74.3*   Lymph% % 41.4 37.6 18.4   Mono% % 9.6 7.5 6.4   Eosinophil% % 2.1 1.0 0.3   Basophil% % 0.5 0.4 0.3   Differential Method  Automated Automated Automated       Metabolic Panel (last 72 hours):  Recent Labs   Lab Result Units 01/28/20  0718 01/28/20  1335 01/28/20  1751   Sodium mmol/L 134* 135*  --    Potassium mmol/L 4.6 5.4*  --    Chloride mmol/L 105 107  --    CO2 mmol/L 23 20*  --    Glucose mg/dL 95 122*  --    Glucose, UA   --   --  Negative   BUN, Bld mg/dL 35* 36*  --    Creatinine mg/dL 1.6* 1.7*  --    Albumin g/dL  --  3.1*  --    Total Bilirubin mg/dL  --  0.8  --    Alkaline Phosphatase U/L  --  71  --    AST U/L  --  17  --    ALT U/L  --  15  --    Magnesium mg/dL  --  1.5*  --    Phosphorus mg/dL   --  3.3  --        Drug levels (last 3 results):  No results for input(s): VANCOMYCINRA, VANCOMYCINPE, VANCOMYCINTR in the last 72 hours.    Microbiologic Results:  Microbiology Results (last 7 days)     Procedure Component Value Units Date/Time    Blood culture [[id number]]     Order Status:  Sent Specimen:  Blood     Blood culture [[id number]]     Order Status:  Sent Specimen:  Blood

## 2020-01-29 NOTE — SUBJECTIVE & OBJECTIVE
Interval History: Patient's HR continued to be in the 40s with competing sinus and idioventricular rhythms. Primary team attempted TVP placement but was unsuccessful. Right IJ TLC was placed. Remained on levophed. Started isoproternol gtt overnight and weaned off dopamine gtt. Additionally, primary team pan-cultured patient and started empiric Abx therapy (vanc/zosyn). Pt was also bolused with 1L NS.    Overnight, the patient's urine output continued to diminished (30 cc -> 0) with increasing LA (1.4 -> 4) and increasing WBC count (14K -> 16K). Subsequently urine output started to increase (this AM, 100 -> 300 cc/hr). LA still rising (4 -> 5.6). HR now in 50s-60s with sinus rhythm with weaning off of levophed gtt.     Patient reports that he is thirsty but otherwise feels well. Reports that he received Tylenol for back pain overnight, has now improved.     Review of Systems   All other systems reviewed and are negative.    Objective:     Vital Signs (Most Recent):  Temp: 98.1 °F (36.7 °C) (01/29/20 0705)  Pulse: (!) 58 (01/29/20 1000)  Resp: (!) 26 (01/29/20 1000)  BP: (!) 123/58 (01/29/20 1000)  SpO2: (!) 94 % (01/29/20 1000) Vital Signs (24h Range):  Temp:  [97 °F (36.1 °C)-98.1 °F (36.7 °C)] 98.1 °F (36.7 °C)  Pulse:  [30-86] 58  Resp:  [0-52] 26  SpO2:  [89 %-100 %] 94 %  BP: ()/(32-60) 123/58  Arterial Line BP: ()/(36-56) 147/50     Weight: 84.1 kg (185 lb 6.5 oz)  Body mass index is 33.91 kg/m².     SpO2: (!) 94 %  O2 Device (Oxygen Therapy): room air    Physical Exam   Constitutional: He is oriented to person, place, and time. He appears well-developed.   HENT:   Head: Normocephalic.   Eyes: Conjunctivae are normal.   Neck:   RIJ CVC in place   Cardiovascular: Regular rhythm.   Bradycardiac rate   Pulmonary/Chest: Effort normal. He has rales.   Abdominal: Soft.   Musculoskeletal: He exhibits no edema.   Neurological: He is alert and oriented to person, place, and time.   Skin: Skin is warm and  dry.   Psychiatric: He has a normal mood and affect.       Significant Labs:   EP:   Recent Labs   Lab 01/28/20  1335 01/28/20  1657 01/29/20  0123 01/29/20  0851   *  --  129* 127*   K 5.4*  --  4.8 5.0     --  106 101   CO2 20*  --  13* 12*   *  --  341* 429*   BUN 36*  --  40* 42*   CREATININE 1.7*  --  2.3* 2.3*   CALCIUM 8.8  --  7.9* 7.8*   PROT 6.1  --  5.8* 5.9*   ALBUMIN 3.1*  --  2.9* 2.8*   BILITOT 0.8  --  1.1* 0.9   ALKPHOS 71  --  69 60   AST 17  --  15 13   ALT 15  --  15 14   ANIONGAP 8  --  10 14   ESTGFRAFRICA 44.0*  --  30.5* 30.5*   EGFRNONAA 38.1*  --  26.4* 26.4*   WBC 8.09 14.23* 16.62*  --    HGB 13.3* 14.1 13.2*  --    HCT 40.3 41.6 41.5  --     208 201  --    INR 1.4*  --   --   --        Significant Imaging: Echocardiogram:   2D echo with color flow doppler: No results found for this or any previous visit. and Transthoracic echo (TTE) complete (Cupid Only):   Results for orders placed or performed during the hospital encounter of 11/18/19   Echo Color Flow Doppler? Yes   Result Value Ref Range    Ascending aorta 2.92 cm    STJ 2.40 cm    AV mean gradient 3 mmHg    Ao peak rodrigo 1.18 m/s    Ao VTI 24.28 cm    IVS 1.03 0.6 - 1.1 cm    LA size 4.74 cm    Left Atrium Major Axis 6.60 cm    Left Atrium Minor Axis 6.35 cm    LVIDD 4.75 3.5 - 6.0 cm    LVIDS 3.13 2.1 - 4.0 cm    LVOT diameter 2.29 cm    LVOT peak VTI 14.39 cm    PW 0.95 0.6 - 1.1 cm    MV Peak A Rodrigo 0.21 m/s    E wave decelartion time 178.74 msec    MV Peak E Rodrgio 0.99 m/s    RA Major Axis 6.27 cm    RA Width 3.05 cm    RVDD 3.10 cm    Sinus 3.18 cm    TAPSE 1.49 cm    TR Max Rodrigo 3.06 m/s    TDI LATERAL 0.12 m/s    TDI SEPTAL 0.07 m/s    LA WIDTH 4.36 cm    LV Diastolic Volume 105.17 mL    LV Systolic Volume 38.67 mL    LVOT peak rodrigo 0.72 m/s    LV LATERAL E/E' RATIO 8.25 m/s    LV SEPTAL E/E' RATIO 14.14 m/s    FS 34 %    LA volume 113.70 cm3    LV mass 165.04 g    Left Ventricle Relative Wall Thickness  0.40 cm    AV valve area 2.44 cm2    AV Velocity Ratio 0.61     AV index (prosthetic) 0.59     E/A ratio 4.71     Mean e' 0.10 m/s    LVOT area 4.1 cm2    LVOT stroke volume 59.24 cm3    AV peak gradient 6 mmHg    E/E' ratio 10.42 m/s    Triscuspid Valve Regurgitation Peak Gradient 37 mmHg    BSA 1.92 m2    LV Systolic Volume Index 20.9 mL/m2    LV Diastolic Volume Index 56.87 mL/m2    LA Volume Index 61.5 mL/m2    LV Mass Index 89 g/m2    Ao root annulus 2.00 cm    Narrative    · Normal left ventricular systolic function. The estimated ejection   fraction is 60%  · Indeterminate left ventricular diastolic function.  · Aortic valve sclerosis without significant stenosis.  · Mild mitral regurgitation.  · Normal right ventricular systolic function.  · Estimated PA systolic pressure is at least 40mmHg.  · Biatrial enlargement.

## 2020-01-29 NOTE — NURSING
Pt.'s uop continuously decreasing, past couple of hours no uop, Dr. Cordoba notified. Orders received to draw morning labs now, will carry orders out, will continue to monitor.

## 2020-01-29 NOTE — PLAN OF CARE
Pharmacokinetic Assessment Follow Up: IV Vancomycin    Vancomycin serum concentration assessment(s):    Patients srcr increased to 2.3, so will stop scheduled vancomycin and order a random to dose off for am labs on 1/30.     Drug levels (last 3 results):  No results for input(s): VANCOMYCINRA, VANCOMYCINPE, VANCOMYCINTR, VANCOTROUGH in the last 72 hours.    Pharmacy will continue to follow and monitor vancomycin.    Please contact pharmacy at extension [phone] for questions regarding this assessment.    Thank you for the consult,   Brina Mari       Patient brief summary:  Giovanni Guerrero is a 79 y.o. male initiated on antimicrobial therapy with IV Vancomycin for treatment of bacteremia    The patient's current regimen is being pulse dosed.    Drug Allergies:   Review of patient's allergies indicates:   Allergen Reactions    Actos [pioglitazone] Other (See Comments)     constipation       Actual Body Weight:   84.1 kg    Renal Function:   Estimated Creatinine Clearance: 25.3 mL/min (A) (based on SCr of 2.3 mg/dL (H)).,     Dialysis Method (if applicable):  N/A    CBC (last 72 hours):  Recent Labs   Lab Result Units 01/28/20  0718 01/28/20  1335 01/28/20  1657 01/29/20  0123   WBC K/uL 5.60 8.09 14.23* 16.62*   Hemoglobin g/dL 13.9* 13.3* 14.1 13.2*   Hematocrit % 43.3 40.3 41.6 41.5   Platelets K/uL 171 172 208 201   Gran% % 46.0 53.4 74.3* 83.3*   Lymph% % 41.4 37.6 18.4 8.0*   Mono% % 9.6 7.5 6.4 8.1   Eosinophil% % 2.1 1.0 0.3 0.0   Basophil% % 0.5 0.4 0.3 0.2   Differential Method  Automated Automated Automated Automated       Metabolic Panel (last 72 hours):  Recent Labs   Lab Result Units 01/28/20  0718 01/28/20  1335 01/28/20  1751 01/29/20  0123 01/29/20  0520 01/29/20  0851   Sodium mmol/L 134* 135*  --  129*  --  127*   Sodium Urine Random mmol/L  --   --   --   --  25  --    Potassium mmol/L 4.6 5.4*  --  4.8  --  5.0   Chloride mmol/L 105 107  --  106  --  101   CO2 mmol/L 23 20*  --  13*  --  12*    Glucose mg/dL 95 122*  --  341*  --  429*   Glucose, UA   --   --  Negative  --   --   --    BUN, Bld mg/dL 35* 36*  --  40*  --  42*   Creatinine mg/dL 1.6* 1.7*  --  2.3*  --  2.3*   Creatinine, Random Ur mg/dL  --   --   --   --  112.0  --    Albumin g/dL  --  3.1*  --  2.9*  --  2.8*   Total Bilirubin mg/dL  --  0.8  --  1.1*  --  0.9   Alkaline Phosphatase U/L  --  71  --  69  --  60   AST U/L  --  17  --  15  --  13   ALT U/L  --  15  --  15  --  14   Magnesium mg/dL  --  1.5*  --  1.3*  --   --    Phosphorus mg/dL  --  3.3  --  3.0  --   --        Vancomycin Administrations:  vancomycin given in the last 96 hours                   vancomycin in dextrose 5 % 1 gram/250 mL IVPB 1,000 mg (mg) 1,000 mg New Bag 01/28/20 2026                Microbiologic Results:  Microbiology Results (last 7 days)     Procedure Component Value Units Date/Time    Blood culture [[id number]] Collected:  01/28/20 2045    Order Status:  Completed Specimen:  Blood from Peripheral, Hand, Left Updated:  01/29/20 0515     Blood Culture, Routine No Growth to date    Blood culture [[id number]] Collected:  01/28/20 2055    Order Status:  Completed Specimen:  Blood from Peripheral, Upper Arm, Right Updated:  01/29/20 0515     Blood Culture, Routine No Growth to date

## 2020-01-29 NOTE — HOSPITAL COURSE
Admitted to CCU service on 1/28 s/p ablation procedure after developing bradycardia and hypotension. Required inotropic and pressor infusions shortly after admission and overnight. On 1/29 patients HR and blood pressure began to normalize. Patient off levophed on 1/30. Weaned off isuprel on 1/31.  Patient given external cardiac monitoring device by electrophysiology.  Nurse was instructed to assist patient with setting of device prior to discharge.    On 2/1/2020, Patient medically stable for discharge. Discharge recommendations and any changes to patient's medication regimen discussed with the patient prior to discharge and included in the patient's discharge packet.      Physical Exam on Day of Discharge:  Vital Signs Reviewed  Gen: NAD  Pulm: CTA-B  Cardiac: intermittently with asymptomatic sinus bradycardia  Neuro: AAOx3  Psych: normal behavior

## 2020-01-29 NOTE — CONSULTS
[facility]  Nephrology  Consult Note    Patient Name: Giovanni Guerrero  MRN: [id number]  Admission Date: 1/28/2020  Hospital Length of Stay: 1 days  Attending Provider: Sergio Bertrand MD   Primary Care Physician: Alan Webster MD  Principal Problem:Persistent atrial fibrillation    Inpatient consult to Nephrology  Consult performed by: Monserrat Bryson, NICOLE, FNP-C  Consult ordered by: Willem Perez MD  Reason for consult: TIFFANIE on CKD        Subjective:     HPI: Giovanni Guerrero is a 78 yo M with a history persistent AF, T2DM, HTN, DLD, HAILEE, CVA (on Eliquis) who presented on 1/28 for a NICOLE/DCCV with Dr Bertrand. The patient sustained a sinus arrest with junctional escape of 30 bpm that persisted for several hours. He also had episodes of hypotension intra -op and post operative (SBPs 90s). The patient was started on IV dopamine for BP support and transferred to the CCU for evaluation and treatment. He was placed on Levophed and Isuprel infusions. The patient had a notable bump in his serum creatinine from 1.6 to 2.3 this AM. The patient had a CO2 of 13 and a pH of 7.23. The patient also had a decline in his urine output. The the patient is a CKD III patient of Dr. Baca who was last seen in the renal clinic in September 2019. On AM assessment, the patient denies any complaints of chest pain/pressure, SOB, peripheral edema, or N/V/D. Urine output has drastically improved with inotrops and pressors. No severe electrolyte derangements. Nephrology consulted for TIFFANIE on CKD III.      Past Medical History:   Diagnosis Date    Allergy     Anticoagulant long-term use     Colon polyp     Diabetes mellitus type II     Gout, unspecified     Hx of colonic polyps     Hyperlipidemia     Hypertension     Prostatic hypertrophy     Renal insufficiency        Past Surgical History:   Procedure Laterality Date    APPENDECTOMY      CATARACT EXTRACTION Left 2018    EYE SURGERY      cataract     HERNIA REPAIR      TONSILLECTOMY      TRANSURETHRAL RESECTION OF PROSTATE (TURP) USING LASER N/A 12/6/2019    Procedure: TURP, USING LASER;  Surgeon: Rafael Marquis Jr., MD;  Location: [facility] OR [address] [place] [address];  Service: Urology;  Laterality: N/A;  75min       Review of patient's allergies indicates:   Allergen Reactions    Actos [pioglitazone] Other (See Comments)     constipation     Current Facility-Administered Medications   Medication Frequency    0.9%  NaCl infusion Continuous    acetaminophen tablet 650 mg Q6H PRN    apixaban tablet 5 mg BID    aspirin EC tablet 81 mg Daily    atorvastatin tablet 40 mg Daily    dextrose 10% (D10W) Bolus PRN    DOPamine 400 mg in dextrose 5 % 250 mL infusion (premix) Continuous    glucagon (human recombinant) injection 1 mg PRN    glucose chewable tablet 16 g PRN    glucose chewable tablet 24 g PRN    HYDROmorphone injection 0.2 mg Q5 Min PRN    insulin aspart U-100 pen 1-10 Units QID (AC + HS) PRN    insulin aspart U-100 pen 2 Units TIDWM    insulin detemir U-100 pen 5 Units QHS    isoproterenol HCl 1 mg in dextrose 5 % 250 mL infusion Continuous    norepinephrine 16 mg in dextrose 5 % 250 mL infusion Continuous    piperacillin-tazobactam 4.5 g in sodium chloride 0.9% 100 mL IVPB (ready to mix system) Q8H    promethazine (PHENERGAN) 6.25 mg in dextrose 5 % 50 mL IVPB Q10 Min PRN    silver sulfADIAZINE 1% cream PRN    sodium chloride 0.9% flush 10 mL PRN    sodium chloride 0.9% flush 3 mL PRN    sodium chloride 0.9% flush 5 mL PRN    vancomycin - pharmacy to dose pharmacy to manage frequency    vancomycin - pharmacy to dose pharmacy to manage frequency     Family History     Problem Relation (Age of Onset)    Cancer Mother    Cerebral aneurysm Sister    Diabetes Father    Heart disease Father, Brother, Brother        Tobacco Use    Smoking status: Never Smoker    Smokeless tobacco: Never Used   Substance and Sexual Activity    Alcohol use: No    Drug  use: No    Sexual activity: Not Currently     Review of Systems   Constitutional: Negative for fever.   HENT: Negative for hearing loss.    Eyes: Negative for visual disturbance.   Respiratory: Positive for shortness of breath (mild). Negative for cough, chest tightness and wheezing.    Cardiovascular: Negative for chest pain, palpitations and leg swelling.   Gastrointestinal: Negative for abdominal distention, diarrhea, nausea and vomiting.   Genitourinary: Negative for decreased urine volume, difficulty urinating and dysuria.   Musculoskeletal: Negative for back pain and gait problem.   Skin: Negative for wound.   Neurological: Positive for weakness. Negative for numbness.   Psychiatric/Behavioral: Negative for agitation, behavioral problems, confusion and decreased concentration.     Objective:     Vital Signs (Most Recent):  Temp: 97.8 °F (36.6 °C) (01/29/20 1105)  Pulse: (!) 58 (01/29/20 1300)  Resp: (!) 25 (01/29/20 1300)  BP: (!) 123/57 (01/29/20 1300)  SpO2: (!) 93 % (01/29/20 1300)  O2 Device (Oxygen Therapy): room air (01/29/20 1300) Vital Signs (24h Range):  Temp:  [97 °F (36.1 °C)-98.1 °F (36.7 °C)] 97.8 °F (36.6 °C)  Pulse:  [44-86] 58  Resp:  [0-52] 25  SpO2:  [89 %-100 %] 93 %  BP: ()/(37-60) 123/57  Arterial Line BP: ()/(36-56) 134/45     Weight: 84.1 kg (185 lb 6.5 oz) (01/29/20 0600)  Body mass index is 33.91 kg/m².  Body surface area is 1.92 meters squared.    I/O last 3 completed shifts:  In: 4076.6 [P.O.:120; I.V.:2406.6; IV Piggyback:1550]  Out: 365 [Urine:365]    Physical Exam   Constitutional: He is oriented to person, place, and time. He appears well-developed and well-nourished. No distress.   HENT:   Head: Normocephalic and atraumatic.   Right Ear: External ear normal.   Left Ear: External ear normal.   Eyes: Right eye exhibits no discharge. Left eye exhibits no discharge.   Neck: Normal range of motion.   Cardiovascular: Normal rate and regular rhythm.   No murmur  heard.  Pulmonary/Chest: Effort normal and breath sounds normal. No respiratory distress.   Abdominal: Soft. Bowel sounds are normal. He exhibits no distension.   Musculoskeletal: Normal range of motion. He exhibits no edema, tenderness or deformity.   Neurological: He is alert and oriented to person, place, and time.   Skin: Skin is warm and dry. He is not diaphoretic. No erythema.   Psychiatric: He has a normal mood and affect. His behavior is normal.       Significant Labs:  CBC:   Recent Labs   Lab 01/29/20  0123   WBC 16.62*   RBC 4.13*   HGB 13.2*   HCT 41.5      *   MCH 32.0*   MCHC 31.8*     CMP:   Recent Labs   Lab 01/29/20  0851   *   CALCIUM 7.8*   ALBUMIN 2.8*   PROT 5.9*   *   K 5.0   CO2 12*      BUN 42*   CREATININE 2.3*   ALKPHOS 60   ALT 14   AST 13   BILITOT 0.9         Assessment/Plan:     * Persistent atrial fibrillation  - per primary team     CKD (chronic kidney disease) stage 3, GFR 30-59 ml/min  The patient is a 80 yo M transferred to CCU s/p ablation procedure after developing bradycardia (30s) and hypotension. He was started on Levophed and Isuprel for support. The patient had a increase in his sCr to 2.3. He is a known CKD III patient with a baseline sCr of 1.7-2.0. The patient was initially noted to be oliguric but showed some improvement after pressor support was started.     TIFFANIE likely secondary to iATN from hemodynamic instability intra-op and post -op (ie hypotensive; bradycardic).    Assessment:   - no urgent need for dialysis at this time. sCr stable at 2.3. UOP much improved, 1 L thus far this shift. Repeat BMP pending.   - continue medical management, no severe electrolyte derangements   - follow CO2 closely. ABG revealing at metabolic acidosis.   - patient takes Sodium Bicarbonate 650 mg BID at home, consider restarting medication   - recommend q6 hr renal function panels   - renal US unremarkable, no hydro   - US negative, UPCR pending  - Avoid  hypotension which may worsen TIFFANIE  - Currently on 1 pressors : Levophed now at 0.09 mcg   - Continue to monitor intake and output, daily weights   - Avoid nephrotoxic medication and renal dose medications to GFR  - Will discuss with staff, Dr. Izaguirre    Thank you for your consult. I will follow-up with patient. Please contact us if you have any additional questions.    Monserrat Bryson DNP, FNP-C  Nephrology  [facility]

## 2020-01-29 NOTE — CARE UPDATE
CCU Update:    Patient's urine output had diminished over the last two hours (30 -> 0 cc x 2 hrs). Reviewed labs. Cr increased from 1.7 -> 2.3, BUN 36 -> 40. WBC increased from 14K -> 16K. UA pending. LA 1.6.     Ordered blood, urine Cx. Bolused with 1L NS. Started broad spectrum Abx (vancomycin/zosyn).    Given acute worsening of kidney function, ordered urine lytes, urine Osm and US retroperitoneal complete. Concerned for possible ATN.     Currently on levophed gtt, isoproterenol gtt, weaning off dopamine gtt.    Tiesha Cordoba MD  Cardiology - PGY4  Pager [phone]

## 2020-01-29 NOTE — SUBJECTIVE & OBJECTIVE
Interval History: Patient remained stable overnight on pressors and inotropes. This morning HR and BP began increasing and inotropes/pressors weaned aggressively. Patient noted with an TIFFANIE and elevated lactate (peaking at 5.6). Nephrology consulted and lactate down trending.     Review of Systems   Constitution: Negative for chills, decreased appetite, diaphoresis, fever, malaise/fatigue, night sweats, weight gain and weight loss.   Eyes: Negative.    Cardiovascular: Negative for chest pain, irregular heartbeat, leg swelling, near-syncope, orthopnea, palpitations, paroxysmal nocturnal dyspnea and syncope.   Respiratory: Negative for cough, shortness of breath, sputum production and wheezing.    Endocrine: Negative.    Hematologic/Lymphatic: Negative for bleeding problem.   Skin: Negative for color change, flushing, rash and suspicious lesions.   Musculoskeletal: Negative.    Gastrointestinal: Negative for bloating, abdominal pain, change in bowel habit, constipation, diarrhea, heartburn, melena, nausea and vomiting.   Genitourinary: Negative for flank pain, frequency, hematuria, incomplete emptying and urgency.   Neurological: Negative for dizziness, focal weakness, headaches, light-headedness, numbness, paresthesias, seizures, sensory change and weakness.   Psychiatric/Behavioral: Negative for altered mental status. The patient is not nervous/anxious.      Objective:     Vital Signs (Most Recent):  Temp: 97.8 °F (36.6 °C) (01/29/20 1105)  Pulse: (!) 58 (01/29/20 1300)  Resp: (!) 25 (01/29/20 1300)  BP: (!) 123/57 (01/29/20 1300)  SpO2: (!) 93 % (01/29/20 1300) Vital Signs (24h Range):  Temp:  [97 °F (36.1 °C)-98.1 °F (36.7 °C)] 97.8 °F (36.6 °C)  Pulse:  [33-86] 58  Resp:  [0-52] 25  SpO2:  [89 %-100 %] 93 %  BP: ()/(35-60) 123/57  Arterial Line BP: ()/(36-56) 134/45     Weight: 84.1 kg (185 lb 6.5 oz)  Body mass index is 33.91 kg/m².     SpO2: (!) 93 %  O2 Device (Oxygen Therapy): room  air      Intake/Output Summary (Last 24 hours) at 1/29/2020 1304  Last data filed at 1/29/2020 1300  Gross per 24 hour   Intake 4564.41 ml   Output 1365 ml   Net 3199.41 ml       Lines/Drains/Airways     Central Venous Catheter Line                 Introducer 01/28/20 1845 right internal jugular less than 1 day         Percutaneous Central Line Insertion/Assessment - triple lumen  01/28/20 1845 right internal jugular less than 1 day          Drain                 Urethral Catheter 01/28/20 1745 16 Fr. less than 1 day          Peripheral Intravenous Line                 Peripheral IV - Single Lumen 01/28/20  20 G Left Forearm 1 day         Peripheral IV - Single Lumen 01/28/20 0736 20 G Right Forearm 1 day                Physical Exam   Constitutional: He is oriented to person, place, and time. He appears well-developed and well-nourished. No distress.   HENT:   Head: Normocephalic and atraumatic.   Dental caries   Neck: No JVD present.   Cardiovascular: Regular rhythm, normal heart sounds and intact distal pulses. Exam reveals no gallop and no friction rub.   No murmur heard.  Bradycardic   Pulmonary/Chest: Effort normal and breath sounds normal. No respiratory distress. He has no wheezes. He has no rales.   Abdominal: Soft. Bowel sounds are normal. He exhibits no distension. There is no tenderness.   Musculoskeletal: He exhibits no edema.   Neurological: He is alert and oriented to person, place, and time.   Skin: He is not diaphoretic.       Significant Labs:   ABG:   Recent Labs   Lab 01/29/20  0707   PH 7.231*   PCO2 30.4*   HCO3 12.8*   POCSATURATED 70*   BE -15   , Blood Culture:   Recent Labs   Lab 01/28/20 2045 01/28/20 2055   LABBLOO No Growth to date No Growth to date   , CMP   Recent Labs   Lab 01/28/20  1335 01/29/20  0123 01/29/20  0851   * 129* 127*   K 5.4* 4.8 5.0    106 101   CO2 20* 13* 12*   * 341* 429*   BUN 36* 40* 42*   CREATININE 1.7* 2.3* 2.3*   CALCIUM 8.8 7.9* 7.8*    PROT 6.1 5.8* 5.9*   ALBUMIN 3.1* 2.9* 2.8*   BILITOT 0.8 1.1* 0.9   ALKPHOS 71 69 60   AST 17 15 13   ALT 15 15 14   ANIONGAP 8 10 14   ESTGFRAFRICA 44.0* 30.5* 30.5*   EGFRNONAA 38.1* 26.4* 26.4*   , CBC   Recent Labs   Lab 01/28/20  1335 01/28/20  1657 01/29/20  0123   WBC 8.09 14.23* 16.62*   HGB 13.3* 14.1 13.2*   HCT 40.3 41.6 41.5    208 201   , Troponin   Recent Labs   Lab 01/29/20  0653   TROPONINI 0.013    and All pertinent lab results from the last 24 hours have been reviewed.    Significant Imaging: Reviewed

## 2020-01-29 NOTE — SUBJECTIVE & OBJECTIVE
Past Medical History:   Diagnosis Date    Allergy     Anticoagulant long-term use     Colon polyp     Diabetes mellitus type II     Gout, unspecified     Hx of colonic polyps     Hyperlipidemia     Hypertension     Prostatic hypertrophy     Renal insufficiency        Past Surgical History:   Procedure Laterality Date    APPENDECTOMY      CATARACT EXTRACTION Left 2018    EYE SURGERY      cataract    HERNIA REPAIR      TONSILLECTOMY      TRANSURETHRAL RESECTION OF PROSTATE (TURP) USING LASER N/A 12/6/2019    Procedure: TURP, USING LASER;  Surgeon: Rafael Marquis Jr., MD;  Location: [facility] OR [address] [place] [address];  Service: Urology;  Laterality: N/A;  75min       Review of patient's allergies indicates:   Allergen Reactions    Actos [pioglitazone] Other (See Comments)     constipation     Current Facility-Administered Medications   Medication Frequency    0.9%  NaCl infusion Continuous    acetaminophen tablet 650 mg Q6H PRN    apixaban tablet 5 mg BID    aspirin EC tablet 81 mg Daily    atorvastatin tablet 40 mg Daily    dextrose 10% (D10W) Bolus PRN    DOPamine 400 mg in dextrose 5 % 250 mL infusion (premix) Continuous    glucagon (human recombinant) injection 1 mg PRN    glucose chewable tablet 16 g PRN    glucose chewable tablet 24 g PRN    HYDROmorphone injection 0.2 mg Q5 Min PRN    insulin aspart U-100 pen 1-10 Units QID (AC + HS) PRN    insulin aspart U-100 pen 2 Units TIDWM    insulin detemir U-100 pen 5 Units QHS    isoproterenol HCl 1 mg in dextrose 5 % 250 mL infusion Continuous    norepinephrine 16 mg in dextrose 5 % 250 mL infusion Continuous    piperacillin-tazobactam 4.5 g in sodium chloride 0.9% 100 mL IVPB (ready to mix system) Q8H    promethazine (PHENERGAN) 6.25 mg in dextrose 5 % 50 mL IVPB Q10 Min PRN    silver sulfADIAZINE 1% cream PRN    sodium chloride 0.9% flush 10 mL PRN    sodium chloride 0.9% flush 3 mL PRN    sodium chloride 0.9% flush 5 mL PRN    vancomycin -  pharmacy to dose pharmacy to manage frequency    vancomycin - pharmacy to dose pharmacy to manage frequency     Family History     Problem Relation (Age of Onset)    Cancer Mother    Cerebral aneurysm Sister    Diabetes Father    Heart disease Father, Brother, Brother        Tobacco Use    Smoking status: Never Smoker    Smokeless tobacco: Never Used   Substance and Sexual Activity    Alcohol use: No    Drug use: No    Sexual activity: Not Currently     Review of Systems   Constitutional: Negative for fever.   HENT: Negative for hearing loss.    Eyes: Negative for visual disturbance.   Respiratory: Positive for shortness of breath (mild). Negative for cough, chest tightness and wheezing.    Cardiovascular: Negative for chest pain, palpitations and leg swelling.   Gastrointestinal: Negative for abdominal distention, diarrhea, nausea and vomiting.   Genitourinary: Negative for decreased urine volume, difficulty urinating and dysuria.   Musculoskeletal: Negative for back pain and gait problem.   Skin: Negative for wound.   Neurological: Positive for weakness. Negative for numbness.   Psychiatric/Behavioral: Negative for agitation, behavioral problems, confusion and decreased concentration.     Objective:     Vital Signs (Most Recent):  Temp: 97.8 °F (36.6 °C) (01/29/20 1105)  Pulse: (!) 58 (01/29/20 1300)  Resp: (!) 25 (01/29/20 1300)  BP: (!) 123/57 (01/29/20 1300)  SpO2: (!) 93 % (01/29/20 1300)  O2 Device (Oxygen Therapy): room air (01/29/20 1300) Vital Signs (24h Range):  Temp:  [97 °F (36.1 °C)-98.1 °F (36.7 °C)] 97.8 °F (36.6 °C)  Pulse:  [44-86] 58  Resp:  [0-52] 25  SpO2:  [89 %-100 %] 93 %  BP: ()/(37-60) 123/57  Arterial Line BP: ()/(36-56) 134/45     Weight: 84.1 kg (185 lb 6.5 oz) (01/29/20 0600)  Body mass index is 33.91 kg/m².  Body surface area is 1.92 meters squared.    I/O last 3 completed shifts:  In: 4076.6 [P.O.:120; I.V.:2406.6; IV Piggyback:1550]  Out: 365 [Urine:365]    Physical  Exam   Constitutional: He is oriented to person, place, and time. He appears well-developed and well-nourished. No distress.   HENT:   Head: Normocephalic and atraumatic.   Right Ear: External ear normal.   Left Ear: External ear normal.   Eyes: Right eye exhibits no discharge. Left eye exhibits no discharge.   Neck: Normal range of motion.   Cardiovascular: Normal rate and regular rhythm.   No murmur heard.  Pulmonary/Chest: Effort normal and breath sounds normal. No respiratory distress.   Abdominal: Soft. Bowel sounds are normal. He exhibits no distension.   Musculoskeletal: Normal range of motion. He exhibits no edema, tenderness or deformity.   Neurological: He is alert and oriented to person, place, and time.   Skin: Skin is warm and dry. He is not diaphoretic. No erythema.   Psychiatric: He has a normal mood and affect. His behavior is normal.       Significant Labs:  CBC:   Recent Labs   Lab 01/29/20  0123   WBC 16.62*   RBC 4.13*   HGB 13.2*   HCT 41.5      *   MCH 32.0*   MCHC 31.8*     CMP:   Recent Labs   Lab 01/29/20  0851   *   CALCIUM 7.8*   ALBUMIN 2.8*   PROT 5.9*   *   K 5.0   CO2 12*      BUN 42*   CREATININE 2.3*   ALKPHOS 60   ALT 14   AST 13   BILITOT 0.9

## 2020-01-29 NOTE — PLAN OF CARE
CM met with patient and family (daughter's, Linda and Lashay) at the bedside to discuss D/C POC needs. Patient AAO x's 3 and able to verify demographics in the chart are correct. CM name and contact number listed on the patient's white board.  CM provided explanation of discharge plan process. CM left blue folder at the bedside with explanation of qualification for placement and facility resources. Patient and family expressed understanding. CM remains available for any further patient needs or concerns.     Alan Webster MD  [address] / [place] [address]      [facility] [place] [address]. - [place], [place] - [facility] [address] [place] [address]  Phone: [phone] Fax: [phone]    [facility] [place] [address]. - [facility] [place] - [address] [place] [address] [place] [address]  Phone: [phone] Fax: [phone]    [id number] [address] [facility] [address]  [address]  [facility] [address]  Phone: [phone] Fax: [phone]      Extended Emergency Contact Information  Primary Emergency Contact: Linda Diamond   [facility]  Home Phone: [phone]  Mobile Phone: [phone]  Relation: Daughter  Secondary Emergency Contact: Lashay Ash  Mobile Phone: [phone]  Relation: Daughter    Future Appointments   Date Time Provider Department Center   2/3/2020  9:30 AM [facility] OIC-US1 MASTER [facility] ULTR [facility]   2/4/2020  9:15 AM EKG, APPT [facility] EKG [facility]   3/16/2020  9:00 AM Rafael Marquis Jr., MD [facility] UROLOGY [facility]   3/16/2020 11:05 AM LAB, APPOINTMENT [facility] LAB VNP [facility]   3/16/2020 11:10 AM SPECIMEN, [facility] SPECLAB [facility]   3/17/2020  1:30 PM EKG, APPT [facility] EKG [facility]   3/17/2020  2:00 PM Sergio Bertrand MD [facility] ARRHYTH [facility]   3/30/2020 10:00 AM Richelle Baca MD [facility] NEPHRO [facility]    5/18/2020  9:00 AM LAB, [facility]DRAW [facility]   5/19/2020  9:00 AM Alan Webster MD [facility] IM [facility]          01/29/20 1152   Discharge Assessment   Assessment Type Discharge Planning Assessment   Confirmed/corrected address and phone number on facesheet? Yes   Assessment information obtained from? Patient   Communicated expected length of stay with patient/caregiver yes   Prior to hospitilization cognitive status: Alert/Oriented   Prior to hospitalization functional status: Independent   Current cognitive status: Alert/Oriented   Current Functional Status: Needs Assistance   Facility Arrived From:   (grandson)   Lives With child(noe), adult   Able to Return to Prior Arrangements other (see comments)  (TBD)   Is patient able to care for self after discharge? Unable to determine at this time (comments)   Who are your caregiver(s) and their phone number(s)? Linda Diamond, daughter, [phone]; Lashay Ash, daughter, [phone]   Patient currently being followed by outpatient case management? No   Patient currently receives any other outside agency services? No   Equipment Currently Used at Home none   Do you have any problems affording any of your prescribed medications? No   Is the patient taking medications as prescribed? yes   Does the patient have transportation home? Yes   Transportation Anticipated family or friend will provide   Does the patient receive services at the [facility]madin Clinic? No   Discharge Plan A Home   Discharge Plan B Home with family   DME Needed Upon Discharge  other (see comments)  (TBD)       Ross Lees RN MSN  Critical Care-   Ext. [phone]

## 2020-01-29 NOTE — PROCEDURES
"Giovanni Guerrero is a 79 y.o. male patient.    Temp: 97 °F (36.1 °C) (01/28/20 1915)  Pulse: (!) 44 (01/28/20 1930)  Resp: (!) 27 (01/28/20 1930)  BP: (!) 110/53 (01/28/20 1930)  SpO2: (!) 92 % (01/28/20 1930)  Weight: 83 kg (183 lb) (01/28/20 0715)  Height: 5' 2" (157.5 cm) (01/28/20 0715)       Central Line  Date/Time: 1/28/2020 9:01 PM  Location procedure was performed: [facility] MEDICAL ICU ([facility])  Performed by: Krishan Awan MD  Supervising provider: Elvira Mclean MD  Assisting provider: Willem Perez MD  Pre-operative Diagnosis: Shock  Post-operative diagnosis: same  Consent Done: Yes  Time out: Immediately prior to procedure a "time out" was called to verify the correct patient, procedure, equipment, support staff and site/side marked as required.  Indications: vascular access, hemodynamic monitoring and med administration  Anesthesia: local infiltration    Anesthesia:  Local anesthesia used: yes  Local Anesthetic: lidocaine 1% without epinephrine  Anesthetic total: 5 mL  Preparation: skin prepped with ChloraPrep  Skin prep agent dried: skin prep agent completely dried prior to procedure  Sterile barriers: all five maximum sterile barriers used - cap, mask, sterile gown, sterile gloves, and large sterile sheet  Hand hygiene: hand hygiene performed prior to central venous catheter insertion  Location details: right internal jugular  Catheter type: Cordis  Catheter size: 8.5 Fr  Ultrasound guidance: yes  Vessel Caliber: medium, compressibility normal  Needle advanced into vessel with real time Ultrasound guidance.  Guidewire confirmed in vessel.  Sterile sheath used.  Number of attempts: 3  Assessment: placement verified by x-ray  Complications: none  Estimated blood loss (mL): 10  Post-procedure: line sutured,  chlorhexidine patch,  sterile dressing applied and blood return through all ports  Complications: No          Krishan Awan  1/28/2020  "

## 2020-01-29 NOTE — PLAN OF CARE
[facility]ICU DAILY GOALS       A: Awake    RASS: Goal -    Actual -  awake/alert   Restraint necessity:  No need  B: Breath   SBT: Not intubated   C: Coordinate A & B, analgesics/sedatives   Pain: managed    SAT: Not intubated  D: Delirium   CAM-ICU: Overall CAM-ICU: Negative  E: Early Mobility   MOVE Screen: Fail   Activity: Activity Management: activity adjusted per tolerance  FAS: Feeding/Nutrition   Diet order: Diet/Nutrition Received: NPO,   Fluid restriction:    T: Thrombus   DVT prophylaxis: VTE Required Core Measure: Pharmacological prophylaxis initiated/maintained  H: HOB Elevation   Head of Bed (HOB): HOB at 30-45 degrees  U: Ulcer Prophylaxis   GI: yes  G: Glucose control   managed Glycemic Management: blood glucose monitoring  S: Skin   Bundle compliance: Yes  Bathing/Skin Care: back care, bath, chlorhexidine, bath, complete, dressed/undressed, linen changed Date: 1/29/20 ( night shift)  B: Bowel Function   no issues   I: Indwelling Catheters   Lozano necessity:      Urethral Catheter 01/28/20 1745 16 Fr.-Reason for Continuing Urinary Catheterization: Critically ill in ICU requiring intensive monitoring   CVC necessity: Yes   IPAD offered: Not appropriate  D: De-escalation Antibx   No  Plan for the day   Wean off pressors, possible TVP  Family/Goals of care/Code Status   Code Status: Full Code     No acute events throughout the night. Pt. In and out of junctional and sinus rhythm , when heart rate increases to the 80s, pt. Nauseated. Isuprel, Levo infusing per orders, off dopamine. VS and assessment per flow sheet, patient progressing towards goals as tolerated, plan of care reviewed with Giovanni Guerrero and family, all concerns addressed, will continue to monitor.

## 2020-01-29 NOTE — PROGRESS NOTES
[facility]  Cardiology  Progress Note    Patient Name: Giovanni Guerrero  MRN: [id number]  Admission Date: 1/28/2020  Hospital Length of Stay: 1 days  Code Status: Full Code   Attending Physician: Sergio Bertrand MD   Primary Care Physician: Alan Webster MD  Expected Discharge Date:   Principal Problem:Persistent atrial fibrillation    Subjective:     Hospital Course:   Admitted to CCU service on 1/28 s/p ablation procedure after developing bradycardia and hypotension. Required inotropic and pressor infusions shortly after admission and overnight. On 1/29 patients HR and blood pressure began to normalize. Inotrops and pressors weaned.     Interval History: Patient remained stable overnight on pressors and inotropes. This morning HR and BP began increasing and inotropes/pressors weaned aggressively. Patient noted with an TIFFANIE and elevated lactate (peaking at 5.6). Nephrology consulted and lactate down trending.     Review of Systems   Constitution: Negative for chills, decreased appetite, diaphoresis, fever, malaise/fatigue, night sweats, weight gain and weight loss.   Eyes: Negative.    Cardiovascular: Negative for chest pain, irregular heartbeat, leg swelling, near-syncope, orthopnea, palpitations, paroxysmal nocturnal dyspnea and syncope.   Respiratory: Negative for cough, shortness of breath, sputum production and wheezing.    Endocrine: Negative.    Hematologic/Lymphatic: Negative for bleeding problem.   Skin: Negative for color change, flushing, rash and suspicious lesions.   Musculoskeletal: Negative.    Gastrointestinal: Negative for bloating, abdominal pain, change in bowel habit, constipation, diarrhea, heartburn, melena, nausea and vomiting.   Genitourinary: Negative for flank pain, frequency, hematuria, incomplete emptying and urgency.   Neurological: Negative for dizziness, focal weakness, headaches, light-headedness, numbness, paresthesias, seizures, sensory change and weakness.    Psychiatric/Behavioral: Negative for altered mental status. The patient is not nervous/anxious.      Objective:     Vital Signs (Most Recent):  Temp: 97.8 °F (36.6 °C) (01/29/20 1105)  Pulse: (!) 58 (01/29/20 1300)  Resp: (!) 25 (01/29/20 1300)  BP: (!) 123/57 (01/29/20 1300)  SpO2: (!) 93 % (01/29/20 1300) Vital Signs (24h Range):  Temp:  [97 °F (36.1 °C)-98.1 °F (36.7 °C)] 97.8 °F (36.6 °C)  Pulse:  [33-86] 58  Resp:  [0-52] 25  SpO2:  [89 %-100 %] 93 %  BP: ()/(35-60) 123/57  Arterial Line BP: ()/(36-56) 134/45     Weight: 84.1 kg (185 lb 6.5 oz)  Body mass index is 33.91 kg/m².     SpO2: (!) 93 %  O2 Device (Oxygen Therapy): room air      Intake/Output Summary (Last 24 hours) at 1/29/2020 1304  Last data filed at 1/29/2020 1300  Gross per 24 hour   Intake 4564.41 ml   Output 1365 ml   Net 3199.41 ml       Lines/Drains/Airways     Central Venous Catheter Line                 Introducer 01/28/20 1845 right internal jugular less than 1 day         Percutaneous Central Line Insertion/Assessment - triple lumen  01/28/20 1845 right internal jugular less than 1 day          Drain                 Urethral Catheter 01/28/20 1745 16 Fr. less than 1 day          Peripheral Intravenous Line                 Peripheral IV - Single Lumen 01/28/20  20 G Left Forearm 1 day         Peripheral IV - Single Lumen 01/28/20 0736 20 G Right Forearm 1 day                Physical Exam   Constitutional: He is oriented to person, place, and time. He appears well-developed and well-nourished. No distress.   HENT:   Head: Normocephalic and atraumatic.   Dental caries   Neck: No JVD present.   Cardiovascular: Regular rhythm, normal heart sounds and intact distal pulses. Exam reveals no gallop and no friction rub.   No murmur heard.  Bradycardic   Pulmonary/Chest: Effort normal and breath sounds normal. No respiratory distress. He has no wheezes. He has no rales.   Abdominal: Soft. Bowel sounds are normal. He exhibits no  distension. There is no tenderness.   Musculoskeletal: He exhibits no edema.   Neurological: He is alert and oriented to person, place, and time.   Skin: He is not diaphoretic.       Significant Labs:   ABG:   Recent Labs   Lab 01/29/20  0707   PH 7.231*   PCO2 30.4*   HCO3 12.8*   POCSATURATED 70*   BE -15   , Blood Culture:   Recent Labs   Lab 01/28/20 2045 01/28/20 2055   LABBLOO No Growth to date No Growth to date   , CMP   Recent Labs   Lab 01/28/20  1335 01/29/20  0123 01/29/20  0851   * 129* 127*   K 5.4* 4.8 5.0    106 101   CO2 20* 13* 12*   * 341* 429*   BUN 36* 40* 42*   CREATININE 1.7* 2.3* 2.3*   CALCIUM 8.8 7.9* 7.8*   PROT 6.1 5.8* 5.9*   ALBUMIN 3.1* 2.9* 2.8*   BILITOT 0.8 1.1* 0.9   ALKPHOS 71 69 60   AST 17 15 13   ALT 15 15 14   ANIONGAP 8 10 14   ESTGFRAFRICA 44.0* 30.5* 30.5*   EGFRNONAA 38.1* 26.4* 26.4*   , CBC   Recent Labs   Lab 01/28/20  1335 01/28/20  1657 01/29/20  0123   WBC 8.09 14.23* 16.62*   HGB 13.3* 14.1 13.2*   HCT 40.3 41.6 41.5    208 201   , Troponin   Recent Labs   Lab 01/29/20  0653   TROPONINI 0.013    and All pertinent lab results from the last 24 hours have been reviewed.    Significant Imaging: Reviewed    Assessment and Plan:       * Persistent atrial fibrillation  S/p DCCV  Continue eliquis  If returns to AFib there will be no further attempt at rhythm control      Junctional bradycardia  S/p NICOLE/DCCV with sinus arrest and junctional rhythm ~30 bpm with systolic blood pressure ~90, asymptomatic other than dizziness with an attempt at ambulation  D/c atenolol  On levophed, dopamine and isoproterenol infusions overnight for bradycardia and hypotension  Currently on levophed weaning to MAP>65 and isoproterenol   Plan to D/C isopro once levophed is off  Will monitor in CCU, EP will monitor and decide on the necessity for a permanent cardiac device    Type 2 diabetes mellitus with diabetic polyneuropathy, with long-term current use of  insulin  detemir 5 nightly, aspart 2 with meals plus sliding scale    Essential hypertension  Holding home medications in setting of shock        VTE Risk Mitigation (From admission, onward)         Ordered     apixaban tablet 5 mg  2 times daily      01/28/20 1301     IP VTE HIGH RISK PATIENT  Once      01/28/20 1301                Willem Perez MD  Cardiology  [facility]

## 2020-01-30 DIAGNOSIS — I48.19 PERSISTENT ATRIAL FIBRILLATION: ICD-10-CM

## 2020-01-30 DIAGNOSIS — I49.5 SICK SINUS SYNDROME: Primary | ICD-10-CM

## 2020-01-30 PROBLEM — R00.1 JUNCTIONAL BRADYCARDIA: Status: RESOLVED | Noted: 2020-01-28 | Resolved: 2020-01-30

## 2020-01-30 LAB
ALBUMIN SERPL BCP-MCNC: 2.4 G/DL (ref 3.5–5.2)
ALBUMIN SERPL BCP-MCNC: 2.4 G/DL (ref 3.5–5.2)
ALBUMIN SERPL BCP-MCNC: 2.5 G/DL (ref 3.5–5.2)
ALLENS TEST: ABNORMAL
ALP SERPL-CCNC: 42 U/L (ref 55–135)
ALT SERPL W/O P-5'-P-CCNC: 13 U/L (ref 10–44)
ANION GAP SERPL CALC-SCNC: 6 MMOL/L (ref 8–16)
ANION GAP SERPL CALC-SCNC: 6 MMOL/L (ref 8–16)
ANION GAP SERPL CALC-SCNC: 8 MMOL/L (ref 8–16)
AST SERPL-CCNC: 15 U/L (ref 10–40)
BASOPHILS # BLD AUTO: 0.01 K/UL (ref 0–0.2)
BASOPHILS NFR BLD: 0.1 % (ref 0–1.9)
BILIRUB SERPL-MCNC: 0.7 MG/DL (ref 0.1–1)
BUN SERPL-MCNC: 35 MG/DL (ref 8–23)
BUN SERPL-MCNC: 35 MG/DL (ref 8–23)
BUN SERPL-MCNC: 37 MG/DL (ref 8–23)
BUN SERPL-MCNC: 37 MG/DL (ref 8–23)
BUN SERPL-MCNC: 38 MG/DL (ref 8–23)
CALCIUM SERPL-MCNC: 7.9 MG/DL (ref 8.7–10.5)
CALCIUM SERPL-MCNC: 8 MG/DL (ref 8.7–10.5)
CALCIUM SERPL-MCNC: 8.1 MG/DL (ref 8.7–10.5)
CHLORIDE SERPL-SCNC: 107 MMOL/L (ref 95–110)
CHLORIDE SERPL-SCNC: 109 MMOL/L (ref 95–110)
CHLORIDE SERPL-SCNC: 110 MMOL/L (ref 95–110)
CO2 SERPL-SCNC: 18 MMOL/L (ref 23–29)
CO2 SERPL-SCNC: 18 MMOL/L (ref 23–29)
CO2 SERPL-SCNC: 20 MMOL/L (ref 23–29)
CREAT SERPL-MCNC: 1.8 MG/DL (ref 0.5–1.4)
CREAT SERPL-MCNC: 1.9 MG/DL (ref 0.5–1.4)
CREAT SERPL-MCNC: 1.9 MG/DL (ref 0.5–1.4)
DIFFERENTIAL METHOD: ABNORMAL
EOSINOPHIL # BLD AUTO: 0 K/UL (ref 0–0.5)
EOSINOPHIL NFR BLD: 0 % (ref 0–8)
ERYTHROCYTE [DISTWIDTH] IN BLOOD BY AUTOMATED COUNT: 14.5 % (ref 11.5–14.5)
EST. GFR  (AFRICAN AMERICAN): 38.5 ML/MIN/1.73 M^2
EST. GFR  (AFRICAN AMERICAN): 38.5 ML/MIN/1.73 M^2
EST. GFR  (AFRICAN AMERICAN): 41.1 ML/MIN/1.73 M^2
EST. GFR  (NON AFRICAN AMERICAN): 33.3 ML/MIN/1.73 M^2
EST. GFR  (NON AFRICAN AMERICAN): 33.3 ML/MIN/1.73 M^2
EST. GFR  (NON AFRICAN AMERICAN): 35.5 ML/MIN/1.73 M^2
GLUCOSE SERPL-MCNC: 125 MG/DL (ref 70–110)
GLUCOSE SERPL-MCNC: 130 MG/DL (ref 70–110)
GLUCOSE SERPL-MCNC: 132 MG/DL (ref 70–110)
GLUCOSE SERPL-MCNC: 132 MG/DL (ref 70–110)
GLUCOSE SERPL-MCNC: 144 MG/DL (ref 70–110)
HCO3 UR-SCNC: 20.9 MMOL/L (ref 24–28)
HCT VFR BLD AUTO: 34.3 % (ref 40–54)
HGB BLD-MCNC: 11.5 G/DL (ref 14–18)
IMM GRANULOCYTES # BLD AUTO: 0.05 K/UL (ref 0–0.04)
IMM GRANULOCYTES NFR BLD AUTO: 0.4 % (ref 0–0.5)
LYMPHOCYTES # BLD AUTO: 1.2 K/UL (ref 1–4.8)
LYMPHOCYTES NFR BLD: 9.5 % (ref 18–48)
MAGNESIUM SERPL-MCNC: 2.2 MG/DL (ref 1.6–2.6)
MCH RBC QN AUTO: 32.9 PG (ref 27–31)
MCHC RBC AUTO-ENTMCNC: 33.5 G/DL (ref 32–36)
MCV RBC AUTO: 98 FL (ref 82–98)
MONOCYTES # BLD AUTO: 1.1 K/UL (ref 0.3–1)
MONOCYTES NFR BLD: 8.5 % (ref 4–15)
NEUTROPHILS # BLD AUTO: 10.1 K/UL (ref 1.8–7.7)
NEUTROPHILS NFR BLD: 81.5 % (ref 38–73)
NRBC BLD-RTO: 0 /100 WBC
PCO2 BLDA: 37.4 MMHG (ref 35–45)
PH SMN: 7.36 [PH] (ref 7.35–7.45)
PHOSPHATE SERPL-MCNC: 1.9 MG/DL (ref 2.7–4.5)
PHOSPHATE SERPL-MCNC: 2.3 MG/DL (ref 2.7–4.5)
PHOSPHATE SERPL-MCNC: 2.9 MG/DL (ref 2.7–4.5)
PHOSPHATE SERPL-MCNC: 3.1 MG/DL (ref 2.7–4.5)
PHOSPHATE SERPL-MCNC: 3.1 MG/DL (ref 2.7–4.5)
PLATELET # BLD AUTO: 141 K/UL (ref 150–350)
PMV BLD AUTO: 10.2 FL (ref 9.2–12.9)
PO2 BLDA: 35 MMHG (ref 40–60)
POC BE: -5 MMOL/L
POC SATURATED O2: 65 % (ref 95–100)
POC TCO2: 22 MMOL/L (ref 24–29)
POCT GLUCOSE: 132 MG/DL (ref 70–110)
POCT GLUCOSE: 134 MG/DL (ref 70–110)
POCT GLUCOSE: 144 MG/DL (ref 70–110)
POCT GLUCOSE: 164 MG/DL (ref 70–110)
POCT GLUCOSE: 97 MG/DL (ref 70–110)
POTASSIUM SERPL-SCNC: 4.1 MMOL/L (ref 3.5–5.1)
POTASSIUM SERPL-SCNC: 4.2 MMOL/L (ref 3.5–5.1)
POTASSIUM SERPL-SCNC: 4.2 MMOL/L (ref 3.5–5.1)
POTASSIUM SERPL-SCNC: 4.4 MMOL/L (ref 3.5–5.1)
POTASSIUM SERPL-SCNC: 4.4 MMOL/L (ref 3.5–5.1)
PROT SERPL-MCNC: 5.2 G/DL (ref 6–8.4)
RBC # BLD AUTO: 3.5 M/UL (ref 4.6–6.2)
SAMPLE: ABNORMAL
SITE: ABNORMAL
SODIUM SERPL-SCNC: 133 MMOL/L (ref 136–145)
SODIUM SERPL-SCNC: 135 MMOL/L (ref 136–145)
SODIUM SERPL-SCNC: 135 MMOL/L (ref 136–145)
SODIUM SERPL-SCNC: 136 MMOL/L (ref 136–145)
SODIUM SERPL-SCNC: 137 MMOL/L (ref 136–145)
VANCOMYCIN SERPL-MCNC: 7.1 UG/ML
WBC # BLD AUTO: 12.37 K/UL (ref 3.9–12.7)

## 2020-01-30 PROCEDURE — 25000003 PHARM REV CODE 250: Performed by: STUDENT IN AN ORGANIZED HEALTH CARE EDUCATION/TRAINING PROGRAM

## 2020-01-30 PROCEDURE — 99231 PR SUBSEQUENT HOSPITAL CARE,LEVL I: ICD-10-PCS | Mod: GC,,, | Performed by: INTERNAL MEDICINE

## 2020-01-30 PROCEDURE — 99900035 HC TECH TIME PER 15 MIN (STAT)

## 2020-01-30 PROCEDURE — 84100 ASSAY OF PHOSPHORUS: CPT

## 2020-01-30 PROCEDURE — 85025 COMPLETE CBC W/AUTO DIFF WBC: CPT

## 2020-01-30 PROCEDURE — 82803 BLOOD GASES ANY COMBINATION: CPT

## 2020-01-30 PROCEDURE — 80202 ASSAY OF VANCOMYCIN: CPT

## 2020-01-30 PROCEDURE — 99231 SBSQ HOSP IP/OBS SF/LOW 25: CPT | Mod: GC,,, | Performed by: INTERNAL MEDICINE

## 2020-01-30 PROCEDURE — 20000000 HC ICU ROOM

## 2020-01-30 PROCEDURE — 80069 RENAL FUNCTION PANEL: CPT | Mod: 91

## 2020-01-30 PROCEDURE — 63600175 PHARM REV CODE 636 W HCPCS: Performed by: INTERNAL MEDICINE

## 2020-01-30 PROCEDURE — 36415 COLL VENOUS BLD VENIPUNCTURE: CPT

## 2020-01-30 PROCEDURE — 25000003 PHARM REV CODE 250: Performed by: INTERNAL MEDICINE

## 2020-01-30 PROCEDURE — 99232 PR SUBSEQUENT HOSPITAL CARE,LEVL II: ICD-10-PCS | Mod: GC,,, | Performed by: INTERNAL MEDICINE

## 2020-01-30 PROCEDURE — 99232 SBSQ HOSP IP/OBS MODERATE 35: CPT | Mod: ,,, | Performed by: NURSE PRACTITIONER

## 2020-01-30 PROCEDURE — 99232 PR SUBSEQUENT HOSPITAL CARE,LEVL II: ICD-10-PCS | Mod: ,,, | Performed by: NURSE PRACTITIONER

## 2020-01-30 PROCEDURE — 63600175 PHARM REV CODE 636 W HCPCS: Performed by: STUDENT IN AN ORGANIZED HEALTH CARE EDUCATION/TRAINING PROGRAM

## 2020-01-30 PROCEDURE — 80053 COMPREHEN METABOLIC PANEL: CPT

## 2020-01-30 PROCEDURE — 99232 SBSQ HOSP IP/OBS MODERATE 35: CPT | Mod: GC,,, | Performed by: INTERNAL MEDICINE

## 2020-01-30 PROCEDURE — 83735 ASSAY OF MAGNESIUM: CPT

## 2020-01-30 RX ORDER — GLUCAGON 1 MG
1 KIT INJECTION
Status: DISCONTINUED | OUTPATIENT
Start: 2020-01-30 | End: 2020-02-01 | Stop reason: HOSPADM

## 2020-01-30 RX ORDER — NOREPINEPHRINE BITARTRATE/D5W 4MG/250ML
0.02 PLASTIC BAG, INJECTION (ML) INTRAVENOUS CONTINUOUS
Status: DISCONTINUED | OUTPATIENT
Start: 2020-01-30 | End: 2020-01-30

## 2020-01-30 RX ORDER — INSULIN ASPART 100 [IU]/ML
0-5 INJECTION, SOLUTION INTRAVENOUS; SUBCUTANEOUS EVERY 6 HOURS PRN
Status: DISCONTINUED | OUTPATIENT
Start: 2020-01-30 | End: 2020-02-01 | Stop reason: HOSPADM

## 2020-01-30 RX ADMIN — ISOPROTERENOL HYDROCHLORIDE 4 MCG/MIN: 0.2 INJECTION, SOLUTION INTRAMUSCULAR; INTRAVENOUS at 04:01

## 2020-01-30 RX ADMIN — ASPIRIN 81 MG: 81 TABLET, COATED ORAL at 09:01

## 2020-01-30 RX ADMIN — SODIUM BICARBONATE 650 MG TABLET 650 MG: at 08:01

## 2020-01-30 RX ADMIN — ISOPROTERENOL HYDROCHLORIDE 3 MCG/MIN: 0.2 INJECTION, SOLUTION INTRAMUSCULAR; INTRAVENOUS at 12:01

## 2020-01-30 RX ADMIN — VANCOMYCIN HYDROCHLORIDE 1250 MG: 1.25 INJECTION, POWDER, LYOPHILIZED, FOR SOLUTION INTRAVENOUS at 08:01

## 2020-01-30 RX ADMIN — SODIUM BICARBONATE 650 MG TABLET 650 MG: at 09:01

## 2020-01-30 RX ADMIN — ISOPROTERENOL HYDROCHLORIDE 3 MCG/MIN: 0.2 INJECTION, SOLUTION INTRAMUSCULAR; INTRAVENOUS at 08:01

## 2020-01-30 RX ADMIN — ATORVASTATIN CALCIUM 40 MG: 20 TABLET, FILM COATED ORAL at 09:01

## 2020-01-30 RX ADMIN — ISOPROTERENOL HYDROCHLORIDE 4 MCG/MIN: 0.2 INJECTION, SOLUTION INTRAMUSCULAR; INTRAVENOUS at 08:01

## 2020-01-30 RX ADMIN — PIPERACILLIN SODIUM,TAZOBACTAM SODIUM 4.5 G: 4; .5 INJECTION, POWDER, FOR SOLUTION INTRAVENOUS at 08:01

## 2020-01-30 RX ADMIN — ISOPROTERENOL HYDROCHLORIDE 4 MCG/MIN: 0.2 INJECTION, SOLUTION INTRAMUSCULAR; INTRAVENOUS at 12:01

## 2020-01-30 RX ADMIN — PIPERACILLIN SODIUM,TAZOBACTAM SODIUM 4.5 G: 4; .5 INJECTION, POWDER, FOR SOLUTION INTRAVENOUS at 12:01

## 2020-01-30 RX ADMIN — ISOPROTERENOL HYDROCHLORIDE 4 MCG/MIN: 0.2 INJECTION, SOLUTION INTRAMUSCULAR; INTRAVENOUS at 05:01

## 2020-01-30 RX ADMIN — APIXABAN 5 MG: 5 TABLET, FILM COATED ORAL at 08:01

## 2020-01-30 RX ADMIN — PIPERACILLIN SODIUM,TAZOBACTAM SODIUM 4.5 G: 4; .5 INJECTION, POWDER, FOR SOLUTION INTRAVENOUS at 04:01

## 2020-01-30 RX ADMIN — NOREPINEPHRINE BITARTRATE 0.06 MCG/KG/MIN: 1 INJECTION, SOLUTION, CONCENTRATE INTRAVENOUS at 01:01

## 2020-01-30 RX ADMIN — NOREPINEPHRINE BITARTRATE 0.04 MCG/KG/MIN: 1 INJECTION, SOLUTION, CONCENTRATE INTRAVENOUS at 01:01

## 2020-01-30 RX ADMIN — APIXABAN 5 MG: 5 TABLET, FILM COATED ORAL at 09:01

## 2020-01-30 NOTE — SUBJECTIVE & OBJECTIVE
Interval History: Renal function greatly improved. Patient making excellent urine (4 L/24 hrs). sCr down to baseline, now 1.9 (baseline 1.7-2).   Patient in good spirits, no distress on AM assessment.     Review of patient's allergies indicates:   Allergen Reactions    Actos [pioglitazone] Other (See Comments)     constipation     Current Facility-Administered Medications   Medication Frequency    0.9%  NaCl infusion Continuous    acetaminophen tablet 650 mg Q6H PRN    apixaban tablet 5 mg BID    aspirin EC tablet 81 mg Daily    atorvastatin tablet 40 mg Daily    dextrose 10% (D10W) Bolus PRN    glucagon (human recombinant) injection 1 mg PRN    glucose chewable tablet 16 g PRN    glucose chewable tablet 24 g PRN    HYDROmorphone injection 0.2 mg Q5 Min PRN    insulin aspart U-100 pen 1-10 Units QID (AC + HS) PRN    insulin aspart U-100 pen 2 Units TIDWM    insulin detemir U-100 pen 5 Units QHS    isoproterenol HCl 1 mg in dextrose 5 % 250 mL infusion Continuous    norepinephrine 32 mg in dextrose 5 % 250 mL infusion Continuous    piperacillin-tazobactam 4.5 g in sodium chloride 0.9% 100 mL IVPB (ready to mix system) Q8H    promethazine (PHENERGAN) 6.25 mg in dextrose 5 % 50 mL IVPB Q10 Min PRN    silver sulfADIAZINE 1% cream PRN    sodium bicarbonate tablet 650 mg BID    sodium chloride 0.9% flush 10 mL PRN    sodium chloride 0.9% flush 3 mL PRN    sodium chloride 0.9% flush 5 mL PRN    vancomycin - pharmacy to dose pharmacy to manage frequency    vancomycin - pharmacy to dose pharmacy to manage frequency    vancomycin 1.25 g in dextrose 5% 250 mL IVPB (ready to mix) Once       Objective:     Vital Signs (Most Recent):  Temp: 98.1 °F (36.7 °C) (01/30/20 0300)  Pulse: 71 (01/30/20 0800)  Resp: (!) 35 (01/30/20 0800)  BP: (!) 140/64 (01/30/20 0800)  SpO2: (!) 94 % (01/30/20 0800)  O2 Device (Oxygen Therapy): room air (01/30/20 0600) Vital Signs (24h Range):  Temp:  [97.8 °F (36.6 °C)-98.1 °F  (36.7 °C)] 98.1 °F (36.7 °C)  Pulse:  [52-72] 71  Resp:  [18-38] 35  SpO2:  [92 %-97 %] 94 %  BP: ()/(46-65) 140/64  Arterial Line BP: ()/(37-54) 146/51     Weight: 84.1 kg (185 lb 6.5 oz) (01/29/20 0600)  Body mass index is 33.91 kg/m².  Body surface area is 1.92 meters squared.    I/O last 3 completed shifts:  In: 5693.2 [P.O.:120; I.V.:3823.2; IV Piggyback:1750]  Out: 4280 [Urine:4280]    Physical Exam   Constitutional: He is oriented to person, place, and time. He appears well-developed and well-nourished. No distress.   HENT:   Head: Normocephalic and atraumatic.   Right Ear: External ear normal.   Left Ear: External ear normal.   Eyes: Right eye exhibits no discharge. Left eye exhibits no discharge.   Neck: Normal range of motion.   Cardiovascular: Normal rate and regular rhythm.   No murmur heard.  Pulmonary/Chest: Effort normal and breath sounds normal. No respiratory distress.   Abdominal: Soft. Bowel sounds are normal. He exhibits no distension.   Musculoskeletal: Normal range of motion. He exhibits no edema, tenderness or deformity.   Neurological: He is alert and oriented to person, place, and time.   Skin: Skin is warm and dry. He is not diaphoretic. No erythema.   Psychiatric: He has a normal mood and affect. His behavior is normal.       Significant Labs:  CBC:   Recent Labs   Lab 01/30/20  0328   WBC 12.37   RBC 3.50*   HGB 11.5*   HCT 34.3*   *   MCV 98   MCH 32.9*   MCHC 33.5     CMP:   Recent Labs   Lab 01/30/20 0328   *  132*   CALCIUM 7.9*  7.9*   ALBUMIN 2.5*  2.5*   PROT 5.2*   *  135*   K 4.4  4.4   CO2 18*  18*     109   BUN 37*  37*   CREATININE 1.9*  1.9*   ALKPHOS 42*   ALT 13   AST 15   BILITOT 0.7

## 2020-01-30 NOTE — PLAN OF CARE
Problem: Infection (Cardiac Rhythm Management Device)  Goal: Absence of Infection Signs/Symptoms  Intervention: Prevent or Manage Infection  Flowsheets (Taken 1/30/2020 0557)  Fever Reduction/Comfort Measures: lightweight bedding; lightweight clothing  Infection Management: aseptic technique maintained     [facility] DAILY GOALS       A: Awake    RASS: Goal - RASS Goal: 0-->alert and calm  Actual - RASS (Diamond Agitation-Sedation Scale): 0-->alert and calm   Restraint necessity:    B: Breath   SBT: Not intubated   C: Coordinate A & B, analgesics/sedatives   Pain: managed    SAT: Not intubated  D: Delirium   CAM-ICU: Overall CAM-ICU: Negative  E: Early Mobility   MOVE Screen: Pass   Activity: Activity Management: activity minimized  FAS: Feeding/Nutrition   Diet order: Diet/Nutrition Received: consistent carb/diabetic diet,   Fluid restriction: Fluid Requirement: 1200 FR  T: Thrombus   DVT prophylaxis: VTE Required Core Measure: Pharmacological prophylaxis initiated/maintained  H: HOB Elevation   Head of Bed (HOB): HOB at 30-45 degrees  U: Ulcer Prophylaxis   GI: yes  G: Glucose control   managed Glycemic Management: blood glucose monitoring  S: Skin   Bundle compliance: yes   Bathing/Skin Care: bath, chlorhexidine, bath, complete, linen changed, bedtime care Date: 1/30/2020  B: Bowel Function   no issues   I: Indwelling Catheters   Lozano necessity:      Urethral Catheter 01/28/20 1745 16 Fr.-Reason for Continuing Urinary Catheterization: Critically ill in ICU requiring intensive monitoring   CVC necessity: Yes   IPAD offered: No  D: De-escalation Antibx   Yes  Plan for the day   Wean off pressors  Family/Goals of care/Code Status   Code Status: Full Code     No acute events throughout day, VS and assessment per flow sheet, patient progressing towards goals as tolerated, plan of care reviewed with Giovanni Guerrero and family, all concerns addressed, will continue to monitor.

## 2020-01-30 NOTE — PROGRESS NOTES
[facility]  Cardiac Electrophysiology  Progress Note    Admission Date: 1/28/2020  Code Status: Full Code   Attending Physician: Elvira Mclean MD   Expected Discharge Date:   Principal Problem:Shock    Subjective:     Interval History: No acute complain overnight, he was on junctional rhythm with rate 52-60 BPM. No episode of lightheadedness or syncope. He denies chest pain, shortness of breath. His Lactic Acid continue to trend down to normal. His sCr is 1.9.    Review of Systems   Constitution: Negative for chills, decreased appetite and malaise/fatigue.   Cardiovascular: Negative for claudication.     Objective:     Vital Signs (Most Recent):  Temp: 98.1 °F (36.7 °C) (01/30/20 0300)  Pulse: 71 (01/30/20 0800)  Resp: (!) 35 (01/30/20 0800)  BP: (!) 140/64 (01/30/20 0800)  SpO2: (!) 94 % (01/30/20 0800) Vital Signs (24h Range):  Temp:  [97.8 °F (36.6 °C)-98.1 °F (36.7 °C)] 98.1 °F (36.7 °C)  Pulse:  [52-72] 71  Resp:  [18-38] 35  SpO2:  [92 %-97 %] 94 %  BP: ()/(46-65) 140/64  Arterial Line BP: ()/(37-54) 146/51     Weight: 84.1 kg (185 lb 6.5 oz)  Body mass index is 33.91 kg/m².     SpO2: (!) 94 %  O2 Device (Oxygen Therapy): room air    Physical Exam   Constitutional: He is oriented to person, place, and time. He appears well-developed.   HENT:   Head: Normocephalic.   Eyes: Conjunctivae are normal.   Neck:   RIJ CVC in place   Cardiovascular: Regular rhythm.   Bradycardiac rate   Pulmonary/Chest: Effort normal. He has rales.   Abdominal: Soft.   Musculoskeletal: He exhibits no edema.   Neurological: He is alert and oriented to person, place, and time.   Skin: Skin is warm and dry.   Psychiatric: He has a normal mood and affect.       Significant Labs:   EP:   Recent Labs   Lab 01/28/20  1335 01/28/20  1657 01/29/20  0123 01/29/20  0851  01/29/20  1731 01/29/20  2339 01/30/20  0328   *  --  129* 127*   < > 129*  129*  129* 133* 135*  135*   K 5.4*  --  4.8 5.0   < >  4.2  4.2  4.2 4.1 4.4  4.4     --  106 101   < > 105  105  105 107 109  109   CO2 20*  --  13* 12*   < > 16*  16*  16* 20* 18*  18*   *  --  341* 429*   < > 231*  231*  231* 125* 132*  132*   BUN 36*  --  40* 42*   < > 41*  41*  41* 38* 37*  37*   CREATININE 1.7*  --  2.3* 2.3*   < > 2.0*  2.0*  2.0* 1.8* 1.9*  1.9*   CALCIUM 8.8  --  7.9* 7.8*   < > 8.2*  8.2*  8.2* 7.9* 7.9*  7.9*   PROT 6.1  --  5.8* 5.9*  --   --   --  5.2*   ALBUMIN 3.1*  --  2.9* 2.8*  --  2.7*  2.7* 2.4* 2.5*  2.5*   BILITOT 0.8  --  1.1* 0.9  --   --   --  0.7   ALKPHOS 71  --  69 60  --   --   --  42*   AST 17  --  15 13  --   --   --  15   ALT 15  --  15 14  --   --   --  13   ANIONGAP 8  --  10 14   < > 8  8  8 6* 8  8   ESTGFRAFRICA 44.0*  --  30.5* 30.5*   < > 36.1*  36.1*  36.1* 41.1* 38.5*  38.5*   EGFRNONAA 38.1*  --  26.4* 26.4*   < > 31.3*  31.3*  31.3* 35.5* 33.3*  33.3*   WBC 8.09 14.23* 16.62*  --   --   --   --  12.37   HGB 13.3* 14.1 13.2*  --   --   --   --  11.5*   HCT 40.3 41.6 41.5  --   --   --   --  34.3*    208 201  --   --   --   --  141*   INR 1.4*  --   --   --   --   --   --   --     < > = values in this interval not displayed.       Significant Imaging: Echocardiogram:   2D echo with color flow doppler: No results found for this or any previous visit. and Transthoracic echo (TTE) complete (Cupid Only):   Results for orders placed or performed during the hospital encounter of 11/18/19   Echo Color Flow Doppler? Yes   Result Value Ref Range    Ascending aorta 2.92 cm    STJ 2.40 cm    AV mean gradient 3 mmHg    Ao peak rodrigo 1.18 m/s    Ao VTI 24.28 cm    IVS 1.03 0.6 - 1.1 cm    LA size 4.74 cm    Left Atrium Major Axis 6.60 cm    Left Atrium Minor Axis 6.35 cm    LVIDD 4.75 3.5 - 6.0 cm    LVIDS 3.13 2.1 - 4.0 cm    LVOT diameter 2.29 cm    LVOT peak VTI 14.39 cm    PW 0.95 0.6 - 1.1 cm    MV Peak A Rodrigo 0.21 m/s    E wave decelartion time 178.74 msec    MV Peak E  Rodrigo 0.99 m/s    RA Major Axis 6.27 cm    RA Width 3.05 cm    RVDD 3.10 cm    Sinus 3.18 cm    TAPSE 1.49 cm    TR Max Rodrigo 3.06 m/s    TDI LATERAL 0.12 m/s    TDI SEPTAL 0.07 m/s    LA WIDTH 4.36 cm    LV Diastolic Volume 105.17 mL    LV Systolic Volume 38.67 mL    LVOT peak rodrigo 0.72 m/s    LV LATERAL E/E' RATIO 8.25 m/s    LV SEPTAL E/E' RATIO 14.14 m/s    FS 34 %    LA volume 113.70 cm3    LV mass 165.04 g    Left Ventricle Relative Wall Thickness 0.40 cm    AV valve area 2.44 cm2    AV Velocity Ratio 0.61     AV index (prosthetic) 0.59     E/A ratio 4.71     Mean e' 0.10 m/s    LVOT area 4.1 cm2    LVOT stroke volume 59.24 cm3    AV peak gradient 6 mmHg    E/E' ratio 10.42 m/s    Triscuspid Valve Regurgitation Peak Gradient 37 mmHg    BSA 1.92 m2    LV Systolic Volume Index 20.9 mL/m2    LV Diastolic Volume Index 56.87 mL/m2    LA Volume Index 61.5 mL/m2    LV Mass Index 89 g/m2    Ao root annulus 2.00 cm    Narrative    · Normal left ventricular systolic function. The estimated ejection   fraction is 60%  · Indeterminate left ventricular diastolic function.  · Aortic valve sclerosis without significant stenosis.  · Mild mitral regurgitation.  · Normal right ventricular systolic function.  · Estimated PA systolic pressure is at least 40mmHg.  · Biatrial enlargement.        Assessment and Plan:     Junctional bradycardia  - S/p NICOLE/DCCV with sinus arrest and junctional rhythm ~30 bpm with systolic blood pressure ~90, asymptomatic other than dizziness with an attempt at ambulation. Likely 2/2 atenolol, now washing out of system. Was on dopamine infusion after procedure, but weaned off.   - Continue to wean off isopreterenol (4 mcg/min currently) and Levophed   - No indication for pacemaker or TVP placement at this time, will continue to follow along     Persistent atrial fibrillation  - S/p DCCV  - Continue eliquis  - If returns to AFib there will be no further attempt at rhythm control    CKD (chronic kidney  disease) stage 3, GFR 30-59 ml/min  - His UOP has been in non-oliguric state, UOP ~ 1.4 with net + 2.3 Liter.   - Nephrology on board for recommendations, they recommended continue on oral bicarb   - Management per Nephrology/primary team         Dieudonne Figueroa MD  Cardiac Electrophysiology  [facility]

## 2020-01-30 NOTE — SUBJECTIVE & OBJECTIVE
Interval History: No acute complain overnight, he was on junctional rhythm with rate 52-60 BPM. No episode of lightheadedness or syncope. He denies chest pain, shortness of breath. His Lactic Acid continue to trend down to normal. His sCr is 1.9.    Review of Systems   Constitution: Negative for chills, decreased appetite and malaise/fatigue.   Cardiovascular: Negative for claudication.     Objective:     Vital Signs (Most Recent):  Temp: 98.1 °F (36.7 °C) (01/30/20 0300)  Pulse: 71 (01/30/20 0800)  Resp: (!) 35 (01/30/20 0800)  BP: (!) 140/64 (01/30/20 0800)  SpO2: (!) 94 % (01/30/20 0800) Vital Signs (24h Range):  Temp:  [97.8 °F (36.6 °C)-98.1 °F (36.7 °C)] 98.1 °F (36.7 °C)  Pulse:  [52-72] 71  Resp:  [18-38] 35  SpO2:  [92 %-97 %] 94 %  BP: ()/(46-65) 140/64  Arterial Line BP: ()/(37-54) 146/51     Weight: 84.1 kg (185 lb 6.5 oz)  Body mass index is 33.91 kg/m².     SpO2: (!) 94 %  O2 Device (Oxygen Therapy): room air    Physical Exam   Constitutional: He is oriented to person, place, and time. He appears well-developed.   HENT:   Head: Normocephalic.   Eyes: Conjunctivae are normal.   Neck:   RIJ CVC in place   Cardiovascular: Regular rhythm.   Bradycardiac rate   Pulmonary/Chest: Effort normal. He has rales.   Abdominal: Soft.   Musculoskeletal: He exhibits no edema.   Neurological: He is alert and oriented to person, place, and time.   Skin: Skin is warm and dry.   Psychiatric: He has a normal mood and affect.       Significant Labs:   EP:   Recent Labs   Lab 01/28/20  1335 01/28/20  1657 01/29/20  0123 01/29/20  0851  01/29/20  1731 01/29/20  2339 01/30/20  0328   *  --  129* 127*   < > 129*  129*  129* 133* 135*  135*   K 5.4*  --  4.8 5.0   < > 4.2  4.2  4.2 4.1 4.4  4.4     --  106 101   < > 105  105  105 107 109  109   CO2 20*  --  13* 12*   < > 16*  16*  16* 20* 18*  18*   *  --  341* 429*   < > 231*  231*  231* 125* 132*  132*   BUN 36*  --  40* 42*   < >  41*  41*  41* 38* 37*  37*   CREATININE 1.7*  --  2.3* 2.3*   < > 2.0*  2.0*  2.0* 1.8* 1.9*  1.9*   CALCIUM 8.8  --  7.9* 7.8*   < > 8.2*  8.2*  8.2* 7.9* 7.9*  7.9*   PROT 6.1  --  5.8* 5.9*  --   --   --  5.2*   ALBUMIN 3.1*  --  2.9* 2.8*  --  2.7*  2.7* 2.4* 2.5*  2.5*   BILITOT 0.8  --  1.1* 0.9  --   --   --  0.7   ALKPHOS 71  --  69 60  --   --   --  42*   AST 17  --  15 13  --   --   --  15   ALT 15  --  15 14  --   --   --  13   ANIONGAP 8  --  10 14   < > 8  8  8 6* 8  8   ESTGFRAFRICA 44.0*  --  30.5* 30.5*   < > 36.1*  36.1*  36.1* 41.1* 38.5*  38.5*   EGFRNONAA 38.1*  --  26.4* 26.4*   < > 31.3*  31.3*  31.3* 35.5* 33.3*  33.3*   WBC 8.09 14.23* 16.62*  --   --   --   --  12.37   HGB 13.3* 14.1 13.2*  --   --   --   --  11.5*   HCT 40.3 41.6 41.5  --   --   --   --  34.3*    208 201  --   --   --   --  141*   INR 1.4*  --   --   --   --   --   --   --     < > = values in this interval not displayed.       Significant Imaging: Echocardiogram:   2D echo with color flow doppler: No results found for this or any previous visit. and Transthoracic echo (TTE) complete (Cupid Only):   Results for orders placed or performed during the hospital encounter of 11/18/19   Echo Color Flow Doppler? Yes   Result Value Ref Range    Ascending aorta 2.92 cm    STJ 2.40 cm    AV mean gradient 3 mmHg    Ao peak rodrigo 1.18 m/s    Ao VTI 24.28 cm    IVS 1.03 0.6 - 1.1 cm    LA size 4.74 cm    Left Atrium Major Axis 6.60 cm    Left Atrium Minor Axis 6.35 cm    LVIDD 4.75 3.5 - 6.0 cm    LVIDS 3.13 2.1 - 4.0 cm    LVOT diameter 2.29 cm    LVOT peak VTI 14.39 cm    PW 0.95 0.6 - 1.1 cm    MV Peak A Rodrigo 0.21 m/s    E wave decelartion time 178.74 msec    MV Peak E Rodrigo 0.99 m/s    RA Major Axis 6.27 cm    RA Width 3.05 cm    RVDD 3.10 cm    Sinus 3.18 cm    TAPSE 1.49 cm    TR Max Rodrigo 3.06 m/s    TDI LATERAL 0.12 m/s    TDI SEPTAL 0.07 m/s    LA WIDTH 4.36 cm    LV Diastolic Volume 105.17 mL    LV Systolic  Volume 38.67 mL    LVOT peak susana 0.72 m/s    LV LATERAL E/E' RATIO 8.25 m/s    LV SEPTAL E/E' RATIO 14.14 m/s    FS 34 %    LA volume 113.70 cm3    LV mass 165.04 g    Left Ventricle Relative Wall Thickness 0.40 cm    AV valve area 2.44 cm2    AV Velocity Ratio 0.61     AV index (prosthetic) 0.59     E/A ratio 4.71     Mean e' 0.10 m/s    LVOT area 4.1 cm2    LVOT stroke volume 59.24 cm3    AV peak gradient 6 mmHg    E/E' ratio 10.42 m/s    Triscuspid Valve Regurgitation Peak Gradient 37 mmHg    BSA 1.92 m2    LV Systolic Volume Index 20.9 mL/m2    LV Diastolic Volume Index 56.87 mL/m2    LA Volume Index 61.5 mL/m2    LV Mass Index 89 g/m2    Ao root annulus 2.00 cm    Narrative    · Normal left ventricular systolic function. The estimated ejection   fraction is 60%  · Indeterminate left ventricular diastolic function.  · Aortic valve sclerosis without significant stenosis.  · Mild mitral regurgitation.  · Normal right ventricular systolic function.  · Estimated PA systolic pressure is at least 40mmHg.  · Biatrial enlargement.

## 2020-01-30 NOTE — SUBJECTIVE & OBJECTIVE
Interval History: Overnight, weaned off levophed. HR improved to 50s-60s with sinus rhythm. Urinating well (4L UOP, net negative 1.7L).      Review of Systems   All other systems reviewed and are negative.    Objective:     Vital Signs (Most Recent):  Temp: 98.4 °F (36.9 °C) (01/30/20 1145)  Pulse: 67 (01/30/20 1300)  Resp: (!) 23 (01/30/20 1300)  BP: (!) 89/45 (01/30/20 1258)  SpO2: 95 % (01/30/20 1300) Vital Signs (24h Range):  Temp:  [97.3 °F (36.3 °C)-98.4 °F (36.9 °C)] 98.4 °F (36.9 °C)  Pulse:  [52-72] 67  Resp:  [20-38] 23  SpO2:  [92 %-96 %] 95 %  BP: ()/(45-65) 89/45  Arterial Line BP: ()/(32-54) 90/38     Weight: 84.1 kg (185 lb 6.5 oz)  Body mass index is 33.91 kg/m².     SpO2: 95 %  O2 Device (Oxygen Therapy): room air      Intake/Output Summary (Last 24 hours) at 1/30/2020 1327  Last data filed at 1/30/2020 1300  Gross per 24 hour   Intake 2454.44 ml   Output 4165 ml   Net -1710.56 ml       Lines/Drains/Airways     Central Venous Catheter Line                 Introducer 01/28/20 1845 right internal jugular 1 day         Percutaneous Central Line Insertion/Assessment - triple lumen  01/28/20 1845 right internal jugular 1 day          Drain                 Urethral Catheter 01/28/20 1745 16 Fr. 1 day          Peripheral Intravenous Line                 Peripheral IV - Single Lumen 01/28/20  20 G Left Forearm 2 days         Peripheral IV - Single Lumen 01/28/20 0736 20 G Right Forearm 2 days                Physical Exam   Constitutional: He is oriented to person, place, and time. He appears well-developed and well-nourished.   HENT:   Head: Normocephalic.   Neck:   RIJ CVC   Cardiovascular: Regular rhythm.   Bradycardiac rate   Pulmonary/Chest: Effort normal and breath sounds normal.   Abdominal: Soft.   Musculoskeletal: He exhibits no edema.   Neurological: He is alert and oriented to person, place, and time.   Skin: Skin is warm and dry.   Psychiatric: He has a normal mood and affect.        Significant Labs:   Recent Lab Results       01/30/20  1156   01/30/20  1004   01/30/20  0340   01/30/20  0337   01/30/20  0328        Albumin 2.4       2.5              2.5     Alkaline Phosphatase         42     Allens Test     N/A         ALT         13     Anion Gap 6       8              8     AST         15     Baso #         0.01     Basophil%         0.1     BILIRUBIN TOTAL         0.7  Comment:  For infants and newborns, interpretation of results should be based  on gestational age, weight and in agreement with clinical  observations.  Premature Infant recommended reference ranges:  Up to 24 hours.............<8.0 mg/dL  Up to 48 hours............<12.0 mg/dL  3-5 days..................<15.0 mg/dL  6-29 days.................<15.0 mg/dL       Site     Other         BUN, Bld 35       37              37     Calcium 8.1       7.9              7.9     Chloride 110       109              109     CO2 20       18              18     Creatinine 1.8       1.9              1.9     Differential Method         Automated     eGFR if  41.1       38.5              38.5     eGFR if non  35.5  Comment:  Calculation used to obtain the estimated glomerular filtration  rate (eGFR) is the CKD-EPI equation.          33.3  Comment:  Calculation used to obtain the estimated glomerular filtration  rate (eGFR) is the CKD-EPI equation.                 33.3  Comment:  Calculation used to obtain the estimated glomerular filtration  rate (eGFR) is the CKD-EPI equation.        Eos #         0.0     Eosinophil%         0.0     Glucose 130       132              132     Gran # (ANC)         10.1     Gran%         81.5     Hematocrit         34.3     Hemoglobin         11.5     Immature Grans (Abs)         0.05  Comment:  Mild elevation in immature granulocytes is non specific and   can be seen in a variety of conditions including stress response,   acute inflammation, trauma and pregnancy. Correlation  with other   laboratory and clinical findings is essential.       Immature Granulocytes         0.4     Lactate, Armani               Lymph #         1.2     Lymph%         9.5     Magnesium         2.2     MCH         32.9     MCHC         33.5     MCV         98     Mono #         1.1     Mono%         8.5     MPV         10.2     nRBC         0     Phosphorus 2.3       3.1              3.1     Platelets         141     POC BE     -5         POC HCO3     20.9         POC PCO2     37.4         POC PH     7.355         POC PO2     35         POC SATURATED O2     65         POC TCO2     22         POCT Glucose   164   134       Potassium 4.2       4.4              4.4     PROTEIN TOTAL         5.2     RBC         3.50     RDW         14.5     Sample     VENOUS         Sodium 136       135              135     Troponin I               Vancomycin, Random         7.1     WBC         12.37                      01/29/20  2339   01/29/20  2152   01/29/20 2005 01/29/20  1731        Albumin 2.4     2.7            2.7     Alkaline Phosphatase             Allens Test             ALT             Anion Gap 6     8            8            8     AST             Baso #             Basophil%             BILIRUBIN TOTAL             Site             BUN, Bld 38     41            41            41     Calcium 7.9     8.2            8.2            8.2     Chloride 107     105            105            105     CO2 20     16            16            16     Creatinine 1.8     2.0            2.0            2.0     Differential Method             eGFR if  41.1     36.1            36.1            36.1     eGFR if non  35.5  Comment:  Calculation used to obtain the estimated glomerular filtration  rate (eGFR) is the CKD-EPI equation.        31.3  Comment:  Calculation used to obtain the estimated glomerular filtration  rate (eGFR) is the CKD-EPI equation.               31.3  Comment:  Calculation used to obtain  the estimated glomerular filtration  rate (eGFR) is the CKD-EPI equation.               31.3  Comment:  Calculation used to obtain the estimated glomerular filtration  rate (eGFR) is the CKD-EPI equation.        Eos #             Eosinophil%             Glucose 125     231            231            231     Gran # (ANC)             Gran%             Hematocrit             Hemoglobin             Immature Grans (Abs)             Immature Granulocytes             Lactate, Armani   1.2  Comment:  Falsely low lactic acid results can be found in samples   containing >=13.0 mg/dL total bilirubin and/or >=3.5 mg/dL   direct bilirubin.     2.0  Comment:  Falsely low lactic acid results can be found in samples   containing >=13.0 mg/dL total bilirubin and/or >=3.5 mg/dL   direct bilirubin.       Lymph #             Lymph%             Magnesium             MCH             MCHC             MCV             Mono #             Mono%             MPV             nRBC             Phosphorus 2.9     2.9            2.9     Platelets             POC BE             POC HCO3             POC PCO2             POC PH             POC PO2             POC SATURATED O2             POC TCO2             POCT Glucose     168       Potassium 4.1     4.2            4.2            4.2     PROTEIN TOTAL             RBC             RDW             Sample             Sodium 133     129            129            129     Troponin I       0.018  Comment:  The reference interval for Troponin I represents the 99th percentile   cutoff   for our facility and is consistent with 3rd generation assay   performance.       Vancomycin, Random             WBC                   Significant Imaging: Echocardiogram:   2D echo with color flow doppler: No results found for this or any previous visit. and Transthoracic echo (TTE) complete (Cupid Only):   Results for orders placed or performed during the hospital encounter of 11/18/19   Echo Color Flow Doppler? Yes   Result  Value Ref Range    Ascending aorta 2.92 cm    STJ 2.40 cm    AV mean gradient 3 mmHg    Ao peak rodrigo 1.18 m/s    Ao VTI 24.28 cm    IVS 1.03 0.6 - 1.1 cm    LA size 4.74 cm    Left Atrium Major Axis 6.60 cm    Left Atrium Minor Axis 6.35 cm    LVIDD 4.75 3.5 - 6.0 cm    LVIDS 3.13 2.1 - 4.0 cm    LVOT diameter 2.29 cm    LVOT peak VTI 14.39 cm    PW 0.95 0.6 - 1.1 cm    MV Peak A Rodrigo 0.21 m/s    E wave decelartion time 178.74 msec    MV Peak E Rodrigo 0.99 m/s    RA Major Axis 6.27 cm    RA Width 3.05 cm    RVDD 3.10 cm    Sinus 3.18 cm    TAPSE 1.49 cm    TR Max Rodrigo 3.06 m/s    TDI LATERAL 0.12 m/s    TDI SEPTAL 0.07 m/s    LA WIDTH 4.36 cm    LV Diastolic Volume 105.17 mL    LV Systolic Volume 38.67 mL    LVOT peak rodrigo 0.72 m/s    LV LATERAL E/E' RATIO 8.25 m/s    LV SEPTAL E/E' RATIO 14.14 m/s    FS 34 %    LA volume 113.70 cm3    LV mass 165.04 g    Left Ventricle Relative Wall Thickness 0.40 cm    AV valve area 2.44 cm2    AV Velocity Ratio 0.61     AV index (prosthetic) 0.59     E/A ratio 4.71     Mean e' 0.10 m/s    LVOT area 4.1 cm2    LVOT stroke volume 59.24 cm3    AV peak gradient 6 mmHg    E/E' ratio 10.42 m/s    Triscuspid Valve Regurgitation Peak Gradient 37 mmHg    BSA 1.92 m2    LV Systolic Volume Index 20.9 mL/m2    LV Diastolic Volume Index 56.87 mL/m2    LA Volume Index 61.5 mL/m2    LV Mass Index 89 g/m2    Ao root annulus 2.00 cm    Narrative    · Normal left ventricular systolic function. The estimated ejection   fraction is 60%  · Indeterminate left ventricular diastolic function.  · Aortic valve sclerosis without significant stenosis.  · Mild mitral regurgitation.  · Normal right ventricular systolic function.  · Estimated PA systolic pressure is at least 40mmHg.  · Biatrial enlargement.

## 2020-01-30 NOTE — CARE UPDATE
Brief CCU Note    Checked BMP lactate; lactate 1.2 will DC q4 checks, defer reordering to day team. Electrolytes including Na (129) and Cr stable.    Kenroy Coy MD  PGY-4, Cardiology  Pager [phone]

## 2020-01-30 NOTE — PROGRESS NOTES
[facility]  Nephrology  Progress Note    Patient Name: Giovanni Guerrero  MRN: [id number]  Admission Date: 1/28/2020  Hospital Length of Stay: 2 days  Attending Provider: Elvira Mclean MD   Primary Care Physician: Alan Webster MD  Principal Problem:Shock    Subjective:     Interval History: Renal function greatly improved. Patient making excellent urine (4 L/24 hrs). sCr down to baseline, now 1.9 (baseline 1.7-2).   Patient in good spirits, no distress on AM assessment.     Review of patient's allergies indicates:   Allergen Reactions    Actos [pioglitazone] Other (See Comments)     constipation     Current Facility-Administered Medications   Medication Frequency    0.9%  NaCl infusion Continuous    acetaminophen tablet 650 mg Q6H PRN    apixaban tablet 5 mg BID    aspirin EC tablet 81 mg Daily    atorvastatin tablet 40 mg Daily    dextrose 10% (D10W) Bolus PRN    glucagon (human recombinant) injection 1 mg PRN    glucose chewable tablet 16 g PRN    glucose chewable tablet 24 g PRN    HYDROmorphone injection 0.2 mg Q5 Min PRN    insulin aspart U-100 pen 1-10 Units QID (AC + HS) PRN    insulin aspart U-100 pen 2 Units TIDWM    insulin detemir U-100 pen 5 Units QHS    isoproterenol HCl 1 mg in dextrose 5 % 250 mL infusion Continuous    norepinephrine 32 mg in dextrose 5 % 250 mL infusion Continuous    piperacillin-tazobactam 4.5 g in sodium chloride 0.9% 100 mL IVPB (ready to mix system) Q8H    promethazine (PHENERGAN) 6.25 mg in dextrose 5 % 50 mL IVPB Q10 Min PRN    silver sulfADIAZINE 1% cream PRN    sodium bicarbonate tablet 650 mg BID    sodium chloride 0.9% flush 10 mL PRN    sodium chloride 0.9% flush 3 mL PRN    sodium chloride 0.9% flush 5 mL PRN    vancomycin - pharmacy to dose pharmacy to manage frequency    vancomycin - pharmacy to dose pharmacy to manage frequency    vancomycin 1.25 g in dextrose 5% 250 mL IVPB (ready to mix) Once       Objective:      Vital Signs (Most Recent):  Temp: 98.1 °F (36.7 °C) (01/30/20 0300)  Pulse: 71 (01/30/20 0800)  Resp: (!) 35 (01/30/20 0800)  BP: (!) 140/64 (01/30/20 0800)  SpO2: (!) 94 % (01/30/20 0800)  O2 Device (Oxygen Therapy): room air (01/30/20 0600) Vital Signs (24h Range):  Temp:  [97.8 °F (36.6 °C)-98.1 °F (36.7 °C)] 98.1 °F (36.7 °C)  Pulse:  [52-72] 71  Resp:  [18-38] 35  SpO2:  [92 %-97 %] 94 %  BP: ()/(46-65) 140/64  Arterial Line BP: ()/(37-54) 146/51     Weight: 84.1 kg (185 lb 6.5 oz) (01/29/20 0600)  Body mass index is 33.91 kg/m².  Body surface area is 1.92 meters squared.    I/O last 3 completed shifts:  In: 5693.2 [P.O.:120; I.V.:3823.2; IV Piggyback:1750]  Out: 4280 [Urine:4280]    Physical Exam   Constitutional: He is oriented to person, place, and time. He appears well-developed and well-nourished. No distress.   HENT:   Head: Normocephalic and atraumatic.   Right Ear: External ear normal.   Left Ear: External ear normal.   Eyes: Right eye exhibits no discharge. Left eye exhibits no discharge.   Neck: Normal range of motion.   Cardiovascular: Normal rate and regular rhythm.   No murmur heard.  Pulmonary/Chest: Effort normal and breath sounds normal. No respiratory distress.   Abdominal: Soft. Bowel sounds are normal. He exhibits no distension.   Musculoskeletal: Normal range of motion. He exhibits no edema, tenderness or deformity.   Neurological: He is alert and oriented to person, place, and time.   Skin: Skin is warm and dry. He is not diaphoretic. No erythema.   Psychiatric: He has a normal mood and affect. His behavior is normal.       Significant Labs:  CBC:   Recent Labs   Lab 01/30/20 0328   WBC 12.37   RBC 3.50*   HGB 11.5*   HCT 34.3*   *   MCV 98   MCH 32.9*   MCHC 33.5     CMP:   Recent Labs   Lab 01/30/20 0328   *  132*   CALCIUM 7.9*  7.9*   ALBUMIN 2.5*  2.5*   PROT 5.2*   *  135*   K 4.4  4.4   CO2 18*  18*     109   BUN 37*  37*    CREATININE 1.9*  1.9*   ALKPHOS 42*   ALT 13   AST 15   BILITOT 0.7            Assessment/Plan:     Persistent atrial fibrillation  - per primary team     CKD (chronic kidney disease) stage 3, GFR 30-59 ml/min  The patient is a 80 yo M transferred to CCU s/p ablation procedure after developing bradycardia (30s) and hypotension. The patient was started on Levophed and Isuprel for support. The patient was noted with a elevated sCr of 2.3. The patient is a CKD III patient with a baseline sCr of 1.7-2.0. The patient was initially noted to be oliguric but showed some improvement after pressor support was started.     TIFFANIE likely secondary to iATN from hemodynamic instability intra-op and post -op (ie hypotensive; bradycardic).    Assessment:   - renal function back to baseline. Patient with excellent urine output. No electrolyte derangements. Pressor support weaned.   - Agree with sodium bicarbonate BID  - continue frequent renal function panels   - renal US unremarkable, no hydro   - US negative, UPCR negative   - Avoid hypotension which may worsen TIFFANIE  - Continue to monitor intake and output, daily weights   - Avoid nephrotoxic medication and renal dose medications to GFR  - Will discuss with staff, Dr. Izaguirre       Thank you for your consult. I will follow-up with patient. Please contact us if you have any additional questions.    Monserrat Bryson DNP, FNP-C  Nephrology  [facility]

## 2020-01-30 NOTE — PROGRESS NOTES
Pharmacokinetic Assessment Follow Up: IV Vancomycin    Vancomycin serum concentration assessment(s):    · The random level of 7.1mcg/mL was drawn correctly and can be used to guide therapy at this time.   · The measurement is below the desired definitive target range of 15 to 20 mcg/mL.  · Serum Creatinine kaylee slightly from 1.8 to 1.9mg/dL. Per nephrology this is around baseline and patient is making urine.     Vancomycin Regimen Plan:    · Continue with pulse dosing today and reassess renal function trend tomorrow and since with renal function frequency would be q24h dosing. May consider scheduled dosing if renal function improves/stablizes and per random level. Redose vancomycin 1250mg (15mg/kg) x 1 dose today.  · Redose when random level is less then 20mcg/mL.  · Next level to be drawn with AM labs on 01/30/20.     Drug levels (last 3 results):  Recent Labs   Lab Result Units 01/30/20  0328   Vancomycin, Random ug/mL 7.1       Pharmacy will continue to follow and monitor vancomycin.    Please contact pharmacy at extension [phone] for questions regarding this assessment.    Thank you for the consult,   Willa Hagan, PharmD, BCPS, Cardiology Clinical Pharmacy Specialist  EXT [phone]     Patient brief summary:  Giovanni Guerrero is a 79 y.o. male initiated on antimicrobial therapy with IV Vancomycin for treatment of bacteremia    Drug Allergies:   Review of patient's allergies indicates:   Allergen Reactions    Actos [pioglitazone] Other (See Comments)     constipation       Actual Body Weight:   84.1kg    Renal Function:   Estimated Creatinine Clearance: 30.6 mL/min (A) (based on SCr of 1.9 mg/dL (H)).,     Dialysis Method (if applicable):  No dialysis     CBC (last 72 hours):  Recent Labs   Lab Result Units 01/28/20  0718 01/28/20  1335 01/28/20  1657 01/29/20  0123 01/30/20  0328   WBC K/uL 5.60 8.09 14.23* 16.62* 12.37   Hemoglobin g/dL 13.9* 13.3* 14.1 13.2* 11.5*   Hematocrit % 43.3 40.3 41.6 41.5 34.3*    Platelets K/uL 171 172 208 201 141*   Gran% % 46.0 53.4 74.3* 83.3* 81.5*   Lymph% % 41.4 37.6 18.4 8.0* 9.5*   Mono% % 9.6 7.5 6.4 8.1 8.5   Eosinophil% % 2.1 1.0 0.3 0.0 0.0   Basophil% % 0.5 0.4 0.3 0.2 0.1   Differential Method  Automated Automated Automated Automated Automated       Metabolic Panel (last 72 hours):  Recent Labs   Lab Result Units 01/28/20  0718 01/28/20  1335 01/28/20  1751 01/29/20  0123 01/29/20  0520 01/29/20  0851 01/29/20  0915 01/29/20  1306 01/29/20  1731 01/29/20  2339 01/30/20  0328   Sodium mmol/L 134* 135*  --  129*  --  127*  --  125* 129*  129*  129* 133* 135*  135*   Sodium Urine Random mmol/L  --   --   --   --  25  --   --   --   --   --   --    Potassium mmol/L 4.6 5.4*  --  4.8  --  5.0  --  4.2 4.2  4.2  4.2 4.1 4.4  4.4   Chloride mmol/L 105 107  --  106  --  101  --  102 105  105  105 107 109  109   CO2 mmol/L 23 20*  --  13*  --  12*  --  17* 16*  16*  16* 20* 18*  18*   Glucose mg/dL 95 122*  --  341*  --  429*  --  313* 231*  231*  231* 125* 132*  132*   Glucose, UA   --   --  Negative  --   --   --   --   --   --   --   --    BUN, Bld mg/dL 35* 36*  --  40*  --  42*  --  42* 41*  41*  41* 38* 37*  37*   Creatinine mg/dL 1.6* 1.7*  --  2.3*  --  2.3*  --  2.1* 2.0*  2.0*  2.0* 1.8* 1.9*  1.9*   Creatinine, Random Ur mg/dL  --   --   --   --  112.0  --  24.0  --   --   --   --    Albumin g/dL  --  3.1*  --  2.9*  --  2.8*  --   --  2.7*  2.7* 2.4* 2.5*  2.5*   Total Bilirubin mg/dL  --  0.8  --  1.1*  --  0.9  --   --   --   --  0.7   Alkaline Phosphatase U/L  --  71  --  69  --  60  --   --   --   --  42*   AST U/L  --  17  --  15  --  13  --   --   --   --  15   ALT U/L  --  15  --  15  --  14  --   --   --   --  13   Magnesium mg/dL  --  1.5*  --  1.3*  --   --   --   --   --   --  2.2   Phosphorus mg/dL  --  3.3  --  3.0  --   --   --   --  2.9  2.9 2.9 3.1  3.1       Vancomycin Administrations:  vancomycin given in the last 96 hours                    vancomycin 1.25 g in dextrose 5% 250 mL IVPB (ready to mix) (mg) 1,250 mg New Bag 01/30/20 0813    vancomycin in dextrose 5 % 1 gram/250 mL IVPB 1,000 mg (mg) 1,000 mg New Bag 01/28/20 2026                Microbiologic Results:  Microbiology Results (last 7 days)     Procedure Component Value Units Date/Time    Blood culture [[id number]] Collected:  01/28/20 2055    Order Status:  Completed Specimen:  Blood from Peripheral, Upper Arm, Right Updated:  01/29/20 2212     Blood Culture, Routine No Growth to date      No Growth to date    Blood culture [[id number]] Collected:  01/28/20 2045    Order Status:  Completed Specimen:  Blood from Peripheral, Hand, Left Updated:  01/29/20 2212     Blood Culture, Routine No Growth to date      No Growth to date

## 2020-01-30 NOTE — PROGRESS NOTES
Pharmacokinetic Assessment Follow Up: IV Vancomycin     Therapy with vancomycin and consult discontinued by provider.  Pharmacy will sign off, please re-consult as needed.    Willa Hagan, PharmD, BCPS, Cardiology Clinical Pharmacy Specialist  EXT [phone]

## 2020-01-30 NOTE — PROGRESS NOTES
[facility]  Cardiology  Progress Note    Patient Name: Giovanni Guerrero  MRN: [id number]  Admission Date: 1/28/2020  Hospital Length of Stay: 2 days  Code Status: Full Code   Attending Physician: Elvira Mclean MD   Primary Care Physician: Alan Webster MD  Expected Discharge Date:   Principal Problem:Shock    Subjective:     Hospital Course:   Admitted to CCU service on 1/28 s/p ablation procedure after developing bradycardia and hypotension. Required inotropic and pressor infusions shortly after admission and overnight. On 1/29 patients HR and blood pressure began to normalize. Inotrops and pressors weaned.     Interval History: Overnight, weaned off levophed. HR improved to 50s-60s with sinus rhythm. Urinating well (4L UOP, net negative 1.7L).      Review of Systems   All other systems reviewed and are negative.    Objective:     Vital Signs (Most Recent):  Temp: 98.4 °F (36.9 °C) (01/30/20 1145)  Pulse: 67 (01/30/20 1300)  Resp: (!) 23 (01/30/20 1300)  BP: (!) 89/45 (01/30/20 1258)  SpO2: 95 % (01/30/20 1300) Vital Signs (24h Range):  Temp:  [97.3 °F (36.3 °C)-98.4 °F (36.9 °C)] 98.4 °F (36.9 °C)  Pulse:  [52-72] 67  Resp:  [20-38] 23  SpO2:  [92 %-96 %] 95 %  BP: ()/(45-65) 89/45  Arterial Line BP: ()/(32-54) 90/38     Weight: 84.1 kg (185 lb 6.5 oz)  Body mass index is 33.91 kg/m².     SpO2: 95 %  O2 Device (Oxygen Therapy): room air      Intake/Output Summary (Last 24 hours) at 1/30/2020 1327  Last data filed at 1/30/2020 1300  Gross per 24 hour   Intake 2454.44 ml   Output 4165 ml   Net -1710.56 ml       Lines/Drains/Airways     Central Venous Catheter Line                 Introducer 01/28/20 1845 right internal jugular 1 day         Percutaneous Central Line Insertion/Assessment - triple lumen  01/28/20 1845 right internal jugular 1 day          Drain                 Urethral Catheter 01/28/20 1745 16 Fr. 1 day          Peripheral Intravenous Line                  Peripheral IV - Single Lumen 01/28/20  20 G Left Forearm 2 days         Peripheral IV - Single Lumen 01/28/20 0736 20 G Right Forearm 2 days                Physical Exam   Constitutional: He is oriented to person, place, and time. He appears well-developed and well-nourished.   HENT:   Head: Normocephalic.   Neck:   RIJ CVC   Cardiovascular: Regular rhythm.   Bradycardiac rate   Pulmonary/Chest: Effort normal and breath sounds normal.   Abdominal: Soft.   Musculoskeletal: He exhibits no edema.   Neurological: He is alert and oriented to person, place, and time.   Skin: Skin is warm and dry.   Psychiatric: He has a normal mood and affect.       Significant Labs:   Recent Lab Results       01/30/20  1156   01/30/20  1004   01/30/20  0340   01/30/20  0337   01/30/20  0328        Albumin 2.4       2.5              2.5     Alkaline Phosphatase         42     Allens Test     N/A         ALT         13     Anion Gap 6       8              8     AST         15     Baso #         0.01     Basophil%         0.1     BILIRUBIN TOTAL         0.7  Comment:  For infants and newborns, interpretation of results should be based  on gestational age, weight and in agreement with clinical  observations.  Premature Infant recommended reference ranges:  Up to 24 hours.............<8.0 mg/dL  Up to 48 hours............<12.0 mg/dL  3-5 days..................<15.0 mg/dL  6-29 days.................<15.0 mg/dL       Site     Other         BUN, Bld 35       37              37     Calcium 8.1       7.9              7.9     Chloride 110       109              109     CO2 20       18              18     Creatinine 1.8       1.9              1.9     Differential Method         Automated     eGFR if  41.1       38.5              38.5     eGFR if non  35.5  Comment:  Calculation used to obtain the estimated glomerular filtration  rate (eGFR) is the CKD-EPI equation.          33.3  Comment:  Calculation used to obtain  the estimated glomerular filtration  rate (eGFR) is the CKD-EPI equation.                 33.3  Comment:  Calculation used to obtain the estimated glomerular filtration  rate (eGFR) is the CKD-EPI equation.        Eos #         0.0     Eosinophil%         0.0     Glucose 130       132              132     Gran # (ANC)         10.1     Gran%         81.5     Hematocrit         34.3     Hemoglobin         11.5     Immature Grans (Abs)         0.05  Comment:  Mild elevation in immature granulocytes is non specific and   can be seen in a variety of conditions including stress response,   acute inflammation, trauma and pregnancy. Correlation with other   laboratory and clinical findings is essential.       Immature Granulocytes         0.4     Lactate, Armani               Lymph #         1.2     Lymph%         9.5     Magnesium         2.2     MCH         32.9     MCHC         33.5     MCV         98     Mono #         1.1     Mono%         8.5     MPV         10.2     nRBC         0     Phosphorus 2.3       3.1              3.1     Platelets         141     POC BE     -5         POC HCO3     20.9         POC PCO2     37.4         POC PH     7.355         POC PO2     35         POC SATURATED O2     65         POC TCO2     22         POCT Glucose   164   134       Potassium 4.2       4.4              4.4     PROTEIN TOTAL         5.2     RBC         3.50     RDW         14.5     Sample     VENOUS         Sodium 136       135              135     Troponin I               Vancomycin, Random         7.1     WBC         12.37                      01/29/20  2339   01/29/20  2152   01/29/20 2005 01/29/20  1731        Albumin 2.4     2.7            2.7     Alkaline Phosphatase             Allens Test             ALT             Anion Gap 6     8            8            8     AST             Baso #             Basophil%             BILIRUBIN TOTAL             Site             BUN, Bld 38     41            41            41      Calcium 7.9     8.2            8.2            8.2     Chloride 107     105            105            105     CO2 20     16            16            16     Creatinine 1.8     2.0            2.0            2.0     Differential Method             eGFR if  41.1     36.1            36.1            36.1     eGFR if non  35.5  Comment:  Calculation used to obtain the estimated glomerular filtration  rate (eGFR) is the CKD-EPI equation.        31.3  Comment:  Calculation used to obtain the estimated glomerular filtration  rate (eGFR) is the CKD-EPI equation.               31.3  Comment:  Calculation used to obtain the estimated glomerular filtration  rate (eGFR) is the CKD-EPI equation.               31.3  Comment:  Calculation used to obtain the estimated glomerular filtration  rate (eGFR) is the CKD-EPI equation.        Eos #             Eosinophil%             Glucose 125     231            231            231     Gran # (ANC)             Gran%             Hematocrit             Hemoglobin             Immature Grans (Abs)             Immature Granulocytes             Lactate, Armani   1.2  Comment:  Falsely low lactic acid results can be found in samples   containing >=13.0 mg/dL total bilirubin and/or >=3.5 mg/dL   direct bilirubin.     2.0  Comment:  Falsely low lactic acid results can be found in samples   containing >=13.0 mg/dL total bilirubin and/or >=3.5 mg/dL   direct bilirubin.       Lymph #             Lymph%             Magnesium             MCH             MCHC             MCV             Mono #             Mono%             MPV             nRBC             Phosphorus 2.9     2.9            2.9     Platelets             POC BE             POC HCO3             POC PCO2             POC PH             POC PO2             POC SATURATED O2             POC TCO2             POCT Glucose     168       Potassium 4.1     4.2            4.2            4.2     PROTEIN TOTAL             RBC              RDW             Sample             Sodium 133     129            129            129     Troponin I       0.018  Comment:  The reference interval for Troponin I represents the 99th percentile   cutoff   for our facility and is consistent with 3rd generation assay   performance.       Vancomycin, Random             WBC                   Significant Imaging: Echocardiogram:   2D echo with color flow doppler: No results found for this or any previous visit. and Transthoracic echo (TTE) complete (Cupid Only):   Results for orders placed or performed during the hospital encounter of 11/18/19   Echo Color Flow Doppler? Yes   Result Value Ref Range    Ascending aorta 2.92 cm    STJ 2.40 cm    AV mean gradient 3 mmHg    Ao peak rodrigo 1.18 m/s    Ao VTI 24.28 cm    IVS 1.03 0.6 - 1.1 cm    LA size 4.74 cm    Left Atrium Major Axis 6.60 cm    Left Atrium Minor Axis 6.35 cm    LVIDD 4.75 3.5 - 6.0 cm    LVIDS 3.13 2.1 - 4.0 cm    LVOT diameter 2.29 cm    LVOT peak VTI 14.39 cm    PW 0.95 0.6 - 1.1 cm    MV Peak A Rodrigo 0.21 m/s    E wave decelartion time 178.74 msec    MV Peak E Rodrigo 0.99 m/s    RA Major Axis 6.27 cm    RA Width 3.05 cm    RVDD 3.10 cm    Sinus 3.18 cm    TAPSE 1.49 cm    TR Max Rodrigo 3.06 m/s    TDI LATERAL 0.12 m/s    TDI SEPTAL 0.07 m/s    LA WIDTH 4.36 cm    LV Diastolic Volume 105.17 mL    LV Systolic Volume 38.67 mL    LVOT peak rodrigo 0.72 m/s    LV LATERAL E/E' RATIO 8.25 m/s    LV SEPTAL E/E' RATIO 14.14 m/s    FS 34 %    LA volume 113.70 cm3    LV mass 165.04 g    Left Ventricle Relative Wall Thickness 0.40 cm    AV valve area 2.44 cm2    AV Velocity Ratio 0.61     AV index (prosthetic) 0.59     E/A ratio 4.71     Mean e' 0.10 m/s    LVOT area 4.1 cm2    LVOT stroke volume 59.24 cm3    AV peak gradient 6 mmHg    E/E' ratio 10.42 m/s    Triscuspid Valve Regurgitation Peak Gradient 37 mmHg    BSA 1.92 m2    LV Systolic Volume Index 20.9 mL/m2    LV Diastolic Volume Index 56.87 mL/m2    LA Volume Index  61.5 mL/m2    LV Mass Index 89 g/m2    Ao root annulus 2.00 cm    Narrative    · Normal left ventricular systolic function. The estimated ejection   fraction is 60%  · Indeterminate left ventricular diastolic function.  · Aortic valve sclerosis without significant stenosis.  · Mild mitral regurgitation.  · Normal right ventricular systolic function.  · Estimated PA systolic pressure is at least 40mmHg.  · Biatrial enlargement.        Assessment and Plan:     * Shock  Placed on levophed for shock, now off this AM. Lactic acid trending down. Now polyuric. Blood Cx NGTD. Elevated WBC likely 2/2 stress response (trending down)  - Was on vancomycin/zosyn -> will discontinue tomorrow    Hyponatremia  2/2 ATN  - Improving     Acute renal failure with acute tubular necrosis superimposed on stage 3 chronic kidney disease    The patient developed non-oliguric renal failure most likely due to ATN secondary to hypotension. Now improving Cr (1.8-1.9) Currently in a polyuric phase.  - Continue to monitor     Sinus bradycardia  Originally presented after procedure with junctional bradycardia, now in sinus rhythm after starting Isuprel. HRs ranging from 50s-60s. Patient is asymptomatic  - Will wean down Isuprel gtt today  - EP is following; patient may need PM placement but will assess after Isuprel discontinued     Persistent atrial fibrillation  S/p DCCV  Continue eliquis  If returns to AFib there will be no further attempt at rhythm control      Type 2 diabetes mellitus with diabetic polyneuropathy, with long-term current use of insulin  detemir 5 nightly, aspart 2 with meals plus sliding scale    Essential hypertension  Holding home medications in setting of shock        VTE Risk Mitigation (From admission, onward)         Ordered     apixaban tablet 5 mg  2 times daily      01/28/20 1301     IP VTE HIGH RISK PATIENT  Once      01/28/20 1301                Tiesha Cordoba MD  Cardiology  [facility]

## 2020-01-31 ENCOUNTER — RESEARCH ENCOUNTER (OUTPATIENT)
Dept: RESEARCH | Facility: HOSPITAL | Age: 80
End: 2020-01-31

## 2020-01-31 ENCOUNTER — CLINICAL SUPPORT (OUTPATIENT)
Dept: CARDIOLOGY | Facility: HOSPITAL | Age: 80
DRG: 308 | End: 2020-01-31
Attending: INTERNAL MEDICINE
Payer: MEDICARE

## 2020-01-31 DIAGNOSIS — I49.5 SICK SINUS SYNDROME: ICD-10-CM

## 2020-01-31 DIAGNOSIS — I48.19 PERSISTENT ATRIAL FIBRILLATION: ICD-10-CM

## 2020-01-31 LAB
ALBUMIN SERPL BCP-MCNC: 2.5 G/DL (ref 3.5–5.2)
ALBUMIN SERPL BCP-MCNC: 2.6 G/DL (ref 3.5–5.2)
ALLENS TEST: ABNORMAL
ALP SERPL-CCNC: 45 U/L (ref 55–135)
ALT SERPL W/O P-5'-P-CCNC: 18 U/L (ref 10–44)
ANION GAP SERPL CALC-SCNC: 5 MMOL/L (ref 8–16)
ANION GAP SERPL CALC-SCNC: 7 MMOL/L (ref 8–16)
ANION GAP SERPL CALC-SCNC: 7 MMOL/L (ref 8–16)
AST SERPL-CCNC: 23 U/L (ref 10–40)
BASOPHILS # BLD AUTO: 0.02 K/UL (ref 0–0.2)
BASOPHILS NFR BLD: 0.2 % (ref 0–1.9)
BILIRUB SERPL-MCNC: 0.8 MG/DL (ref 0.1–1)
BUN SERPL-MCNC: 31 MG/DL (ref 8–23)
BUN SERPL-MCNC: 32 MG/DL (ref 8–23)
BUN SERPL-MCNC: 34 MG/DL (ref 8–23)
CALCIUM SERPL-MCNC: 8 MG/DL (ref 8.7–10.5)
CALCIUM SERPL-MCNC: 8.1 MG/DL (ref 8.7–10.5)
CALCIUM SERPL-MCNC: 8.4 MG/DL (ref 8.7–10.5)
CHLORIDE SERPL-SCNC: 106 MMOL/L (ref 95–110)
CHLORIDE SERPL-SCNC: 107 MMOL/L (ref 95–110)
CHLORIDE SERPL-SCNC: 108 MMOL/L (ref 95–110)
CO2 SERPL-SCNC: 21 MMOL/L (ref 23–29)
CO2 SERPL-SCNC: 22 MMOL/L (ref 23–29)
CO2 SERPL-SCNC: 22 MMOL/L (ref 23–29)
CREAT SERPL-MCNC: 1.5 MG/DL (ref 0.5–1.4)
CREAT SERPL-MCNC: 1.5 MG/DL (ref 0.5–1.4)
CREAT SERPL-MCNC: 1.6 MG/DL (ref 0.5–1.4)
DIFFERENTIAL METHOD: ABNORMAL
EOSINOPHIL # BLD AUTO: 0.1 K/UL (ref 0–0.5)
EOSINOPHIL NFR BLD: 0.9 % (ref 0–8)
ERYTHROCYTE [DISTWIDTH] IN BLOOD BY AUTOMATED COUNT: 15 % (ref 11.5–14.5)
EST. GFR  (AFRICAN AMERICAN): 47.3 ML/MIN/1.73 M^2
EST. GFR  (AFRICAN AMERICAN): 51.2 ML/MIN/1.73 M^2
EST. GFR  (AFRICAN AMERICAN): 51.2 ML/MIN/1.73 M^2
EST. GFR  (NON AFRICAN AMERICAN): 40.9 ML/MIN/1.73 M^2
EST. GFR  (NON AFRICAN AMERICAN): 44.3 ML/MIN/1.73 M^2
EST. GFR  (NON AFRICAN AMERICAN): 44.3 ML/MIN/1.73 M^2
GLUCOSE SERPL-MCNC: 105 MG/DL (ref 70–110)
GLUCOSE SERPL-MCNC: 93 MG/DL (ref 70–110)
GLUCOSE SERPL-MCNC: 97 MG/DL (ref 70–110)
HCO3 UR-SCNC: 22.7 MMOL/L (ref 24–28)
HCT VFR BLD AUTO: 34.3 % (ref 40–54)
HGB BLD-MCNC: 11 G/DL (ref 14–18)
IMM GRANULOCYTES # BLD AUTO: 0.02 K/UL (ref 0–0.04)
IMM GRANULOCYTES NFR BLD AUTO: 0.2 % (ref 0–0.5)
LYMPHOCYTES # BLD AUTO: 1.4 K/UL (ref 1–4.8)
LYMPHOCYTES NFR BLD: 17 % (ref 18–48)
MAGNESIUM SERPL-MCNC: 1.8 MG/DL (ref 1.6–2.6)
MCH RBC QN AUTO: 32.4 PG (ref 27–31)
MCHC RBC AUTO-ENTMCNC: 32.1 G/DL (ref 32–36)
MCV RBC AUTO: 101 FL (ref 82–98)
MONOCYTES # BLD AUTO: 0.9 K/UL (ref 0.3–1)
MONOCYTES NFR BLD: 10.7 % (ref 4–15)
NEUTROPHILS # BLD AUTO: 5.7 K/UL (ref 1.8–7.7)
NEUTROPHILS NFR BLD: 71 % (ref 38–73)
NRBC BLD-RTO: 0 /100 WBC
PCO2 BLDA: 38.8 MMHG (ref 35–45)
PH SMN: 7.38 [PH] (ref 7.35–7.45)
PHOSPHATE SERPL-MCNC: 1.8 MG/DL (ref 2.7–4.5)
PHOSPHATE SERPL-MCNC: 1.9 MG/DL (ref 2.7–4.5)
PHOSPHATE SERPL-MCNC: 2.3 MG/DL (ref 2.7–4.5)
PLATELET # BLD AUTO: 128 K/UL (ref 150–350)
PMV BLD AUTO: 10.1 FL (ref 9.2–12.9)
PO2 BLDA: 37 MMHG (ref 40–60)
POC BE: -2 MMOL/L
POC SATURATED O2: 69 % (ref 95–100)
POC TCO2: 24 MMOL/L (ref 24–29)
POCT GLUCOSE: 96 MG/DL (ref 70–110)
POCT GLUCOSE: 98 MG/DL (ref 70–110)
POCT GLUCOSE: 99 MG/DL (ref 70–110)
POTASSIUM SERPL-SCNC: 4.1 MMOL/L (ref 3.5–5.1)
POTASSIUM SERPL-SCNC: 4.3 MMOL/L (ref 3.5–5.1)
PROT SERPL-MCNC: 5.2 G/DL (ref 6–8.4)
RBC # BLD AUTO: 3.4 M/UL (ref 4.6–6.2)
SAMPLE: ABNORMAL
SITE: ABNORMAL
SODIUM SERPL-SCNC: 134 MMOL/L (ref 136–145)
SODIUM SERPL-SCNC: 135 MMOL/L (ref 136–145)
SODIUM SERPL-SCNC: 136 MMOL/L (ref 136–145)
WBC # BLD AUTO: 8.02 K/UL (ref 3.9–12.7)

## 2020-01-31 PROCEDURE — 93272 ECG/REVIEW INTERPRET ONLY: CPT | Mod: ,,, | Performed by: INTERNAL MEDICINE

## 2020-01-31 PROCEDURE — 99900035 HC TECH TIME PER 15 MIN (STAT)

## 2020-01-31 PROCEDURE — 63600175 PHARM REV CODE 636 W HCPCS: Performed by: STUDENT IN AN ORGANIZED HEALTH CARE EDUCATION/TRAINING PROGRAM

## 2020-01-31 PROCEDURE — 97161 PT EVAL LOW COMPLEX 20 MIN: CPT

## 2020-01-31 PROCEDURE — 25000003 PHARM REV CODE 250: Performed by: INTERNAL MEDICINE

## 2020-01-31 PROCEDURE — 25000003 PHARM REV CODE 250: Performed by: STUDENT IN AN ORGANIZED HEALTH CARE EDUCATION/TRAINING PROGRAM

## 2020-01-31 PROCEDURE — 99232 PR SUBSEQUENT HOSPITAL CARE,LEVL II: ICD-10-PCS | Mod: GC,,, | Performed by: INTERNAL MEDICINE

## 2020-01-31 PROCEDURE — 83735 ASSAY OF MAGNESIUM: CPT

## 2020-01-31 PROCEDURE — 99232 SBSQ HOSP IP/OBS MODERATE 35: CPT | Mod: GC,,, | Performed by: INTERNAL MEDICINE

## 2020-01-31 PROCEDURE — 82803 BLOOD GASES ANY COMBINATION: CPT

## 2020-01-31 PROCEDURE — 80069 RENAL FUNCTION PANEL: CPT

## 2020-01-31 PROCEDURE — 93271 ECG/MONITORING AND ANALYSIS: CPT

## 2020-01-31 PROCEDURE — 80069 RENAL FUNCTION PANEL: CPT | Mod: 91

## 2020-01-31 PROCEDURE — 85025 COMPLETE CBC W/AUTO DIFF WBC: CPT

## 2020-01-31 PROCEDURE — 80053 COMPREHEN METABOLIC PANEL: CPT

## 2020-01-31 PROCEDURE — 20000000 HC ICU ROOM

## 2020-01-31 PROCEDURE — 63600175 PHARM REV CODE 636 W HCPCS: Performed by: INTERNAL MEDICINE

## 2020-01-31 PROCEDURE — 93272 CARDIAC EVENT MONITOR (CUPID ONLY): ICD-10-PCS | Mod: ,,, | Performed by: INTERNAL MEDICINE

## 2020-01-31 PROCEDURE — 97535 SELF CARE MNGMENT TRAINING: CPT

## 2020-01-31 PROCEDURE — 97165 OT EVAL LOW COMPLEX 30 MIN: CPT

## 2020-01-31 PROCEDURE — 97116 GAIT TRAINING THERAPY: CPT

## 2020-01-31 RX ORDER — SODIUM,POTASSIUM PHOSPHATES 280-250MG
2 POWDER IN PACKET (EA) ORAL ONCE
Status: COMPLETED | OUTPATIENT
Start: 2020-01-31 | End: 2020-01-31

## 2020-01-31 RX ORDER — SODIUM,POTASSIUM PHOSPHATES 280-250MG
2 POWDER IN PACKET (EA) ORAL
Status: COMPLETED | OUTPATIENT
Start: 2020-01-31 | End: 2020-01-31

## 2020-01-31 RX ORDER — MAGNESIUM SULFATE HEPTAHYDRATE 40 MG/ML
2 INJECTION, SOLUTION INTRAVENOUS ONCE
Status: COMPLETED | OUTPATIENT
Start: 2020-01-31 | End: 2020-01-31

## 2020-01-31 RX ORDER — ATROPINE SULFATE 0.1 MG/ML
INJECTION INTRAVENOUS
Status: DISPENSED
Start: 2020-01-31 | End: 2020-02-01

## 2020-01-31 RX ADMIN — APIXABAN 5 MG: 5 TABLET, FILM COATED ORAL at 08:01

## 2020-01-31 RX ADMIN — PIPERACILLIN SODIUM,TAZOBACTAM SODIUM 4.5 G: 4; .5 INJECTION, POWDER, FOR SOLUTION INTRAVENOUS at 09:01

## 2020-01-31 RX ADMIN — POTASSIUM & SODIUM PHOSPHATES POWDER PACK 280-160-250 MG 2 PACKET: 280-160-250 PACK at 08:01

## 2020-01-31 RX ADMIN — PIPERACILLIN SODIUM,TAZOBACTAM SODIUM 4.5 G: 4; .5 INJECTION, POWDER, FOR SOLUTION INTRAVENOUS at 11:01

## 2020-01-31 RX ADMIN — SODIUM BICARBONATE 650 MG TABLET 650 MG: at 09:01

## 2020-01-31 RX ADMIN — MAGNESIUM SULFATE IN WATER 2 G: 40 INJECTION, SOLUTION INTRAVENOUS at 06:01

## 2020-01-31 RX ADMIN — POTASSIUM & SODIUM PHOSPHATES POWDER PACK 280-160-250 MG 2 PACKET: 280-160-250 PACK at 06:01

## 2020-01-31 RX ADMIN — ATORVASTATIN CALCIUM 40 MG: 20 TABLET, FILM COATED ORAL at 08:01

## 2020-01-31 RX ADMIN — POTASSIUM & SODIUM PHOSPHATES POWDER PACK 280-160-250 MG 2 PACKET: 280-160-250 PACK at 11:01

## 2020-01-31 RX ADMIN — INSULIN DETEMIR 5 UNITS: 100 INJECTION, SOLUTION SUBCUTANEOUS at 09:01

## 2020-01-31 RX ADMIN — APIXABAN 5 MG: 5 TABLET, FILM COATED ORAL at 09:01

## 2020-01-31 RX ADMIN — SODIUM BICARBONATE 650 MG TABLET 650 MG: at 08:01

## 2020-01-31 RX ADMIN — PIPERACILLIN SODIUM,TAZOBACTAM SODIUM 4.5 G: 4; .5 INJECTION, POWDER, FOR SOLUTION INTRAVENOUS at 04:01

## 2020-01-31 RX ADMIN — ASPIRIN 81 MG: 81 TABLET, COATED ORAL at 08:01

## 2020-01-31 RX ADMIN — ISOPROTERENOL HYDROCHLORIDE 3 MCG/MIN: 0.2 INJECTION, SOLUTION INTRAMUSCULAR; INTRAVENOUS at 01:01

## 2020-01-31 RX ADMIN — ISOPROTERENOL HYDROCHLORIDE 0.5 MCG/MIN: 0.2 INJECTION, SOLUTION INTRAMUSCULAR; INTRAVENOUS at 11:01

## 2020-01-31 RX ADMIN — POTASSIUM & SODIUM PHOSPHATES POWDER PACK 280-160-250 MG 2 PACKET: 280-160-250 PACK at 05:01

## 2020-01-31 NOTE — NURSING
Patients isoproterenol was stopped at 0850. Patients HR is in low 40s to upper 30s. Patients blood pressure is systolic 130s with a MAP in the 70s. Patient does not complain of dizziness and states he 'feels good'. Cardiology team was contacted and made aware of HR, BP, and patient status. MD stated to keep isoproterenol turned off. Will continue to monitor patient.

## 2020-01-31 NOTE — PROGRESS NOTES
[facility]  Cardiology  Progress Note    Patient Name: Giovanni Guerrero  MRN: [id number]  Admission Date: 1/28/2020  Hospital Length of Stay: 3 days  Code Status: Full Code   Attending Physician: Elvira Mclean MD   Primary Care Physician: Alan Webster MD  Expected Discharge Date:   Principal Problem:Shock    Subjective:     Hospital Course:   Admitted to CCU service on 1/28 s/p ablation procedure after developing bradycardia and hypotension. Required inotropic and pressor infusions shortly after admission and overnight. On 1/29 patients HR and blood pressure began to normalize. Patient off levophed on 1/30. Currently weaning isuprel.    Interval History: NAEO. Currently on minimal isuprel. HR remains in 50s-60s with sinus rhythm. Urinating well (4L UOP, net negative 1.7L).  Will wean isuprel today.    Review of Systems   All other systems reviewed and are negative.    Objective:     Vital Signs (Most Recent):  Temp: 98.2 °F (36.8 °C) (01/31/20 1100)  Pulse: (!) 54 (01/31/20 1400)  Resp: (!) 35 (01/31/20 1400)  BP: 115/62 (01/31/20 1330)  SpO2: 96 % (01/31/20 1400) Vital Signs (24h Range):  Temp:  [97.8 °F (36.6 °C)-98.6 °F (37 °C)] 98.2 °F (36.8 °C)  Pulse:  [45-79] 54  Resp:  [20-49] 35  SpO2:  [93 %-97 %] 96 %  BP: (115-156)/(57-68) 115/62  Arterial Line BP: (118-160)/(42-61) 150/55     Weight: 84.1 kg (185 lb 6.5 oz)  Body mass index is 33.91 kg/m².     SpO2: 96 %  O2 Device (Oxygen Therapy): room air      Intake/Output Summary (Last 24 hours) at 1/31/2020 1427  Last data filed at 1/31/2020 1400  Gross per 24 hour   Intake 2655.22 ml   Output 2895 ml   Net -239.78 ml       Lines/Drains/Airways     Central Venous Catheter Line                 Introducer 01/28/20 1845 right internal jugular 2 days         Percutaneous Central Line Insertion/Assessment - triple lumen  01/28/20 1845 right internal jugular 2 days          Peripheral Intravenous Line                 Peripheral IV - Single  Lumen 01/28/20  20 G Left Hand 3 days         Peripheral IV - Single Lumen 01/28/20 0736 20 G Right Forearm 3 days                Physical Exam   Constitutional: He is oriented to person, place, and time. He appears well-developed and well-nourished.   HENT:   Head: Normocephalic.   Neck:   RIJ CVC   Cardiovascular: Regular rhythm.   Bradycardiac rate   Pulmonary/Chest: Effort normal and breath sounds normal.   Abdominal: Soft.   Musculoskeletal: He exhibits no edema.   Neurological: He is alert and oriented to person, place, and time.   Skin: Skin is warm and dry.   Psychiatric: He has a normal mood and affect.       Significant Labs:   Recent Lab Results       01/31/20  1159   01/31/20  1158   01/31/20  0307   01/31/20  0300   01/31/20  0300        Albumin 2.5       2.5              2.5              2.5     Alkaline Phosphatase         45     Allens Test     N/A         ALT         18     Anion Gap 7       5              5              5     AST         23     Baso #         0.02     Basophil%         0.2     BILIRUBIN TOTAL         0.8  Comment:  For infants and newborns, interpretation of results should be based  on gestational age, weight and in agreement with clinical  observations.  Premature Infant recommended reference ranges:  Up to 24 hours.............<8.0 mg/dL  Up to 48 hours............<12.0 mg/dL  3-5 days..................<15.0 mg/dL  6-29 days.................<15.0 mg/dL       Site     Other         BUN, Bld 31       34              34              34     Calcium 8.1       8.0              8.0              8.0     Chloride 106       108              108              108     CO2 22       21              21              21     Creatinine 1.5       1.6              1.6              1.6     Differential Method         Automated     eGFR if  51.2       47.3              47.3              47.3     eGFR if non  44.3  Comment:  Calculation used to obtain the estimated  glomerular filtration  rate (eGFR) is the CKD-EPI equation.          40.9  Comment:  Calculation used to obtain the estimated glomerular filtration  rate (eGFR) is the CKD-EPI equation.                 40.9  Comment:  Calculation used to obtain the estimated glomerular filtration  rate (eGFR) is the CKD-EPI equation.                 40.9  Comment:  Calculation used to obtain the estimated glomerular filtration  rate (eGFR) is the CKD-EPI equation.        Eos #         0.1     Eosinophil%         0.9     Glucose 93       105              105              105     Gran # (ANC)         5.7     Gran%         71.0     Hematocrit         34.3     Hemoglobin         11.0     Immature Grans (Abs)         0.02  Comment:  Mild elevation in immature granulocytes is non specific and   can be seen in a variety of conditions including stress response,   acute inflammation, trauma and pregnancy. Correlation with other   laboratory and clinical findings is essential.       Immature Granulocytes         0.2     Lymph #         1.4     Lymph%         17.0     Magnesium         1.8     MCH         32.4     MCHC         32.1     MCV         101     Mono #         0.9     Mono%         10.7     MPV         10.1     nRBC         0     Phosphorus 1.8       1.9              1.9              1.9     Platelets         128     POC BE     -2         POC HCO3     22.7         POC PCO2     38.8         POC PH     7.375         POC PO2     37         POC SATURATED O2     69         POC TCO2     24         POCT Glucose   96   99       Potassium 4.3       4.1              4.1              4.1     PROTEIN TOTAL         5.2     RBC         3.40     RDW         15.0     Sample     VENOUS         Sodium 135       134              134              134     WBC         8.02                      01/30/20  2044   01/30/20  1812   01/30/20  1810        Albumin   2.5       Alkaline Phosphatase           Allens Test           ALT           Anion Gap   8        AST           Baso #           Basophil%           BILIRUBIN TOTAL           Site           BUN, Bld   35       Calcium   8.0       Chloride   109       CO2   20       Creatinine   1.8       Differential Method           eGFR if    41.1       eGFR if non    35.5  Comment:  Calculation used to obtain the estimated glomerular filtration  rate (eGFR) is the CKD-EPI equation.          Eos #           Eosinophil%           Glucose   144       Gran # (ANC)           Gran%           Hematocrit           Hemoglobin           Immature Grans (Abs)           Immature Granulocytes           Lymph #           Lymph%           Magnesium           MCH           MCHC           MCV           Mono #           Mono%           MPV           nRBC           Phosphorus   1.9       Platelets           POC BE           POC HCO3           POC PCO2           POC PH           POC PO2           POC SATURATED O2           POC TCO2           POCT Glucose 97   144     Potassium   4.2       PROTEIN TOTAL           RBC           RDW           Sample           Sodium   137       WBC                 Significant Imaging: Echocardiogram:   2D echo with color flow doppler: No results found for this or any previous visit. and Transthoracic echo (TTE) complete (Cupid Only):   Results for orders placed or performed during the hospital encounter of 11/18/19   Echo Color Flow Doppler? Yes   Result Value Ref Range    Ascending aorta 2.92 cm    STJ 2.40 cm    AV mean gradient 3 mmHg    Ao peak rodrigo 1.18 m/s    Ao VTI 24.28 cm    IVS 1.03 0.6 - 1.1 cm    LA size 4.74 cm    Left Atrium Major Axis 6.60 cm    Left Atrium Minor Axis 6.35 cm    LVIDD 4.75 3.5 - 6.0 cm    LVIDS 3.13 2.1 - 4.0 cm    LVOT diameter 2.29 cm    LVOT peak VTI 14.39 cm    PW 0.95 0.6 - 1.1 cm    MV Peak A Rodrigo 0.21 m/s    E wave decelartion time 178.74 msec    MV Peak E Rodrigo 0.99 m/s    RA Major Axis 6.27 cm    RA Width 3.05 cm    RVDD 3.10 cm    Sinus 3.18 cm     TAPSE 1.49 cm    TR Max Rodrigo 3.06 m/s    TDI LATERAL 0.12 m/s    TDI SEPTAL 0.07 m/s    LA WIDTH 4.36 cm    LV Diastolic Volume 105.17 mL    LV Systolic Volume 38.67 mL    LVOT peak rodrigo 0.72 m/s    LV LATERAL E/E' RATIO 8.25 m/s    LV SEPTAL E/E' RATIO 14.14 m/s    FS 34 %    LA volume 113.70 cm3    LV mass 165.04 g    Left Ventricle Relative Wall Thickness 0.40 cm    AV valve area 2.44 cm2    AV Velocity Ratio 0.61     AV index (prosthetic) 0.59     E/A ratio 4.71     Mean e' 0.10 m/s    LVOT area 4.1 cm2    LVOT stroke volume 59.24 cm3    AV peak gradient 6 mmHg    E/E' ratio 10.42 m/s    Triscuspid Valve Regurgitation Peak Gradient 37 mmHg    BSA 1.92 m2    LV Systolic Volume Index 20.9 mL/m2    LV Diastolic Volume Index 56.87 mL/m2    LA Volume Index 61.5 mL/m2    LV Mass Index 89 g/m2    Ao root annulus 2.00 cm    Narrative    · Normal left ventricular systolic function. The estimated ejection   fraction is 60%  · Indeterminate left ventricular diastolic function.  · Aortic valve sclerosis without significant stenosis.  · Mild mitral regurgitation.  · Normal right ventricular systolic function.  · Estimated PA systolic pressure is at least 40mmHg.  · Biatrial enlargement.        Assessment and Plan:       * Shock  Placed on levophed for shock, now off this AM. Lactic acid trending down. Now polyuric. Blood Cx NGTD. Elevated WBC likely 2/2 stress response (trending down)  - Was on vancomycin/zosyn -> will discontinue tomorrow    Hyponatremia  2/2 ATN  - Improving     Acute renal failure with acute tubular necrosis superimposed on stage 3 chronic kidney disease    The patient developed non-oliguric renal failure most likely due to ATN secondary to hypotension. Now improving Cr (1.5) Currently in a polyuric phase.  - Continue to monitor     Sinus bradycardia  Originally presented after procedure with junctional bradycardia, now in sinus rhythm after starting Isuprel. HRs ranging from 50s-60s. Patient is  asymptomatic  - Will wean down Isuprel gtt today  - EP is following; patient may need PM placement but will assess after Isuprel discontinued     Persistent atrial fibrillation  S/p DCCV  Continue eliquis  If returns to AFib there will be no further attempt at rhythm control      Type 2 diabetes mellitus with diabetic polyneuropathy, with long-term current use of insulin  detemir 5 nightly, aspart 2 with meals plus sliding scale    Essential hypertension  Holding home medications in setting of shock        VTE Risk Mitigation (From admission, onward)         Ordered     apixaban tablet 5 mg  2 times daily      01/28/20 1301     IP VTE HIGH RISK PATIENT  Once      01/28/20 1301                Willem Perez MD  Cardiology  [facility] [address]

## 2020-01-31 NOTE — PROGRESS NOTES
Date: 1/31/2020    Study: VIOS    IRB #: [id number]    Sponsor: [employer] VI[employer]    Primary Investugator: Kenroy Torres MD    Present for Visit: Trevor Navarro [facility]    Recording Start Time: 934    Recording Stop Time: 1140    Chest Sensor S/N: [id number]    Adapter S/N: [id number]    BSM S/N: BSM 2      Patient was connected to the device and recording was initiated.  While data was being transferred the study staff completed the study CRF's with the patient.  After two hours the recording was stopped and the data was saved in the necessary files.  The device was disconnected and cleaned.  Prior to leaving study staff answered any questions, provided the patient/family with contact information, and made sure the patient was settled.

## 2020-01-31 NOTE — PLAN OF CARE
Problem: Occupational Therapy Goal  Goal: Occupational Therapy Goal  Description  Goals to be met by: 2/14/20     Patient will increase functional independence with ADLs by performing:    UE Dressing with [place].  LE Dressing with [place].  Grooming while standing at sink with [place].  Toileting from toilet with [place] for hygiene and clothing management.   Bathing from  shower chair/bench with [place].  Toilet transfer to toilet with [place].  Increased functional strength to WFL for B UE.  Upper extremity exercise program x15 reps per handout, with assistance as needed.     Outcome: Ongoing, Progressing

## 2020-01-31 NOTE — PT/OT/SLP EVAL
Physical Therapy Evaluation and Treatment     Patient Name:  Giovanni Guerrero   MRN:  [id number]      Recommendations:     Discharge Recommendations:  home health PT   Discharge Equipment Recommendations: none   Barriers to discharge: None    Assessment:     Giovanni Guerrero is a 79 y.o. male admitted with a medical diagnosis of Shock.  He presents with the following impairments/functional limitations:  weakness, impaired endurance, impaired cardiopulmonary response to activity, gait instability, impaired balance, impaired functional mobilty. Pt tolerated activity with pt able to ambulate in hallway with CGA, improving to stand by assistance. Pt without any significant change in HR, sustained 70-80, with occasional bradycardia throughout session. Pt denied symptoms of dizziness. Pt encouraged to ambulate daily with assistance/supervision from nursing/therapy, able to ambulate with family once transferred out of ICU. Pt would continue to benefit from acute skilled therapy intervention to address deficits and progress toward prior level of function.       Rehab Prognosis: Good; patient would benefit from acute skilled PT services to address these deficits and reach maximum level of function.    Recent Surgery: Procedure(s) (LRB):  CARDIOVERSION (N/A)  Transesophageal echo (NICOLE) intra-procedure log documentation (N/A) 3 Days Post-Op    Plan:     During this hospitalization, patient to be seen 3 x/week to address the identified rehab impairments via gait training, therapeutic activities, therapeutic exercises, neuromuscular re-education and progress toward the following goals:    · Plan of Care Expires:  03/01/20    Subjective     Chief Complaint: Pt with no complaints, happy to ambulate in hallway today   Patient/Family Comments/goals: to get better and return home  Pain/Comfort:  · Pain Rating 1: 0/10  · Pain Rating Post-Intervention 1: 0/10    Patients cultural, spiritual, Cheondoism conflicts given the current situation:  no    Living Environment:  Pt lives with grandson in a 2 ST, able to live on first floor, with 3 ADELIA and B HR.   Prior to admission, patients level of function was independent with mobility and ADLs. Pt owns a SPC, used only for long distance ambulating.  Equipment used at home: none.  DME owned (not currently used): single point cane.  Upon discharge, patient will have assistance from family.    Objective:     Communicated with RN prior to session.  Patient found up in chair with blood pressure cuff, pulse ox (continuous), telemetry, peripheral IV  upon PT entry to room.    General Precautions: Standard, fall   Orthopedic Precautions:N/A   Braces: N/A     Exams:  · Cognitive Exam:  Patient is AAOx4, followed all commands, communicates clearly and fluently  · Gross Motor Coordination:  WFL  · RUE ROM: WFL  · RUE Strength: WFL  · LUE ROM: WFL  · LUE Strength: WFL  · RLE ROM: WFL  · RLE Strength: WFL  · LLE ROM: WFL  · LLE Strength: WFL    Functional Mobility:  · Bed Mobility:  Not assessed 2/2 pt  Up in chair upon arrival   · Transfers:     · Sit to Stand:  supervision with no AD  · Gait: Pt ambulated 140 feet with no AD and contact guard assistance progressing to stand by assistance. Pt with decreased shirley, decreased step size, no LOB, no SOB, no dizziness.       Therapeutic Activities and Exercises:   Pt ambulated to restroom, attempted to use urinal. Pt then requested bed pan for bowel movement. Pt sat in chair, then performed toileting with stand by assistance.   Pt educated on role of PT/POC. Pt verbalized understanding.   Pt encouraged to only perform OOB mobility with assistance from nursing/therapy. Pt agreeable.   Pt encouraged to ambulate daily with assistance/supervision from nursing/therapy, able to ambulate with family once out of the ICU. Pt agreeable.      AM-PAC 6 CLICK MOBILITY  Total Score:18     Patient left up in chair with all lines intact, call button in reach and RN notified.    GOALS:    Multidisciplinary Problems     Physical Therapy Goals        Problem: Physical Therapy Goal    Goal Priority Disciplines Outcome Goal Variances Interventions   Physical Therapy Goal     PT, PT/OT Ongoing, Progressing     Description:  Goals to be met by: 2020     Patient will increase functional independence with mobility by performin. Supine to sit with Set-up [place]  2. Sit to stand transfer with Supervision  3. Gait  x 200 feet with Supervision using LRAD   4. Ascend/descend 3 stair with bilateral Handrails Contact Guard Assistance using LRAD                       History:     Past Medical History:   Diagnosis Date    Allergy     Anticoagulant long-term use     Colon polyp     Diabetes mellitus type II     Gout, unspecified     Hx of colonic polyps     Hyperlipidemia     Hypertension     Prostatic hypertrophy     Renal insufficiency        Past Surgical History:   Procedure Laterality Date    APPENDECTOMY      CATARACT EXTRACTION Left 2018    EYE SURGERY      cataract    HERNIA REPAIR      TONSILLECTOMY      TRANSURETHRAL RESECTION OF PROSTATE (TURP) USING LASER N/A 2019    Procedure: TURP, USING LASER;  Surgeon: Rafael Marquis Jr., MD;  Location: [facility] OR [address] [place] [address];  Service: Urology;  Laterality: N/A;  75min    TREATMENT OF CARDIAC ARRHYTHMIA N/A 2020    Procedure: CARDIOVERSION;  Surgeon: Sergio Bertrand MD;  Location: [facility] EP LAB;  Service: Cardiology;  Laterality: N/A;  AF, NICOLE, DCCV, MAC, MN, 3 Prep       Time Tracking:     PT Received On: 20  PT Start Time: 1330     PT Stop Time: 1355  PT Total Time (min): 25 min     Billable Minutes: Evaluation 10 mins  and Gait Training 10 mins       Usha Rivera, PT  2020

## 2020-01-31 NOTE — PLAN OF CARE
Problem: Diabetes Comorbidity  Goal: Blood Glucose Level Within Desired Range  Intervention: Maintain Glycemic Control  Flowsheets (Taken 1/31/2020 0544)  Glycemic Management: blood glucose monitoring; insulin dose matched to carbohydrate intake     CMICU DAILY GOALS       A: Awake    RASS: Goal - RASS Goal: 0-->alert and calm  Actual - RASS (Diamond Agitation-Sedation Scale): 0-->alert and calm   Restraint necessity:    B: Breath   SBT: Not intubated   C: Coordinate A & B, analgesics/sedatives   Pain: managed    SAT: Not intubated  D: Delirium   CAM-ICU: Overall CAM-ICU: Negative  E: Early Mobility   MOVE Screen: Pass   Activity: Activity Management: activity minimized  FAS: Feeding/Nutrition   Diet order: Diet/Nutrition Received: low saturated fat/low cholesterol, consistent carb/diabetic diet,   Fluid restriction: Fluid Requirement: 1200 FR  T: Thrombus   DVT prophylaxis: VTE Required Core Measure: Pharmacological prophylaxis initiated/maintained  H: HOB Elevation   Head of Bed (HOB): HOB at 30-45 degrees  U: Ulcer Prophylaxis   GI: no  G: Glucose control   managed Glycemic Management: blood glucose monitoring, insulin dose matched to carbohydrate intake  S: Skin   Bundle compliance: yes   Bathing/Skin Care: bath, chlorhexidine, bath, complete, shampoo, linen changed Date: 1/30   no issues   I: Indwelling Catheters   Lozano necessity:      Urethral Catheter 01/28/20 1745 16 Fr.-Reason for Continuing Urinary Catheterization: Critically ill in ICU requiring intensive monitoring   CVC necessity: Yes   IPAD offered: No  D: De-escalation Antibx   Yes  Plan for the day   Wean of Isuprel  Family/Goals of care/Code Status   Code Status: Full Code     No acute events throughout day, VS and assessment per flow sheet, patient progressing towards goals as tolerated, plan of care reviewed with Giovanni Guerrero and family, all concerns addressed, will continue to monitor.

## 2020-01-31 NOTE — PLAN OF CARE
No acute events throughout day, VS and assessment per flow sheet, see below for updates:    Pulmonary: Patient remains on RA throughout shift, no SOB noted.    Cardiovascular: Patient SB/SR 40-60s. Isoproterenol turned off at approx 1230 and has remained off. BP WNL and patient asymptomatic with drops in HR. Team aware.     Neurological: AAOx4, walked hull this shift with no changes in vitals.    Gastrointestinal: 2 large BMs this shift. Tolerating diet well.    Genitourinary: Lozano removed. Adequate UO.    Endocrine: Accu checks per order    Skin/Bath: See flowsheets for charting,arterial line removed   Date of last CHG bath given: 1/30/20 PM    Infusions: KVO    Patient progressing towards goals as tolerated, plan of care communicated and reviewed with Giovanni Guerrero and family, all concerns addressed, will continue to monitor.     Alexia Silva RN    CMICU DAILY GOALS       A: Awake    RASS: Goal - RASS Goal: 0-->alert and calm  Actual - RASS (Diamond Agitation-Sedation Scale): 0-->alert and calm   Restraint necessity:    B: Breath   SBT: Not intubated   C: Coordinate A & B, analgesics/sedatives   Pain: managed    SAT: Not intubated  D: Delirium   CAM-ICU: Overall CAM-ICU: Negative  E: Early Mobility   MOVE Screen: Pass   Activity: Activity Management: bedrest maintained per order  FAS: Feeding/Nutrition   Diet order: Diet/Nutrition Received: low saturated fat/low cholesterol,   Fluid restriction: Fluid Requirement: 1200 FR  T: Thrombus   DVT prophylaxis: VTE Required Core Measure: Pharmacological prophylaxis initiated/maintained  H: HOB Elevation   Head of Bed (HOB): HOB at 30-45 degrees  U: Ulcer Prophylaxis   GI: no  G: Glucose control   managed Glycemic Management: blood glucose monitoring  S: Skin   Bundle compliance: yes   Bathing/Skin Care: bath, chlorhexidine, bath, complete, shampoo, linen changed Date: 1/30/20  B: Bowel Function   no issues   I: Indwelling Catheters   Lozano necessity: [REMOVED]       Urethral Catheter 01/28/20 1745 16 Fr.-Reason for Continuing Urinary Catheterization: Critically ill in ICU requiring intensive monitoring   CVC necessity: Yes   IPAD offered: No  D: De-escalation Antibx   No  Plan for the day   Stop isoproterenol gtt  Family/Goals of care/Code Status   Code Status: Full Code     No acute events throughout day, VS and assessment per flow sheet, patient progressing towards goals as tolerated, plan of care reviewed with Giovanni Guerrero and family, all concerns addressed, will continue to monitor.

## 2020-01-31 NOTE — PT/OT/SLP EVAL
Occupational Therapy   Evaluation    Name: Giovanni Guerrero  MRN: [id number]  Admitting Diagnosis:  Shock 3 Days Post-Op    Recommendations:     Discharge Recommendations: home health OT  Discharge Equipment Recommendations:  bedside commode  Barriers to discharge:  Inaccessible home environment    Assessment:     Giovanni Guerrero is a 79 y.o. male with a medical diagnosis of Shock.  He was able to perform supine/sit, sit/stand, and bed/chair T/F c CGA.  Pt c some bradycardia at times c HR getting as low as 37 but for most of assessment remained in the 46-48 range.  Able to perform UB dressing c total assist and toilet hygiene c min A.  Has 3-/5 B UE strength.  Pt states that he felt good throughout assessment.  Has fair+ dynamic standing balance. Performance deficits affecting function: weakness, impaired endurance, impaired self care skills, impaired functional mobilty, impaired balance, decreased upper extremity function, decreased ROM.      Rehab Prognosis: Good; patient would benefit from acute skilled OT services to address these deficits and reach maximum level of function.       Plan:     Patient to be seen 4 x/week to address the above listed problems via self-care/home management, therapeutic activities, therapeutic exercises  · Plan of Care Expires: 02/14/20  · Plan of Care Reviewed with: patient, daughter    Subjective     Chief Complaint: Acute renal failure  Patient/Family Comments/goals: To get better.    Occupational Profile:  Living Environment: Pt lives in a two story shotgun and has first floor access to bedroom and bathroom.  Pt's primary bedroom and bathroom are upstairs and has 19 steps to second floor c rail on the L side of steps.  Has 2 ADELIA.  Pt has both a tub/shower combo and a walk-in shower.  Previous level of function: I PTA  Equipment Used at Home:  cane, straight(Pt rarely uses cane)  Assistance upon Discharge: Pt lives c daughters.    Pain/Comfort:  · Pain Rating 1: 0/10    Patients  cultural, spiritual, Methodist conflicts given the current situation: no    Objective:     Communicated with: RN prior to session.  Patient found supine with arterial line, blood pressure cuff, central line, lester catheter, oxygen, peripheral IV, pulse ox (continuous), telemetry upon OT entry to room.    General Precautions: Standard, fall   Orthopedic Precautions:N/A   Braces: N/A     Occupational Performance:    Bed Mobility:    · Patient completed Supine to Sit with contact guard assistance    Functional Mobility/Transfers:  · Patient completed Sit <> Stand Transfer with contact guard assistance  with  hand-held assist   · Patient completed Bed <> Chair Transfer using Stand Pivot technique with contact guard assistance with hand-held assist  · Functional Mobility: Pt has fair+ static/dynamic standing balance at this time.      Activities of Daily Living:  · Upper Body Dressing: total assistance to don hospital gown.  · Toileting: minimum assistance for toilet hygiene.  Pt c small BM in bed.    Cognitive/Visual Perceptual:  Cognitive/Psychosocial Skills:     -       Oriented to: Person, Place, Time and Situation   -       Follows Commands/attention:Follows multistep  commands    Physical Exam:  Upper Extremity Range of Motion:     -       Right Upper Extremity: WFL  -       Left Upper Extremity: WFL  Upper Extremity Strength:    -       Right Upper Extremity: WFL  -       Left Upper Extremity: WFL    AMPAC 6 Click ADL:  AMPAC Total Score: 18      Education:    Patient left up in chair with all lines intact, call button in reach, RN notified and daughter present    GOALS:   Multidisciplinary Problems     Occupational Therapy Goals        Problem: Occupational Therapy Goal    Goal Priority Disciplines Outcome Interventions   Occupational Therapy Goal     OT, PT/OT Ongoing, Progressing    Description:  Goals to be met by: 2/14/20     Patient will increase functional independence with ADLs by performing:    UE  Dressing with [place].  LE Dressing with [place].  Grooming while standing at sink with [place].  Toileting from toilet with [place] for hygiene and clothing management.   Bathing from  shower chair/bench with [place].  Toilet transfer to toilet with [place].  Increased functional strength to WFL for B UE.  Upper extremity exercise program x15 reps per handout, with assistance as needed.                      History:     Past Medical History:   Diagnosis Date    Allergy     Anticoagulant long-term use     Colon polyp     Diabetes mellitus type II     Gout, unspecified     Hx of colonic polyps     Hyperlipidemia     Hypertension     Prostatic hypertrophy     Renal insufficiency        Past Surgical History:   Procedure Laterality Date    APPENDECTOMY      CATARACT EXTRACTION Left 2018    EYE SURGERY      cataract    HERNIA REPAIR      TONSILLECTOMY      TRANSURETHRAL RESECTION OF PROSTATE (TURP) USING LASER N/A 12/6/2019    Procedure: TURP, USING LASER;  Surgeon: Rafael Marquis Jr., MD;  Location: [address];  Service: Urology;  Laterality: N/A;  75min    TREATMENT OF CARDIAC ARRHYTHMIA N/A 1/28/2020    Procedure: CARDIOVERSION;  Surgeon: Sergio Bertrand MD;  Location: [facility];  Service: Cardiology;  Laterality: N/A;  AF, NICOLE, DCCV, MAC, [place], 3 Prep       Time Tracking:     OT Date of Treatment: 01/31/20  OT Start Time: 0900  OT Stop Time: 0930  OT Total Time (min): 30 min    Billable Minutes:Evaluation 15  Self Care/Home Management 15    JUDI Alvarado  1/31/2020

## 2020-01-31 NOTE — PLAN OF CARE
Problem: Physical Therapy Goal  Goal: Physical Therapy Goal  Description  Goals to be met by: 2020     Patient will increase functional independence with mobility by performin. Supine to sit with Set-up [place]  2. Sit to stand transfer with Supervision  3. Gait  x 200 feet with Supervision using LRAD   4. Ascend/descend 3 stair with bilateral Handrails Contact Guard Assistance using LRAD      Outcome: Ongoing, Progressing     Pt evaluated and appropriate goals established.     Usha Rivera, PT, DPT  2020  [phone]

## 2020-01-31 NOTE — PROGRESS NOTES
[facility]  Cardiac Electrophysiology  Progress Note    Admission Date: 1/28/2020  Code Status: Full Code   Attending Physician: Elvira Mclean MD   Expected Discharge Date:   Principal Problem:Shock    Subjective:     Interval History: No acute complain overnight, Rate in high 50s to 60 BPM. He denies chest pain, shortness of breath, lightheadedness or syncope. Isoproterenol was at 1 and now is off.     Review of Systems   Constitutional: Negative for chills, fever, malaise/fatigue and weight loss.   HENT: Negative for hearing loss.    Eyes: Negative for blurred vision.   Respiratory: Negative for shortness of breath.    Cardiovascular: Negative for chest pain and palpitations.   Gastrointestinal: Negative for nausea and vomiting.   Neurological: Negative for weakness.     Objective:     Vital Signs (Most Recent):  Temp: 97.9 °F (36.6 °C) (01/31/20 0701)  Pulse: 62 (01/31/20 0800)  Resp: (!) 29 (01/31/20 0800)  BP: (!) 143/65 (01/31/20 0800)  SpO2: (!) 94 % (01/31/20 0800) Vital Signs (24h Range):  Temp:  [97.8 °F (36.6 °C)-98.6 °F (37 °C)] 97.9 °F (36.6 °C)  Pulse:  [55-79] 62  Resp:  [20-49] 29  SpO2:  [93 %-96 %] 94 %  BP: ()/(45-68) 143/65  Arterial Line BP: ()/(32-54) 128/48     Weight: 84.1 kg (185 lb 6.5 oz)  Body mass index is 33.91 kg/m².     SpO2: (!) 94 %  O2 Device (Oxygen Therapy): room air    Physical Exam   Constitutional: He is oriented to person, place, and time. He appears well-developed.   HENT:   Head: Normocephalic.   Eyes: Conjunctivae are normal.   Neck:   RIJ CVC in place   Cardiovascular: Regular rhythm.   Bradycardiac rate   Pulmonary/Chest: Effort normal. He has rales.   Abdominal: Soft.   Musculoskeletal: He exhibits no edema.   Neurological: He is alert and oriented to person, place, and time.   Skin: Skin is warm and dry.   Psychiatric: He has a normal mood and affect.       Significant Labs:   EP:   Recent Labs   Lab 01/29/20  0851  01/30/20  8558  01/30/20  1156 01/30/20  1812 01/31/20  0300   *   < > 135*  135* 136 137 134*  134*  134*   K 5.0   < > 4.4  4.4 4.2 4.2 4.1  4.1  4.1      < > 109  109 110 109 108  108  108   CO2 12*   < > 18*  18* 20* 20* 21*  21*  21*   *   < > 132*  132* 130* 144* 105  105  105   BUN 42*   < > 37*  37* 35* 35* 34*  34*  34*   CREATININE 2.3*   < > 1.9*  1.9* 1.8* 1.8* 1.6*  1.6*  1.6*   CALCIUM 7.8*   < > 7.9*  7.9* 8.1* 8.0* 8.0*  8.0*  8.0*   PROT 5.9*  --  5.2*  --   --  5.2*   ALBUMIN 2.8*   < > 2.5*  2.5* 2.4* 2.5* 2.5*  2.5*  2.5*   BILITOT 0.9  --  0.7  --   --  0.8   ALKPHOS 60  --  42*  --   --  45*   AST 13  --  15  --   --  23   ALT 14  --  13  --   --  18   ANIONGAP 14   < > 8  8 6* 8 5*  5*  5*   ESTGFRAFRICA 30.5*   < > 38.5*  38.5* 41.1* 41.1* 47.3*  47.3*  47.3*   EGFRNONAA 26.4*   < > 33.3*  33.3* 35.5* 35.5* 40.9*  40.9*  40.9*   WBC  --   --  12.37  --   --  8.02   HGB  --   --  11.5*  --   --  11.0*   HCT  --    < > 34.3*  --   --  34.3*   PLT  --   --  141*  --   --  128*    < > = values in this interval not displayed.       Significant Imaging: Echocardiogram:   2D echo with color flow doppler: No results found for this or any previous visit. and Transthoracic echo (TTE) complete (Cupid Only):   Results for orders placed or performed during the hospital encounter of 11/18/19   Echo Color Flow Doppler? Yes   Result Value Ref Range    Ascending aorta 2.92 cm    STJ 2.40 cm    AV mean gradient 3 mmHg    Ao peak rodrigo 1.18 m/s    Ao VTI 24.28 cm    IVS 1.03 0.6 - 1.1 cm    LA size 4.74 cm    Left Atrium Major Axis 6.60 cm    Left Atrium Minor Axis 6.35 cm    LVIDD 4.75 3.5 - 6.0 cm    LVIDS 3.13 2.1 - 4.0 cm    LVOT diameter 2.29 cm    LVOT peak VTI 14.39 cm    PW 0.95 0.6 - 1.1 cm    MV Peak A Rodrigo 0.21 m/s    E wave decelartion time 178.74 msec    MV Peak E Rodrigo 0.99 m/s    RA Major Axis 6.27 cm    RA Width 3.05 cm    RVDD 3.10 cm    Sinus 3.18 cm    TAPSE  1.49 cm    TR Max Rodrigo 3.06 m/s    TDI LATERAL 0.12 m/s    TDI SEPTAL 0.07 m/s    LA WIDTH 4.36 cm    LV Diastolic Volume 105.17 mL    LV Systolic Volume 38.67 mL    LVOT peak rodrigo 0.72 m/s    LV LATERAL E/E' RATIO 8.25 m/s    LV SEPTAL E/E' RATIO 14.14 m/s    FS 34 %    LA volume 113.70 cm3    LV mass 165.04 g    Left Ventricle Relative Wall Thickness 0.40 cm    AV valve area 2.44 cm2    AV Velocity Ratio 0.61     AV index (prosthetic) 0.59     E/A ratio 4.71     Mean e' 0.10 m/s    LVOT area 4.1 cm2    LVOT stroke volume 59.24 cm3    AV peak gradient 6 mmHg    E/E' ratio 10.42 m/s    Triscuspid Valve Regurgitation Peak Gradient 37 mmHg    BSA 1.92 m2    LV Systolic Volume Index 20.9 mL/m2    LV Diastolic Volume Index 56.87 mL/m2    LA Volume Index 61.5 mL/m2    LV Mass Index 89 g/m2    Ao root annulus 2.00 cm    Narrative    · Normal left ventricular systolic function. The estimated ejection   fraction is 60%  · Indeterminate left ventricular diastolic function.  · Aortic valve sclerosis without significant stenosis.  · Mild mitral regurgitation.  · Normal right ventricular systolic function.  · Estimated PA systolic pressure is at least 40mmHg.  · Biatrial enlargement.        Assessment and Plan:     Sinus bradycardia  - S/p NICOLE/DCCV with sinus arrest and junctional rhythm ~30 bpm with systolic blood pressure ~90  - He has been asymptomatic for the last couple of days   - Tele showed rate 60-70 BPM with no episode of symptomatic bradycardia, was off levo since yesterday, and weaning down on isopreterenol and this morning discontinued.   - No indication for pacemaker or TVP placement at this time, monitor with no isopreterenol     Persistent atrial fibrillation  - S/p DCCV  - Continue eliquis  - If returns to AFib there will be no further attempt at rhythm control    CKD (chronic kidney disease) stage 3, GFR 30-59 ml/min  - His UOP has been in non-oliguric state, UOP ~ 3.4 with net + 1.4 Liter.   - Nephrology on  board for recommendations, they recommended continue on oral bicarb   - Management per Nephrology/primary team     Dieudonne Figueroa MD  Cardiac Electrophysiology  [facility]

## 2020-01-31 NOTE — SUBJECTIVE & OBJECTIVE
Interval History: No acute complain overnight, Rate in high 50s to 60 BPM. He denies chest pain, shortness of breath, lightheadedness or syncope. Isoproterenol was at 1 and now is off.     Review of Systems   Constitution: Negative for chills, decreased appetite and malaise/fatigue.   Cardiovascular: Negative for claudication.     Objective:     Vital Signs (Most Recent):  Temp: 97.9 °F (36.6 °C) (01/31/20 0701)  Pulse: 62 (01/31/20 0800)  Resp: (!) 29 (01/31/20 0800)  BP: (!) 143/65 (01/31/20 0800)  SpO2: (!) 94 % (01/31/20 0800) Vital Signs (24h Range):  Temp:  [97.8 °F (36.6 °C)-98.6 °F (37 °C)] 97.9 °F (36.6 °C)  Pulse:  [55-79] 62  Resp:  [20-49] 29  SpO2:  [93 %-96 %] 94 %  BP: ()/(45-68) 143/65  Arterial Line BP: ()/(32-54) 128/48     Weight: 84.1 kg (185 lb 6.5 oz)  Body mass index is 33.91 kg/m².     SpO2: (!) 94 %  O2 Device (Oxygen Therapy): room air    Physical Exam   Constitutional: He is oriented to person, place, and time. He appears well-developed.   HENT:   Head: Normocephalic.   Eyes: Conjunctivae are normal.   Neck:   RIJ CVC in place   Cardiovascular: Regular rhythm.   Bradycardiac rate   Pulmonary/Chest: Effort normal. He has rales.   Abdominal: Soft.   Musculoskeletal: He exhibits no edema.   Neurological: He is alert and oriented to person, place, and time.   Skin: Skin is warm and dry.   Psychiatric: He has a normal mood and affect.       Significant Labs:   EP:   Recent Labs   Lab 01/29/20  0851  01/30/20  0328 01/30/20  1156 01/30/20  1812 01/31/20  0300   *   < > 135*  135* 136 137 134*  134*  134*   K 5.0   < > 4.4  4.4 4.2 4.2 4.1  4.1  4.1      < > 109  109 110 109 108  108  108   CO2 12*   < > 18*  18* 20* 20* 21*  21*  21*   *   < > 132*  132* 130* 144* 105  105  105   BUN 42*   < > 37*  37* 35* 35* 34*  34*  34*   CREATININE 2.3*   < > 1.9*  1.9* 1.8* 1.8* 1.6*  1.6*  1.6*   CALCIUM 7.8*   < > 7.9*  7.9* 8.1* 8.0* 8.0*  8.0*   8.0*   PROT 5.9*  --  5.2*  --   --  5.2*   ALBUMIN 2.8*   < > 2.5*  2.5* 2.4* 2.5* 2.5*  2.5*  2.5*   BILITOT 0.9  --  0.7  --   --  0.8   ALKPHOS 60  --  42*  --   --  45*   AST 13  --  15  --   --  23   ALT 14  --  13  --   --  18   ANIONGAP 14   < > 8  8 6* 8 5*  5*  5*   ESTGFRAFRICA 30.5*   < > 38.5*  38.5* 41.1* 41.1* 47.3*  47.3*  47.3*   EGFRNONAA 26.4*   < > 33.3*  33.3* 35.5* 35.5* 40.9*  40.9*  40.9*   WBC  --   --  12.37  --   --  8.02   HGB  --   --  11.5*  --   --  11.0*   HCT  --    < > 34.3*  --   --  34.3*   PLT  --   --  141*  --   --  128*    < > = values in this interval not displayed.       Significant Imaging: Echocardiogram:   2D echo with color flow doppler: No results found for this or any previous visit. and Transthoracic echo (TTE) complete (Cupid Only):   Results for orders placed or performed during the hospital encounter of 11/18/19   Echo Color Flow Doppler? Yes   Result Value Ref Range    Ascending aorta 2.92 cm    STJ 2.40 cm    AV mean gradient 3 mmHg    Ao peak rodrigo 1.18 m/s    Ao VTI 24.28 cm    IVS 1.03 0.6 - 1.1 cm    LA size 4.74 cm    Left Atrium Major Axis 6.60 cm    Left Atrium Minor Axis 6.35 cm    LVIDD 4.75 3.5 - 6.0 cm    LVIDS 3.13 2.1 - 4.0 cm    LVOT diameter 2.29 cm    LVOT peak VTI 14.39 cm    PW 0.95 0.6 - 1.1 cm    MV Peak A Rodrigo 0.21 m/s    E wave decelartion time 178.74 msec    MV Peak E Rodrigo 0.99 m/s    RA Major Axis 6.27 cm    RA Width 3.05 cm    RVDD 3.10 cm    Sinus 3.18 cm    TAPSE 1.49 cm    TR Max Rodrigo 3.06 m/s    TDI LATERAL 0.12 m/s    TDI SEPTAL 0.07 m/s    LA WIDTH 4.36 cm    LV Diastolic Volume 105.17 mL    LV Systolic Volume 38.67 mL    LVOT peak rodrigo 0.72 m/s    LV LATERAL E/E' RATIO 8.25 m/s    LV SEPTAL E/E' RATIO 14.14 m/s    FS 34 %    LA volume 113.70 cm3    LV mass 165.04 g    Left Ventricle Relative Wall Thickness 0.40 cm    AV valve area 2.44 cm2    AV Velocity Ratio 0.61     AV index (prosthetic) 0.59     E/A ratio 4.71     Mean e'  0.10 m/s    LVOT area 4.1 cm2    LVOT stroke volume 59.24 cm3    AV peak gradient 6 mmHg    E/E' ratio 10.42 m/s    Triscuspid Valve Regurgitation Peak Gradient 37 mmHg    BSA 1.92 m2    LV Systolic Volume Index 20.9 mL/m2    LV Diastolic Volume Index 56.87 mL/m2    LA Volume Index 61.5 mL/m2    LV Mass Index 89 g/m2    Ao root annulus 2.00 cm    Narrative    · Normal left ventricular systolic function. The estimated ejection   fraction is 60%  · Indeterminate left ventricular diastolic function.  · Aortic valve sclerosis without significant stenosis.  · Mild mitral regurgitation.  · Normal right ventricular systolic function.  · Estimated PA systolic pressure is at least 40mmHg.  · Biatrial enlargement.

## 2020-01-31 NOTE — PROGRESS NOTES
Date Consent signed: 1/31/2020    Sponsor: Kaiden Jama    Study Title/IRB Number: [id number]    Principle Investigator: Kenroy Torres    Present for Discussion: MARANDA Phillips and Rain Roper,      Is LATOYA Consenting for Subject: No    Prior to the Informed Consent (IC) being signed, or any study protocol required data collection, testing, procedure, or intervention being performed, the following was done and/or discussed:   Patient was given a copy of the IC for review    Purpose of the study and qualifications to participate    Study design, Follow up schedule, and tests or procedures done at each visit   Confidentiality and HIPAA Authorization for Release of Medical Records for the research trial/ subject's rights/research related injury   Risk, Benefits, Alternative Treatments, Compensation and Costs   Participation in the research trial is voluntary and patient may withdraw at anytime   Contact information for study related questions    Patient verbalizes understanding of the above: Yes  Contact information for CRC and PI given to patient: Yes  Patient able to adequately summarize: the purpose of the study, the risks associated with the study, and all procedures, testing, and follow-ups associated with the study: Yes    Giovanni Guerrero signed the informed consent form for the [facility] research study with an IRB approval date of [id number].  Each page of the consent form was reviewed with Giovanni Guerrero (and pts family) and all questions answered satisfactorily. Giovanni Guerrero signed the consent form and received a copy of same. The original consent was scanned into electronic medical records (EPIC) and filed into the subject's research study binder.     Patient eligibility was confirmed by Dr Luis Prince, Sub-Investigator.

## 2020-01-31 NOTE — PLAN OF CARE
CM[facility] DAILY GOALS       A: Awake    RASS: Goal - RASS Goal: 0-->alert and calm  Actual - RASS (Diamond Agitation-Sedation Scale): 0-->alert and calm   Restraint necessity:    B: Breath   SBT: Not intubated   C: Coordinate A & B, analgesics/sedatives   Pain: managed    SAT: Not intubated  D: Delirium   CAM-ICU: Overall CAM-ICU: Negative  E: Early Mobility   MOVE Screen: Pass   Activity: Activity Management: activity adjusted per tolerance  FAS: Feeding/Nutrition   Diet order: Diet/Nutrition Received: low saturated fat/low cholesterol,   Fluid restriction: Fluid Requirement: 1200 FR  T: Thrombus   DVT prophylaxis: VTE Required Core Measure: Pharmacological prophylaxis initiated/maintained  H: HOB Elevation   Head of Bed (HOB): HOB at 30-45 degrees  U: Ulcer Prophylaxis   GI: no  G: Glucose control   managed Glycemic Management: blood glucose monitoring  S: Skin   Bundle compliance: yes   Bathing/Skin Care: bath, chlorhexidine, bath, complete, linen changed, bedtime care Date: 1/29/20  B: Bowel Function   no issues   I: Indwelling Catheters   [place] necessity:      Urethral Catheter 01/28/20 1745 16 Fr.-Reason for Continuing Urinary Catheterization: Critically ill in ICU requiring intensive monitoring   CVC necessity: Yes   IPAD offered: No  D: De-escalation Antibx   No  Plan for the day   Wean isuprel  Family/Goals of care/Code Status   Code Status: Full Code     No acute events throughout day, VS and assessment per flow sheet, patient progressing towards goals as tolerated, plan of care reviewed with Giovanni Guerrero and family, all concerns addressed, will continue to monitor.

## 2020-01-31 NOTE — SUBJECTIVE & OBJECTIVE
Interval History: NAEO. Currently on minimal isuprel. HR remains in 50s-60s with sinus rhythm. Urinating well (4L UOP, net negative 1.7L).  Will wean isuprel today.    Review of Systems   All other systems reviewed and are negative.    Objective:     Vital Signs (Most Recent):  Temp: 98.2 °F (36.8 °C) (01/31/20 1100)  Pulse: (!) 54 (01/31/20 1400)  Resp: (!) 35 (01/31/20 1400)  BP: 115/62 (01/31/20 1330)  SpO2: 96 % (01/31/20 1400) Vital Signs (24h Range):  Temp:  [97.8 °F (36.6 °C)-98.6 °F (37 °C)] 98.2 °F (36.8 °C)  Pulse:  [45-79] 54  Resp:  [20-49] 35  SpO2:  [93 %-97 %] 96 %  BP: (115-156)/(57-68) 115/62  Arterial Line BP: (118-160)/(42-61) 150/55     Weight: 84.1 kg (185 lb 6.5 oz)  Body mass index is 33.91 kg/m².     SpO2: 96 %  O2 Device (Oxygen Therapy): room air      Intake/Output Summary (Last 24 hours) at 1/31/2020 1427  Last data filed at 1/31/2020 1400  Gross per 24 hour   Intake 2655.22 ml   Output 2895 ml   Net -239.78 ml       Lines/Drains/Airways     Central Venous Catheter Line                 Introducer 01/28/20 1845 right internal jugular 2 days         Percutaneous Central Line Insertion/Assessment - triple lumen  01/28/20 1845 right internal jugular 2 days          Peripheral Intravenous Line                 Peripheral IV - Single Lumen 01/28/20  20 G Left Hand 3 days         Peripheral IV - Single Lumen 01/28/20 0736 20 G Right Forearm 3 days                Physical Exam   Constitutional: He is oriented to person, place, and time. He appears well-developed and well-nourished.   HENT:   Head: Normocephalic.   Neck:   RIJ CVC   Cardiovascular: Regular rhythm.   Bradycardiac rate   Pulmonary/Chest: Effort normal and breath sounds normal.   Abdominal: Soft.   Musculoskeletal: He exhibits no edema.   Neurological: He is alert and oriented to person, place, and time.   Skin: Skin is warm and dry.   Psychiatric: He has a normal mood and affect.       Significant Labs:   Recent Lab Results        01/31/20  1159   01/31/20  1158   01/31/20  0307   01/31/20  0300   01/31/20  0300        Albumin 2.5       2.5              2.5              2.5     Alkaline Phosphatase         45     Allens Test     N/A         ALT         18     Anion Gap 7       5              5              5     AST         23     Baso #         0.02     Basophil%         0.2     BILIRUBIN TOTAL         0.8  Comment:  For infants and newborns, interpretation of results should be based  on gestational age, weight and in agreement with clinical  observations.  Premature Infant recommended reference ranges:  Up to 24 hours.............<8.0 mg/dL  Up to 48 hours............<12.0 mg/dL  3-5 days..................<15.0 mg/dL  6-29 days.................<15.0 mg/dL       Site     Other         BUN, Bld 31       34              34              34     Calcium 8.1       8.0              8.0              8.0     Chloride 106       108              108              108     CO2 22       21              21              21     Creatinine 1.5       1.6              1.6              1.6     Differential Method         Automated     eGFR if  51.2       47.3              47.3              47.3     eGFR if non  44.3  Comment:  Calculation used to obtain the estimated glomerular filtration  rate (eGFR) is the CKD-EPI equation.          40.9  Comment:  Calculation used to obtain the estimated glomerular filtration  rate (eGFR) is the CKD-EPI equation.                 40.9  Comment:  Calculation used to obtain the estimated glomerular filtration  rate (eGFR) is the CKD-EPI equation.                 40.9  Comment:  Calculation used to obtain the estimated glomerular filtration  rate (eGFR) is the CKD-EPI equation.        Eos #         0.1     Eosinophil%         0.9     Glucose 93       105              105              105     Gran # (ANC)         5.7     Gran%         71.0     Hematocrit         34.3     Hemoglobin         11.0      Immature Grans (Abs)         0.02  Comment:  Mild elevation in immature granulocytes is non specific and   can be seen in a variety of conditions including stress response,   acute inflammation, trauma and pregnancy. Correlation with other   laboratory and clinical findings is essential.       Immature Granulocytes         0.2     Lymph #         1.4     Lymph%         17.0     Magnesium         1.8     MCH         32.4     MCHC         32.1     MCV         101     Mono #         0.9     Mono%         10.7     MPV         10.1     nRBC         0     Phosphorus 1.8       1.9              1.9              1.9     Platelets         128     POC BE     -2         POC HCO3     22.7         POC PCO2     38.8         POC PH     7.375         POC PO2     37         POC SATURATED O2     69         POC TCO2     24         POCT Glucose   96   99       Potassium 4.3       4.1              4.1              4.1     PROTEIN TOTAL         5.2     RBC         3.40     RDW         15.0     Sample     VENOUS         Sodium 135       134              134              134     WBC         8.02                      01/30/20  2044   01/30/20  1812   01/30/20  1810        Albumin   2.5       Alkaline Phosphatase           Allens Test           ALT           Anion Gap   8       AST           Baso #           Basophil%           BILIRUBIN TOTAL           Site           BUN, Bld   35       Calcium   8.0       Chloride   109       CO2   20       Creatinine   1.8       Differential Method           eGFR if    41.1       eGFR if non    35.5  Comment:  Calculation used to obtain the estimated glomerular filtration  rate (eGFR) is the CKD-EPI equation.          Eos #           Eosinophil%           Glucose   144       Gran # (ANC)           Gran%           Hematocrit           Hemoglobin           Immature Grans (Abs)           Immature Granulocytes           Lymph #           Lymph%           Magnesium           MCH            MCHC           MCV           Mono #           Mono%           MPV           nRBC           Phosphorus   1.9       Platelets           POC BE           POC HCO3           POC PCO2           POC PH           POC PO2           POC SATURATED O2           POC TCO2           POCT Glucose 97   144     Potassium   4.2       PROTEIN TOTAL           RBC           RDW           Sample           Sodium   137       WBC                 Significant Imaging: Echocardiogram:   2D echo with color flow doppler: No results found for this or any previous visit. and Transthoracic echo (TTE) complete (Cupid Only):   Results for orders placed or performed during the hospital encounter of 11/18/19   Echo Color Flow Doppler? Yes   Result Value Ref Range    Ascending aorta 2.92 cm    STJ 2.40 cm    AV mean gradient 3 mmHg    Ao peak rodrigo 1.18 m/s    Ao VTI 24.28 cm    IVS 1.03 0.6 - 1.1 cm    LA size 4.74 cm    Left Atrium Major Axis 6.60 cm    Left Atrium Minor Axis 6.35 cm    LVIDD 4.75 3.5 - 6.0 cm    LVIDS 3.13 2.1 - 4.0 cm    LVOT diameter 2.29 cm    LVOT peak VTI 14.39 cm    PW 0.95 0.6 - 1.1 cm    MV Peak A Rodrigo 0.21 m/s    E wave decelartion time 178.74 msec    MV Peak E Rodrigo 0.99 m/s    RA Major Axis 6.27 cm    RA Width 3.05 cm    RVDD 3.10 cm    Sinus 3.18 cm    TAPSE 1.49 cm    TR Max Rodrigo 3.06 m/s    TDI LATERAL 0.12 m/s    TDI SEPTAL 0.07 m/s    LA WIDTH 4.36 cm    LV Diastolic Volume 105.17 mL    LV Systolic Volume 38.67 mL    LVOT peak rodrigo 0.72 m/s    LV LATERAL E/E' RATIO 8.25 m/s    LV SEPTAL E/E' RATIO 14.14 m/s    FS 34 %    LA volume 113.70 cm3    LV mass 165.04 g    Left Ventricle Relative Wall Thickness 0.40 cm    AV valve area 2.44 cm2    AV Velocity Ratio 0.61     AV index (prosthetic) 0.59     E/A ratio 4.71     Mean e' 0.10 m/s    LVOT area 4.1 cm2    LVOT stroke volume 59.24 cm3    AV peak gradient 6 mmHg    E/E' ratio 10.42 m/s    Triscuspid Valve Regurgitation Peak Gradient 37 mmHg    BSA 1.92 m2    LV  Systolic Volume Index 20.9 mL/m2    LV Diastolic Volume Index 56.87 mL/m2    LA Volume Index 61.5 mL/m2    LV Mass Index 89 g/m2    Ao root annulus 2.00 cm    Narrative    · Normal left ventricular systolic function. The estimated ejection   fraction is 60%  · Indeterminate left ventricular diastolic function.  · Aortic valve sclerosis without significant stenosis.  · Mild mitral regurgitation.  · Normal right ventricular systolic function.  · Estimated PA systolic pressure is at least 40mmHg.  · Biatrial enlargement.

## 2020-02-01 VITALS
HEIGHT: 62 IN | WEIGHT: 185.44 LBS | TEMPERATURE: 98 F | RESPIRATION RATE: 24 BRPM | BODY MASS INDEX: 34.12 KG/M2 | OXYGEN SATURATION: 98 % | SYSTOLIC BLOOD PRESSURE: 181 MMHG | HEART RATE: 63 BPM | DIASTOLIC BLOOD PRESSURE: 77 MMHG

## 2020-02-01 PROBLEM — R00.1 SINUS BRADYCARDIA: Status: RESOLVED | Noted: 2020-01-29 | Resolved: 2020-02-01

## 2020-02-01 PROBLEM — I48.19 PERSISTENT ATRIAL FIBRILLATION: Status: RESOLVED | Noted: 2019-11-13 | Resolved: 2020-02-01

## 2020-02-01 PROBLEM — R57.9 SHOCK: Status: RESOLVED | Noted: 2020-01-29 | Resolved: 2020-02-01

## 2020-02-01 PROBLEM — E87.1 HYPONATREMIA: Status: RESOLVED | Noted: 2020-01-29 | Resolved: 2020-02-01

## 2020-02-01 LAB
ALBUMIN SERPL BCP-MCNC: 2.5 G/DL (ref 3.5–5.2)
ALLENS TEST: ABNORMAL
ALP SERPL-CCNC: 58 U/L (ref 55–135)
ALT SERPL W/O P-5'-P-CCNC: 22 U/L (ref 10–44)
ANION GAP SERPL CALC-SCNC: 10 MMOL/L (ref 8–16)
ANION GAP SERPL CALC-SCNC: 10 MMOL/L (ref 8–16)
ANION GAP SERPL CALC-SCNC: 8 MMOL/L (ref 8–16)
AST SERPL-CCNC: 25 U/L (ref 10–40)
BASOPHILS # BLD AUTO: 0.05 K/UL (ref 0–0.2)
BASOPHILS NFR BLD: 0.6 % (ref 0–1.9)
BILIRUB SERPL-MCNC: 1 MG/DL (ref 0.1–1)
BUN SERPL-MCNC: 28 MG/DL (ref 8–23)
BUN SERPL-MCNC: 28 MG/DL (ref 8–23)
BUN SERPL-MCNC: 30 MG/DL (ref 8–23)
CALCIUM SERPL-MCNC: 8.1 MG/DL (ref 8.7–10.5)
CALCIUM SERPL-MCNC: 8.2 MG/DL (ref 8.7–10.5)
CALCIUM SERPL-MCNC: 8.2 MG/DL (ref 8.7–10.5)
CHLORIDE SERPL-SCNC: 106 MMOL/L (ref 95–110)
CHLORIDE SERPL-SCNC: 106 MMOL/L (ref 95–110)
CHLORIDE SERPL-SCNC: 108 MMOL/L (ref 95–110)
CO2 SERPL-SCNC: 19 MMOL/L (ref 23–29)
CO2 SERPL-SCNC: 19 MMOL/L (ref 23–29)
CO2 SERPL-SCNC: 20 MMOL/L (ref 23–29)
CREAT SERPL-MCNC: 1.5 MG/DL (ref 0.5–1.4)
DIFFERENTIAL METHOD: ABNORMAL
EOSINOPHIL # BLD AUTO: 0.3 K/UL (ref 0–0.5)
EOSINOPHIL NFR BLD: 2.9 % (ref 0–8)
ERYTHROCYTE [DISTWIDTH] IN BLOOD BY AUTOMATED COUNT: 14.7 % (ref 11.5–14.5)
EST. GFR  (AFRICAN AMERICAN): 51.2 ML/MIN/1.73 M^2
EST. GFR  (NON AFRICAN AMERICAN): 44.3 ML/MIN/1.73 M^2
GLUCOSE SERPL-MCNC: 76 MG/DL (ref 70–110)
GLUCOSE SERPL-MCNC: 76 MG/DL (ref 70–110)
GLUCOSE SERPL-MCNC: 81 MG/DL (ref 70–110)
HCO3 UR-SCNC: 23.7 MMOL/L (ref 24–28)
HCT VFR BLD AUTO: 35.8 % (ref 40–54)
HGB BLD-MCNC: 12.1 G/DL (ref 14–18)
IMM GRANULOCYTES # BLD AUTO: 0.12 K/UL (ref 0–0.04)
IMM GRANULOCYTES NFR BLD AUTO: 1.4 % (ref 0–0.5)
LYMPHOCYTES # BLD AUTO: 2.1 K/UL (ref 1–4.8)
LYMPHOCYTES NFR BLD: 24.2 % (ref 18–48)
MAGNESIUM SERPL-MCNC: 1.8 MG/DL (ref 1.6–2.6)
MCH RBC QN AUTO: 33.2 PG (ref 27–31)
MCHC RBC AUTO-ENTMCNC: 33.8 G/DL (ref 32–36)
MCV RBC AUTO: 98 FL (ref 82–98)
MONOCYTES # BLD AUTO: 0.8 K/UL (ref 0.3–1)
MONOCYTES NFR BLD: 8.8 % (ref 4–15)
NEUTROPHILS # BLD AUTO: 5.3 K/UL (ref 1.8–7.7)
NEUTROPHILS NFR BLD: 62.1 % (ref 38–73)
NRBC BLD-RTO: 0 /100 WBC
PCO2 BLDA: 41.8 MMHG (ref 35–45)
PH SMN: 7.36 [PH] (ref 7.35–7.45)
PHOSPHATE SERPL-MCNC: 2.6 MG/DL (ref 2.7–4.5)
PHOSPHATE SERPL-MCNC: 2.7 MG/DL (ref 2.7–4.5)
PHOSPHATE SERPL-MCNC: 2.7 MG/DL (ref 2.7–4.5)
PLATELET # BLD AUTO: 155 K/UL (ref 150–350)
PMV BLD AUTO: 10.4 FL (ref 9.2–12.9)
PO2 BLDA: 30 MMHG (ref 40–60)
POC BE: -2 MMOL/L
POC SATURATED O2: 55 % (ref 95–100)
POC TCO2: 25 MMOL/L (ref 24–29)
POCT GLUCOSE: 126 MG/DL (ref 70–110)
POCT GLUCOSE: 86 MG/DL (ref 70–110)
POCT GLUCOSE: 99 MG/DL (ref 70–110)
POTASSIUM SERPL-SCNC: 3.9 MMOL/L (ref 3.5–5.1)
PROT SERPL-MCNC: 5.5 G/DL (ref 6–8.4)
RBC # BLD AUTO: 3.64 M/UL (ref 4.6–6.2)
SAMPLE: ABNORMAL
SITE: ABNORMAL
SODIUM SERPL-SCNC: 135 MMOL/L (ref 136–145)
SODIUM SERPL-SCNC: 135 MMOL/L (ref 136–145)
SODIUM SERPL-SCNC: 136 MMOL/L (ref 136–145)
WBC # BLD AUTO: 8.54 K/UL (ref 3.9–12.7)

## 2020-02-01 PROCEDURE — 25000003 PHARM REV CODE 250: Performed by: INTERNAL MEDICINE

## 2020-02-01 PROCEDURE — 82803 BLOOD GASES ANY COMBINATION: CPT

## 2020-02-01 PROCEDURE — 84100 ASSAY OF PHOSPHORUS: CPT

## 2020-02-01 PROCEDURE — 83735 ASSAY OF MAGNESIUM: CPT

## 2020-02-01 PROCEDURE — 63600175 PHARM REV CODE 636 W HCPCS: Performed by: STUDENT IN AN ORGANIZED HEALTH CARE EDUCATION/TRAINING PROGRAM

## 2020-02-01 PROCEDURE — 99900035 HC TECH TIME PER 15 MIN (STAT)

## 2020-02-01 PROCEDURE — 85025 COMPLETE CBC W/AUTO DIFF WBC: CPT

## 2020-02-01 PROCEDURE — 80053 COMPREHEN METABOLIC PANEL: CPT

## 2020-02-01 PROCEDURE — 25000003 PHARM REV CODE 250: Performed by: STUDENT IN AN ORGANIZED HEALTH CARE EDUCATION/TRAINING PROGRAM

## 2020-02-01 RX ORDER — MAGNESIUM SULFATE HEPTAHYDRATE 40 MG/ML
2 INJECTION, SOLUTION INTRAVENOUS ONCE
Status: COMPLETED | OUTPATIENT
Start: 2020-02-01 | End: 2020-02-01

## 2020-02-01 RX ADMIN — MAGNESIUM SULFATE IN WATER 2 G: 40 INJECTION, SOLUTION INTRAVENOUS at 06:02

## 2020-02-01 RX ADMIN — PIPERACILLIN SODIUM,TAZOBACTAM SODIUM 4.5 G: 4; .5 INJECTION, POWDER, FOR SOLUTION INTRAVENOUS at 03:02

## 2020-02-01 RX ADMIN — ATORVASTATIN CALCIUM 40 MG: 20 TABLET, FILM COATED ORAL at 09:02

## 2020-02-01 RX ADMIN — SODIUM BICARBONATE 650 MG TABLET 650 MG: at 09:02

## 2020-02-01 RX ADMIN — APIXABAN 5 MG: 5 TABLET, FILM COATED ORAL at 09:02

## 2020-02-01 RX ADMIN — ASPIRIN 81 MG: 81 TABLET, COATED ORAL at 09:02

## 2020-02-01 NOTE — PLAN OF CARE
Sinus bradycardia  - S/p NICOLE/DCCV with sinus arrest and junctional rhythm ~30 bpm with systolic blood pressure ~90  - He has been asymptomatic for the last couple of days   - He had episodes of HR going to 30s, one of them while he was having bowel movement. He was asymptomatic during these episodes.   - No plan for intervention at this time, 30 Day monitor delivered to his bed, follow up with Dr. Jerzy RIVERA upon discharge.  - Please call for question/concers     Dalila Figueroa MD  Cardiology Fellow ([id number])  Pager: [phone]

## 2020-02-01 NOTE — PLAN OF CARE
VS and assessment per flow sheet, see below for updates:    Pulmonary: Pt on RA, O2 sats >95%. Lung sounds clear and diminished.     Cardiovascular: HR 40s-60s (occasionally down to 30s) but patient is asymptomatic and denies any SOB, dizziness, etc. SBP 120s-140s. Pulses palpable.     Neurological: Pt AAOX4, denies pain, pupils equal and reactive. Pt LANDA spontaneously and can ambulate to bathroom with 1 person assist.    Gastrointestinal: Pt on cardiac diet. Bowel sounds audible and active. Pt has been having episodes of diarrhea that he states he occasionally gets at home.     Genitourinary: UO adequate, pt voids via urinal or bedside commode.     Endocrine: Accuchecks ACHS.     Skin/Bath: Skin intact - sacrum slightly pink related to frequent diarrhea episodes.  Date of last CHG bath given: 1/31    Infusions: none    CMICU DAILY GOALS       A: Awake    RASS: Goal - RASS Goal: 0-->alert and calm  Actual - RASS (Diamond Agitation-Sedation Scale): 0-->alert and calm   Restraint necessity:    B: Breath   SBT: Not intubated   C: Coordinate A & B, analgesics/sedatives   Pain: managed    SAT: Not intubated  D: Delirium   CAM-ICU: Overall CAM-ICU: Negative  E: Early Mobility   MOVE Screen: Pass   Activity: Activity Management: activity adjusted per tolerance, activity clustered for rest period  FAS: Feeding/Nutrition   Diet order: Diet/Nutrition Received: 2 gram sodium,   Fluid restriction: Fluid Requirement: 1200 FR  T: Thrombus   DVT prophylaxis: VTE Required Core Measure: Pharmacological prophylaxis initiated/maintained  H: HOB Elevation   Head of Bed (HOB): HOB at 30-45 degrees  U: Ulcer Prophylaxis   GI: yes  G: Glucose control   managed Glycemic Management: blood glucose monitoring  S: Skin   Bundle compliance: yes   Bathing/Skin Care: bath, chlorhexidine, dressed/undressed, bath, complete, linen changed Date: 1/31  B: Bowel Function   diarrhea   I: Indwelling Catheters   Lozano necessity: [REMOVED]      Urethral  Catheter 01/28/20 1745 16 Fr.-Reason for Continuing Urinary Catheterization: Critically ill in ICU requiring intensive monitoring   CVC necessity: Yes   IPAD offered: No  D: De-escalation Antibx   Abx continued per MD order.    Plan for the day   Plan of care is for cardiology team to assess pt in AM for appropriateness of discharge and pt could potentially d/c home with Holter monitor in place.   Family/Goals of care/Code Status   Code Status: Full Code       Patient progressing towards goals as tolerated, plan of care communicated and reviewed with Giovanni Guerrero and family, all concerns addressed, will continue to monitor.     Lashaun Winslow RN

## 2020-02-01 NOTE — DISCHARGE SUMMARY
[facility]  Cardiology  Discharge Summary      Patient Name: Giovnani Guerrero  MRN: [id number]  Admission Date: 1/28/2020  Hospital Length of Stay: 4 days  Discharge Date and Time:  02/01/2020 12:58 PM  Attending Physician: Grupo Giron MD    Discharging Provider: Willem Perez MD  Primary Care Physician: Alan Webster MD    HPI:   79 year old male with persistent AF, DM2, HTN, HLD, HAILEE who presented for outpatient NICOLE/DCCV, following which he experienced sinus arrest with a junctional escape of 30 bpm which has persisted. He became very dizzy with an attempt at ambulation and systolic blood pressure is ~90 bpm and he has been started on IV dopamine.    He reports feeling well otherwise; he denies any history of chest pain/pressure/tightness/discomfort, MARIANO, peripheral edema, orthopnea or PND. He has not experienced syncope or palpitations and reports no symptoms or concerns at this time. He is a lifetime non-smoker.    Procedure(s) (LRB):  CARDIOVERSION (N/A)  Transesophageal echo (NICOLE) intra-procedure log documentation (N/A)     Indwelling Lines/Drains at time of discharge:  Lines/Drains/Airways     Central Venous Catheter Line                 Introducer 01/28/20 1845 right internal jugular 3 days         Percutaneous Central Line Insertion/Assessment - triple lumen  01/28/20 1845 right internal jugular 3 days                Hospital Course:  Admitted to CCU service on 1/28 s/p ablation procedure after developing bradycardia and hypotension. Required inotropic and pressor infusions shortly after admission and overnight. On 1/29 patients HR and blood pressure began to normalize. Patient off levophed on 1/30. Weaned off isuprel on 1/31.  Patient given external cardiac monitoring device by electrophysiology.  Nurse was instructed to assist patient with setting of device prior to discharge.    On 2/1/2020, Patient medically stable for discharge. Discharge recommendations and any  changes to patient's medication regimen discussed with the patient prior to discharge and included in the patient's discharge packet.      Physical Exam on Day of Discharge:  Vital Signs Reviewed  Gen: NAD  Pulm: CTA-B  Cardiac: intermittently with asymptomatic sinus bradycardia  Neuro: AAOx3  Psych: normal behavior      Consults:   Consults (From admission, onward)        Status Ordering Provider     Inpatient consult to Nephrology  Once     Provider:  (Not yet assigned)    Completed LARRY OLIVAREZ          Significant Diagnostic Studies: Labs:   CMP   Recent Labs   Lab 01/31/20 0300 01/31/20  1733 01/31/20  2329 02/01/20  0335   *  134*  134*   < > 136 136 135*  135*   K 4.1  4.1  4.1   < > 4.1 3.9 3.9  3.9     108  108   < > 107 108 106  106   CO2 21*  21*  21*   < > 22* 20* 19*  19*     105  105   < > 97 81 76  76   BUN 34*  34*  34*   < > 32* 30* 28*  28*   CREATININE 1.6*  1.6*  1.6*   < > 1.5* 1.5* 1.5*  1.5*   CALCIUM 8.0*  8.0*  8.0*   < > 8.4* 8.1* 8.2*  8.2*   PROT 5.2*  --   --   --  5.5*   ALBUMIN 2.5*  2.5*  2.5*   < > 2.6* 2.5* 2.5*  2.5*   BILITOT 0.8  --   --   --  1.0   ALKPHOS 45*  --   --   --  58   AST 23  --   --   --  25   ALT 18  --   --   --  22   ANIONGAP 5*  5*  5*   < > 7* 8 10  10   ESTGFRAFRICA 47.3*  47.3*  47.3*   < > 51.2* 51.2* 51.2*  51.2*   EGFRNONAA 40.9*  40.9*  40.9*   < > 44.3* 44.3* 44.3*  44.3*    < > = values in this interval not displayed.   , CBC   Recent Labs   Lab 01/31/20 0300 02/01/20  0335   WBC 8.02 8.54   HGB 11.0* 12.1*   HCT 34.3* 35.8*   * 155   , Troponin   Recent Labs   Lab 01/29/20  1731   TROPONINI 0.018    and All labs within the past 24 hours have been reviewed    Pending Diagnostic Studies:     Procedure Component Value Units Date/Time    Urinalysis, Reflex to Urine Culture Urine, Clean Catch [[id number]] Collected:  01/29/20 0117    Order Status:  Sent Lab Status:  In process Updated:   01/29/20 0117    Specimen:  Urine           Final Active Diagnoses:    Diagnosis Date Noted POA    Acute renal failure with acute tubular necrosis superimposed on stage 3 chronic kidney disease [N17.0, N18.3] 01/29/2020 No    Type 2 diabetes mellitus with diabetic polyneuropathy, with long-term current use of insulin [E11.42, Z79.4] 05/10/2018 Not Applicable    CKD (chronic kidney disease) stage 3, GFR 30-59 ml/min [N18.3] 11/11/2013 Yes    Mixed hyperlipidemia [E78.2] 10/04/2012 Yes     Chronic      Problems Resolved During this Admission:    Diagnosis Date Noted Date Resolved POA    PRINCIPAL PROBLEM:  Shock [R57.9] 01/29/2020 02/01/2020 Yes    Sinus bradycardia [R00.1] 01/29/2020 02/01/2020 Yes    Hyponatremia [E87.1] 01/29/2020 02/01/2020 No    Junctional bradycardia [R00.1] 01/28/2020 01/30/2020 No    Persistent atrial fibrillation [I48.19] 11/13/2019 02/01/2020 Yes    Essential hypertension [I10] 10/04/2012 02/01/2020 Yes     Chronic     No new Assessment & Plan notes have been filed under this hospital service since the last note was generated.  Service: Cardiology      Discharged Condition: stable    Disposition: Home or Self Care    Follow Up:    Patient Instructions:      Diet Cardiac     Notify your health care provider if you experience any of the following:  difficulty breathing or increased cough     Notify your health care provider if you experience any of the following:  persistent dizziness, light-headedness, or visual disturbances     Notify your health care provider if you experience any of the following:  increased confusion or weakness     Activity as tolerated     Medications:  Reconciled Home Medications:      Medication List      CHANGE how you take these medications    tamsulosin 0.4 mg Cap  Commonly known as:  FLOMAX  TAKE 1 CAPSULE BY MOUTH TWICE A DAY  What changed:  Another medication with the same name was removed. Continue taking this medication, and follow the directions you  "see here.        CONTINUE taking these medications    amLODIPine 5 MG tablet  Commonly known as:  NORVASC  Take 1 tablet (5 mg total) by mouth once daily.     apixaban 5 mg Tab  Commonly known as:  ELIQUIS  Take 1 tablet (5 mg total) by mouth 2 (two) times daily.     aspirin 81 MG EC tablet  Commonly known as:  ECOTRIN  Take 81 mg by mouth once daily.     atorvastatin 40 MG tablet  Commonly known as:  LIPITOR  Take 1 tablet (40 mg total) by mouth once daily.     BD Insulin Syringe Ultra-Fine 0.5 mL 31 gauge x 5/16" Syrg  Generic drug:  insulin syringe-needle U-100  USE WITH LANTUS INSULIN ONCE DAILY     BD Ultra-Fine Short Pen Needle 31 gauge x 5/16" Ndle  Generic drug:  pen needle, diabetic  USE WITH LANTUS INSULIN ONCE DAILY     blood sugar diagnostic Strp  Check sugar tid as directed     blood-glucose meter Misc  1 kit by Misc.(Non-Drug; Combo Route) route 3 (three) times daily. Pt is to test blood sugar TID     cyanocobalamin 2000 MCG tablet  Take 2,000 mcg by mouth once daily.     finasteride 5 mg tablet  Commonly known as:  PROSCAR  Take 1 tablet (5 mg total) by mouth once daily.     hydrALAZINE 100 MG tablet  Commonly known as:  APRESOLINE  TK 1 T PO  Q 12 H     Omacor 1 gram capsule  Generic drug:  omega-3 acid ethyl esters  Take 1 g by mouth. 1 Capsule Oral Twice a day     sodium bicarbonate 325 MG tablet  Take 2 tablets (650 mg total) by mouth 2 (two) times daily.     Tresiba FlexTouch U-100 100 unit/mL (3 mL) Inpn  Generic drug:  insulin degludec  INJECT 15 UNITS UNDER THE SKIN EVERY EVENING     Vitamin D3 2,000 unit Tab  Generic drug:  cholecalciferol (vitamin D3)  Take by mouth once daily.        STOP taking these medications    atenoloL 100 MG tablet  Commonly known as:  TENORMIN     lisinopril 20 MG tablet  Commonly known as:  PRINIVIL,ZESTRIL     sulfamethoxazole-trimethoprim 800-160mg 800-160 mg Tab  Commonly known as:  BACTRIM DS            Time spent on the discharge of patient: 35 " minutes    Willem Perez MD  Cardiology  [facility]

## 2020-02-02 LAB
BACTERIA BLD CULT: NORMAL
BACTERIA BLD CULT: NORMAL

## 2020-02-03 NOTE — PLAN OF CARE
Patient discharged on 2/1/20.  Team updated and to place HH orders today.       02/03/20 0958   Final Note   Assessment Type Final Discharge Note   Anticipated Discharge Disposition Home-Health   What phone number can be called within the next 1-3 days to see how you are doing after discharge? [phone]   Hospital Follow Up  Appt(s) scheduled? Yes   Discharge plans and expectations educations in teach back method with documentation complete? Yes   Right Care Referral Info   Post Acute Recommendation Home-care   Referral Type Home Health   Facility Name TBD per Insurance   Post-Acute Status   Post-Acute Authorization Home Health/Hospice   Post-Acute Placement Status Pending Payor Review   Home Health/Hospice Status Awaiting Orders for HH   Discharge Delays None known at this time       Ross Lees, RN MSN  Critical Care-   Ext. [phone]

## 2020-02-03 NOTE — PLAN OF CARE
[facility]    HOME HEALTH ORDERS  FACE TO FACE ENCOUNTER    Patient Name: Giovanni Guerrero  YOB: 1940    PCP: Alan Webster MD   PCP Address: [address] [facility] [place] [address]  PCP Phone Number: [phone]  PCP Fax: [phone]    Encounter Date: 02/03/2020    Admit to Home Health    Diagnoses:  Active Hospital Problems    Diagnosis  POA    Acute renal failure with acute tubular necrosis superimposed on stage 3 chronic kidney disease [N17.0, N18.3]  No    Type 2 diabetes mellitus with diabetic polyneuropathy, with long-term current use of insulin [E11.42, Z79.4]  Not Applicable    CKD (chronic kidney disease) stage 3, GFR 30-59 ml/min [N18.3]  Yes    Mixed hyperlipidemia [E78.2]  Yes     Chronic      Resolved Hospital Problems    Diagnosis Date Resolved POA    *Shock [R57.9] 02/01/2020 Yes    Sinus bradycardia [R00.1] 02/01/2020 Yes    Hyponatremia [E87.1] 02/01/2020 No    Junctional bradycardia [R00.1] 01/30/2020 No    Persistent atrial fibrillation [I48.19] 02/01/2020 Yes    Essential hypertension [I10] 02/01/2020 Yes     Chronic       Future Appointments   Date Time Provider Department Center   3/16/2020  9:00 AM Rafael Marquis Jr., MD [facility] UROLOGY [facility]   3/16/2020 11:05 AM LAB, [facility] LAB VNP [facility]   3/16/2020 11:10 AM SPECIMEN, [facility] SPECLAB [facility]   3/17/2020  1:30 PM EKG, APPT [facility] EKG [facility]   3/17/2020  2:00 PM Sergio Bertrand MD [facility] ARRHYTH [facility]   3/30/2020 10:00 AM Richelle Baca MD [facility] NEPHRO [facility]   5/18/2020  9:00 AM LAB, [facility]DRAW [facility]   5/19/2020  9:00 AM [facility]           I have seen and examined this patient face to face today. My clinical findings that support the need for the home health skilled services and home bound status are the following:  Weakness/numbness causing balance and gait disturbance due to sinus arrest  making it taxing to leave home.    Allergies:  Review of patient's allergies indicates:   Allergen Reactions    Actos [pioglitazone] Other (See Comments)     constipation       Diet: cardiac diet    Activities: activity as tolerated    Nursing:   SN to complete comprehensive assessment including routine vital signs. Instruct on disease process and s/s of complications to report to MD. Review/verify medication list sent home with the patient at time of discharge  and instruct patient/caregiver as needed. Frequency may be adjusted depending on start of care date.    Notify MD if SBP > 160 or < 90; DBP > 90 or < 50; HR > 120 or < 50; Temp > 101      CONSULTS:    Physical Therapy to evaluate and treat. Evaluate for home safety and equipment needs; Establish/upgrade home exercise program. Perform / instruct on therapeutic exercises, gait training, transfer training, and Range of Motion.  Occupational Therapy to evaluate and treat. Evaluate home environment for safety and equipment needs. Perform/Instruct on transfers, ADL training, ROM, and therapeutic exercises.    MISCELLANEOUS CARE:  N/A    WOUND CARE ORDERS  n/a      Medications: Review discharge medications with patient and family and provide education.      Discharge Medication List as of 2/1/2020  1:06 PM      CONTINUE these medications which have NOT CHANGED    Details   amLODIPine (NORVASC) 5 MG tablet Take 1 tablet (5 mg total) by mouth once daily., Starting Mon 9/30/2019, Normal      apixaban (ELIQUIS) 5 mg Tab Take 1 tablet (5 mg total) by mouth 2 (two) times daily., Starting Wed 11/13/2019, Normal      aspirin (ECOTRIN) 81 MG EC tablet Take 81 mg by mouth once daily., Until Discontinued, Historical Med      atorvastatin (LIPITOR) 40 MG tablet Take 1 tablet (40 mg total) by mouth once daily., Starting Fri 3/8/2019, Normal      cholecalciferol, vitamin D3, (VITAMIN D3) 2,000 unit Tab Take by mouth once daily., Until Discontinued, Historical Med     "  cyanocobalamin 2000 MCG tablet Take 2,000 mcg by mouth once daily. , Historical Med      finasteride (PROSCAR) 5 mg tablet Take 1 tablet (5 mg total) by mouth once daily., Starting Thu 7/11/2019, Until Tue 1/28/2020, Normal      hydrALAZINE (APRESOLINE) 100 MG tablet TK 1 T PO  Q 12 H, Historical Med      OMEGA-3 ACID ETHYL ESTERS (OMACOR) 1 gram Cap Take 1 g by mouth. 1 Capsule Oral Twice a day, Until Discontinued, Historical Med      sodium bicarbonate 325 MG tablet Take 2 tablets (650 mg total) by mouth 2 (two) times daily., Starting Mon 9/30/2019, Until Tue 9/29/2020, OTC      TRESIBA FLEXTOUCH U-100 100 unit/mL (3 mL) InPn INJECT 15 UNITS UNDER THE SKIN EVERY EVENING, Normal      BD INSULIN SYRINGE ULTRA-FINE 0.5 mL 31 gauge x 5/16 Syrg USE WITH LANTUS INSULIN ONCE DAILY, Normal      BD ULTRA-FINE SHORT PEN NEEDLE 31 gauge x 5/16" Ndle USE WITH LANTUS INSULIN ONCE DAILY, Normal      blood sugar diagnostic Strp Check sugar tid as directed, Print      blood-glucose meter Misc 1 kit by Misc.(Non-Drug; Combo Route) route 3 (three) times daily. Pt is to test blood sugar TID, Starting Tue 7/12/2016, Until Thu 1/2/2020, Print      tamsulosin (FLOMAX) 0.4 mg Cap TAKE 1 CAPSULE BY MOUTH TWICE A DAY, Normal         STOP taking these medications       atenolol (TENORMIN) 100 MG tablet Comments:   Reason for Stopping:         lisinopril (PRINIVIL,ZESTRIL) 20 MG tablet Comments:   Reason for Stopping:         sulfamethoxazole-trimethoprim 800-160mg (BACTRIM DS) 800-160 mg Tab Comments:   Reason for Stopping:               I certify that this patient is confined to his home and needs physical therapy and occupational therapy.      "

## 2020-02-03 NOTE — PLAN OF CARE
Attempted to call patient on [phone] with no response.  Called daughter Linda Diamond at [phone] and notified her that we were trying to get in touch with patient to set up HH PT/OT per recommendation.  Linda stated that her and her sister discussed  HH and thinks the patient does not need.  Stated they are making the patient use the cane around the house but he has no noted deficits.    Stated that they are not allowing the patient to go upstairs and are available for support.  Denies need for services at this time.

## 2020-02-10 ENCOUNTER — TELEPHONE (OUTPATIENT)
Dept: INTERNAL MEDICINE | Facility: CLINIC | Age: 80
End: 2020-02-10

## 2020-02-10 NOTE — TELEPHONE ENCOUNTER
----- Message from Belkys Dietrich sent at 2/10/2020 12:59 PM CST -----  Contact: Self  Type:  Patient Returning Call    Who Called: LUCHO SHARMA [[id number]]    Who Left Message for Patient: Karen    Does the patient know what this is regarding?: Yes, regarding a sooner hospital f/u appt    Best Call Back Number: [phone]    Additional Information: None

## 2020-02-10 NOTE — TELEPHONE ENCOUNTER
----- Message from Jorge A Cowart, Patient Care Assistant sent at 2/10/2020  9:06 AM CST -----  Contact: LUCHO SHARMA [[id number]]  Name of Who is Calling: LUCHO SHARMA [[id number]]    What is the request in detail:Requesting a call back in regards of a sooner appointment for hospital follow up.  Please contact to further discuss and advise      Can the clinic reply by [facility]: No    What Number to Call Back [facility]:   [phone]

## 2020-02-27 ENCOUNTER — OFFICE VISIT (OUTPATIENT)
Dept: INTERNAL MEDICINE | Facility: CLINIC | Age: 80
End: 2020-02-27
Payer: MEDICARE

## 2020-02-27 ENCOUNTER — LAB VISIT (OUTPATIENT)
Dept: LAB | Facility: OTHER | Age: 80
End: 2020-02-27
Attending: INTERNAL MEDICINE
Payer: MEDICARE

## 2020-02-27 VITALS
WEIGHT: 190.94 LBS | HEART RATE: 73 BPM | DIASTOLIC BLOOD PRESSURE: 62 MMHG | SYSTOLIC BLOOD PRESSURE: 126 MMHG | BODY MASS INDEX: 34.92 KG/M2 | OXYGEN SATURATION: 98 %

## 2020-02-27 DIAGNOSIS — N18.30 CKD (CHRONIC KIDNEY DISEASE) STAGE 3, GFR 30-59 ML/MIN: ICD-10-CM

## 2020-02-27 DIAGNOSIS — I48.20 CHRONIC ATRIAL FIBRILLATION: ICD-10-CM

## 2020-02-27 DIAGNOSIS — E11.42 DIABETIC POLYNEUROPATHY ASSOCIATED WITH TYPE 2 DIABETES MELLITUS: Chronic | ICD-10-CM

## 2020-02-27 DIAGNOSIS — N40.0 BENIGN PROSTATIC HYPERPLASIA, UNSPECIFIED WHETHER LOWER URINARY TRACT SYMPTOMS PRESENT: ICD-10-CM

## 2020-02-27 DIAGNOSIS — E11.42 TYPE 2 DIABETES MELLITUS WITH DIABETIC POLYNEUROPATHY, WITH LONG-TERM CURRENT USE OF INSULIN: ICD-10-CM

## 2020-02-27 DIAGNOSIS — Z79.4 TYPE 2 DIABETES MELLITUS WITH DIABETIC POLYNEUROPATHY, WITH LONG-TERM CURRENT USE OF INSULIN: ICD-10-CM

## 2020-02-27 DIAGNOSIS — M10.9 GOUT, ARTHRITIS: ICD-10-CM

## 2020-02-27 DIAGNOSIS — E78.2 MIXED HYPERLIPIDEMIA: Chronic | ICD-10-CM

## 2020-02-27 DIAGNOSIS — E11.21 DIABETIC NEPHROPATHY ASSOCIATED WITH TYPE 2 DIABETES MELLITUS: Chronic | ICD-10-CM

## 2020-02-27 DIAGNOSIS — M89.8X9 METABOLIC BONE DISEASE: ICD-10-CM

## 2020-02-27 DIAGNOSIS — E11.42 DIABETIC POLYNEUROPATHY ASSOCIATED WITH TYPE 2 DIABETES MELLITUS: Primary | Chronic | ICD-10-CM

## 2020-02-27 PROBLEM — N39.0 UTI (URINARY TRACT INFECTION): Status: RESOLVED | Noted: 2019-12-05 | Resolved: 2020-02-27

## 2020-02-27 LAB
ANION GAP SERPL CALC-SCNC: 8 MMOL/L (ref 8–16)
BUN SERPL-MCNC: 23 MG/DL (ref 8–23)
CALCIUM SERPL-MCNC: 9.6 MG/DL (ref 8.7–10.5)
CHLORIDE SERPL-SCNC: 105 MMOL/L (ref 95–110)
CO2 SERPL-SCNC: 27 MMOL/L (ref 23–29)
CREAT SERPL-MCNC: 1.6 MG/DL (ref 0.5–1.4)
EST. GFR  (AFRICAN AMERICAN): 47 ML/MIN/1.73 M^2
EST. GFR  (NON AFRICAN AMERICAN): 41 ML/MIN/1.73 M^2
ESTIMATED AVG GLUCOSE: 123 MG/DL (ref 68–131)
GLUCOSE SERPL-MCNC: 108 MG/DL (ref 70–110)
HBA1C MFR BLD HPLC: 5.9 % (ref 4–5.6)
POTASSIUM SERPL-SCNC: 4.6 MMOL/L (ref 3.5–5.1)
SODIUM SERPL-SCNC: 140 MMOL/L (ref 136–145)

## 2020-02-27 PROCEDURE — 99214 PR OFFICE/OUTPT VISIT, EST, LEVL IV, 30-39 MIN: ICD-10-PCS | Mod: S$GLB,,, | Performed by: INTERNAL MEDICINE

## 2020-02-27 PROCEDURE — 99999 PR PBB SHADOW E&M-EST. PATIENT-LVL III: CPT | Mod: PBBFAC,,, | Performed by: INTERNAL MEDICINE

## 2020-02-27 PROCEDURE — 83036 HEMOGLOBIN GLYCOSYLATED A1C: CPT

## 2020-02-27 PROCEDURE — 1101F PT FALLS ASSESS-DOCD LE1/YR: CPT | Mod: CPTII,S$GLB,, | Performed by: INTERNAL MEDICINE

## 2020-02-27 PROCEDURE — 1159F PR MEDICATION LIST DOCUMENTED IN MEDICAL RECORD: ICD-10-PCS | Mod: S$GLB,,, | Performed by: INTERNAL MEDICINE

## 2020-02-27 PROCEDURE — 1126F PR PAIN SEVERITY QUANTIFIED, NO PAIN PRESENT: ICD-10-PCS | Mod: S$GLB,,, | Performed by: INTERNAL MEDICINE

## 2020-02-27 PROCEDURE — 3074F SYST BP LT 130 MM HG: CPT | Mod: CPTII,S$GLB,, | Performed by: INTERNAL MEDICINE

## 2020-02-27 PROCEDURE — 99214 OFFICE O/P EST MOD 30 MIN: CPT | Mod: S$GLB,,, | Performed by: INTERNAL MEDICINE

## 2020-02-27 PROCEDURE — 1126F AMNT PAIN NOTED NONE PRSNT: CPT | Mod: S$GLB,,, | Performed by: INTERNAL MEDICINE

## 2020-02-27 PROCEDURE — 3078F PR MOST RECENT DIASTOLIC BLOOD PRESSURE < 80 MM HG: ICD-10-PCS | Mod: CPTII,S$GLB,, | Performed by: INTERNAL MEDICINE

## 2020-02-27 PROCEDURE — 3078F DIAST BP <80 MM HG: CPT | Mod: CPTII,S$GLB,, | Performed by: INTERNAL MEDICINE

## 2020-02-27 PROCEDURE — 3074F PR MOST RECENT SYSTOLIC BLOOD PRESSURE < 130 MM HG: ICD-10-PCS | Mod: CPTII,S$GLB,, | Performed by: INTERNAL MEDICINE

## 2020-02-27 PROCEDURE — 99999 PR PBB SHADOW E&M-EST. PATIENT-LVL III: ICD-10-PCS | Mod: PBBFAC,,, | Performed by: INTERNAL MEDICINE

## 2020-02-27 PROCEDURE — 1159F MED LIST DOCD IN RCRD: CPT | Mod: S$GLB,,, | Performed by: INTERNAL MEDICINE

## 2020-02-27 PROCEDURE — 80048 BASIC METABOLIC PNL TOTAL CA: CPT

## 2020-02-27 PROCEDURE — 36415 COLL VENOUS BLD VENIPUNCTURE: CPT

## 2020-02-27 PROCEDURE — 1101F PR PT FALLS ASSESS DOC 0-1 FALLS W/OUT INJ PAST YR: ICD-10-PCS | Mod: CPTII,S$GLB,, | Performed by: INTERNAL MEDICINE

## 2020-02-27 PROCEDURE — 99499 RISK ADDL DX/OHS AUDIT: ICD-10-PCS | Mod: S$GLB,,, | Performed by: INTERNAL MEDICINE

## 2020-02-27 PROCEDURE — 99499 UNLISTED E&M SERVICE: CPT | Mod: S$GLB,,, | Performed by: INTERNAL MEDICINE

## 2020-02-27 NOTE — PROGRESS NOTES
Subjective:       Patient ID: Giovanni Guerrero is a 80 y.o. male.    Chief Complaint: Hospital Follow Up    Pt here for f/u. He has a-fib dx during preop assessment for TURP in 2019. He subsequently had ablation procedure complicated by sinus arrest requiring pressor support. Hospital record reviewed. He recovered and has appropriate f/u with cardiology. He is on eliquis for anticoag. Denies cp/sob/palpitations.     DM2 is well controlled. Most recent A1C was 6.3. Pt reports sugars are in 90s-100s fasting, prandial sugars are generally less than 125. He has not had lows. He is on tresiba monotherapy at 15 units qhs. He does have nephropathy with CKD IV and microalbuminuria and his creatinine has been stable at 2.2-2.5; however, his creatinine on last check improved to 1.5. He is followed by nephrology. He does have neuropathy but is not bothersome and manages without meds.      He has renal metabolic bone disease which is also managed by nephrology and stable.     Pt's BP is well controlled. Tolerating meds well. Home readings are also controlled. Pt denies cp/sob/ha/vision or neuro changes.     HLD is tx with lipitor which he tolerates well.    Gout has been quiet. No recent attacks.      He has BPH with urinary obstruction for which he takes flomax and finasteride. He had TURP which has helped. He has f/u with urology.    Diabetes   Pertinent negatives for hypoglycemia include no headaches. Pertinent negatives for diabetes include no chest pain and no polyuria.   Hypertension   Pertinent negatives include no chest pain, headaches or shortness of breath.     Review of Systems   Constitutional: Negative for appetite change, fever and unexpected weight change.   Eyes: Negative for visual disturbance.   Respiratory: Negative for shortness of breath.    Cardiovascular: Negative for chest pain.   Gastrointestinal: Negative for abdominal pain, blood in stool, constipation and diarrhea.   Endocrine: Negative for polyuria.    Genitourinary: Negative for frequency.   Neurological: Negative for light-headedness and headaches.   Psychiatric/Behavioral: Negative for dysphoric mood.       Objective:      Physical Exam   Constitutional: He is oriented to person, place, and time. He appears well-developed and well-nourished.   HENT:   Head: Normocephalic and atraumatic.   Eyes: Pupils are equal, round, and reactive to light. EOM are normal.   Neck: Neck supple. Carotid bruit is not present. No thyromegaly present.   Cardiovascular: Normal rate, regular rhythm, S1 normal, S2 normal and normal heart sounds.   No murmur heard.  Pulmonary/Chest: Effort normal and breath sounds normal. He has no wheezes.   Abdominal: Soft. Bowel sounds are normal. He exhibits no mass. There is no hepatosplenomegaly. There is no tenderness.   Musculoskeletal: He exhibits no edema.   Lymphadenopathy:     He has no cervical adenopathy.   Neurological: He is alert and oriented to person, place, and time. No cranial nerve deficit.   Psychiatric: He has a normal mood and affect. His behavior is normal.       Assessment:       1. Diabetic polyneuropathy associated with type 2 diabetes mellitus    2. Mixed hyperlipidemia    3. CKD (chronic kidney disease) stage 3, GFR 30-59 ml/min    4. Diabetic nephropathy associated with type 2 diabetes mellitus    5. Type 2 diabetes mellitus with diabetic polyneuropathy, with long-term current use of insulin    6. Metabolic bone disease    7. Gout, arthritis    8. Benign prostatic hyperplasia, unspecified whether lower urinary tract symptoms present    9. Chronic atrial fibrillation        Plan:       1. Recent labs reviewed     2. Continue current meds  3. Continue with home BS and BP readings  4. Keep f/u with specialists  5. He is due for c-scope as last one in 2014 showed small polyps but he does not want to pursue this and understands r/b of this decision

## 2020-03-10 RX ORDER — LISINOPRIL 20 MG/1
TABLET ORAL
Qty: 45 TABLET | Refills: 11 | Status: SHIPPED | OUTPATIENT
Start: 2020-03-10 | End: 2020-12-16

## 2020-03-10 RX ORDER — ATORVASTATIN CALCIUM 40 MG/1
TABLET, FILM COATED ORAL
Qty: 90 TABLET | Refills: 3 | Status: SHIPPED | OUTPATIENT
Start: 2020-03-10 | End: 2021-03-26 | Stop reason: SDUPTHER

## 2020-03-13 ENCOUNTER — PATIENT OUTREACH (OUTPATIENT)
Dept: ADMINISTRATIVE | Facility: OTHER | Age: 80
End: 2020-03-13

## 2020-03-16 ENCOUNTER — OFFICE VISIT (OUTPATIENT)
Dept: UROLOGY | Facility: CLINIC | Age: 80
End: 2020-03-16
Payer: MEDICARE

## 2020-03-16 ENCOUNTER — LAB VISIT (OUTPATIENT)
Dept: LAB | Facility: HOSPITAL | Age: 80
End: 2020-03-16
Attending: INTERNAL MEDICINE
Payer: MEDICARE

## 2020-03-16 ENCOUNTER — TELEPHONE (OUTPATIENT)
Dept: ELECTROPHYSIOLOGY | Facility: CLINIC | Age: 80
End: 2020-03-16

## 2020-03-16 VITALS
BODY MASS INDEX: 34.89 KG/M2 | HEIGHT: 62 IN | SYSTOLIC BLOOD PRESSURE: 148 MMHG | WEIGHT: 189.63 LBS | DIASTOLIC BLOOD PRESSURE: 56 MMHG | HEART RATE: 62 BPM

## 2020-03-16 DIAGNOSIS — D63.1 ANEMIA OF CHRONIC RENAL FAILURE, UNSPECIFIED CKD STAGE: ICD-10-CM

## 2020-03-16 DIAGNOSIS — I10 ESSENTIAL HYPERTENSION: ICD-10-CM

## 2020-03-16 DIAGNOSIS — E87.20 ACIDOSIS, METABOLIC: ICD-10-CM

## 2020-03-16 DIAGNOSIS — Z98.890 POST-OPERATIVE STATE: Primary | ICD-10-CM

## 2020-03-16 DIAGNOSIS — E11.21 DIABETIC NEPHROPATHY ASSOCIATED WITH TYPE 2 DIABETES MELLITUS: ICD-10-CM

## 2020-03-16 DIAGNOSIS — N18.30 CHRONIC KIDNEY DISEASE, STAGE III (MODERATE): ICD-10-CM

## 2020-03-16 DIAGNOSIS — N18.9 ANEMIA OF CHRONIC RENAL FAILURE, UNSPECIFIED CKD STAGE: ICD-10-CM

## 2020-03-16 LAB
25(OH)D3+25(OH)D2 SERPL-MCNC: 33 NG/ML (ref 30–96)
ALBUMIN SERPL BCP-MCNC: 3.1 G/DL (ref 3.5–5.2)
ANION GAP SERPL CALC-SCNC: 10 MMOL/L (ref 8–16)
BUN SERPL-MCNC: 31 MG/DL (ref 8–23)
CALCIUM SERPL-MCNC: 9.2 MG/DL (ref 8.7–10.5)
CHLORIDE SERPL-SCNC: 104 MMOL/L (ref 95–110)
CO2 SERPL-SCNC: 25 MMOL/L (ref 23–29)
CREAT SERPL-MCNC: 1.7 MG/DL (ref 0.5–1.4)
ERYTHROCYTE [DISTWIDTH] IN BLOOD BY AUTOMATED COUNT: 12.6 % (ref 11.5–14.5)
EST. GFR  (AFRICAN AMERICAN): 43.7 ML/MIN/1.73 M^2
EST. GFR  (NON AFRICAN AMERICAN): 37.8 ML/MIN/1.73 M^2
FERRITIN SERPL-MCNC: 22 NG/ML (ref 20–300)
GLUCOSE SERPL-MCNC: 116 MG/DL (ref 70–110)
HCT VFR BLD AUTO: 41.8 % (ref 40–54)
HGB BLD-MCNC: 13.2 G/DL (ref 14–18)
IRON SERPL-MCNC: 28 UG/DL (ref 45–160)
MCH RBC QN AUTO: 29.8 PG (ref 27–31)
MCHC RBC AUTO-ENTMCNC: 31.6 G/DL (ref 32–36)
MCV RBC AUTO: 94 FL (ref 82–98)
PHOSPHATE SERPL-MCNC: 3.3 MG/DL (ref 2.7–4.5)
PLATELET # BLD AUTO: 221 K/UL (ref 150–350)
PMV BLD AUTO: 10.2 FL (ref 9.2–12.9)
POTASSIUM SERPL-SCNC: 4.1 MMOL/L (ref 3.5–5.1)
PTH-INTACT SERPL-MCNC: 91 PG/ML (ref 9–77)
RBC # BLD AUTO: 4.43 M/UL (ref 4.6–6.2)
SATURATED IRON: 7 % (ref 20–50)
SODIUM SERPL-SCNC: 139 MMOL/L (ref 136–145)
TOTAL IRON BINDING CAPACITY: 422 UG/DL (ref 250–450)
TRANSFERRIN SERPL-MCNC: 285 MG/DL (ref 200–375)
WBC # BLD AUTO: 6.64 K/UL (ref 3.9–12.7)

## 2020-03-16 PROCEDURE — 82728 ASSAY OF FERRITIN: CPT

## 2020-03-16 PROCEDURE — 99024 POSTOP FOLLOW-UP VISIT: CPT | Mod: S$GLB,,, | Performed by: UROLOGY

## 2020-03-16 PROCEDURE — 85027 COMPLETE CBC AUTOMATED: CPT

## 2020-03-16 PROCEDURE — 99999 PR PBB SHADOW E&M-EST. PATIENT-LVL III: CPT | Mod: PBBFAC,,, | Performed by: UROLOGY

## 2020-03-16 PROCEDURE — 99024 PR POST-OP FOLLOW-UP VISIT: ICD-10-PCS | Mod: S$GLB,,, | Performed by: UROLOGY

## 2020-03-16 PROCEDURE — 83970 ASSAY OF PARATHORMONE: CPT

## 2020-03-16 PROCEDURE — 99999 PR PBB SHADOW E&M-EST. PATIENT-LVL III: ICD-10-PCS | Mod: PBBFAC,,, | Performed by: UROLOGY

## 2020-03-16 PROCEDURE — 83540 ASSAY OF IRON: CPT

## 2020-03-16 PROCEDURE — 36415 COLL VENOUS BLD VENIPUNCTURE: CPT

## 2020-03-16 PROCEDURE — 82306 VITAMIN D 25 HYDROXY: CPT

## 2020-03-16 PROCEDURE — 80069 RENAL FUNCTION PANEL: CPT

## 2020-03-16 NOTE — LETTER
March 16, 2020      Alan Webster MD  [address]  [facility] [address]           [facility] 4th [facility]  [address]  [facility] [address]  Phone: [phone]          Patient: Giovanni Guerrero   MR Number: [id number]   YOB: 1940   Date of Visit: 3/16/2020       Dear Dr. Alan Webster:    Thank you for referring Giovanni Guerrero to me for evaluation. Attached you will find relevant portions of my assessment and plan of care.    If you have questions, please do not hesitate to call me. I look forward to following Giovanni Guerrero along with you.    Sincerely,    Rafael Marquis Jr., MD    Enclosure  CC:  No Recipients    If you would like to receive this communication electronically, please contact [email or web address] or [phone] to request more information on [employer] Link access.    For providers and/or their staff who would like to refer a patient to [facility], please contact us through our one-stop-shop provider referral line, [facility], at [phone].    If you feel you have received this communication in error or would no longer like to receive these types of communications, please e-mail [email or web address]

## 2020-03-16 NOTE — PROGRESS NOTES
Subjective:       Patient ID: Giovanni Guerrero is a 80 y.o. male.    Chief Complaint: s/p turp (3month follow yup pt doing well he state he urinating like crazy . he very happy with this .)    HPI  Giovanni Guerrero is a 80 y.o. male with a PMHx of HLD, HTN, and Type II DM who presents to the [facility] s/p TURP 12/6/2019 for post-op evaluation. He reports being extremely satisfied with his current urinary habits.    Past Medical History:   Diagnosis Date    Allergy     Anticoagulant long-term use     Colon polyp     Diabetes mellitus type II     Gout, unspecified     Hx of colonic polyps     Hyperlipidemia     Hypertension     Prostatic hypertrophy     Renal insufficiency        Past Surgical History:   Procedure Laterality Date    APPENDECTOMY      CATARACT EXTRACTION Left 2018    EYE SURGERY      cataract    HERNIA REPAIR      TONSILLECTOMY      TRANSURETHRAL RESECTION OF PROSTATE (TURP) USING LASER N/A 12/6/2019    Procedure: TURP, USING LASER;  Surgeon: Rafael Marquis Jr., MD;  Location: [facility] OR [address] [place] [address];  Service: Urology;  Laterality: N/A;  75min    TREATMENT OF CARDIAC ARRHYTHMIA N/A 1/28/2020    Procedure: CARDIOVERSION;  Surgeon: Sergio Bertrand MD;  Location: [facility] EP LAB;  Service: Cardiology;  Laterality: N/A;  AF, NICOLE, DCCV, MAC, ME, 3 Prep       Family History   Problem Relation Age of Onset    Cancer Mother         ovarian cancer    Heart disease Father         MI at 57    Diabetes Father     Cerebral aneurysm Sister     Heart disease Brother         MI at 60    Heart disease Brother         CABG    Anesthesia problems Neg Hx        Social History     Socioeconomic History    Marital status:      Spouse name: Not on file    Number of children: Not on file    Years of education: Not on file    Highest education level: Not on file   Occupational History    Not on file   Social Needs    Financial resource strain: Not on file    Food insecurity:     Worry: Not on file      "Inability: Not on file    Transportation needs:     Medical: Not on file     Non-medical: Not on file   Tobacco Use    Smoking status: Never Smoker    Smokeless tobacco: Never Used   Substance and Sexual Activity    Alcohol use: No    Drug use: No    Sexual activity: Not Currently   Lifestyle    Physical activity:     Days per week: Not on file     Minutes per session: Not on file    Stress: Not on file   Relationships    Social connections:     Talks on phone: Not on file     Gets together: Not on file     Attends Orthodoxy service: Not on file     Active member of club or organization: Not on file     Attends meetings of clubs or organizations: Not on file     Relationship status: Not on file   Other Topics Concern    Not on file   Social History Narrative     52 yrs, 3 childre (two living), 11 grandchildren & 4 great grandchildren       Allergies:  Actos [pioglitazone]    Medications:    Current Outpatient Medications:     amLODIPine (NORVASC) 5 MG tablet, Take 1 tablet (5 mg total) by mouth once daily., Disp: 90 tablet, Rfl: 3    apixaban (ELIQUIS) 5 mg Tab, Take 1 tablet (5 mg total) by mouth 2 (two) times daily., Disp: 60 tablet, Rfl: 5    aspirin (ECOTRIN) 81 MG EC tablet, Take 81 mg by mouth once daily., Disp: , Rfl:     atorvastatin (LIPITOR) 40 MG tablet, TAKE 1 TABLET(40 MG) BY MOUTH EVERY DAY, Disp: 90 tablet, Rfl: 3    BD INSULIN SYRINGE ULTRA-FINE 0.5 mL 31 gauge x 5/16 Syrg, USE WITH LANTUS INSULIN ONCE DAILY, Disp: 100 each, Rfl: 3    BD ULTRA-FINE SHORT PEN NEEDLE 31 gauge x 5/16" Ndle, USE WITH LANTUS INSULIN ONCE DAILY, Disp: 100 each, Rfl: 3    blood sugar diagnostic Strp, Check sugar tid as directed, Disp: 300 each, Rfl: 3    cholecalciferol, vitamin D3, (VITAMIN D3) 2,000 unit Tab, Take by mouth once daily., Disp: , Rfl:     cyanocobalamin 2000 MCG tablet, Take 2,000 mcg by mouth once daily. , Disp: , Rfl:     hydrALAZINE (APRESOLINE) 100 MG tablet, TK 1 T PO  Q 12 H, " Disp: , Rfl: 3    lisinopriL (PRINIVIL,ZESTRIL) 20 MG tablet, TAKE ONE-HALF TABLET(10 MG) BY MOUTH EVERY DAY, Disp: 45 tablet, Rfl: 11    OMEGA-3 ACID ETHYL ESTERS (OMACOR) 1 gram Cap, Take 1 g by mouth. 1 Capsule Oral Twice a day, Disp: , Rfl:     sodium bicarbonate 325 MG tablet, Take 2 tablets (650 mg total) by mouth 2 (two) times daily., Disp: 120 tablet, Rfl: 6    blood-glucose meter Misc, 1 kit by Misc.(Non-Drug; Combo Route) route 3 (three) times daily. Pt is to test blood sugar TID, Disp: 1 each, Rfl: 0    finasteride (PROSCAR) 5 mg tablet, Take 1 tablet (5 mg total) by mouth once daily., Disp: 90 tablet, Rfl: 3    tamsulosin (FLOMAX) 0.4 mg Cap, TAKE 1 CAPSULE BY MOUTH TWICE A DAY, Disp: 180 capsule, Rfl: 1    TRESIBA FLEXTOUCH U-100 100 unit/mL (3 mL) InPn, INJECT 15 UNITS UNDER THE SKIN EVERY EVENING, Disp: 9 mL, Rfl: 1  No current facility-administered medications for this visit.     Facility-Administered Medications Ordered in Other Visits:     0.9%  NaCl infusion, , Intravenous, Continuous, Krishan Awan MD, Stopped at 01/28/20 0943    sodium chloride 0.9% flush 5 mL, 5 mL, Intravenous, PRN, Krishan Awan MD    Review of Systems   Constitutional: Negative for activity change, appetite change, chills, diaphoresis, fatigue, fever and unexpected weight change.   HENT: Negative for congestion, dental problem, hearing loss, mouth sores, postnasal drip, rhinorrhea, sinus pressure and trouble swallowing.    Eyes: Negative for pain, discharge and itching.   Respiratory: Negative for apnea, cough, choking, chest tightness, shortness of breath and wheezing.    Cardiovascular: Negative for chest pain, palpitations and leg swelling.   Gastrointestinal: Negative for abdominal distention, abdominal pain, anal bleeding, blood in stool, constipation and diarrhea.   Endocrine: Negative for polydipsia and polyuria.   Genitourinary: Negative for decreased urine volume, difficulty urinating, discharge,  dysuria, enuresis, flank pain, frequency, genital sores, hematuria, penile pain, penile swelling, scrotal swelling and urgency.   Musculoskeletal: Negative for arthralgias and back pain.   Skin: Negative for color change, rash and wound.   Neurological: Negative for dizziness, syncope, speech difficulty, light-headedness and headaches.   Hematological: Negative for adenopathy. Does not bruise/bleed easily.   Psychiatric/Behavioral: Negative for behavioral problems and confusion.       Objective:      Physical Exam   Vitals reviewed.  Constitutional: He appears well-developed.   HENT:   Head: Normocephalic.   Cardiovascular: Normal rate.    Pulmonary/Chest: Effort normal.   Abdominal: Soft.   Genitourinary:   Genitourinary Comments: PVR 0cc  Urine Dip Clear   Neurological: He is alert.   Skin: Skin is warm.     Psychiatric: He has a normal mood and affect.       Assessment:       1. Post-operative state        Plan:       Giovanni was seen today for s/p turp.    Diagnoses and all orders for this visit:    Post-operative state          RTC 1 year for RUBEN.    IKerri, am acting as a scribe on this patient encounter in the presence and under the supervision of DR. Marquis.      03/16/2020 8:57 AM  IDr. Marquis, personally performed the services described in this documentation.   All medical record entries made by the scribe were at my direction and in my presence.   I have reviewed the chart and agree that the record is accurate and complete.   Rafael Marquis MD.  8:57 AM 03/16/2020

## 2020-03-16 NOTE — TELEPHONE ENCOUNTER
Left pt a message informing him that he does not need to come in for his appt due to covid19. Offered telemed or to be r/s at a later date. Instructed pt to call me back to let me know.

## 2020-03-20 ENCOUNTER — TELEPHONE (OUTPATIENT)
Dept: NEPHROLOGY | Facility: CLINIC | Age: 80
End: 2020-03-20

## 2020-04-17 RX ORDER — INSULIN DEGLUDEC 100 U/ML
INJECTION, SOLUTION SUBCUTANEOUS
Qty: 9 ML | Refills: 1 | Status: SHIPPED | OUTPATIENT
Start: 2020-04-17 | End: 2020-08-24

## 2020-05-05 ENCOUNTER — TELEPHONE (OUTPATIENT)
Dept: NEPHROLOGY | Facility: CLINIC | Age: 80
End: 2020-05-05

## 2020-05-11 DIAGNOSIS — I48.19 PERSISTENT ATRIAL FIBRILLATION: ICD-10-CM

## 2020-08-03 ENCOUNTER — TELEPHONE (OUTPATIENT)
Dept: UROLOGY | Facility: CLINIC | Age: 80
End: 2020-08-03

## 2020-08-03 DIAGNOSIS — N13.8 BPH WITH OBSTRUCTION/LOWER URINARY TRACT SYMPTOMS: ICD-10-CM

## 2020-08-03 DIAGNOSIS — R33.8 ACUTE URINARY RETENTION: ICD-10-CM

## 2020-08-03 DIAGNOSIS — N40.1 BPH WITH OBSTRUCTION/LOWER URINARY TRACT SYMPTOMS: ICD-10-CM

## 2020-08-03 RX ORDER — FINASTERIDE 5 MG/1
5 TABLET, FILM COATED ORAL DAILY
Qty: 90 TABLET | Refills: 3 | Status: SHIPPED | OUTPATIENT
Start: 2020-08-03 | End: 2021-07-28

## 2020-08-03 NOTE — TELEPHONE ENCOUNTER
----- Message from Frieda Celeste LPN sent at 8/3/2020 11:18 AM CDT -----  Regarding: FW: Pt [phone]  Last seen by Dr. Marquis in March saw you in Dec  ----- Message -----  From: Noni Gifford  Sent: 8/3/2020   9:06 AM CDT  To: Nathan PRATER Staff  Subject: Pt [phone]                                  Reason: Pt calling to get a refill on finasteride (PROSCAR) 5 mg tablet      Pharmacy:[id number] - [facility] [address]

## 2020-10-29 NOTE — LETTER
November 7, 2019      Alan Webster MD  [address]  [place] [address]           [facility] 4th [facility]  [address]  [facility] [address]  Phone: [phone]          Patient: Giovanni Guerrero   MR Number: [id number]   YOB: 1940   Date of Visit: 11/5/2019       Dear Dr. Alan Webster:    Thank you for referring Giovanni Guerrero to me for evaluation. Attached you will find relevant portions of my assessment and plan of care.    If you have questions, please do not hesitate to call me. I look forward to following Giovanni Guerrero along with you.    Sincerely,    Rafael Marquis Jr., MD    Enclosure  CC:  No Recipients    If you would like to receive this communication electronically, please contact [email or web address] or [phone] to request more information on [employer] Link access.    For providers and/or their staff who would like to refer a patient to [facility], please contact us through our one-stop-shop provider referral line, [facility], at [phone].    If you feel you have received this communication in error or would no longer like to receive these types of communications, please e-mail [email or web address]         
no

## 2020-11-06 DIAGNOSIS — Z79.4 TYPE 2 DIABETES MELLITUS WITH DIABETIC POLYNEUROPATHY, WITH LONG-TERM CURRENT USE OF INSULIN: Primary | ICD-10-CM

## 2020-11-06 DIAGNOSIS — E11.42 TYPE 2 DIABETES MELLITUS WITH DIABETIC POLYNEUROPATHY, WITH LONG-TERM CURRENT USE OF INSULIN: Primary | ICD-10-CM

## 2020-11-06 RX ORDER — INSULIN DEGLUDEC 100 U/ML
INJECTION, SOLUTION SUBCUTANEOUS
Qty: 9 ML | Refills: 0 | Status: SHIPPED | OUTPATIENT
Start: 2020-11-06 | End: 2020-12-31 | Stop reason: SDUPTHER

## 2020-11-06 NOTE — TELEPHONE ENCOUNTER
----- Message from Rosette Chavez sent at 11/6/2020  8:13 AM CST -----  Refill :TRESIBA FLEXTOUCH U-100 100 unit/mL (3 mL) [id number] [place] [address] [address] [facility] & [address] [address]  Phone: [phone] Fax: [phone]

## 2020-11-09 DIAGNOSIS — I48.19 PERSISTENT ATRIAL FIBRILLATION: ICD-10-CM

## 2020-12-16 ENCOUNTER — OFFICE VISIT (OUTPATIENT)
Dept: INTERNAL MEDICINE | Facility: CLINIC | Age: 80
End: 2020-12-16
Payer: MEDICARE

## 2020-12-16 ENCOUNTER — LAB VISIT (OUTPATIENT)
Dept: LAB | Facility: OTHER | Age: 80
End: 2020-12-16
Attending: INTERNAL MEDICINE
Payer: MEDICARE

## 2020-12-16 VITALS
WEIGHT: 203.94 LBS | HEIGHT: 64 IN | OXYGEN SATURATION: 99 % | HEART RATE: 98 BPM | SYSTOLIC BLOOD PRESSURE: 156 MMHG | DIASTOLIC BLOOD PRESSURE: 98 MMHG | BODY MASS INDEX: 34.82 KG/M2

## 2020-12-16 DIAGNOSIS — Z79.4 TYPE 2 DIABETES MELLITUS WITH DIABETIC POLYNEUROPATHY, WITH LONG-TERM CURRENT USE OF INSULIN: ICD-10-CM

## 2020-12-16 DIAGNOSIS — I10 ESSENTIAL HYPERTENSION: Chronic | ICD-10-CM

## 2020-12-16 DIAGNOSIS — N40.1 BENIGN PROSTATIC HYPERPLASIA WITH URINARY RETENTION: ICD-10-CM

## 2020-12-16 DIAGNOSIS — E78.2 MIXED HYPERLIPIDEMIA: Chronic | ICD-10-CM

## 2020-12-16 DIAGNOSIS — Z11.59 NEED FOR HEPATITIS C SCREENING TEST: ICD-10-CM

## 2020-12-16 DIAGNOSIS — E11.42 TYPE 2 DIABETES MELLITUS WITH DIABETIC POLYNEUROPATHY, WITH LONG-TERM CURRENT USE OF INSULIN: ICD-10-CM

## 2020-12-16 DIAGNOSIS — M10.9 GOUT, ARTHRITIS: ICD-10-CM

## 2020-12-16 DIAGNOSIS — N18.32 STAGE 3B CHRONIC KIDNEY DISEASE: ICD-10-CM

## 2020-12-16 DIAGNOSIS — I48.20 CHRONIC ATRIAL FIBRILLATION: ICD-10-CM

## 2020-12-16 DIAGNOSIS — E11.21 DIABETIC NEPHROPATHY ASSOCIATED WITH TYPE 2 DIABETES MELLITUS: Chronic | ICD-10-CM

## 2020-12-16 DIAGNOSIS — R33.8 BENIGN PROSTATIC HYPERPLASIA WITH URINARY RETENTION: ICD-10-CM

## 2020-12-16 DIAGNOSIS — E11.42 DIABETIC POLYNEUROPATHY ASSOCIATED WITH TYPE 2 DIABETES MELLITUS: Primary | Chronic | ICD-10-CM

## 2020-12-16 DIAGNOSIS — E11.42 DIABETIC POLYNEUROPATHY ASSOCIATED WITH TYPE 2 DIABETES MELLITUS: Chronic | ICD-10-CM

## 2020-12-16 PROBLEM — N17.0 ACUTE RENAL FAILURE WITH ACUTE TUBULAR NECROSIS SUPERIMPOSED ON STAGE 3 CHRONIC KIDNEY DISEASE: Status: RESOLVED | Noted: 2020-01-29 | Resolved: 2020-12-16

## 2020-12-16 PROBLEM — N18.30 ACUTE RENAL FAILURE WITH ACUTE TUBULAR NECROSIS SUPERIMPOSED ON STAGE 3 CHRONIC KIDNEY DISEASE: Status: RESOLVED | Noted: 2020-01-29 | Resolved: 2020-12-16

## 2020-12-16 LAB
ALBUMIN SERPL BCP-MCNC: 3.6 G/DL (ref 3.5–5.2)
ALP SERPL-CCNC: 71 U/L (ref 55–135)
ALT SERPL W/O P-5'-P-CCNC: 21 U/L (ref 10–44)
ANION GAP SERPL CALC-SCNC: 12 MMOL/L (ref 8–16)
AST SERPL-CCNC: 20 U/L (ref 10–40)
BASOPHILS # BLD AUTO: 0.03 K/UL (ref 0–0.2)
BASOPHILS NFR BLD: 0.4 % (ref 0–1.9)
BILIRUB SERPL-MCNC: 0.6 MG/DL (ref 0.1–1)
BUN SERPL-MCNC: 35 MG/DL (ref 8–23)
CALCIUM SERPL-MCNC: 10.2 MG/DL (ref 8.7–10.5)
CHLORIDE SERPL-SCNC: 105 MMOL/L (ref 95–110)
CHOLEST SERPL-MCNC: 113 MG/DL (ref 120–199)
CHOLEST/HDLC SERPL: 3.3 {RATIO} (ref 2–5)
CO2 SERPL-SCNC: 23 MMOL/L (ref 23–29)
CREAT SERPL-MCNC: 1.8 MG/DL (ref 0.5–1.4)
DIFFERENTIAL METHOD: ABNORMAL
EOSINOPHIL # BLD AUTO: 0.1 K/UL (ref 0–0.5)
EOSINOPHIL NFR BLD: 1.7 % (ref 0–8)
ERYTHROCYTE [DISTWIDTH] IN BLOOD BY AUTOMATED COUNT: 14.6 % (ref 11.5–14.5)
EST. GFR  (AFRICAN AMERICAN): 41 ML/MIN/1.73 M^2
EST. GFR  (NON AFRICAN AMERICAN): 35 ML/MIN/1.73 M^2
ESTIMATED AVG GLUCOSE: 151 MG/DL (ref 68–131)
GLUCOSE SERPL-MCNC: 144 MG/DL (ref 70–110)
HBA1C MFR BLD HPLC: 6.9 % (ref 4–5.6)
HCT VFR BLD AUTO: 48.8 % (ref 40–54)
HDLC SERPL-MCNC: 34 MG/DL (ref 40–75)
HDLC SERPL: 30.1 % (ref 20–50)
HGB BLD-MCNC: 15.6 G/DL (ref 14–18)
IMM GRANULOCYTES # BLD AUTO: 0.01 K/UL (ref 0–0.04)
IMM GRANULOCYTES NFR BLD AUTO: 0.1 % (ref 0–0.5)
LDLC SERPL CALC-MCNC: 57.4 MG/DL (ref 63–159)
LYMPHOCYTES # BLD AUTO: 2.6 K/UL (ref 1–4.8)
LYMPHOCYTES NFR BLD: 34.4 % (ref 18–48)
MCH RBC QN AUTO: 29.1 PG (ref 27–31)
MCHC RBC AUTO-ENTMCNC: 32 G/DL (ref 32–36)
MCV RBC AUTO: 91 FL (ref 82–98)
MONOCYTES # BLD AUTO: 0.6 K/UL (ref 0.3–1)
MONOCYTES NFR BLD: 8.5 % (ref 4–15)
NEUTROPHILS # BLD AUTO: 4.1 K/UL (ref 1.8–7.7)
NEUTROPHILS NFR BLD: 54.9 % (ref 38–73)
NONHDLC SERPL-MCNC: 79 MG/DL
NRBC BLD-RTO: 0 /100 WBC
PLATELET # BLD AUTO: 204 K/UL (ref 150–350)
PMV BLD AUTO: 10.1 FL (ref 9.2–12.9)
POTASSIUM SERPL-SCNC: 4.6 MMOL/L (ref 3.5–5.1)
PROT SERPL-MCNC: 7.3 G/DL (ref 6–8.4)
RBC # BLD AUTO: 5.36 M/UL (ref 4.6–6.2)
SODIUM SERPL-SCNC: 140 MMOL/L (ref 136–145)
TRIGL SERPL-MCNC: 108 MG/DL (ref 30–150)
WBC # BLD AUTO: 7.5 K/UL (ref 3.9–12.7)

## 2020-12-16 PROCEDURE — 99214 PR OFFICE/OUTPT VISIT, EST, LEVL IV, 30-39 MIN: ICD-10-PCS | Mod: S$GLB,,, | Performed by: INTERNAL MEDICINE

## 2020-12-16 PROCEDURE — 1126F PR PAIN SEVERITY QUANTIFIED, NO PAIN PRESENT: ICD-10-PCS | Mod: S$GLB,,, | Performed by: INTERNAL MEDICINE

## 2020-12-16 PROCEDURE — 85025 COMPLETE CBC W/AUTO DIFF WBC: CPT

## 2020-12-16 PROCEDURE — 83036 HEMOGLOBIN GLYCOSYLATED A1C: CPT

## 2020-12-16 PROCEDURE — 99999 PR PBB SHADOW E&M-EST. PATIENT-LVL IV: CPT | Mod: PBBFAC,,, | Performed by: INTERNAL MEDICINE

## 2020-12-16 PROCEDURE — 3077F SYST BP >= 140 MM HG: CPT | Mod: CPTII,S$GLB,, | Performed by: INTERNAL MEDICINE

## 2020-12-16 PROCEDURE — 3080F PR MOST RECENT DIASTOLIC BLOOD PRESSURE >= 90 MM HG: ICD-10-PCS | Mod: CPTII,S$GLB,, | Performed by: INTERNAL MEDICINE

## 2020-12-16 PROCEDURE — 3288F PR FALLS RISK ASSESSMENT DOCUMENTED: ICD-10-PCS | Mod: CPTII,S$GLB,, | Performed by: INTERNAL MEDICINE

## 2020-12-16 PROCEDURE — 1101F PR PT FALLS ASSESS DOC 0-1 FALLS W/OUT INJ PAST YR: ICD-10-PCS | Mod: CPTII,S$GLB,, | Performed by: INTERNAL MEDICINE

## 2020-12-16 PROCEDURE — 1159F PR MEDICATION LIST DOCUMENTED IN MEDICAL RECORD: ICD-10-PCS | Mod: S$GLB,,, | Performed by: INTERNAL MEDICINE

## 2020-12-16 PROCEDURE — 1159F MED LIST DOCD IN RCRD: CPT | Mod: S$GLB,,, | Performed by: INTERNAL MEDICINE

## 2020-12-16 PROCEDURE — 1101F PT FALLS ASSESS-DOCD LE1/YR: CPT | Mod: CPTII,S$GLB,, | Performed by: INTERNAL MEDICINE

## 2020-12-16 PROCEDURE — 3077F PR MOST RECENT SYSTOLIC BLOOD PRESSURE >= 140 MM HG: ICD-10-PCS | Mod: CPTII,S$GLB,, | Performed by: INTERNAL MEDICINE

## 2020-12-16 PROCEDURE — 36415 COLL VENOUS BLD VENIPUNCTURE: CPT

## 2020-12-16 PROCEDURE — 99214 OFFICE O/P EST MOD 30 MIN: CPT | Mod: S$GLB,,, | Performed by: INTERNAL MEDICINE

## 2020-12-16 PROCEDURE — 1126F AMNT PAIN NOTED NONE PRSNT: CPT | Mod: S$GLB,,, | Performed by: INTERNAL MEDICINE

## 2020-12-16 PROCEDURE — 99499 RISK ADDL DX/OHS AUDIT: ICD-10-PCS | Mod: S$GLB,,, | Performed by: INTERNAL MEDICINE

## 2020-12-16 PROCEDURE — 80053 COMPREHEN METABOLIC PANEL: CPT

## 2020-12-16 PROCEDURE — 99499 UNLISTED E&M SERVICE: CPT | Mod: S$GLB,,, | Performed by: INTERNAL MEDICINE

## 2020-12-16 PROCEDURE — 80061 LIPID PANEL: CPT

## 2020-12-16 PROCEDURE — 3080F DIAST BP >= 90 MM HG: CPT | Mod: CPTII,S$GLB,, | Performed by: INTERNAL MEDICINE

## 2020-12-16 PROCEDURE — 99999 PR PBB SHADOW E&M-EST. PATIENT-LVL IV: ICD-10-PCS | Mod: PBBFAC,,, | Performed by: INTERNAL MEDICINE

## 2020-12-16 PROCEDURE — 86803 HEPATITIS C AB TEST: CPT

## 2020-12-16 PROCEDURE — 3288F FALL RISK ASSESSMENT DOCD: CPT | Mod: CPTII,S$GLB,, | Performed by: INTERNAL MEDICINE

## 2020-12-16 RX ORDER — AMLODIPINE BESYLATE 5 MG/1
7.5 TABLET ORAL DAILY
Qty: 135 TABLET | Refills: 3 | Status: SHIPPED | OUTPATIENT
Start: 2020-12-16 | End: 2020-12-30

## 2020-12-16 NOTE — PROGRESS NOTES
Subjective:       Patient ID: Giovanni Guerrero is a 80 y.o. male.    Chief Complaint: Diabetes    Pt here for f/u. He is due for c-scope as last one in 2014 showed small polyps but he does not want to pursue this and understands r/b of this decision    He has a-fib dx during preop assessment for TURP in 2019. He subsequently had ablation procedure complicated by sinus arrest requiring pressor support. He recovered and has had appropriate f/u with cardiology. He is on eliquis for anticoag. Denies cp/sob/palpitations.     DM2 is well controlled. Most recent A1C was 6.3. Pt reports sugars are in 90s-100s fasting, prandial sugars are generally less than 125. He has not had lows. He is on tresiba monotherapy at 15 units qhs. He does have nephropathy with CKD IV and microalbuminuria and his creatinine has been stable at 1.7. He is followed by nephrology. He does have neuropathy but is not bothersome and manages without meds. He is due for eye exam which he will schedule.    He has renal metabolic bone disease which is also managed by nephrology and stable.     Pt's BP is not well controlled. Tolerating meds well. Home readings are better controlled. Pt denies cp/sob/ha/vision or neuro changes.     HLD is tx with lipitor which he tolerates well.    Gout has been quiet. No recent attacks.      He has BPH with urinary obstruction for which he takes flomax and finasteride. He had TURP which has helped. He has f/u with urology.    Diabetes  Pertinent negatives for hypoglycemia include no headaches. Pertinent negatives for diabetes include no chest pain and no polyuria.   Hypertension  Pertinent negatives include no chest pain, headaches or shortness of breath.     Review of Systems   Constitutional: Negative for appetite change, fever and unexpected weight change.   Eyes: Negative for visual disturbance.   Respiratory: Negative for shortness of breath.    Cardiovascular: Negative for chest pain.   Gastrointestinal: Negative for  abdominal pain, blood in stool, constipation and diarrhea.   Endocrine: Negative for polyuria.   Genitourinary: Negative for frequency.   Neurological: Negative for light-headedness and headaches.   Psychiatric/Behavioral: Negative for dysphoric mood.       Objective:      Physical Exam   Constitutional: He is oriented to person, place, and time. He appears well-developed and well-nourished.   HENT:   Head: Normocephalic and atraumatic.   Eyes: Pupils are equal, round, and reactive to light. EOM are normal.   Neck: Neck supple. Carotid bruit is not present. No thyromegaly present.   Cardiovascular: Normal rate, regular rhythm, S1 normal, S2 normal and normal heart sounds.   No murmur heard.  Pulmonary/Chest: Effort normal and breath sounds normal. He has no wheezes.   Abdominal: Soft. Bowel sounds are normal. He exhibits no mass. There is no hepatosplenomegaly. There is no abdominal tenderness.   Musculoskeletal:         General: No edema.      Comments: Protective Sensation (w/ 10 gram monofilament):  Right: Intact  Left: Intact    Visual Inspection:  Normal -  Bilateral    Pedal Pulses:   Right: Present  Left: Present    Posterior tibialis:   Right:Present  Left: Present     Lymphadenopathy:     He has no cervical adenopathy.   Neurological: He is alert and oriented to person, place, and time. No cranial nerve deficit.   Psychiatric: He has a normal mood and affect. His behavior is normal.       Assessment:       1. Diabetic polyneuropathy associated with type 2 diabetes mellitus    2. Mixed hyperlipidemia    3. Essential hypertension    4. Chronic atrial fibrillation    5. Diabetic nephropathy associated with type 2 diabetes mellitus    6. Stage 3b chronic kidney disease    7. Benign prostatic hyperplasia with urinary retention    8. Type 2 diabetes mellitus with diabetic polyneuropathy, with long-term current use of insulin    9. Gout, arthritis    10. Need for hepatitis C screening test        Plan:       1.  Appropriate labs    2. Keep f/u with specialists  3. Home BS and BP readings; reviewed hypoglycemia plan with pt  4. Increase norvasc to 7.5mg daily  5. Nursing BP check in 2 wks

## 2020-12-17 ENCOUNTER — TELEPHONE (OUTPATIENT)
Dept: INTERNAL MEDICINE | Facility: CLINIC | Age: 80
End: 2020-12-17

## 2020-12-17 LAB — HCV AB SERPL QL IA: NEGATIVE

## 2020-12-17 NOTE — TELEPHONE ENCOUNTER
Inform pt that labs look good except sugars have increased. No changes in meds but needs to focus on healthy diet, exercise and some weight loss.

## 2020-12-18 NOTE — TELEPHONE ENCOUNTER
LVM to call office back. If unable to reach by this evening we can send a letter to patient with results and recommendation.

## 2020-12-30 ENCOUNTER — TELEPHONE (OUTPATIENT)
Dept: INTERNAL MEDICINE | Facility: CLINIC | Age: 80
End: 2020-12-30

## 2020-12-30 ENCOUNTER — CLINICAL SUPPORT (OUTPATIENT)
Dept: INTERNAL MEDICINE | Facility: CLINIC | Age: 80
End: 2020-12-30
Payer: MEDICARE

## 2020-12-30 VITALS — SYSTOLIC BLOOD PRESSURE: 140 MMHG | OXYGEN SATURATION: 98 % | DIASTOLIC BLOOD PRESSURE: 76 MMHG | HEART RATE: 74 BPM

## 2020-12-30 PROCEDURE — 99999 PR PBB SHADOW E&M-EST. PATIENT-LVL II: CPT | Mod: PBBFAC,,,

## 2020-12-30 PROCEDURE — 99999 PR PBB SHADOW E&M-EST. PATIENT-LVL II: ICD-10-PCS | Mod: PBBFAC,,,

## 2020-12-30 RX ORDER — AMLODIPINE BESYLATE 10 MG/1
10 TABLET ORAL DAILY
Qty: 90 TABLET | Refills: 3 | Status: SHIPPED | OUTPATIENT
Start: 2020-12-30 | End: 2021-01-13

## 2020-12-30 NOTE — PROGRESS NOTES
"Giovanni Guerrero 80 y.o. male is here today for Blood Pressure check.   History of HTN yes.    Review of patient's allergies indicates:   Allergen Reactions    Actos [pioglitazone] Other (See Comments)     constipation     Creatinine   Date Value Ref Range Status   12/16/2020 1.8 (H) 0.5 - 1.4 mg/dL Final     Sodium   Date Value Ref Range Status   12/16/2020 140 136 - 145 mmol/L Final     Potassium   Date Value Ref Range Status   12/16/2020 4.6 3.5 - 5.1 mmol/L Final   ]  Patient verifies taking blood pressure medications on a regular basis at the same time of the day.     Current Outpatient Medications:     amLODIPine (NORVASC) 5 MG tablet, Take 1.5 tablets (7.5 mg total) by mouth once daily., Disp: 135 tablet, Rfl: 3    apixaban (ELIQUIS) 5 mg Tab, Take 1 tablet (5 mg total) by mouth 2 (two) times daily., Disp: 180 tablet, Rfl: 3    aspirin (ECOTRIN) 81 MG EC tablet, Take 81 mg by mouth once daily., Disp: , Rfl:     atorvastatin (LIPITOR) 40 MG tablet, TAKE 1 TABLET(40 MG) BY MOUTH EVERY DAY, Disp: 90 tablet, Rfl: 3    BD INSULIN SYRINGE ULTRA-FINE 0.5 mL 31 gauge x 5/16 Syrg, USE WITH LANTUS INSULIN ONCE DAILY, Disp: 100 each, Rfl: 3    BD ULTRA-FINE SHORT PEN NEEDLE 31 gauge x 5/16" Ndle, USE WITH LANTUS INSULIN ONCE DAILY, Disp: 100 each, Rfl: 3    blood sugar diagnostic Strp, Check sugar tid as directed, Disp: 300 each, Rfl: 3    blood-glucose meter Misc, 1 kit by Misc.(Non-Drug; Combo Route) route 3 (three) times daily. Pt is to test blood sugar TID, Disp: 1 each, Rfl: 0    cholecalciferol, vitamin D3, (VITAMIN D3) 2,000 unit Tab, Take by mouth once daily., Disp: , Rfl:     cyanocobalamin 2000 MCG tablet, Take 2,000 mcg by mouth once daily. , Disp: , Rfl:     finasteride (PROSCAR) 5 mg tablet, Take 1 tablet (5 mg total) by mouth once daily., Disp: 90 tablet, Rfl: 3    insulin degludec (TRESIBA FLEXTOUCH U-100) 100 unit/mL (3 mL) InPn, ADMINISTER 15 UNITS UNDER THE SKIN EVERY EVENING, Disp: 9 mL, " Rfl: 0    OMEGA-3 ACID ETHYL ESTERS (OMACOR) 1 gram Cap, Take 1 g by mouth. 1 Capsule Oral Twice a day, Disp: , Rfl:     sodium bicarbonate 325 MG tablet, Take 2 tablets (650 mg total) by mouth 2 (two) times daily., Disp: 120 tablet, Rfl: 6    tamsulosin (FLOMAX) 0.4 mg Cap, TAKE 1 CAPSULE BY MOUTH TWICE A DAY, Disp: 180 capsule, Rfl: 3  No current facility-administered medications for this visit.     Facility-Administered Medications Ordered in Other Visits:     0.9%  NaCl infusion, , Intravenous, Continuous, Krishan Awan MD, Stopped at 01/28/20 0943    sodium chloride 0.9% flush 5 mL, 5 mL, Intravenous, PRN, Krishan Awan MD  Does patient have record of home blood pressure readings no. Readings have been averaging unknown.   Last dose of blood pressure medication was taken at 0900am.  Patient is asymptomatic.   BP: (!) 158/80 , Pulse: 108 .  Blood pressure reading after 15 minutes was 140/76, Pulse 74.  Dr. Webster notified.

## 2020-12-30 NOTE — TELEPHONE ENCOUNTER
Have pt increase amlodipine to 10mg daily (ok to take 2 of the 5mg tablets until he gets the new Rx). Nursing BP check in 2 wks.

## 2020-12-30 NOTE — TELEPHONE ENCOUNTER
Spoke with pt and he verbally understood MD recommendation   States he will start taking amlodipine 10 mg total daily tomorrow and get a new machine to monitor BP

## 2020-12-30 NOTE — Clinical Note
Does patient have record of home blood pressure readings no. Readings have been averaging unknown.   Last dose of blood pressure medication was taken at 0900am.  Patient is asymptomatic.   BP: (!) 158/80 , Pulse: 108 .  Blood pressure reading after 15 minutes was 140/76, Pulse 74.

## 2020-12-31 DIAGNOSIS — E11.42 TYPE 2 DIABETES MELLITUS WITH DIABETIC POLYNEUROPATHY, WITH LONG-TERM CURRENT USE OF INSULIN: Primary | ICD-10-CM

## 2020-12-31 DIAGNOSIS — Z79.4 TYPE 2 DIABETES MELLITUS WITH DIABETIC POLYNEUROPATHY, WITH LONG-TERM CURRENT USE OF INSULIN: Primary | ICD-10-CM

## 2020-12-31 RX ORDER — PEN NEEDLE, DIABETIC 30 GX3/16"
NEEDLE, DISPOSABLE MISCELLANEOUS
Qty: 100 EACH | Refills: 3 | Status: SHIPPED | OUTPATIENT
Start: 2020-12-31 | End: 2022-01-12 | Stop reason: SDUPTHER

## 2020-12-31 RX ORDER — INSULIN DEGLUDEC 100 U/ML
INJECTION, SOLUTION SUBCUTANEOUS
Qty: 9 ML | Refills: 3 | Status: SHIPPED | OUTPATIENT
Start: 2020-12-31 | End: 2021-08-13

## 2021-01-13 ENCOUNTER — CLINICAL SUPPORT (OUTPATIENT)
Dept: INTERNAL MEDICINE | Facility: CLINIC | Age: 81
End: 2021-01-13
Payer: MEDICARE

## 2021-01-13 ENCOUNTER — TELEPHONE (OUTPATIENT)
Dept: INTERNAL MEDICINE | Facility: CLINIC | Age: 81
End: 2021-01-13

## 2021-01-13 VITALS
WEIGHT: 207 LBS | HEIGHT: 64 IN | SYSTOLIC BLOOD PRESSURE: 152 MMHG | DIASTOLIC BLOOD PRESSURE: 64 MMHG | HEART RATE: 106 BPM | OXYGEN SATURATION: 97 % | BODY MASS INDEX: 35.34 KG/M2

## 2021-01-13 PROCEDURE — 99999 PR PBB SHADOW E&M-EST. PATIENT-LVL III: CPT | Mod: PBBFAC,,,

## 2021-01-13 PROCEDURE — 99999 PR PBB SHADOW E&M-EST. PATIENT-LVL III: ICD-10-PCS | Mod: PBBFAC,,,

## 2021-01-13 RX ORDER — NIFEDIPINE 60 MG/1
60 TABLET, EXTENDED RELEASE ORAL DAILY
Qty: 90 TABLET | Refills: 3 | Status: SHIPPED | OUTPATIENT
Start: 2021-01-13 | End: 2021-10-20

## 2021-01-28 ENCOUNTER — CLINICAL SUPPORT (OUTPATIENT)
Dept: INTERNAL MEDICINE | Facility: CLINIC | Age: 81
End: 2021-01-28
Payer: MEDICARE

## 2021-01-28 ENCOUNTER — TELEPHONE (OUTPATIENT)
Dept: INTERNAL MEDICINE | Facility: CLINIC | Age: 81
End: 2021-01-28

## 2021-01-28 VITALS — HEART RATE: 82 BPM | OXYGEN SATURATION: 98 % | DIASTOLIC BLOOD PRESSURE: 80 MMHG | SYSTOLIC BLOOD PRESSURE: 138 MMHG

## 2021-01-28 PROCEDURE — 99999 PR PBB SHADOW E&M-EST. PATIENT-LVL II: ICD-10-PCS | Mod: PBBFAC,,,

## 2021-01-28 PROCEDURE — 99999 PR PBB SHADOW E&M-EST. PATIENT-LVL II: CPT | Mod: PBBFAC,,,

## 2021-02-25 ENCOUNTER — IMMUNIZATION (OUTPATIENT)
Dept: OBSTETRICS AND GYNECOLOGY | Facility: CLINIC | Age: 81
End: 2021-02-25
Payer: MEDICARE

## 2021-02-25 DIAGNOSIS — Z23 NEED FOR VACCINATION: Primary | ICD-10-CM

## 2021-02-25 PROCEDURE — 91300 COVID-19, MRNA, LNP-S, PF, 30 MCG/0.3 ML DOSE VACCINE: CPT | Mod: PBBFAC | Performed by: FAMILY MEDICINE

## 2021-03-08 ENCOUNTER — OFFICE VISIT (OUTPATIENT)
Dept: INTERNAL MEDICINE | Facility: CLINIC | Age: 81
End: 2021-03-08
Payer: MEDICARE

## 2021-03-08 ENCOUNTER — TELEPHONE (OUTPATIENT)
Dept: INTERNAL MEDICINE | Facility: CLINIC | Age: 81
End: 2021-03-08

## 2021-03-08 VITALS
BODY MASS INDEX: 34.1 KG/M2 | HEIGHT: 64 IN | DIASTOLIC BLOOD PRESSURE: 80 MMHG | WEIGHT: 199.75 LBS | HEART RATE: 107 BPM | OXYGEN SATURATION: 98 % | SYSTOLIC BLOOD PRESSURE: 140 MMHG

## 2021-03-08 DIAGNOSIS — L98.9 SCALP LESION: Primary | ICD-10-CM

## 2021-03-08 DIAGNOSIS — R03.0 ELEVATED BLOOD PRESSURE READING: ICD-10-CM

## 2021-03-08 PROCEDURE — 99213 OFFICE O/P EST LOW 20 MIN: CPT | Mod: S$GLB,,, | Performed by: PHYSICIAN ASSISTANT

## 2021-03-08 PROCEDURE — 1126F AMNT PAIN NOTED NONE PRSNT: CPT | Mod: S$GLB,,, | Performed by: PHYSICIAN ASSISTANT

## 2021-03-08 PROCEDURE — 3079F DIAST BP 80-89 MM HG: CPT | Mod: CPTII,S$GLB,, | Performed by: PHYSICIAN ASSISTANT

## 2021-03-08 PROCEDURE — 1159F PR MEDICATION LIST DOCUMENTED IN MEDICAL RECORD: ICD-10-PCS | Mod: S$GLB,,, | Performed by: PHYSICIAN ASSISTANT

## 2021-03-08 PROCEDURE — 3288F PR FALLS RISK ASSESSMENT DOCUMENTED: ICD-10-PCS | Mod: CPTII,S$GLB,, | Performed by: PHYSICIAN ASSISTANT

## 2021-03-08 PROCEDURE — 3079F PR MOST RECENT DIASTOLIC BLOOD PRESSURE 80-89 MM HG: ICD-10-PCS | Mod: CPTII,S$GLB,, | Performed by: PHYSICIAN ASSISTANT

## 2021-03-08 PROCEDURE — 3288F FALL RISK ASSESSMENT DOCD: CPT | Mod: CPTII,S$GLB,, | Performed by: PHYSICIAN ASSISTANT

## 2021-03-08 PROCEDURE — 99999 PR PBB SHADOW E&M-EST. PATIENT-LVL V: CPT | Mod: PBBFAC,,, | Performed by: PHYSICIAN ASSISTANT

## 2021-03-08 PROCEDURE — 1126F PR PAIN SEVERITY QUANTIFIED, NO PAIN PRESENT: ICD-10-PCS | Mod: S$GLB,,, | Performed by: PHYSICIAN ASSISTANT

## 2021-03-08 PROCEDURE — 3077F SYST BP >= 140 MM HG: CPT | Mod: CPTII,S$GLB,, | Performed by: PHYSICIAN ASSISTANT

## 2021-03-08 PROCEDURE — 1101F PR PT FALLS ASSESS DOC 0-1 FALLS W/OUT INJ PAST YR: ICD-10-PCS | Mod: CPTII,S$GLB,, | Performed by: PHYSICIAN ASSISTANT

## 2021-03-08 PROCEDURE — 1101F PT FALLS ASSESS-DOCD LE1/YR: CPT | Mod: CPTII,S$GLB,, | Performed by: PHYSICIAN ASSISTANT

## 2021-03-08 PROCEDURE — 99213 PR OFFICE/OUTPT VISIT, EST, LEVL III, 20-29 MIN: ICD-10-PCS | Mod: S$GLB,,, | Performed by: PHYSICIAN ASSISTANT

## 2021-03-08 PROCEDURE — 1159F MED LIST DOCD IN RCRD: CPT | Mod: S$GLB,,, | Performed by: PHYSICIAN ASSISTANT

## 2021-03-08 PROCEDURE — 3077F PR MOST RECENT SYSTOLIC BLOOD PRESSURE >= 140 MM HG: ICD-10-PCS | Mod: CPTII,S$GLB,, | Performed by: PHYSICIAN ASSISTANT

## 2021-03-08 PROCEDURE — 99999 PR PBB SHADOW E&M-EST. PATIENT-LVL V: ICD-10-PCS | Mod: PBBFAC,,, | Performed by: PHYSICIAN ASSISTANT

## 2021-03-08 RX ORDER — MUPIROCIN 20 MG/G
OINTMENT TOPICAL 3 TIMES DAILY
Qty: 22 G | Refills: 0 | Status: SHIPPED | OUTPATIENT
Start: 2021-03-08 | End: 2021-06-18

## 2021-03-16 ENCOUNTER — PATIENT OUTREACH (OUTPATIENT)
Dept: ADMINISTRATIVE | Facility: HOSPITAL | Age: 81
End: 2021-03-16

## 2021-03-16 DIAGNOSIS — E11.9 DIABETES MELLITUS WITHOUT COMPLICATION: Primary | ICD-10-CM

## 2021-03-18 ENCOUNTER — IMMUNIZATION (OUTPATIENT)
Dept: OBSTETRICS AND GYNECOLOGY | Facility: CLINIC | Age: 81
End: 2021-03-18
Payer: MEDICARE

## 2021-03-18 DIAGNOSIS — Z23 NEED FOR VACCINATION: Primary | ICD-10-CM

## 2021-03-18 PROCEDURE — 91300 COVID-19, MRNA, LNP-S, PF, 30 MCG/0.3 ML DOSE VACCINE: CPT | Mod: S$GLB,,, | Performed by: FAMILY MEDICINE

## 2021-03-18 PROCEDURE — 91300 COVID-19, MRNA, LNP-S, PF, 30 MCG/0.3 ML DOSE VACCINE: ICD-10-PCS | Mod: S$GLB,,, | Performed by: FAMILY MEDICINE

## 2021-03-18 PROCEDURE — 0002A COVID-19, MRNA, LNP-S, PF, 30 MCG/0.3 ML DOSE VACCINE: ICD-10-PCS | Mod: CV19,S$GLB,, | Performed by: FAMILY MEDICINE

## 2021-03-18 PROCEDURE — 0002A COVID-19, MRNA, LNP-S, PF, 30 MCG/0.3 ML DOSE VACCINE: CPT | Mod: CV19,S$GLB,, | Performed by: FAMILY MEDICINE

## 2021-03-23 ENCOUNTER — PATIENT OUTREACH (OUTPATIENT)
Dept: ADMINISTRATIVE | Facility: HOSPITAL | Age: 81
End: 2021-03-23

## 2021-03-25 ENCOUNTER — PATIENT OUTREACH (OUTPATIENT)
Dept: ADMINISTRATIVE | Facility: OTHER | Age: 81
End: 2021-03-25

## 2021-03-26 DIAGNOSIS — E78.2 MIXED HYPERLIPIDEMIA: Primary | ICD-10-CM

## 2021-03-26 RX ORDER — ATORVASTATIN CALCIUM 40 MG/1
40 TABLET, FILM COATED ORAL DAILY
Qty: 90 TABLET | Refills: 3 | Status: SHIPPED | OUTPATIENT
Start: 2021-03-26 | End: 2022-01-31

## 2021-03-26 NOTE — PROGRESS NOTES
Subjective:       Patient ID: Giovanni Guerrero is a 79 y.o. male.    Chief Complaint: Diabetes    Pt here for f/u. DM2 is well controlled. Most recent A1C was 6.2. Pt reports sugars are in 90s-100s fasting, prandial sugars are generally less than 130. He has not had lows. He is on tresiba monotherapy at 15 units qhs. He does have nephropathy with CKD IV and microalbuminuria and his creatinine has been stable at 2.2-2.5. He is followed by nephrology but is due for f/u. He does have neuropathy but is not bothersome and manages without meds.      He has renal metabolic bone disease which is also managed by nephrology and stable.     Pt's BP is well controlled. Tolerating meds well. Home readings are also controlled. Pt denies cp/sob/ha/vision or neuro changes.     HLD is tx with tricor and lipitor which he tolerates well.    Gout has been quiet. No recent attacks.      He has BPH with urinary obstruction but does well with flomax twice daily. He follows with urology regularly.       Diabetes   Pertinent negatives for hypoglycemia include no headaches. Pertinent negatives for diabetes include no chest pain and no polyuria.   Hypertension   Pertinent negatives include no chest pain, headaches or shortness of breath.     Review of Systems   Constitutional: Negative for appetite change, fever and unexpected weight change.   Eyes: Negative for visual disturbance.   Respiratory: Negative for shortness of breath.    Cardiovascular: Negative for chest pain.   Gastrointestinal: Negative for abdominal pain, blood in stool, constipation and diarrhea.   Endocrine: Negative for polyuria.   Genitourinary: Negative for frequency.   Neurological: Negative for light-headedness and headaches.   Psychiatric/Behavioral: Negative for dysphoric mood.       Objective:      Physical Exam   Constitutional: He is oriented to person, place, and time. He appears well-developed and well-nourished.   HENT:   Head: Normocephalic and atraumatic.    Eyes: Pupils are equal, round, and reactive to light. EOM are normal.   Neck: Neck supple. Carotid bruit is not present. No thyromegaly present.   Cardiovascular: Normal rate, regular rhythm, S1 normal, S2 normal and normal heart sounds.   No murmur heard.  Pulmonary/Chest: Effort normal and breath sounds normal. He has no wheezes.   Abdominal: Soft. Bowel sounds are normal. He exhibits no mass. There is no hepatosplenomegaly. There is no tenderness.   Musculoskeletal: He exhibits no edema.   Protective Sensation (w/ 10 gram monofilament):  Right: Intact  Left: Intact    Visual Inspection:  Normal -  Bilateral    Pedal Pulses:   Right: Present  Left: Present    Posterior tibialis:   Right:Present  Left: Present       Lymphadenopathy:     He has no cervical adenopathy.   Neurological: He is alert and oriented to person, place, and time. No cranial nerve deficit.   Psychiatric: He has a normal mood and affect. His behavior is normal.       Assessment:       1. Essential hypertension    2. Hyperlipidemia type II    3. Diabetic polyneuropathy associated with type 2 diabetes mellitus    4. CKD (chronic kidney disease) stage 4, GFR 15-29 ml/min    5. BPH with urinary obstruction    6. Diabetic nephropathy associated with type 2 diabetes mellitus    7. Metabolic bone disease    8. Type 2 diabetes mellitus with diabetic polyneuropathy, with long-term current use of insulin    9. Gout, arthritis        Plan:       1. Appropriate labs     2. Continue current meds  3. Continue with home BS and BP readings  4. Keep f/u with specialists  5. He is due for c-scope as last one 5 years ago showed small polyps but he does not want to pursue this and understands r/b of this decision          26-Mar-2021 07:50

## 2021-03-29 ENCOUNTER — OFFICE VISIT (OUTPATIENT)
Dept: DERMATOLOGY | Facility: CLINIC | Age: 81
End: 2021-03-29
Payer: MEDICARE

## 2021-03-29 DIAGNOSIS — D22.9 BENIGN NEVUS: ICD-10-CM

## 2021-03-29 PROCEDURE — 99999 PR PBB SHADOW E&M-EST. PATIENT-LVL III: ICD-10-PCS | Mod: PBBFAC,,, | Performed by: DERMATOLOGY

## 2021-03-29 PROCEDURE — 99999 PR PBB SHADOW E&M-EST. PATIENT-LVL III: CPT | Mod: PBBFAC,,, | Performed by: DERMATOLOGY

## 2021-03-29 PROCEDURE — 1126F AMNT PAIN NOTED NONE PRSNT: CPT | Mod: S$GLB,,, | Performed by: DERMATOLOGY

## 2021-03-29 PROCEDURE — 99202 PR OFFICE/OUTPT VISIT, NEW, LEVL II, 15-29 MIN: ICD-10-PCS | Mod: S$GLB,,, | Performed by: DERMATOLOGY

## 2021-03-29 PROCEDURE — 1159F MED LIST DOCD IN RCRD: CPT | Mod: S$GLB,,, | Performed by: DERMATOLOGY

## 2021-03-29 PROCEDURE — 1101F PT FALLS ASSESS-DOCD LE1/YR: CPT | Mod: CPTII,S$GLB,, | Performed by: DERMATOLOGY

## 2021-03-29 PROCEDURE — 1159F PR MEDICATION LIST DOCUMENTED IN MEDICAL RECORD: ICD-10-PCS | Mod: S$GLB,,, | Performed by: DERMATOLOGY

## 2021-03-29 PROCEDURE — 99202 OFFICE O/P NEW SF 15 MIN: CPT | Mod: S$GLB,,, | Performed by: DERMATOLOGY

## 2021-03-29 PROCEDURE — 3288F FALL RISK ASSESSMENT DOCD: CPT | Mod: CPTII,S$GLB,, | Performed by: DERMATOLOGY

## 2021-03-29 PROCEDURE — 1126F PR PAIN SEVERITY QUANTIFIED, NO PAIN PRESENT: ICD-10-PCS | Mod: S$GLB,,, | Performed by: DERMATOLOGY

## 2021-03-29 PROCEDURE — 3288F PR FALLS RISK ASSESSMENT DOCUMENTED: ICD-10-PCS | Mod: CPTII,S$GLB,, | Performed by: DERMATOLOGY

## 2021-03-29 PROCEDURE — 1101F PR PT FALLS ASSESS DOC 0-1 FALLS W/OUT INJ PAST YR: ICD-10-PCS | Mod: CPTII,S$GLB,, | Performed by: DERMATOLOGY

## 2021-03-30 ENCOUNTER — PATIENT OUTREACH (OUTPATIENT)
Dept: ADMINISTRATIVE | Facility: HOSPITAL | Age: 81
End: 2021-03-30

## 2021-04-09 ENCOUNTER — PATIENT OUTREACH (OUTPATIENT)
Dept: ADMINISTRATIVE | Facility: HOSPITAL | Age: 81
End: 2021-04-09

## 2021-04-29 ENCOUNTER — TELEPHONE (OUTPATIENT)
Dept: NEPHROLOGY | Facility: CLINIC | Age: 81
End: 2021-04-29

## 2021-05-03 ENCOUNTER — PATIENT OUTREACH (OUTPATIENT)
Dept: ADMINISTRATIVE | Facility: OTHER | Age: 81
End: 2021-05-03

## 2021-05-03 ENCOUNTER — LAB VISIT (OUTPATIENT)
Dept: LAB | Facility: OTHER | Age: 81
End: 2021-05-03
Attending: INTERNAL MEDICINE
Payer: MEDICARE

## 2021-05-03 DIAGNOSIS — N18.30 STAGE 3 CHRONIC KIDNEY DISEASE, UNSPECIFIED WHETHER STAGE 3A OR 3B CKD: Primary | ICD-10-CM

## 2021-05-03 DIAGNOSIS — N18.30 STAGE 3 CHRONIC KIDNEY DISEASE, UNSPECIFIED WHETHER STAGE 3A OR 3B CKD: ICD-10-CM

## 2021-05-03 LAB
BILIRUB UR QL STRIP: NEGATIVE
CLARITY UR: CLEAR
COLOR UR: YELLOW
CREAT UR-MCNC: 116.8 MG/DL (ref 23–375)
GLUCOSE UR QL STRIP: NEGATIVE
HGB UR QL STRIP: ABNORMAL
KETONES UR QL STRIP: NEGATIVE
LEUKOCYTE ESTERASE UR QL STRIP: NEGATIVE
NITRITE UR QL STRIP: NEGATIVE
PH UR STRIP: 6 [PH] (ref 5–8)
PROT UR QL STRIP: NEGATIVE
PROT UR-MCNC: 21 MG/DL (ref 0–15)
PROT/CREAT UR: 0.18 MG/G{CREAT} (ref 0–0.2)
SP GR UR STRIP: 1.02 (ref 1–1.03)
URN SPEC COLLECT METH UR: ABNORMAL
UROBILINOGEN UR STRIP-ACNC: NEGATIVE EU/DL

## 2021-05-03 PROCEDURE — 81003 URINALYSIS AUTO W/O SCOPE: CPT | Performed by: INTERNAL MEDICINE

## 2021-05-03 PROCEDURE — 84156 ASSAY OF PROTEIN URINE: CPT | Performed by: INTERNAL MEDICINE

## 2021-05-04 ENCOUNTER — OFFICE VISIT (OUTPATIENT)
Dept: NEPHROLOGY | Facility: CLINIC | Age: 81
End: 2021-05-04
Payer: MEDICARE

## 2021-05-04 VITALS
DIASTOLIC BLOOD PRESSURE: 50 MMHG | HEIGHT: 64 IN | BODY MASS INDEX: 34.82 KG/M2 | SYSTOLIC BLOOD PRESSURE: 160 MMHG | OXYGEN SATURATION: 98 % | WEIGHT: 203.94 LBS | HEART RATE: 88 BPM

## 2021-05-04 DIAGNOSIS — I10 ESSENTIAL HYPERTENSION: Primary | ICD-10-CM

## 2021-05-04 DIAGNOSIS — N18.32 STAGE 3B CHRONIC KIDNEY DISEASE: ICD-10-CM

## 2021-05-04 DIAGNOSIS — E11.21 DIABETIC NEPHROPATHY ASSOCIATED WITH TYPE 2 DIABETES MELLITUS: ICD-10-CM

## 2021-05-04 PROCEDURE — 3078F PR MOST RECENT DIASTOLIC BLOOD PRESSURE < 80 MM HG: ICD-10-PCS | Mod: CPTII,S$GLB,, | Performed by: INTERNAL MEDICINE

## 2021-05-04 PROCEDURE — 1159F MED LIST DOCD IN RCRD: CPT | Mod: S$GLB,,, | Performed by: INTERNAL MEDICINE

## 2021-05-04 PROCEDURE — 3288F PR FALLS RISK ASSESSMENT DOCUMENTED: ICD-10-PCS | Mod: CPTII,S$GLB,, | Performed by: INTERNAL MEDICINE

## 2021-05-04 PROCEDURE — 1126F AMNT PAIN NOTED NONE PRSNT: CPT | Mod: S$GLB,,, | Performed by: INTERNAL MEDICINE

## 2021-05-04 PROCEDURE — 99214 OFFICE O/P EST MOD 30 MIN: CPT | Mod: S$GLB,,, | Performed by: INTERNAL MEDICINE

## 2021-05-04 PROCEDURE — 99999 PR PBB SHADOW E&M-EST. PATIENT-LVL IV: ICD-10-PCS | Mod: PBBFAC,,, | Performed by: INTERNAL MEDICINE

## 2021-05-04 PROCEDURE — 3077F PR MOST RECENT SYSTOLIC BLOOD PRESSURE >= 140 MM HG: ICD-10-PCS | Mod: CPTII,S$GLB,, | Performed by: INTERNAL MEDICINE

## 2021-05-04 PROCEDURE — 99999 PR PBB SHADOW E&M-EST. PATIENT-LVL IV: CPT | Mod: PBBFAC,,, | Performed by: INTERNAL MEDICINE

## 2021-05-04 PROCEDURE — 99499 RISK ADDL DX/OHS AUDIT: ICD-10-PCS | Mod: S$GLB,,, | Performed by: INTERNAL MEDICINE

## 2021-05-04 PROCEDURE — 99214 PR OFFICE/OUTPT VISIT, EST, LEVL IV, 30-39 MIN: ICD-10-PCS | Mod: S$GLB,,, | Performed by: INTERNAL MEDICINE

## 2021-05-04 PROCEDURE — 3078F DIAST BP <80 MM HG: CPT | Mod: CPTII,S$GLB,, | Performed by: INTERNAL MEDICINE

## 2021-05-04 PROCEDURE — 3288F FALL RISK ASSESSMENT DOCD: CPT | Mod: CPTII,S$GLB,, | Performed by: INTERNAL MEDICINE

## 2021-05-04 PROCEDURE — 1101F PR PT FALLS ASSESS DOC 0-1 FALLS W/OUT INJ PAST YR: ICD-10-PCS | Mod: CPTII,S$GLB,, | Performed by: INTERNAL MEDICINE

## 2021-05-04 PROCEDURE — 1159F PR MEDICATION LIST DOCUMENTED IN MEDICAL RECORD: ICD-10-PCS | Mod: S$GLB,,, | Performed by: INTERNAL MEDICINE

## 2021-05-04 PROCEDURE — 3077F SYST BP >= 140 MM HG: CPT | Mod: CPTII,S$GLB,, | Performed by: INTERNAL MEDICINE

## 2021-05-04 PROCEDURE — 1101F PT FALLS ASSESS-DOCD LE1/YR: CPT | Mod: CPTII,S$GLB,, | Performed by: INTERNAL MEDICINE

## 2021-05-04 PROCEDURE — 99499 UNLISTED E&M SERVICE: CPT | Mod: S$GLB,,, | Performed by: INTERNAL MEDICINE

## 2021-05-04 PROCEDURE — 1126F PR PAIN SEVERITY QUANTIFIED, NO PAIN PRESENT: ICD-10-PCS | Mod: S$GLB,,, | Performed by: INTERNAL MEDICINE

## 2021-05-04 RX ORDER — FUROSEMIDE 40 MG/1
40 TABLET ORAL DAILY
Qty: 90 TABLET | Refills: 11 | Status: SHIPPED | OUTPATIENT
Start: 2021-05-04 | End: 2022-07-29

## 2021-06-04 ENCOUNTER — PATIENT OUTREACH (OUTPATIENT)
Dept: ADMINISTRATIVE | Facility: HOSPITAL | Age: 81
End: 2021-06-04

## 2021-06-04 ENCOUNTER — PATIENT MESSAGE (OUTPATIENT)
Dept: ADMINISTRATIVE | Facility: HOSPITAL | Age: 81
End: 2021-06-04

## 2021-06-14 ENCOUNTER — LAB VISIT (OUTPATIENT)
Dept: LAB | Facility: OTHER | Age: 81
End: 2021-06-14
Attending: INTERNAL MEDICINE
Payer: MEDICARE

## 2021-06-14 DIAGNOSIS — E11.42 DIABETIC POLYNEUROPATHY ASSOCIATED WITH TYPE 2 DIABETES MELLITUS: Chronic | ICD-10-CM

## 2021-06-14 LAB
ANION GAP SERPL CALC-SCNC: 11 MMOL/L (ref 8–16)
BUN SERPL-MCNC: 46 MG/DL (ref 8–23)
CALCIUM SERPL-MCNC: 9.6 MG/DL (ref 8.7–10.5)
CHLORIDE SERPL-SCNC: 99 MMOL/L (ref 95–110)
CO2 SERPL-SCNC: 29 MMOL/L (ref 23–29)
CREAT SERPL-MCNC: 2.4 MG/DL (ref 0.5–1.4)
EST. GFR  (AFRICAN AMERICAN): 29 ML/MIN/1.73 M^2
EST. GFR  (NON AFRICAN AMERICAN): 25 ML/MIN/1.73 M^2
ESTIMATED AVG GLUCOSE: 146 MG/DL (ref 68–131)
GLUCOSE SERPL-MCNC: 120 MG/DL (ref 70–110)
HBA1C MFR BLD: 6.7 % (ref 4–5.6)
POTASSIUM SERPL-SCNC: 3.6 MMOL/L (ref 3.5–5.1)
SODIUM SERPL-SCNC: 139 MMOL/L (ref 136–145)

## 2021-06-14 PROCEDURE — 36415 COLL VENOUS BLD VENIPUNCTURE: CPT | Performed by: INTERNAL MEDICINE

## 2021-06-14 PROCEDURE — 80048 BASIC METABOLIC PNL TOTAL CA: CPT | Performed by: INTERNAL MEDICINE

## 2021-06-14 PROCEDURE — 83036 HEMOGLOBIN GLYCOSYLATED A1C: CPT | Performed by: INTERNAL MEDICINE

## 2021-06-18 ENCOUNTER — OFFICE VISIT (OUTPATIENT)
Dept: INTERNAL MEDICINE | Facility: CLINIC | Age: 81
End: 2021-06-18
Payer: MEDICARE

## 2021-06-18 VITALS
BODY MASS INDEX: 33.27 KG/M2 | HEIGHT: 64 IN | SYSTOLIC BLOOD PRESSURE: 144 MMHG | DIASTOLIC BLOOD PRESSURE: 90 MMHG | WEIGHT: 194.88 LBS | HEART RATE: 98 BPM | OXYGEN SATURATION: 98 %

## 2021-06-18 DIAGNOSIS — E11.22 CKD STAGE 4 DUE TO TYPE 2 DIABETES MELLITUS: ICD-10-CM

## 2021-06-18 DIAGNOSIS — M10.9 GOUT, ARTHRITIS: ICD-10-CM

## 2021-06-18 DIAGNOSIS — M89.8X9 METABOLIC BONE DISEASE: ICD-10-CM

## 2021-06-18 DIAGNOSIS — E11.42 TYPE 2 DIABETES MELLITUS WITH DIABETIC POLYNEUROPATHY, WITH LONG-TERM CURRENT USE OF INSULIN: ICD-10-CM

## 2021-06-18 DIAGNOSIS — R33.8 BENIGN PROSTATIC HYPERPLASIA WITH URINARY RETENTION: ICD-10-CM

## 2021-06-18 DIAGNOSIS — N18.4 CKD STAGE 4 DUE TO TYPE 2 DIABETES MELLITUS: ICD-10-CM

## 2021-06-18 DIAGNOSIS — E11.42 DIABETIC POLYNEUROPATHY ASSOCIATED WITH TYPE 2 DIABETES MELLITUS: ICD-10-CM

## 2021-06-18 DIAGNOSIS — Z79.4 TYPE 2 DIABETES MELLITUS WITH DIABETIC POLYNEUROPATHY, WITH LONG-TERM CURRENT USE OF INSULIN: ICD-10-CM

## 2021-06-18 DIAGNOSIS — E78.2 MIXED HYPERLIPIDEMIA: ICD-10-CM

## 2021-06-18 DIAGNOSIS — N40.1 BENIGN PROSTATIC HYPERPLASIA WITH URINARY RETENTION: ICD-10-CM

## 2021-06-18 DIAGNOSIS — E11.21 DIABETIC NEPHROPATHY ASSOCIATED WITH TYPE 2 DIABETES MELLITUS: Primary | ICD-10-CM

## 2021-06-18 DIAGNOSIS — I48.20 CHRONIC ATRIAL FIBRILLATION: ICD-10-CM

## 2021-06-18 PROCEDURE — 99499 RISK ADDL DX/OHS AUDIT: ICD-10-PCS | Mod: S$GLB,,, | Performed by: INTERNAL MEDICINE

## 2021-06-18 PROCEDURE — 1126F PR PAIN SEVERITY QUANTIFIED, NO PAIN PRESENT: ICD-10-PCS | Mod: S$GLB,,, | Performed by: INTERNAL MEDICINE

## 2021-06-18 PROCEDURE — 1159F MED LIST DOCD IN RCRD: CPT | Mod: S$GLB,,, | Performed by: INTERNAL MEDICINE

## 2021-06-18 PROCEDURE — 1159F PR MEDICATION LIST DOCUMENTED IN MEDICAL RECORD: ICD-10-PCS | Mod: S$GLB,,, | Performed by: INTERNAL MEDICINE

## 2021-06-18 PROCEDURE — 99214 OFFICE O/P EST MOD 30 MIN: CPT | Mod: S$GLB,,, | Performed by: INTERNAL MEDICINE

## 2021-06-18 PROCEDURE — 3288F PR FALLS RISK ASSESSMENT DOCUMENTED: ICD-10-PCS | Mod: CPTII,S$GLB,, | Performed by: INTERNAL MEDICINE

## 2021-06-18 PROCEDURE — 99214 PR OFFICE/OUTPT VISIT, EST, LEVL IV, 30-39 MIN: ICD-10-PCS | Mod: S$GLB,,, | Performed by: INTERNAL MEDICINE

## 2021-06-18 PROCEDURE — 99499 UNLISTED E&M SERVICE: CPT | Mod: S$GLB,,, | Performed by: INTERNAL MEDICINE

## 2021-06-18 PROCEDURE — 99999 PR PBB SHADOW E&M-EST. PATIENT-LVL III: ICD-10-PCS | Mod: PBBFAC,,, | Performed by: INTERNAL MEDICINE

## 2021-06-18 PROCEDURE — 1126F AMNT PAIN NOTED NONE PRSNT: CPT | Mod: S$GLB,,, | Performed by: INTERNAL MEDICINE

## 2021-06-18 PROCEDURE — 3288F FALL RISK ASSESSMENT DOCD: CPT | Mod: CPTII,S$GLB,, | Performed by: INTERNAL MEDICINE

## 2021-06-18 PROCEDURE — 99999 PR PBB SHADOW E&M-EST. PATIENT-LVL III: CPT | Mod: PBBFAC,,, | Performed by: INTERNAL MEDICINE

## 2021-06-18 PROCEDURE — 1100F PTFALLS ASSESS-DOCD GE2>/YR: CPT | Mod: CPTII,S$GLB,, | Performed by: INTERNAL MEDICINE

## 2021-06-18 PROCEDURE — 1100F PR PT FALLS ASSESS DOC 2+ FALLS/FALL W/INJURY/YR: ICD-10-PCS | Mod: CPTII,S$GLB,, | Performed by: INTERNAL MEDICINE

## 2021-06-18 RX ORDER — CARVEDILOL 3.12 MG/1
3.12 TABLET ORAL 2 TIMES DAILY WITH MEALS
Qty: 180 TABLET | Refills: 3 | Status: SHIPPED | OUTPATIENT
Start: 2021-06-18 | End: 2022-06-15

## 2021-06-21 ENCOUNTER — TELEPHONE (OUTPATIENT)
Dept: INTERNAL MEDICINE | Facility: CLINIC | Age: 81
End: 2021-06-21

## 2021-06-21 ENCOUNTER — IMMUNIZATION (OUTPATIENT)
Dept: PHARMACY | Facility: CLINIC | Age: 81
End: 2021-06-21
Payer: MEDICARE

## 2021-07-02 ENCOUNTER — TELEPHONE (OUTPATIENT)
Dept: INTERNAL MEDICINE | Facility: CLINIC | Age: 81
End: 2021-07-02

## 2021-07-06 ENCOUNTER — PATIENT OUTREACH (OUTPATIENT)
Dept: ADMINISTRATIVE | Facility: OTHER | Age: 81
End: 2021-07-06

## 2021-07-09 ENCOUNTER — OFFICE VISIT (OUTPATIENT)
Dept: CARDIOLOGY | Facility: CLINIC | Age: 81
End: 2021-07-09
Payer: MEDICARE

## 2021-07-09 VITALS
WEIGHT: 193.13 LBS | BODY MASS INDEX: 32.97 KG/M2 | HEIGHT: 64 IN | DIASTOLIC BLOOD PRESSURE: 78 MMHG | HEART RATE: 100 BPM | SYSTOLIC BLOOD PRESSURE: 156 MMHG

## 2021-07-09 DIAGNOSIS — E11.22 CKD STAGE 4 DUE TO TYPE 2 DIABETES MELLITUS: Chronic | ICD-10-CM

## 2021-07-09 DIAGNOSIS — Z79.4 TYPE 2 DIABETES MELLITUS WITH DIABETIC POLYNEUROPATHY, WITH LONG-TERM CURRENT USE OF INSULIN: ICD-10-CM

## 2021-07-09 DIAGNOSIS — E78.2 MIXED HYPERLIPIDEMIA: Chronic | ICD-10-CM

## 2021-07-09 DIAGNOSIS — E11.42 TYPE 2 DIABETES MELLITUS WITH DIABETIC POLYNEUROPATHY, WITH LONG-TERM CURRENT USE OF INSULIN: ICD-10-CM

## 2021-07-09 DIAGNOSIS — N18.4 CKD STAGE 4 DUE TO TYPE 2 DIABETES MELLITUS: Chronic | ICD-10-CM

## 2021-07-09 DIAGNOSIS — I48.0 PAROXYSMAL ATRIAL FIBRILLATION: Primary | ICD-10-CM

## 2021-07-09 DIAGNOSIS — I10 ESSENTIAL HYPERTENSION: ICD-10-CM

## 2021-07-09 PROCEDURE — 3078F PR MOST RECENT DIASTOLIC BLOOD PRESSURE < 80 MM HG: ICD-10-PCS | Mod: CPTII,S$GLB,, | Performed by: INTERNAL MEDICINE

## 2021-07-09 PROCEDURE — 99214 OFFICE O/P EST MOD 30 MIN: CPT | Mod: S$GLB,,, | Performed by: INTERNAL MEDICINE

## 2021-07-09 PROCEDURE — 3077F SYST BP >= 140 MM HG: CPT | Mod: CPTII,S$GLB,, | Performed by: INTERNAL MEDICINE

## 2021-07-09 PROCEDURE — 99999 PR PBB SHADOW E&M-EST. PATIENT-LVL IV: ICD-10-PCS | Mod: PBBFAC,,, | Performed by: INTERNAL MEDICINE

## 2021-07-09 PROCEDURE — 1126F AMNT PAIN NOTED NONE PRSNT: CPT | Mod: S$GLB,,, | Performed by: INTERNAL MEDICINE

## 2021-07-09 PROCEDURE — 1159F MED LIST DOCD IN RCRD: CPT | Mod: S$GLB,,, | Performed by: INTERNAL MEDICINE

## 2021-07-09 PROCEDURE — 1159F PR MEDICATION LIST DOCUMENTED IN MEDICAL RECORD: ICD-10-PCS | Mod: S$GLB,,, | Performed by: INTERNAL MEDICINE

## 2021-07-09 PROCEDURE — 3077F PR MOST RECENT SYSTOLIC BLOOD PRESSURE >= 140 MM HG: ICD-10-PCS | Mod: CPTII,S$GLB,, | Performed by: INTERNAL MEDICINE

## 2021-07-09 PROCEDURE — 3078F DIAST BP <80 MM HG: CPT | Mod: CPTII,S$GLB,, | Performed by: INTERNAL MEDICINE

## 2021-07-09 PROCEDURE — 1126F PR PAIN SEVERITY QUANTIFIED, NO PAIN PRESENT: ICD-10-PCS | Mod: S$GLB,,, | Performed by: INTERNAL MEDICINE

## 2021-07-09 PROCEDURE — 99999 PR PBB SHADOW E&M-EST. PATIENT-LVL IV: CPT | Mod: PBBFAC,,, | Performed by: INTERNAL MEDICINE

## 2021-07-09 PROCEDURE — 99214 PR OFFICE/OUTPT VISIT, EST, LEVL IV, 30-39 MIN: ICD-10-PCS | Mod: S$GLB,,, | Performed by: INTERNAL MEDICINE

## 2021-07-09 PROCEDURE — 99499 RISK ADDL DX/OHS AUDIT: ICD-10-PCS | Mod: S$GLB,,, | Performed by: INTERNAL MEDICINE

## 2021-07-09 PROCEDURE — 99499 UNLISTED E&M SERVICE: CPT | Mod: S$GLB,,, | Performed by: INTERNAL MEDICINE

## 2021-07-12 ENCOUNTER — PATIENT MESSAGE (OUTPATIENT)
Dept: ADMINISTRATIVE | Facility: HOSPITAL | Age: 81
End: 2021-07-12

## 2021-07-12 ENCOUNTER — PATIENT OUTREACH (OUTPATIENT)
Dept: ADMINISTRATIVE | Facility: HOSPITAL | Age: 81
End: 2021-07-12

## 2021-07-12 DIAGNOSIS — E11.9 DIABETES MELLITUS WITHOUT COMPLICATION: Primary | ICD-10-CM

## 2021-07-13 ENCOUNTER — TELEPHONE (OUTPATIENT)
Dept: INTERNAL MEDICINE | Facility: CLINIC | Age: 81
End: 2021-07-13

## 2021-07-13 ENCOUNTER — PATIENT OUTREACH (OUTPATIENT)
Dept: ADMINISTRATIVE | Facility: HOSPITAL | Age: 81
End: 2021-07-13

## 2021-07-23 ENCOUNTER — TELEPHONE (OUTPATIENT)
Dept: INTERNAL MEDICINE | Facility: CLINIC | Age: 81
End: 2021-07-23

## 2021-07-27 ENCOUNTER — PATIENT OUTREACH (OUTPATIENT)
Dept: ADMINISTRATIVE | Facility: HOSPITAL | Age: 81
End: 2021-07-27

## 2021-08-04 ENCOUNTER — TELEPHONE (OUTPATIENT)
Dept: INTERNAL MEDICINE | Facility: CLINIC | Age: 81
End: 2021-08-04

## 2021-08-04 RX ORDER — PREDNISONE 10 MG/1
20 TABLET ORAL DAILY
Qty: 10 TABLET | Refills: 0 | Status: SHIPPED | OUTPATIENT
Start: 2021-08-04 | End: 2022-01-12

## 2021-08-10 ENCOUNTER — PATIENT OUTREACH (OUTPATIENT)
Dept: ADMINISTRATIVE | Facility: HOSPITAL | Age: 81
End: 2021-08-10

## 2021-08-24 ENCOUNTER — TELEPHONE (OUTPATIENT)
Dept: INTERNAL MEDICINE | Facility: CLINIC | Age: 81
End: 2021-08-24

## 2021-09-10 ENCOUNTER — PATIENT OUTREACH (OUTPATIENT)
Dept: ADMINISTRATIVE | Facility: HOSPITAL | Age: 81
End: 2021-09-10

## 2021-10-15 ENCOUNTER — TELEPHONE (OUTPATIENT)
Dept: INTERNAL MEDICINE | Facility: CLINIC | Age: 81
End: 2021-10-15

## 2021-10-15 ENCOUNTER — IMMUNIZATION (OUTPATIENT)
Dept: INTERNAL MEDICINE | Facility: CLINIC | Age: 81
End: 2021-10-15
Payer: MEDICARE

## 2021-10-15 ENCOUNTER — OFFICE VISIT (OUTPATIENT)
Dept: INTERNAL MEDICINE | Facility: CLINIC | Age: 81
End: 2021-10-15
Payer: MEDICARE

## 2021-10-15 VITALS
OXYGEN SATURATION: 98 % | BODY MASS INDEX: 32.21 KG/M2 | HEIGHT: 64 IN | DIASTOLIC BLOOD PRESSURE: 72 MMHG | WEIGHT: 188.69 LBS | HEART RATE: 58 BPM | SYSTOLIC BLOOD PRESSURE: 128 MMHG

## 2021-10-15 DIAGNOSIS — T83.9XXA FOLEY CATHETER PROBLEM, INITIAL ENCOUNTER: ICD-10-CM

## 2021-10-15 DIAGNOSIS — I48.0 PAROXYSMAL ATRIAL FIBRILLATION: ICD-10-CM

## 2021-10-15 DIAGNOSIS — T14.8XXA BLOOD BLISTER: ICD-10-CM

## 2021-10-15 DIAGNOSIS — Z23 NEED FOR VACCINATION: Primary | ICD-10-CM

## 2021-10-15 DIAGNOSIS — T14.8XXA BLISTER: Primary | ICD-10-CM

## 2021-10-15 DIAGNOSIS — L98.9 SKIN LESION: Primary | ICD-10-CM

## 2021-10-15 DIAGNOSIS — E66.01 MORBID (SEVERE) OBESITY DUE TO EXCESS CALORIES: ICD-10-CM

## 2021-10-15 PROCEDURE — 1126F AMNT PAIN NOTED NONE PRSNT: CPT | Mod: CPTII,S$GLB,, | Performed by: PHYSICIAN ASSISTANT

## 2021-10-15 PROCEDURE — 99499 RISK ADDL DX/OHS AUDIT: ICD-10-PCS | Mod: S$GLB,,, | Performed by: PHYSICIAN ASSISTANT

## 2021-10-15 PROCEDURE — 3074F PR MOST RECENT SYSTOLIC BLOOD PRESSURE < 130 MM HG: ICD-10-PCS | Mod: CPTII,S$GLB,, | Performed by: PHYSICIAN ASSISTANT

## 2021-10-15 PROCEDURE — 99999 PR PBB SHADOW E&M-EST. PATIENT-LVL IV: CPT | Mod: PBBFAC,,, | Performed by: PHYSICIAN ASSISTANT

## 2021-10-15 PROCEDURE — 1126F PR PAIN SEVERITY QUANTIFIED, NO PAIN PRESENT: ICD-10-PCS | Mod: CPTII,S$GLB,, | Performed by: PHYSICIAN ASSISTANT

## 2021-10-15 PROCEDURE — 1101F PT FALLS ASSESS-DOCD LE1/YR: CPT | Mod: CPTII,S$GLB,, | Performed by: PHYSICIAN ASSISTANT

## 2021-10-15 PROCEDURE — 3288F FALL RISK ASSESSMENT DOCD: CPT | Mod: CPTII,S$GLB,, | Performed by: PHYSICIAN ASSISTANT

## 2021-10-15 PROCEDURE — 1159F PR MEDICATION LIST DOCUMENTED IN MEDICAL RECORD: ICD-10-PCS | Mod: CPTII,S$GLB,, | Performed by: PHYSICIAN ASSISTANT

## 2021-10-15 PROCEDURE — 91300 COVID-19, MRNA, LNP-S, PF, 30 MCG/0.3 ML DOSE VACCINE: CPT | Mod: PBBFAC | Performed by: INTERNAL MEDICINE

## 2021-10-15 PROCEDURE — 1160F RVW MEDS BY RX/DR IN RCRD: CPT | Mod: CPTII,S$GLB,, | Performed by: PHYSICIAN ASSISTANT

## 2021-10-15 PROCEDURE — 1159F MED LIST DOCD IN RCRD: CPT | Mod: CPTII,S$GLB,, | Performed by: PHYSICIAN ASSISTANT

## 2021-10-15 PROCEDURE — 3078F PR MOST RECENT DIASTOLIC BLOOD PRESSURE < 80 MM HG: ICD-10-PCS | Mod: CPTII,S$GLB,, | Performed by: PHYSICIAN ASSISTANT

## 2021-10-15 PROCEDURE — 1160F PR REVIEW ALL MEDS BY PRESCRIBER/CLIN PHARMACIST DOCUMENTED: ICD-10-PCS | Mod: CPTII,S$GLB,, | Performed by: PHYSICIAN ASSISTANT

## 2021-10-15 PROCEDURE — 0003A COVID-19, MRNA, LNP-S, PF, 30 MCG/0.3 ML DOSE VACCINE: CPT | Mod: PBBFAC | Performed by: INTERNAL MEDICINE

## 2021-10-15 PROCEDURE — 99214 PR OFFICE/OUTPT VISIT, EST, LEVL IV, 30-39 MIN: ICD-10-PCS | Mod: S$GLB,,, | Performed by: PHYSICIAN ASSISTANT

## 2021-10-15 PROCEDURE — 3288F PR FALLS RISK ASSESSMENT DOCUMENTED: ICD-10-PCS | Mod: CPTII,S$GLB,, | Performed by: PHYSICIAN ASSISTANT

## 2021-10-15 PROCEDURE — 1101F PR PT FALLS ASSESS DOC 0-1 FALLS W/OUT INJ PAST YR: ICD-10-PCS | Mod: CPTII,S$GLB,, | Performed by: PHYSICIAN ASSISTANT

## 2021-10-15 PROCEDURE — 3074F SYST BP LT 130 MM HG: CPT | Mod: CPTII,S$GLB,, | Performed by: PHYSICIAN ASSISTANT

## 2021-10-15 PROCEDURE — 99499 UNLISTED E&M SERVICE: CPT | Mod: S$GLB,,, | Performed by: PHYSICIAN ASSISTANT

## 2021-10-15 PROCEDURE — 99214 OFFICE O/P EST MOD 30 MIN: CPT | Mod: S$GLB,,, | Performed by: PHYSICIAN ASSISTANT

## 2021-10-15 PROCEDURE — 99999 PR PBB SHADOW E&M-EST. PATIENT-LVL IV: ICD-10-PCS | Mod: PBBFAC,,, | Performed by: PHYSICIAN ASSISTANT

## 2021-10-15 PROCEDURE — 3078F DIAST BP <80 MM HG: CPT | Mod: CPTII,S$GLB,, | Performed by: PHYSICIAN ASSISTANT

## 2021-10-15 RX ORDER — MUPIROCIN 20 MG/G
OINTMENT TOPICAL 3 TIMES DAILY
Qty: 22 G | Refills: 0 | Status: SHIPPED | OUTPATIENT
Start: 2021-10-15 | End: 2021-12-08

## 2021-10-15 RX ORDER — MUPIROCIN CALCIUM 20 MG/G
CREAM TOPICAL 3 TIMES DAILY
Qty: 30 G | Refills: 0 | Status: SHIPPED | OUTPATIENT
Start: 2021-10-15 | End: 2021-10-15 | Stop reason: ALTCHOICE

## 2021-10-16 PROBLEM — T83.9XXA FOLEY CATHETER PROBLEM, INITIAL ENCOUNTER: Status: ACTIVE | Noted: 2021-10-16

## 2021-10-16 PROBLEM — E66.01 MORBID (SEVERE) OBESITY DUE TO EXCESS CALORIES: Status: ACTIVE | Noted: 2021-10-16

## 2021-10-18 NOTE — HPI
79 year old male with persistent AF, DM2, HTN, HLD, HAILEE who presented for outpatient NICOLE/DCCV, following which he experienced sinus arrest with a junctional escape of 30 bpm which has persisted. He became very dizzy with an attempt at ambulation and systolic blood pressure is ~90 bpm and he has been started on IV dopamine.    He reports feeling well otherwise; he denies any history of chest pain/pressure/tightness/discomfort, MARIANO, peripheral edema, orthopnea or PND. He has not experienced syncope or palpitations and reports no symptoms or concerns at this time. He is a lifetime non-smoker.  
Giovanni Guerrero is a 80 yo M with a history persistent AF, T2DM, HTN, DLD, HAILEE, CVA (on Eliquis) who presented on 1/28 for a NICOLE/DCCV with Dr Bertrand. The patient sustained a sinus arrest with junctional escape of 30 bpm that persisted for several hours. He also had episodes of hypotension intra -op and post operative (SBPs 90s). The patient was started on IV dopamine for BP support and transferred to the CCU for evaluation and treatment. He was placed on Levophed and Isuprel infusions. The patient had a notable bump in his serum creatinine from 1.6 to 2.3 this AM. The patient had a CO2 of 13 and a pH of 7.23. The patient also had a decline in his urine output. The the patient is a CKD III patient of Dr. Baca who was last seen in the renal clinic in September 2019. On AM assessment, the patient denies any complaints of chest pain/pressure, SOB, peripheral edema, or N/V/D. Urine output has drastically improved with inotrops and pressors. No severe electrolyte derangements. Nephrology consulted for TIFFANIE on CKD III.    
Call bell/Explanation of exam/test/Fall precautions/Side rails

## 2021-10-27 ENCOUNTER — OFFICE VISIT (OUTPATIENT)
Dept: DERMATOLOGY | Facility: CLINIC | Age: 81
End: 2021-10-27
Payer: MEDICARE

## 2021-10-27 DIAGNOSIS — L01.03 BULLOUS IMPETIGO: ICD-10-CM

## 2021-10-27 DIAGNOSIS — L98.9 DISEASE OF SKIN AND SUBCUTANEOUS TISSUE: Primary | ICD-10-CM

## 2021-10-27 PROCEDURE — 99999 PR PBB SHADOW E&M-EST. PATIENT-LVL III: CPT | Mod: PBBFAC,,, | Performed by: DERMATOLOGY

## 2021-10-27 PROCEDURE — 1160F PR REVIEW ALL MEDS BY PRESCRIBER/CLIN PHARMACIST DOCUMENTED: ICD-10-PCS | Mod: CPTII,S$GLB,, | Performed by: DERMATOLOGY

## 2021-10-27 PROCEDURE — 99213 PR OFFICE/OUTPT VISIT, EST, LEVL III, 20-29 MIN: ICD-10-PCS | Mod: S$GLB,,, | Performed by: DERMATOLOGY

## 2021-10-27 PROCEDURE — 87077 CULTURE AEROBIC IDENTIFY: CPT | Mod: 59 | Performed by: DERMATOLOGY

## 2021-10-27 PROCEDURE — 1159F MED LIST DOCD IN RCRD: CPT | Mod: CPTII,S$GLB,, | Performed by: DERMATOLOGY

## 2021-10-27 PROCEDURE — 1160F RVW MEDS BY RX/DR IN RCRD: CPT | Mod: CPTII,S$GLB,, | Performed by: DERMATOLOGY

## 2021-10-27 PROCEDURE — 1159F PR MEDICATION LIST DOCUMENTED IN MEDICAL RECORD: ICD-10-PCS | Mod: CPTII,S$GLB,, | Performed by: DERMATOLOGY

## 2021-10-27 PROCEDURE — 87070 CULTURE OTHR SPECIMN AEROBIC: CPT | Performed by: DERMATOLOGY

## 2021-10-27 PROCEDURE — 99999 PR PBB SHADOW E&M-EST. PATIENT-LVL III: ICD-10-PCS | Mod: PBBFAC,,, | Performed by: DERMATOLOGY

## 2021-10-27 PROCEDURE — 87186 SC STD MICRODIL/AGAR DIL: CPT | Mod: 59 | Performed by: DERMATOLOGY

## 2021-10-27 PROCEDURE — 99213 OFFICE O/P EST LOW 20 MIN: CPT | Mod: S$GLB,,, | Performed by: DERMATOLOGY

## 2021-10-27 RX ORDER — DOXYCYCLINE 100 MG/1
100 CAPSULE ORAL 2 TIMES DAILY
Qty: 28 CAPSULE | Refills: 0 | Status: SHIPPED | OUTPATIENT
Start: 2021-10-27 | End: 2022-01-12

## 2021-10-28 ENCOUNTER — TELEPHONE (OUTPATIENT)
Dept: INTERNAL MEDICINE | Facility: CLINIC | Age: 81
End: 2021-10-28
Payer: MEDICARE

## 2021-10-30 LAB
BACTERIA SPEC AEROBE CULT: ABNORMAL
BACTERIA SPEC AEROBE CULT: ABNORMAL

## 2021-10-31 ENCOUNTER — PATIENT MESSAGE (OUTPATIENT)
Dept: DERMATOLOGY | Facility: CLINIC | Age: 81
End: 2021-10-31
Payer: MEDICARE

## 2021-10-31 DIAGNOSIS — L08.9 SKIN INFECTION: Primary | ICD-10-CM

## 2021-10-31 RX ORDER — CIPROFLOXACIN 500 MG/1
500 TABLET ORAL 2 TIMES DAILY
Qty: 20 TABLET | Refills: 0 | Status: SHIPPED | OUTPATIENT
Start: 2021-10-31 | End: 2021-11-10

## 2021-11-01 ENCOUNTER — TELEPHONE (OUTPATIENT)
Dept: DERMATOLOGY | Facility: CLINIC | Age: 81
End: 2021-11-01
Payer: MEDICARE

## 2021-11-01 LAB
BACTERIA SPEC AEROBE CULT: ABNORMAL
BACTERIA SPEC AEROBE CULT: ABNORMAL

## 2021-11-15 ENCOUNTER — TELEPHONE (OUTPATIENT)
Dept: NEPHROLOGY | Facility: CLINIC | Age: 81
End: 2021-11-15
Payer: MEDICARE

## 2021-11-16 ENCOUNTER — LAB VISIT (OUTPATIENT)
Dept: LAB | Facility: OTHER | Age: 81
End: 2021-11-16
Attending: INTERNAL MEDICINE
Payer: MEDICARE

## 2021-11-16 DIAGNOSIS — E11.21 DIABETIC NEPHROPATHY ASSOCIATED WITH TYPE 2 DIABETES MELLITUS: ICD-10-CM

## 2021-11-16 DIAGNOSIS — I10 ESSENTIAL HYPERTENSION: ICD-10-CM

## 2021-11-16 DIAGNOSIS — N18.32 STAGE 3B CHRONIC KIDNEY DISEASE: ICD-10-CM

## 2021-11-16 LAB
CREAT UR-MCNC: 95.5 MG/DL (ref 23–375)
PROT UR-MCNC: 25 MG/DL (ref 0–15)
PROT/CREAT UR: 0.26 MG/G{CREAT} (ref 0–0.2)

## 2021-11-16 PROCEDURE — 84156 ASSAY OF PROTEIN URINE: CPT | Performed by: INTERNAL MEDICINE

## 2021-11-17 ENCOUNTER — PES CALL (OUTPATIENT)
Dept: ADMINISTRATIVE | Facility: CLINIC | Age: 81
End: 2021-11-17
Payer: MEDICARE

## 2021-12-01 ENCOUNTER — OFFICE VISIT (OUTPATIENT)
Dept: NEPHROLOGY | Facility: CLINIC | Age: 81
End: 2021-12-01
Payer: MEDICARE

## 2021-12-01 VITALS
HEART RATE: 95 BPM | WEIGHT: 193.56 LBS | SYSTOLIC BLOOD PRESSURE: 130 MMHG | BODY MASS INDEX: 33.05 KG/M2 | DIASTOLIC BLOOD PRESSURE: 80 MMHG | OXYGEN SATURATION: 98 % | HEIGHT: 64 IN

## 2021-12-01 DIAGNOSIS — I10 ESSENTIAL HYPERTENSION: Primary | ICD-10-CM

## 2021-12-01 DIAGNOSIS — N18.32 STAGE 3B CHRONIC KIDNEY DISEASE: ICD-10-CM

## 2021-12-01 PROCEDURE — 99213 OFFICE O/P EST LOW 20 MIN: CPT | Mod: S$GLB,,, | Performed by: INTERNAL MEDICINE

## 2021-12-01 PROCEDURE — 99999 PR PBB SHADOW E&M-EST. PATIENT-LVL IV: CPT | Mod: PBBFAC,,, | Performed by: INTERNAL MEDICINE

## 2021-12-01 PROCEDURE — 99999 PR PBB SHADOW E&M-EST. PATIENT-LVL IV: ICD-10-PCS | Mod: PBBFAC,,, | Performed by: INTERNAL MEDICINE

## 2021-12-01 PROCEDURE — 99213 PR OFFICE/OUTPT VISIT, EST, LEVL III, 20-29 MIN: ICD-10-PCS | Mod: S$GLB,,, | Performed by: INTERNAL MEDICINE

## 2021-12-06 ENCOUNTER — TELEPHONE (OUTPATIENT)
Dept: DERMATOLOGY | Facility: CLINIC | Age: 81
End: 2021-12-06
Payer: MEDICARE

## 2021-12-07 ENCOUNTER — PATIENT MESSAGE (OUTPATIENT)
Dept: DERMATOLOGY | Facility: CLINIC | Age: 81
End: 2021-12-07
Payer: MEDICARE

## 2021-12-07 DIAGNOSIS — L30.4 INTERTRIGO: Primary | ICD-10-CM

## 2021-12-08 ENCOUNTER — PATIENT MESSAGE (OUTPATIENT)
Dept: DERMATOLOGY | Facility: CLINIC | Age: 81
End: 2021-12-08
Payer: MEDICARE

## 2021-12-08 DIAGNOSIS — T14.8XXA BLISTER: ICD-10-CM

## 2021-12-08 RX ORDER — CICLOPIROX OLAMINE 7.7 MG/G
CREAM TOPICAL 2 TIMES DAILY
Qty: 90 G | Refills: 3 | Status: SHIPPED | OUTPATIENT
Start: 2021-12-08 | End: 2023-02-28

## 2021-12-08 RX ORDER — MUPIROCIN 20 MG/G
OINTMENT TOPICAL 2 TIMES DAILY
Qty: 30 G | Refills: 2 | Status: SHIPPED | OUTPATIENT
Start: 2021-12-08 | End: 2022-01-12

## 2021-12-16 ENCOUNTER — LAB VISIT (OUTPATIENT)
Dept: LAB | Facility: OTHER | Age: 81
End: 2021-12-16
Attending: INTERNAL MEDICINE
Payer: MEDICARE

## 2021-12-16 DIAGNOSIS — E11.22 CKD STAGE 4 DUE TO TYPE 2 DIABETES MELLITUS: ICD-10-CM

## 2021-12-16 DIAGNOSIS — E11.21 DIABETIC NEPHROPATHY ASSOCIATED WITH TYPE 2 DIABETES MELLITUS: ICD-10-CM

## 2021-12-16 DIAGNOSIS — E78.2 MIXED HYPERLIPIDEMIA: ICD-10-CM

## 2021-12-16 DIAGNOSIS — N18.4 CKD STAGE 4 DUE TO TYPE 2 DIABETES MELLITUS: ICD-10-CM

## 2021-12-16 LAB
ALBUMIN SERPL BCP-MCNC: 3.5 G/DL (ref 3.5–5.2)
ALP SERPL-CCNC: 68 U/L (ref 55–135)
ALT SERPL W/O P-5'-P-CCNC: 20 U/L (ref 10–44)
ANION GAP SERPL CALC-SCNC: 12 MMOL/L (ref 8–16)
AST SERPL-CCNC: 16 U/L (ref 10–40)
BASOPHILS # BLD AUTO: 0.05 K/UL (ref 0–0.2)
BASOPHILS NFR BLD: 0.6 % (ref 0–1.9)
BILIRUB SERPL-MCNC: 0.7 MG/DL (ref 0.1–1)
BUN SERPL-MCNC: 40 MG/DL (ref 8–23)
CALCIUM SERPL-MCNC: 9.3 MG/DL (ref 8.7–10.5)
CHLORIDE SERPL-SCNC: 102 MMOL/L (ref 95–110)
CHOLEST SERPL-MCNC: 120 MG/DL (ref 120–199)
CHOLEST/HDLC SERPL: 4.3 {RATIO} (ref 2–5)
CO2 SERPL-SCNC: 26 MMOL/L (ref 23–29)
CREAT SERPL-MCNC: 1.9 MG/DL (ref 0.5–1.4)
DIFFERENTIAL METHOD: ABNORMAL
EOSINOPHIL # BLD AUTO: 0.5 K/UL (ref 0–0.5)
EOSINOPHIL NFR BLD: 5.9 % (ref 0–8)
ERYTHROCYTE [DISTWIDTH] IN BLOOD BY AUTOMATED COUNT: 12.8 % (ref 11.5–14.5)
EST. GFR  (AFRICAN AMERICAN): 38 ML/MIN/1.73 M^2
EST. GFR  (NON AFRICAN AMERICAN): 33 ML/MIN/1.73 M^2
ESTIMATED AVG GLUCOSE: 157 MG/DL (ref 68–131)
GLUCOSE SERPL-MCNC: 127 MG/DL (ref 70–110)
HBA1C MFR BLD: 7.1 % (ref 4–5.6)
HCT VFR BLD AUTO: 45 % (ref 40–54)
HDLC SERPL-MCNC: 28 MG/DL (ref 40–75)
HDLC SERPL: 23.3 % (ref 20–50)
HGB BLD-MCNC: 15.7 G/DL (ref 14–18)
IMM GRANULOCYTES # BLD AUTO: 0.02 K/UL (ref 0–0.04)
IMM GRANULOCYTES NFR BLD AUTO: 0.2 % (ref 0–0.5)
LDLC SERPL CALC-MCNC: 54.4 MG/DL (ref 63–159)
LYMPHOCYTES # BLD AUTO: 3.2 K/UL (ref 1–4.8)
LYMPHOCYTES NFR BLD: 38.3 % (ref 18–48)
MCH RBC QN AUTO: 32.2 PG (ref 27–31)
MCHC RBC AUTO-ENTMCNC: 34.9 G/DL (ref 32–36)
MCV RBC AUTO: 92 FL (ref 82–98)
MONOCYTES # BLD AUTO: 0.8 K/UL (ref 0.3–1)
MONOCYTES NFR BLD: 9.3 % (ref 4–15)
NEUTROPHILS # BLD AUTO: 3.8 K/UL (ref 1.8–7.7)
NEUTROPHILS NFR BLD: 45.7 % (ref 38–73)
NONHDLC SERPL-MCNC: 92 MG/DL
NRBC BLD-RTO: 0 /100 WBC
PLATELET # BLD AUTO: 186 K/UL (ref 150–450)
PMV BLD AUTO: 9.7 FL (ref 9.2–12.9)
POTASSIUM SERPL-SCNC: 3.8 MMOL/L (ref 3.5–5.1)
PROT SERPL-MCNC: 6.5 G/DL (ref 6–8.4)
RBC # BLD AUTO: 4.87 M/UL (ref 4.6–6.2)
SODIUM SERPL-SCNC: 140 MMOL/L (ref 136–145)
TRIGL SERPL-MCNC: 188 MG/DL (ref 30–150)
WBC # BLD AUTO: 8.41 K/UL (ref 3.9–12.7)

## 2021-12-16 PROCEDURE — 80061 LIPID PANEL: CPT | Performed by: INTERNAL MEDICINE

## 2021-12-16 PROCEDURE — 85025 COMPLETE CBC W/AUTO DIFF WBC: CPT | Performed by: INTERNAL MEDICINE

## 2021-12-16 PROCEDURE — 80053 COMPREHEN METABOLIC PANEL: CPT | Performed by: INTERNAL MEDICINE

## 2021-12-16 PROCEDURE — 36415 COLL VENOUS BLD VENIPUNCTURE: CPT | Performed by: INTERNAL MEDICINE

## 2021-12-16 PROCEDURE — 83036 HEMOGLOBIN GLYCOSYLATED A1C: CPT | Performed by: INTERNAL MEDICINE

## 2022-01-12 ENCOUNTER — OFFICE VISIT (OUTPATIENT)
Dept: INTERNAL MEDICINE | Facility: CLINIC | Age: 82
End: 2022-01-12
Payer: MEDICARE

## 2022-01-12 VITALS
DIASTOLIC BLOOD PRESSURE: 74 MMHG | OXYGEN SATURATION: 98 % | HEART RATE: 102 BPM | HEIGHT: 64 IN | WEIGHT: 190.25 LBS | BODY MASS INDEX: 32.48 KG/M2 | SYSTOLIC BLOOD PRESSURE: 118 MMHG

## 2022-01-12 DIAGNOSIS — E66.01 MORBID (SEVERE) OBESITY DUE TO EXCESS CALORIES: ICD-10-CM

## 2022-01-12 DIAGNOSIS — M10.9 GOUT, ARTHRITIS: ICD-10-CM

## 2022-01-12 DIAGNOSIS — E78.2 MIXED HYPERLIPIDEMIA: ICD-10-CM

## 2022-01-12 DIAGNOSIS — N40.1 BENIGN PROSTATIC HYPERPLASIA WITH URINARY RETENTION: ICD-10-CM

## 2022-01-12 DIAGNOSIS — E11.42 DIABETIC POLYNEUROPATHY ASSOCIATED WITH TYPE 2 DIABETES MELLITUS: ICD-10-CM

## 2022-01-12 DIAGNOSIS — I10 PRIMARY HYPERTENSION: ICD-10-CM

## 2022-01-12 DIAGNOSIS — I48.0 PAROXYSMAL ATRIAL FIBRILLATION: ICD-10-CM

## 2022-01-12 DIAGNOSIS — E11.21 DIABETIC NEPHROPATHY ASSOCIATED WITH TYPE 2 DIABETES MELLITUS: ICD-10-CM

## 2022-01-12 DIAGNOSIS — R33.8 BENIGN PROSTATIC HYPERPLASIA WITH URINARY RETENTION: ICD-10-CM

## 2022-01-12 DIAGNOSIS — N18.4 CKD STAGE 4 DUE TO TYPE 2 DIABETES MELLITUS: ICD-10-CM

## 2022-01-12 DIAGNOSIS — E11.42 TYPE 2 DIABETES MELLITUS WITH DIABETIC POLYNEUROPATHY, WITH LONG-TERM CURRENT USE OF INSULIN: Primary | ICD-10-CM

## 2022-01-12 DIAGNOSIS — M89.8X9 METABOLIC BONE DISEASE: ICD-10-CM

## 2022-01-12 DIAGNOSIS — Z79.4 TYPE 2 DIABETES MELLITUS WITH DIABETIC POLYNEUROPATHY, WITH LONG-TERM CURRENT USE OF INSULIN: Primary | ICD-10-CM

## 2022-01-12 DIAGNOSIS — E11.22 CKD STAGE 4 DUE TO TYPE 2 DIABETES MELLITUS: ICD-10-CM

## 2022-01-12 PROCEDURE — 99499 RISK ADDL DX/OHS AUDIT: ICD-10-PCS | Mod: S$GLB,,, | Performed by: INTERNAL MEDICINE

## 2022-01-12 PROCEDURE — 1126F AMNT PAIN NOTED NONE PRSNT: CPT | Mod: CPTII,S$GLB,, | Performed by: INTERNAL MEDICINE

## 2022-01-12 PROCEDURE — 3078F PR MOST RECENT DIASTOLIC BLOOD PRESSURE < 80 MM HG: ICD-10-PCS | Mod: CPTII,S$GLB,, | Performed by: INTERNAL MEDICINE

## 2022-01-12 PROCEDURE — 99214 OFFICE O/P EST MOD 30 MIN: CPT | Mod: S$GLB,,, | Performed by: INTERNAL MEDICINE

## 2022-01-12 PROCEDURE — 1159F MED LIST DOCD IN RCRD: CPT | Mod: CPTII,S$GLB,, | Performed by: INTERNAL MEDICINE

## 2022-01-12 PROCEDURE — 1160F PR REVIEW ALL MEDS BY PRESCRIBER/CLIN PHARMACIST DOCUMENTED: ICD-10-PCS | Mod: CPTII,S$GLB,, | Performed by: INTERNAL MEDICINE

## 2022-01-12 PROCEDURE — 1160F RVW MEDS BY RX/DR IN RCRD: CPT | Mod: CPTII,S$GLB,, | Performed by: INTERNAL MEDICINE

## 2022-01-12 PROCEDURE — 99999 PR PBB SHADOW E&M-EST. PATIENT-LVL IV: CPT | Mod: PBBFAC,,, | Performed by: INTERNAL MEDICINE

## 2022-01-12 PROCEDURE — 3051F PR MOST RECENT HEMOGLOBIN A1C LEVEL 7.0 - < 8.0%: ICD-10-PCS | Mod: CPTII,S$GLB,, | Performed by: INTERNAL MEDICINE

## 2022-01-12 PROCEDURE — 3074F SYST BP LT 130 MM HG: CPT | Mod: CPTII,S$GLB,, | Performed by: INTERNAL MEDICINE

## 2022-01-12 PROCEDURE — 1101F PR PT FALLS ASSESS DOC 0-1 FALLS W/OUT INJ PAST YR: ICD-10-PCS | Mod: CPTII,S$GLB,, | Performed by: INTERNAL MEDICINE

## 2022-01-12 PROCEDURE — 3288F PR FALLS RISK ASSESSMENT DOCUMENTED: ICD-10-PCS | Mod: CPTII,S$GLB,, | Performed by: INTERNAL MEDICINE

## 2022-01-12 PROCEDURE — 99214 PR OFFICE/OUTPT VISIT, EST, LEVL IV, 30-39 MIN: ICD-10-PCS | Mod: S$GLB,,, | Performed by: INTERNAL MEDICINE

## 2022-01-12 PROCEDURE — 3078F DIAST BP <80 MM HG: CPT | Mod: CPTII,S$GLB,, | Performed by: INTERNAL MEDICINE

## 2022-01-12 PROCEDURE — 3288F FALL RISK ASSESSMENT DOCD: CPT | Mod: CPTII,S$GLB,, | Performed by: INTERNAL MEDICINE

## 2022-01-12 PROCEDURE — 3074F PR MOST RECENT SYSTOLIC BLOOD PRESSURE < 130 MM HG: ICD-10-PCS | Mod: CPTII,S$GLB,, | Performed by: INTERNAL MEDICINE

## 2022-01-12 PROCEDURE — 99999 PR PBB SHADOW E&M-EST. PATIENT-LVL IV: ICD-10-PCS | Mod: PBBFAC,,, | Performed by: INTERNAL MEDICINE

## 2022-01-12 PROCEDURE — 1101F PT FALLS ASSESS-DOCD LE1/YR: CPT | Mod: CPTII,S$GLB,, | Performed by: INTERNAL MEDICINE

## 2022-01-12 PROCEDURE — 1159F PR MEDICATION LIST DOCUMENTED IN MEDICAL RECORD: ICD-10-PCS | Mod: CPTII,S$GLB,, | Performed by: INTERNAL MEDICINE

## 2022-01-12 PROCEDURE — 99499 UNLISTED E&M SERVICE: CPT | Mod: S$GLB,,, | Performed by: INTERNAL MEDICINE

## 2022-01-12 PROCEDURE — 3051F HG A1C>EQUAL 7.0%<8.0%: CPT | Mod: CPTII,S$GLB,, | Performed by: INTERNAL MEDICINE

## 2022-01-12 PROCEDURE — 1126F PR PAIN SEVERITY QUANTIFIED, NO PAIN PRESENT: ICD-10-PCS | Mod: CPTII,S$GLB,, | Performed by: INTERNAL MEDICINE

## 2022-01-12 RX ORDER — INSULIN DEGLUDEC 100 U/ML
17 INJECTION, SOLUTION SUBCUTANEOUS NIGHTLY
Qty: 9 PEN | Refills: 1 | Status: SHIPPED | OUTPATIENT
Start: 2022-01-12 | End: 2022-05-06

## 2022-01-12 RX ORDER — PEN NEEDLE, DIABETIC 30 GX3/16"
NEEDLE, DISPOSABLE MISCELLANEOUS
Qty: 100 EACH | Refills: 3 | Status: SHIPPED | OUTPATIENT
Start: 2022-01-12

## 2022-01-12 RX ORDER — MUPIROCIN CALCIUM 20 MG/G
CREAM TOPICAL
COMMUNITY
Start: 2021-10-27 | End: 2022-01-12 | Stop reason: SDUPTHER

## 2022-01-12 NOTE — PROGRESS NOTES
Subjective:       Patient ID: Giovanni Guerrero is a 81 y.o. male.    Chief Complaint: Diabetes    Pt here for f/u.     He has a-fib dx during preop assessment for TURP in 2019. He subsequently had ablation procedure complicated by sinus arrest requiring pressor support. He recovered and has had appropriate f/u with cardiology. He is on eliquis for anticoag. Denies cp/sob/palpitations.     DM2 is fairly well controlled. Most recent A1C was 7.1. Pt reports sugars are in 130s fasting, prandial sugars are generally less than 140s. He has not had lows. He is on tresiba monotherapy at 15 units qhs. He does have nephropathy with CKD-4 and microalbuminuria and his creatinine has been fairly stable. He is followed by nephrology. He does have neuropathy but is not bothersome and manages without meds. He is due for eye exam which he will schedule.    He has renal metabolic bone disease which is also managed by nephrology and stable.     Pt's BP is well controlled. Tolerating meds well. Home readings are controlled. Pt denies cp/sob/ha/vision or neuro changes.     HLD is tx with lipitor which he tolerates well.    Gout has been quiet. No recent attacks.      He has BPH with urinary obstruction for which he takes flomax and finasteride. He had TURP which has helped. He has f/u with urology.    Diabetes  Pertinent negatives for hypoglycemia include no headaches. Pertinent negatives for diabetes include no chest pain and no polyuria.   Hypertension  Pertinent negatives include no chest pain, headaches or shortness of breath.     Review of Systems   Constitutional: Negative for appetite change, fever and unexpected weight change.   Eyes: Negative for visual disturbance.   Respiratory: Negative for shortness of breath.    Cardiovascular: Negative for chest pain.   Gastrointestinal: Negative for abdominal pain, blood in stool, constipation and diarrhea.   Endocrine: Negative for polyuria.   Genitourinary: Negative for frequency.    Neurological: Negative for light-headedness and headaches.   Psychiatric/Behavioral: Negative for dysphoric mood.       Objective:      Physical Exam  Constitutional:       Appearance: Normal appearance. He is well-developed.   HENT:      Head: Normocephalic and atraumatic.   Eyes:      Extraocular Movements: Extraocular movements intact.      Pupils: Pupils are equal, round, and reactive to light.   Neck:      Thyroid: No thyromegaly.      Vascular: No carotid bruit.   Cardiovascular:      Rate and Rhythm: Normal rate and regular rhythm.      Pulses:           Posterior tibial pulses are 2+ on the right side and 2+ on the left side.      Heart sounds: Normal heart sounds, S1 normal and S2 normal. No murmur heard.      Pulmonary:      Effort: Pulmonary effort is normal.      Breath sounds: Normal breath sounds. No wheezing.   Abdominal:      General: Bowel sounds are normal.      Palpations: Abdomen is soft. There is no mass.      Tenderness: There is no abdominal tenderness.   Musculoskeletal:      Cervical back: Neck supple.      Right foot: Normal range of motion. No deformity.      Left foot: Normal range of motion. No deformity.      Comments: Protective Sensation (w/ 10 gram monofilament):  Right: Intact  Left: Intact    Visual Inspection:  Normal -  Bilateral    Pedal Pulses:   Right: Present  Left: Present    Posterior tibialis:   Right:Present  Left: Present     Feet:      Right foot:      Protective Sensation: 6 sites tested. 6 sites sensed.      Skin integrity: No ulcer or blister.      Left foot:      Protective Sensation: 6 sites tested. 6 sites sensed.      Skin integrity: No ulcer or blister.   Lymphadenopathy:      Cervical: No cervical adenopathy.   Skin:     Comments: Rash on lower abdomen with some scabbing; per pt looks better but present for 3 months   Neurological:      Mental Status: He is alert and oriented to person, place, and time.      Cranial Nerves: No cranial nerve deficit.    Psychiatric:         Mood and Affect: Mood normal.         Behavior: Behavior normal.         Assessment:       1. Type 2 diabetes mellitus with diabetic polyneuropathy, with long-term current use of insulin    2. Diabetic nephropathy associated with type 2 diabetes mellitus    3. CKD stage 4 due to type 2 diabetes mellitus    4. Diabetic polyneuropathy associated with type 2 diabetes mellitus    5. Benign prostatic hyperplasia with urinary retention    6. Mixed hyperlipidemia    7. Paroxysmal atrial fibrillation    8. Metabolic bone disease    9. Morbid (severe) obesity due to excess calories    10. Gout, arthritis    11. Primary hypertension        Plan:       1. Recent labs reviewed    2. Keep f/u with specialists; assisted in nephrology f/u  3. Home BS and BP readings; reviewed hypoglycemia plan with pt  4. Increase tresiba to 17 units qhs  5. F/u derm--assisted in making appt

## 2022-01-16 DIAGNOSIS — Z79.4 TYPE 2 DIABETES MELLITUS WITH DIABETIC POLYNEUROPATHY, WITH LONG-TERM CURRENT USE OF INSULIN: ICD-10-CM

## 2022-01-16 DIAGNOSIS — E11.42 TYPE 2 DIABETES MELLITUS WITH DIABETIC POLYNEUROPATHY, WITH LONG-TERM CURRENT USE OF INSULIN: ICD-10-CM

## 2022-01-16 NOTE — TELEPHONE ENCOUNTER
No new care gaps identified.  Powered by [employer]. Reference number: [id number].   1/16/2022 11:20:16 AM CST   (3) occasionally moist

## 2022-01-26 DIAGNOSIS — E78.2 MIXED HYPERLIPIDEMIA: ICD-10-CM

## 2022-01-26 NOTE — TELEPHONE ENCOUNTER
No new care gaps identified.  Powered by [employer]. Reference number: [id number].   1/26/2022 3:14:48 PM CST

## 2022-01-29 RX ORDER — PEN NEEDLE, DIABETIC 30 GX3/16"
NEEDLE, DISPOSABLE MISCELLANEOUS
Qty: 100 EACH | OUTPATIENT
Start: 2022-01-29

## 2022-01-30 NOTE — TELEPHONE ENCOUNTER
"Quick DC. Request already responded to by other means (e.g. phone or fax)   Refill Authorization Note   Giovanni Guerrero  is requesting a refill authorization.  Brief Assessment and Rationale for Refill:  Quick Discontinue  Medication Therapy Plan:       Medication Reconciliation Completed:  No      Comments:   Pended Medication(s)       Requested Prescriptions     Refused Prescriptions Disp Refills    pen needle, diabetic (BD ULTRA-FINE MINI PEN NEEDLE) 31 gauge x 3/16" Ndle 100 each      Sig: USE TO INJECT LANTUS ONCE DAILY     Refused By: SARAH DUPREE     Reason for Refusal: Request already responded to by other means (e.g. phone or fax)        Duplicate Pended Encounter(s)/ Last Prescribed Details: (includes pharmacy & prescriber details)   Ordering Encounter Report    Associated Reports   View Encounter            Note composed:9:24 PM 01/29/2022         " CAD (coronary artery disease)  h/o PCI with stenting x1  Dyslipidemia    H/O: HTN (hypertension)  on medication  H/O: iron deficiency anemia  h/o blood transfusion in 2002  History of myocardial infarction  h/o upper back and left arm pain prompted an angioplasty with stenting x 1 artery in 2000  last echo/ ekg April 2017  Mild persistent asthma in adult without complication  last rescue inhaler used over 6 months ago  Snores    Spinal stenosis of lumbar region    T2DM (type 2 diabetes mellitus)  no daily fingerstick   last A1C  (10+) as per patient CAD (coronary artery disease)  h/o PCI with stenting x1  Dyslipidemia    H/O: HTN (hypertension)  on medication  H/O: iron deficiency anemia  h/o blood transfusion in 2002  History of myocardial infarction  h/o upper back and left arm pain prompted an angioplasty with stenting x 1 artery in 2000  last echo/ ekg April 2017  Mild persistent asthma in adult without complication  last rescue inhaler used over 6 months ago  Scoliosis    Snores    Spinal stenosis of lumbar region    T2DM (type 2 diabetes mellitus)  no daily fingerstick   last A1C  (10+) as per patient

## 2022-01-31 ENCOUNTER — TELEPHONE (OUTPATIENT)
Dept: INTERNAL MEDICINE | Facility: CLINIC | Age: 82
End: 2022-01-31
Payer: MEDICARE

## 2022-01-31 NOTE — TELEPHONE ENCOUNTER
----- Message from Arcelia Mansfield sent at 1/31/2022  9:12 AM CST -----  Regarding: Refill request  Type:  RX Refill Request    Who Called: LCUHO SHARMA [[id number]]    Refill or New Rx: Refill     RX Name and Strength: atorvastatin (LIPITOR) 40 MG tablet    How is the patient currently taking it? (ex. 1XDay) 1xday     Is this a 30 day or 90 day RX: 90 days     Preferred Pharmacy with phone number: "[id number] [address] [facility]    Local or Mail Order: local     Ordering Provider: Eleanor Beltran Call Back Number: [phone]    Additional Information: pt is completely out and the pharmacy will no longer give him emergency medication.

## 2022-01-31 NOTE — TELEPHONE ENCOUNTER
Rx re-sent to PCP as high priority. Rx requested from Refill Pool since 1/16/2022 with no response. Pt is completely out and the pharmacy will no longer give him emergency medication.

## 2022-02-10 RX ORDER — ATORVASTATIN CALCIUM 40 MG/1
TABLET, FILM COATED ORAL
Qty: 90 TABLET | Refills: 3 | OUTPATIENT
Start: 2022-02-10

## 2022-02-14 ENCOUNTER — PES CALL (OUTPATIENT)
Dept: ADMINISTRATIVE | Facility: CLINIC | Age: 82
End: 2022-02-14
Payer: MEDICARE

## 2022-03-23 ENCOUNTER — OFFICE VISIT (OUTPATIENT)
Dept: DERMATOLOGY | Facility: CLINIC | Age: 82
End: 2022-03-23
Payer: MEDICARE

## 2022-03-23 DIAGNOSIS — L98.9 DISEASE OF SKIN AND SUBCUTANEOUS TISSUE: Primary | ICD-10-CM

## 2022-03-23 PROCEDURE — 1159F PR MEDICATION LIST DOCUMENTED IN MEDICAL RECORD: ICD-10-PCS | Mod: CPTII,S$GLB,, | Performed by: DERMATOLOGY

## 2022-03-23 PROCEDURE — 99499 UNLISTED E&M SERVICE: CPT | Mod: S$GLB,,, | Performed by: DERMATOLOGY

## 2022-03-23 PROCEDURE — 87070 CULTURE OTHR SPECIMN AEROBIC: CPT | Performed by: DERMATOLOGY

## 2022-03-23 PROCEDURE — 88312 SPECIAL STAINS GROUP 1: CPT | Mod: 26,,, | Performed by: PATHOLOGY

## 2022-03-23 PROCEDURE — 1160F RVW MEDS BY RX/DR IN RCRD: CPT | Mod: CPTII,S$GLB,, | Performed by: DERMATOLOGY

## 2022-03-23 PROCEDURE — 3288F FALL RISK ASSESSMENT DOCD: CPT | Mod: CPTII,S$GLB,, | Performed by: DERMATOLOGY

## 2022-03-23 PROCEDURE — 11105 PR PUNCH BIOPSY, SKIN, EA ADDTL LESION: ICD-10-PCS | Mod: S$GLB,,, | Performed by: DERMATOLOGY

## 2022-03-23 PROCEDURE — 99999 PR PBB SHADOW E&M-EST. PATIENT-LVL III: CPT | Mod: PBBFAC,,, | Performed by: DERMATOLOGY

## 2022-03-23 PROCEDURE — 11104 PR PUNCH BIOPSY, SKIN, SINGLE LESION: ICD-10-PCS | Mod: S$GLB,,, | Performed by: DERMATOLOGY

## 2022-03-23 PROCEDURE — 88305 TISSUE EXAM BY PATHOLOGIST: CPT | Mod: 26,,, | Performed by: PATHOLOGY

## 2022-03-23 PROCEDURE — 88346 IMFLUOR 1ST 1ANTB STAIN PX: CPT | Performed by: PATHOLOGY

## 2022-03-23 PROCEDURE — 99999 PR PBB SHADOW E&M-EST. PATIENT-LVL III: ICD-10-PCS | Mod: PBBFAC,,, | Performed by: DERMATOLOGY

## 2022-03-23 PROCEDURE — 99499 NO LOS: ICD-10-PCS | Mod: S$GLB,,, | Performed by: DERMATOLOGY

## 2022-03-23 PROCEDURE — 88312 SPECIAL STAINS GROUP 1: CPT | Performed by: PATHOLOGY

## 2022-03-23 PROCEDURE — 1126F AMNT PAIN NOTED NONE PRSNT: CPT | Mod: CPTII,S$GLB,, | Performed by: DERMATOLOGY

## 2022-03-23 PROCEDURE — 1126F PR PAIN SEVERITY QUANTIFIED, NO PAIN PRESENT: ICD-10-PCS | Mod: CPTII,S$GLB,, | Performed by: DERMATOLOGY

## 2022-03-23 PROCEDURE — 11104 PUNCH BX SKIN SINGLE LESION: CPT | Mod: S$GLB,,, | Performed by: DERMATOLOGY

## 2022-03-23 PROCEDURE — 11105 PUNCH BX SKIN EA SEP/ADDL: CPT | Mod: S$GLB,,, | Performed by: DERMATOLOGY

## 2022-03-23 PROCEDURE — 1160F PR REVIEW ALL MEDS BY PRESCRIBER/CLIN PHARMACIST DOCUMENTED: ICD-10-PCS | Mod: CPTII,S$GLB,, | Performed by: DERMATOLOGY

## 2022-03-23 PROCEDURE — 3288F PR FALLS RISK ASSESSMENT DOCUMENTED: ICD-10-PCS | Mod: CPTII,S$GLB,, | Performed by: DERMATOLOGY

## 2022-03-23 PROCEDURE — 1101F PR PT FALLS ASSESS DOC 0-1 FALLS W/OUT INJ PAST YR: ICD-10-PCS | Mod: CPTII,S$GLB,, | Performed by: DERMATOLOGY

## 2022-03-23 PROCEDURE — 88350 IMFLUOR EA ADDL 1ANTB STN PX: CPT | Mod: 91 | Performed by: PATHOLOGY

## 2022-03-23 PROCEDURE — 1101F PT FALLS ASSESS-DOCD LE1/YR: CPT | Mod: CPTII,S$GLB,, | Performed by: DERMATOLOGY

## 2022-03-23 PROCEDURE — 88305 TISSUE EXAM BY PATHOLOGIST: CPT | Performed by: PATHOLOGY

## 2022-03-23 PROCEDURE — 88312 PR  SPECIAL STAINS,GROUP I: ICD-10-PCS | Mod: 26,,, | Performed by: PATHOLOGY

## 2022-03-23 PROCEDURE — 88305 TISSUE EXAM BY PATHOLOGIST: ICD-10-PCS | Mod: 26,,, | Performed by: PATHOLOGY

## 2022-03-23 PROCEDURE — 1159F MED LIST DOCD IN RCRD: CPT | Mod: CPTII,S$GLB,, | Performed by: DERMATOLOGY

## 2022-03-23 RX ORDER — PEN NEEDLE, DIABETIC 31 GX5/16"
NEEDLE, DISPOSABLE MISCELLANEOUS
COMMUNITY
Start: 2022-01-19 | End: 2023-03-19 | Stop reason: SDUPTHER

## 2022-03-23 RX ORDER — INFLUENZA VACCINE, ADJUVANTED 15; 15; 15; 15 UG/.5ML; UG/.5ML; UG/.5ML; UG/.5ML
INJECTION, SUSPENSION INTRAMUSCULAR
COMMUNITY
Start: 2021-10-29 | End: 2023-02-28

## 2022-03-23 NOTE — PROGRESS NOTES
Subjective:       Patient ID:  Giovanni Guerrero is a 82 y.o. male who presents for   Chief Complaint   Patient presents with    Blister     Not healing      HPI  Pt presents today for blisters on abdomen that continue to appear. States his abdomen is much better now than it was when he first came, but he continues to get blisters in areas near where he injects his insulin. Sites are asymptomatic.  States that he is using ciclopirox cream for treatment of groin creases and mupirocin for erosions.     Was last seen in 10/2021 at which time he had eroded areas which grew pseudomonas, MRSA, and klebsiella. Doxy and cipro were prescribed orally, and he was to use mupirocin to open sores. Photos were sent at a later date, and most of the erosions were healed, but mupirocin was refilled for remaining erosions and ciclopirox was added on to intertriginous areas for intertrigo.    Review of Systems   Constitutional: Negative for fever, chills, weight loss, weight gain, fatigue, night sweats and malaise.   Skin: Negative for daily sunscreen use, activity-related sunscreen use and recent sunburn.   Hematologic/Lymphatic: Bruises/bleeds easily.        Objective:    Physical Exam   Constitutional: He appears well-developed and well-nourished. No distress.   Neurological: He is alert and oriented to person, place, and time. He is not disoriented.   Psychiatric: He has a normal mood and affect.   Skin:   Areas Examined (abnormalities noted in diagram):   Abdomen Inspection Performed                  Diagram Legend     Erythematous scaling macule/papule c/w actinic keratosis       Vascular papule c/w angioma      Pigmented verrucoid papule/plaque c/w seborrheic keratosis      Yellow umbilicated papule c/w sebaceous hyperplasia      Irregularly shaped tan macule c/w lentigo     1-2 mm smooth white papules consistent with Milia      Movable subcutaneous cyst with punctum c/w epidermal inclusion cyst      Subcutaneous movable cyst  c/w pilar cyst      Firm pink to brown papule c/w dermatofibroma      Pedunculated fleshy papule(s) c/w skin tag(s)      Evenly pigmented macule c/w junctional nevus     Mildly variegated pigmented, slightly irregular-bordered macule c/w mildly atypical nevus      Flesh colored to evenly pigmented papule c/w intradermal nevus       Pink pearly papule/plaque c/w basal cell carcinoma      Erythematous hyperkeratotic cursted plaque c/w SCC      Surgical scar with no sign of skin cancer recurrence      Open and closed comedones      Inflammatory papules and pustules      Verrucoid papule consistent consistent with wart     Erythematous eczematous patches and plaques     Dystrophic onycholytic nail with subungual debris c/w onychomycosis     Umbilicated papule    Erythematous-base heme-crusted tan verrucoid plaque consistent with inflamed seborrheic keratosis     Erythematous Silvery Scaling Plaque c/w Psoriasis     See annotation      Assessment / Plan:    Disease of skin and subcutaneous tissue  Punch biopsy procedure note x 2:  Punch biopsies performed after verbal consent obtained. Areas marked and prepped with alcohol. Approximately 1cc of 1% lidocaine with epinephrine injected. 4 mm disposable punch used to remove lesions. Hemostasis obtained and biopsy sites closed with 1 - 2 Prolene sutures each. Wound care instructions reviewed with patient and handout given.  -     Specimen to Pathology, Dermatology    Pathology Orders:     Normal Orders This Visit    Specimen to Pathology, Dermatology     Comments:    Number of Specimens:->2  ------------------------->-------------------------  Spec 1 Procedure:->Biopsy  Spec 1 Clinical Impression:->r/o bullous impetigo vs  autoimmune blistering d/o vs other  Spec 1 Source:->lower abdomen  ------------------------->-------------------------  Spec 2 Procedure:->Immunofluorescence (No formalin)  send to [facility] for DIF  Spec 2 Clinical Impression:->r/o bullous impetigo  vs  autoimmune blistering d/o vs other  Spec 2 Source:->lower abdomen    Questions:    Procedure Type: Dermatology and skin neoplasms    Number of Specimens: 2    ------------------------: -------------------------    Spec 1 Procedure: Biopsy    Spec 1 Clinical Impression: r/o bullous impetigo vs autoimmune blistering d/o vs other    Spec 1 Source: lower abdomen    ------------------------: -------------------------    Spec 2 Procedure: Immunofluorescence (No formalin) Comment - send to [facility] for DIF    Spec 2 Clinical Impression: r/o bullous impetigo vs autoimmune blistering d/o vs other    Spec 2 Source: lower abdomen    Release to patient:         -     Aerobic culture    Continue mupirocin and ciclopirox as above    rtc 2 wks for suture removal and biopsy results

## 2022-03-25 ENCOUNTER — TELEPHONE (OUTPATIENT)
Dept: DERMATOLOGY | Facility: CLINIC | Age: 82
End: 2022-03-25
Payer: MEDICARE

## 2022-03-25 NOTE — TELEPHONE ENCOUNTER
----- Message from Reinier Davila sent at 3/25/2022 10:56 AM CDT -----  Contact: Patient  Patient requesting call back in regards to Rx refill. Patient stated that he was told the script was canceled      Patient @[phone] (home)

## 2022-03-27 NOTE — PATIENT INSTRUCTIONS

## 2022-03-28 ENCOUNTER — TELEPHONE (OUTPATIENT)
Dept: DERMATOLOGY | Facility: CLINIC | Age: 82
End: 2022-03-28
Payer: MEDICARE

## 2022-03-28 LAB — BACTERIA SPEC AEROBE CULT: NO GROWTH

## 2022-03-28 NOTE — TELEPHONE ENCOUNTER
Spoke with pt and let the pt know that I have spoken with the pharmacy and that he can go and  his medication. Pt verbalized understanding and thanked me for the call.

## 2022-04-01 LAB
FINAL PATHOLOGIC DIAGNOSIS: NORMAL
GROSS: NORMAL
Lab: NORMAL
MICROSCOPIC EXAM: NORMAL

## 2022-04-06 ENCOUNTER — OFFICE VISIT (OUTPATIENT)
Dept: DERMATOLOGY | Facility: CLINIC | Age: 82
End: 2022-04-06
Payer: MEDICARE

## 2022-04-06 DIAGNOSIS — L12.30 EPIDERMOLYSIS BULLOSA ACQUISITA: Primary | ICD-10-CM

## 2022-04-06 PROCEDURE — 1160F RVW MEDS BY RX/DR IN RCRD: CPT | Mod: CPTII,S$GLB,, | Performed by: DERMATOLOGY

## 2022-04-06 PROCEDURE — 1159F MED LIST DOCD IN RCRD: CPT | Mod: CPTII,S$GLB,, | Performed by: DERMATOLOGY

## 2022-04-06 PROCEDURE — 99999 PR PBB SHADOW E&M-EST. PATIENT-LVL III: CPT | Mod: PBBFAC,,, | Performed by: DERMATOLOGY

## 2022-04-06 PROCEDURE — 1126F AMNT PAIN NOTED NONE PRSNT: CPT | Mod: CPTII,S$GLB,, | Performed by: DERMATOLOGY

## 2022-04-06 PROCEDURE — 3288F PR FALLS RISK ASSESSMENT DOCUMENTED: ICD-10-PCS | Mod: CPTII,S$GLB,, | Performed by: DERMATOLOGY

## 2022-04-06 PROCEDURE — 99999 PR PBB SHADOW E&M-EST. PATIENT-LVL III: ICD-10-PCS | Mod: PBBFAC,,, | Performed by: DERMATOLOGY

## 2022-04-06 PROCEDURE — 1159F PR MEDICATION LIST DOCUMENTED IN MEDICAL RECORD: ICD-10-PCS | Mod: CPTII,S$GLB,, | Performed by: DERMATOLOGY

## 2022-04-06 PROCEDURE — 1101F PR PT FALLS ASSESS DOC 0-1 FALLS W/OUT INJ PAST YR: ICD-10-PCS | Mod: CPTII,S$GLB,, | Performed by: DERMATOLOGY

## 2022-04-06 PROCEDURE — 1101F PT FALLS ASSESS-DOCD LE1/YR: CPT | Mod: CPTII,S$GLB,, | Performed by: DERMATOLOGY

## 2022-04-06 PROCEDURE — 99213 PR OFFICE/OUTPT VISIT, EST, LEVL III, 20-29 MIN: ICD-10-PCS | Mod: S$GLB,,, | Performed by: DERMATOLOGY

## 2022-04-06 PROCEDURE — 1126F PR PAIN SEVERITY QUANTIFIED, NO PAIN PRESENT: ICD-10-PCS | Mod: CPTII,S$GLB,, | Performed by: DERMATOLOGY

## 2022-04-06 PROCEDURE — 99499 RISK ADDL DX/OHS AUDIT: ICD-10-PCS | Mod: S$GLB,,, | Performed by: DERMATOLOGY

## 2022-04-06 PROCEDURE — 1160F PR REVIEW ALL MEDS BY PRESCRIBER/CLIN PHARMACIST DOCUMENTED: ICD-10-PCS | Mod: CPTII,S$GLB,, | Performed by: DERMATOLOGY

## 2022-04-06 PROCEDURE — 3288F FALL RISK ASSESSMENT DOCD: CPT | Mod: CPTII,S$GLB,, | Performed by: DERMATOLOGY

## 2022-04-06 PROCEDURE — 99499 UNLISTED E&M SERVICE: CPT | Mod: S$GLB,,, | Performed by: DERMATOLOGY

## 2022-04-06 PROCEDURE — 99213 OFFICE O/P EST LOW 20 MIN: CPT | Mod: S$GLB,,, | Performed by: DERMATOLOGY

## 2022-04-06 RX ORDER — TRIAMCINOLONE ACETONIDE 1 MG/G
OINTMENT TOPICAL
Qty: 80 G | Refills: 1 | Status: SHIPPED | OUTPATIENT
Start: 2022-04-06 | End: 2023-02-28

## 2022-04-06 NOTE — PROGRESS NOTES
Subjective:       Patient ID:  Giovanni Guerrero is a 82 y.o. male who presents for   Chief Complaint   Patient presents with    skin lesion     F/u     Pt here today to get sutures removed and receive bx results.  States his skin has improved since last visit.  Using mupirocin and ciclopirox.  No blisters in any other areas. No sores in mouth.    Review of Systems   Constitutional: Negative for fever, chills and malaise.   Skin: Positive for rash. Negative for itching.        Objective:    Physical Exam   Constitutional: He appears well-developed and well-nourished. No distress.   Neurological: He is alert and oriented to person, place, and time. He is not disoriented.   Psychiatric: He has a normal mood and affect.   Skin:   Areas Examined (abnormalities noted in diagram):   Head / Face Inspection Performed  Neck Inspection Performed  Abdomen Inspection Performed              Diagram Legend     Erythematous scaling macule/papule c/w actinic keratosis       Vascular papule c/w angioma      Pigmented verrucoid papule/plaque c/w seborrheic keratosis      Yellow umbilicated papule c/w sebaceous hyperplasia      Irregularly shaped tan macule c/w lentigo     1-2 mm smooth white papules consistent with Milia      Movable subcutaneous cyst with punctum c/w epidermal inclusion cyst      Subcutaneous movable cyst c/w pilar cyst      Firm pink to brown papule c/w dermatofibroma      Pedunculated fleshy papule(s) c/w skin tag(s)      Evenly pigmented macule c/w junctional nevus     Mildly variegated pigmented, slightly irregular-bordered macule c/w mildly atypical nevus      Flesh colored to evenly pigmented papule c/w intradermal nevus       Pink pearly papule/plaque c/w basal cell carcinoma      Erythematous hyperkeratotic cursted plaque c/w SCC      Surgical scar with no sign of skin cancer recurrence      Open and closed comedones      Inflammatory papules and pustules      Verrucoid papule consistent consistent with  wart     Erythematous eczematous patches and plaques     Dystrophic onycholytic nail with subungual debris c/w onychomycosis     Umbilicated papule    Erythematous-base heme-crusted tan verrucoid plaque consistent with inflamed seborrheic keratosis     Erythematous Silvery Scaling Plaque c/w Psoriasis     See annotation      Assessment / Plan:         Final Pathologic Diagnosis 1. Skin, lower abdomen, punch biopsy for H&E:   -SUBEPIDERMAL BLISTER WITH UNDERLYING EOSINOPHILS AND NEUTROPHILS, see comment   COMMENT:  Clinical images were reviewed in epic and differential diagnosis   noted.  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  See comment in   part 2 for differential diagnosis.   2. Skin, lower abdomen, punch biopsy for direct immunofluorescence:   IgG: Continuous strong linear deposition along the basement membrane zone   IgG4: Continuous strong linear deposition along the basement membrane zone   IgM: Negative   IgA: Negative   C3: Continuous strong linear deposition along the basement membrane zone   Fibrinogen: Negative   Impression:   Consistent with a subepidermal autoimmune mucocutaneous blistering disorder   (see comment)   Comment:   The pattern of linear basement membrane zone deposition with multiple   conjugates is consistent with a subepidermal autoimmune   mucocutaneous blistering disorder including bullous pemphigoid and its   variants, epidermolysis bullosa acquisita, bullous systemic lupus   erythematosus and other rare variants (anti-p200 pemphigoid, anti-p105   pemphigoid and anti-collagen IV pemphigoid). The final definitive diagnosis   should however be based on correlation of the direct immunofluorescence   findings with the clinical presentation, histopathological findings on   examination of sections fromformalin-fixed,  paraffin-embedded tissue and   serum testing for (a) indirect immunofluorescence using human salt split skin   and monkey esophagus substrates with IgG conjugates ((Melissa Test Code: CIFS;   Cutaneous Immunofluorescence Antibodies (IgG)); (b) RASHI testing for   YK80R-DZ 180and  IgG antibodies (Melissa Test Unit Code BPAB: SH40W-   and  IgG antibodies); and (c) autoimmune connective tissue disease   serologies (Melissa Test Code: CTDC: Connective Tissue Diseases [place]).   Hafsa Shane M.D.   Report attached.   Performing location:   [facility]   [address] [place] [address]    Comment: Interp By Paddy Coleman M.D., Signed on 04/01/2022 at 13:07       Epidermolysis bullosa acquisita  Counseled pt that his biopsy is consistent with an autoimmune blistering disorder triggered by friction/trauma to skin.  Pt denies any blisters or sores elsewhere on body.  Recommended looking into looser pants with suspenders as the friction from the waistlines of his pants may be triggering the blisters to form. Continue insulin injections (but not to blistered areas).  Continue current antibacterial/anti-yeast creams to any open sores, but will try a topical steroid below to treat any new blisters:  -     triamcinolone acetonide 0.1% (KENALOG) 0.1 % ointment; AAA on lower abdomen BID if blisters form  Dispense: 80 g; Refill: 1  Warned patient about side effects from overuse of topical steroids, including thinning of skin, lightening of skin, easy tearing/bruising of skin, stretch marks, etc. Patient was instructed to take breaks from the topical steroid, especially if any of the above side effects are noticed.      If blisters become more widespread or aren't controlled by the triamcinolone, will consider colchicine.      rtc 2-3 months for skin check or sooner for any concerns

## 2022-04-06 NOTE — PATIENT INSTRUCTIONS

## 2022-05-05 DIAGNOSIS — Z79.4 TYPE 2 DIABETES MELLITUS WITH DIABETIC POLYNEUROPATHY, WITH LONG-TERM CURRENT USE OF INSULIN: ICD-10-CM

## 2022-05-05 DIAGNOSIS — E11.42 TYPE 2 DIABETES MELLITUS WITH DIABETIC POLYNEUROPATHY, WITH LONG-TERM CURRENT USE OF INSULIN: ICD-10-CM

## 2022-05-05 NOTE — TELEPHONE ENCOUNTER
No new care gaps identified.  [facility] Embedded Care Gaps. Reference number: [id number]. 5/05/2022   5:11:03 PM CDT

## 2022-05-06 RX ORDER — INSULIN DEGLUDEC 100 U/ML
INJECTION, SOLUTION SUBCUTANEOUS
Qty: 3 PEN | Refills: 1 | Status: SHIPPED | OUTPATIENT
Start: 2022-05-06 | End: 2022-06-27

## 2022-05-06 NOTE — TELEPHONE ENCOUNTER
Refill Routing Note   Medication(s) are not appropriate for processing by [facility] for the following reason(s):      - Required laboratory values are abnormal    [facility] action(s):  Defer          Medication reconciliation completed: No     Appointments  past 12m or future 3m with PCP    Date Provider   Last Visit   1/12/2022 Alan Webster MD   Next Visit   7/13/2022 Alan Webster MD   ED visits in past 90 days: 0        Note composed:10:44 PM 05/05/2022

## 2022-06-07 ENCOUNTER — PES CALL (OUTPATIENT)
Dept: ADMINISTRATIVE | Facility: CLINIC | Age: 82
End: 2022-06-07
Payer: MEDICARE

## 2022-06-15 RX ORDER — CARVEDILOL 3.12 MG/1
TABLET ORAL
Qty: 180 TABLET | Refills: 2 | Status: SHIPPED | OUTPATIENT
Start: 2022-06-15 | End: 2023-03-09

## 2022-06-23 NOTE — ASSESSMENT & PLAN NOTE
The patient developed non-oliguric renal failure most likely due to ATN secondary to hypotension. Now improving Cr (1.5) Currently in a polyuric phase.  - Continue to monitor   
  The patient developed non-oliguric renal failure most likely due to ATN secondary to hypotension. Now improving Cr (1.8-1.9) Currently in a polyuric phase.  - Continue to monitor   
- His UOP has been in non-oliguric state, UOP ~ 1.4 with net + 2.3 Liter.   - Nephrology on board for recommendations, they recommended continue on oral bicarb   - Management per Nephrology/primary team   
- His UOP has been in non-oliguric state, UOP ~ 3.4 with net + 1.4 Liter.   - Nephrology on board for recommendations, they recommended continue on oral bicarb   - Management per Nephrology/primary team   
- S/p DCCV  - Continue eliquis  - If returns to AFib there will be no further attempt at rhythm control  
- S/p DCCV  - Continue eliquis  - If returns to AFib there will be no further attempt at rhythm control  
- S/p NICOLE/DCCV with sinus arrest and junctional rhythm ~30 bpm with systolic blood pressure ~90  - He has been asymptomatic for the last couple of days   - Tele showed rate 60-70 BPM with no episode of symptomatic bradycardia, was off levo since yesterday, and weaning down on isopreterenol and this morning discontinued.   - No indication for pacemaker or TVP placement at this time, monitor with no isopreterenol   
- S/p NICOLE/DCCV with sinus arrest and junctional rhythm ~30 bpm with systolic blood pressure ~90, asymptomatic other than dizziness with an attempt at ambulation. Likely 2/2 atenolol, now washing out of system. Was on dopamine infusion after procedure, but weaned off.   - Continue to wean off isopreterenol (4 mcg/min currently) and Levophed   - No indication for pacemaker or TVP placement at this time, will continue to follow along   
- per primary team   
- per primary team   
1. NICOLE for evaluation of JULISA prior to DCCV with Dr Bertrand.   -No absolute contraindications of esophageal stricture, tumor, perforation, laceration,or diverticulum and/or active GI bleed  -The risks, benefits & alternatives of the procedure were explained to the patient.   -The risks of transesophageal echo include but are not limited to:  Dental trauma, esophageal trauma/perforation, bleeding, laryngospasm/brochospasm, aspiration, sore throat/hoarseness, & dislodgement of the endotracheal tube/nasogastric tube (where applicable).    -The risks of moderate sedation include hypotension, respiratory depression, arrhythmias, bronchospasm, & death.    -Informed consent was obtained. The patient is agreeable to proceed with the procedure and all questions and concerns addressed.    Case discussed with an attending in echocardiography lab.     Further recommendations per attending addendum    
2/2 ATN  - Improving   
2/2 ATN  - Improving   
Continue home medications  
Holding home medications in setting of shock  
Originally presented after procedure with junctional bradycardia, now in sinus rhythm after starting Isuprel. HRs ranging from 50s-60s. Patient is asymptomatic  - Will wean down Isuprel gtt today  - EP is following; patient may need PM placement but will assess after Isuprel discontinued   
Originally presented after procedure with junctional bradycardia, now in sinus rhythm after starting Isuprel. HRs ranging from 50s-60s. Patient is asymptomatic  - Will wean down Isuprel gtt today  - EP is following; patient may need PM placement but will assess after Isuprel discontinued   
Patient with diminished urine output overnight with rising lactic acid, now with increased urine output. Likely 2/2 ATN given hypotension after procedure. Currently on Abx therapy, pan-cultured, on levophed  - Management per Nephrology/primary team     
Placed on levophed for shock, now off this AM. Lactic acid trending down. Now polyuric. Blood Cx NGTD. Elevated WBC likely 2/2 stress response (trending down)  - Was on vancomycin/zosyn -> will discontinue tomorrow  
Placed on levophed for shock, now off this AM. Lactic acid trending down. Now polyuric. Blood Cx NGTD. Elevated WBC likely 2/2 stress response (trending down)  - Was on vancomycin/zosyn -> will discontinue tomorrow  
S/p DCCV  Continue eliquis  If returns to AFib there will be no further attempt at rhythm control  
S/p NICOLE/DCCV with sinus arrest and junctional rhythm ~30 bpm with systolic blood pressure ~90, asymptomatic other than dizziness with an attempt at ambulation  D/c atenolol  On Dopamine infusion 5 mcg/kg/min for bradycardia  Will monitor in CCU, EP will monitor and decide on the necessity for a permanent cardiac device  
S/p NICOLE/DCCV with sinus arrest and junctional rhythm ~30 bpm with systolic blood pressure ~90, asymptomatic other than dizziness with an attempt at ambulation  D/c atenolol  On levophed, dopamine and isoproterenol infusions overnight for bradycardia and hypotension  Currently on levophed weaning to MAP>65 and isoproterenol   Plan to D/C isopro once levophed is off  Will monitor in CCU, EP will monitor and decide on the necessity for a permanent cardiac device  
S/p NICOLE/DCCV with sinus arrest and junctional rhythm ~30 bpm with systolic blood pressure ~90, asymptomatic other than dizziness with an attempt at ambulation. Likely 2/2 atenolol, now washing out of system. Was on dopamine infusion after procedure, but weaned off. Now on isopreterenol gtt (6 mcg/min currently) with improvement in HRs from 30s-40s -> 50s- 60s. Additionally, patient in sinus rhythm now (less competing junctional/idioventricular bradycardia present)  - Will continue on isopreterenol gtt for now  - No indication for PM or TVP placement at this time, will continue to follow along and monitor    
The patient is a 78 yo M transferred to CCU s/p ablation procedure after developing bradycardia (30s) and hypotension. The patient was started on Levophed and Isuprel for support. The patient was noted with a elevated sCr of 2.3. The patient is a CKD III patient with a baseline sCr of 1.7-2.0. The patient was initially noted to be oliguric but showed some improvement after pressor support was started.     TIFFANIE likely secondary to iATN from hemodynamic instability intra-op and post -op (ie hypotensive; bradycardic).    Assessment:   - renal function back to baseline. Patient with excellent urine output. No electrolyte derangements. Pressor support weaned.   - Agree with sodium bicarbonate BID  - continue frequent renal function panels   - renal US unremarkable, no hydro   - US negative, UPCR negative   - Avoid hypotension which may worsen TIFFANIE  - Continue to monitor intake and output, daily weights   - Avoid nephrotoxic medication and renal dose medications to GFR  - Will discuss with staff, Dr. Izaguirre     
The patient is a 80 yo M transferred to CCU s/p ablation procedure after developing bradycardia (30s) and hypotension. The patient was started on Levophed and Isuprel for support. The patient was noted with a elevated sCr of 2.3. The patient is a CKD III patient with a baseline sCr of 1.7-2.0. The patient was initially noted to be oliguric but showed some improvement after pressor support was started.     TIFFANIE likely secondary to iATN from hemodynamic instability intra-op and post -op (ie hypotensive; bradycardic).    Assessment:   - no urgent need for dialysis at this time. sCr stable at 2.3. UOP much improved, 1 L thus far this shift. Repeat BMP pending.   - continue medical management, no severe electrolyte derangements   - follow CO2 closely. ABG revealing at metabolic acidosis.   - patient takes Sodium Bicarbonate 650 mg BID at home, consider restarting medication   - recommend q6 hr renal function panels   - renal US unremarkable, no hydro   - US negative, UPCR pending  - Avoid hypotension which may worsen TIFFANIE  - Currently on 1 pressors : Levophed now at 0.09 mcg   - Continue to monitor intake and output, daily weights   - Avoid nephrotoxic medication and renal dose medications to GFR  - Will discuss with staff, Dr. Baca     
detemir 5 nightly, aspart 2 with meals plus sliding scale  
chest pain

## 2022-06-27 DIAGNOSIS — Z79.4 TYPE 2 DIABETES MELLITUS WITH DIABETIC POLYNEUROPATHY, WITH LONG-TERM CURRENT USE OF INSULIN: ICD-10-CM

## 2022-06-27 DIAGNOSIS — E11.42 TYPE 2 DIABETES MELLITUS WITH DIABETIC POLYNEUROPATHY, WITH LONG-TERM CURRENT USE OF INSULIN: ICD-10-CM

## 2022-06-27 RX ORDER — INSULIN DEGLUDEC 100 U/ML
INJECTION, SOLUTION SUBCUTANEOUS
Qty: 3 PEN | Refills: 0 | Status: SHIPPED | OUTPATIENT
Start: 2022-06-27 | End: 2022-08-18 | Stop reason: SDUPTHER

## 2022-06-27 NOTE — TELEPHONE ENCOUNTER
Refill Routing Note   Medication(s) are not appropriate for processing by [facility] for the following reason(s):      - Required laboratory values are outdated  - Required laboratory values are abnormal    [facility] action(s):  Defer          Medication reconciliation completed: No     Appointments  past 12m or future 3m with PCP    Date Provider   Last Visit   1/12/2022 Alan Webster MD   Next Visit   7/13/2022 Alan Webster MD   ED visits in past 90 days: 0        Note composed:10:18 AM 06/27/2022

## 2022-06-27 NOTE — TELEPHONE ENCOUNTER
Care Due:                  Date            Visit Type   Department     Provider  --------------------------------------------------------------------------------                                EP -                              PRIMARY      [facility] INTERNAL  Last Visit: 01-      CARE ([facility])   ALEXANDR Webster                              EP -                              PRIMARY      [facility] INTERNAL  Next Visit: 07-      CARE ([facility])   ALEXANDR Webster                                                            Last  Test          Frequency    Reason                     Performed    Due Date  --------------------------------------------------------------------------------    HBA1C.......  6 months...  TRESIBA..................  12- 06-    [employer] Embedded Care Gaps. Reference number: [id number]. 6/27/2022   9:47:13 AM CDT

## 2022-07-06 ENCOUNTER — OFFICE VISIT (OUTPATIENT)
Dept: DERMATOLOGY | Facility: CLINIC | Age: 82
End: 2022-07-06
Payer: MEDICARE

## 2022-07-06 DIAGNOSIS — D48.5 NEOPLASM OF UNCERTAIN BEHAVIOR OF SKIN: Primary | ICD-10-CM

## 2022-07-06 DIAGNOSIS — L12.30 EPIDERMOLYSIS BULLOSA ACQUISITA: ICD-10-CM

## 2022-07-06 PROCEDURE — 1101F PT FALLS ASSESS-DOCD LE1/YR: CPT | Mod: CPTII,S$GLB,, | Performed by: DERMATOLOGY

## 2022-07-06 PROCEDURE — 11102 PR TANGENTIAL BIOPSY, SKIN, SINGLE LESION: ICD-10-PCS | Mod: S$GLB,,, | Performed by: DERMATOLOGY

## 2022-07-06 PROCEDURE — 1126F PR PAIN SEVERITY QUANTIFIED, NO PAIN PRESENT: ICD-10-PCS | Mod: CPTII,S$GLB,, | Performed by: DERMATOLOGY

## 2022-07-06 PROCEDURE — 1159F MED LIST DOCD IN RCRD: CPT | Mod: CPTII,S$GLB,, | Performed by: DERMATOLOGY

## 2022-07-06 PROCEDURE — 1160F PR REVIEW ALL MEDS BY PRESCRIBER/CLIN PHARMACIST DOCUMENTED: ICD-10-PCS | Mod: CPTII,S$GLB,, | Performed by: DERMATOLOGY

## 2022-07-06 PROCEDURE — 1159F PR MEDICATION LIST DOCUMENTED IN MEDICAL RECORD: ICD-10-PCS | Mod: CPTII,S$GLB,, | Performed by: DERMATOLOGY

## 2022-07-06 PROCEDURE — 88305 TISSUE EXAM BY PATHOLOGIST: ICD-10-PCS | Mod: 26,,, | Performed by: PATHOLOGY

## 2022-07-06 PROCEDURE — 99213 PR OFFICE/OUTPT VISIT, EST, LEVL III, 20-29 MIN: ICD-10-PCS | Mod: 25,S$GLB,, | Performed by: DERMATOLOGY

## 2022-07-06 PROCEDURE — 3288F PR FALLS RISK ASSESSMENT DOCUMENTED: ICD-10-PCS | Mod: CPTII,S$GLB,, | Performed by: DERMATOLOGY

## 2022-07-06 PROCEDURE — 99999 PR PBB SHADOW E&M-EST. PATIENT-LVL IV: CPT | Mod: PBBFAC,,, | Performed by: DERMATOLOGY

## 2022-07-06 PROCEDURE — 3288F FALL RISK ASSESSMENT DOCD: CPT | Mod: CPTII,S$GLB,, | Performed by: DERMATOLOGY

## 2022-07-06 PROCEDURE — 88305 TISSUE EXAM BY PATHOLOGIST: CPT | Mod: 26,,, | Performed by: PATHOLOGY

## 2022-07-06 PROCEDURE — 99213 OFFICE O/P EST LOW 20 MIN: CPT | Mod: 25,S$GLB,, | Performed by: DERMATOLOGY

## 2022-07-06 PROCEDURE — 1160F RVW MEDS BY RX/DR IN RCRD: CPT | Mod: CPTII,S$GLB,, | Performed by: DERMATOLOGY

## 2022-07-06 PROCEDURE — 1126F AMNT PAIN NOTED NONE PRSNT: CPT | Mod: CPTII,S$GLB,, | Performed by: DERMATOLOGY

## 2022-07-06 PROCEDURE — 1101F PR PT FALLS ASSESS DOC 0-1 FALLS W/OUT INJ PAST YR: ICD-10-PCS | Mod: CPTII,S$GLB,, | Performed by: DERMATOLOGY

## 2022-07-06 PROCEDURE — 99999 PR PBB SHADOW E&M-EST. PATIENT-LVL IV: ICD-10-PCS | Mod: PBBFAC,,, | Performed by: DERMATOLOGY

## 2022-07-06 PROCEDURE — 11102 TANGNTL BX SKIN SINGLE LES: CPT | Mod: S$GLB,,, | Performed by: DERMATOLOGY

## 2022-07-06 PROCEDURE — 88305 TISSUE EXAM BY PATHOLOGIST: CPT | Performed by: PATHOLOGY

## 2022-07-06 NOTE — PATIENT INSTRUCTIONS
Shave Biopsy Wound Care    Your doctor has performed a shave biopsy today.  A band aid and vaseline ointment has been placed over the site.  This should remain in place for NO LONGER THAN 48 hours.  It is fine to remove the bandaid after 24 hours, if the area is no longer bleeding. It is recommended that you keep the area dry (do not wet)) for the first 24 hours.  After 24 hours, wash the area with warm soap and water and apply Vaseline jelly.  Many patients prefer to use Neosporin or Bacitracin ointment.  This is acceptable; however, know that you can develop an allergy to this medication even if you have used it safely for years.  It is important to keep the area moist.  Letting it dry out and get air slows healing time, and will worsen the scar.        If you notice increasing redness, tenderness, pain, or yellow drainage at the biopsy site, please notify your doctor.  These are signs of an infection.    If your biopsy site is bleeding, apply firm pressure for 15 minutes straight.  Repeat for another 15 minutes, if it is still bleeding.   If the surgical site continues to bleed, then please contact your doctor.      For [facility] users:   You will receive your biopsy results in [facility] as soon as they are available. Please be assured that your physician/provider will review your results and will then determine what further treatment, evaluation, or planning is required. You should be contacted by your physician's/provider's office within 5 business days of receiving your results; If not, please reach out to directly. This is one more way [facility] is putting you first.     [facility] [place] [address]/ [phone] FAX/ [email or web address]     Sun Protection      The [facility] would like to remind you of the importance of sun protection all year round and particularly during the summer when the suns rays are the strongest. It has been proven that both acute  and chronic sun exposure damages our cells and leads to skin cancer. Beyond skin cancer, the sun causes 90% of the symptoms of premature skin aging, including wrinkles, lentigines (brown spots), and thin, easily bruised skin. Proper sun protection can help prevent these unwanted conditions.    Many patients report that they dont go in the sun. It has been shown that the average person receives 18 hours of incidental sun exposure per week during activities such as walking through parking lots, driving, or sitting next to windows. This accumulates to several bad sunburns per year!    In choosing sunscreen, you want one that protects against both UVA and UVB rays (broad spectrum). It is recommended that you use one of SPF 30 or higher. It is important to apply the sunscreen about 20 minutes prior to sun exposure. Most sunscreens are chemical sunscreens and a reaction must take place in the skin so that they are effective. If they are applied and then you are immediately exposed to the sun or start sweating, this reaction has not had time to take place and you are therefore unprotected. Sunscreen needs to be reapplied every 2 hours if you are participating in water sports or sweating. We recommend Elta MD or CeraVe sunscreens for daily use; however there are many options and it is most important for you to find one that you will use on a consistent basis.    If you have sensitive skin, you may do best with a sunscreen that contains only physical blockers in the active ingredient section. The only physical blockers available in the [place] currently are titanium dioxide or zinc oxide. These are typically thicker and harder to apply, however they afford very good protection. Neutrogena Sensitive Skin, Blue Lizard Sensitive Skin (pink top) or Neutrogena Pure and Free are popular ones.     Aside from sunscreen, clothes with UV protection (UPF), wide brimmed hats, and sunglasses are other means of sun protection that we  recommend.      Based on a recent study (6/2021) and out of an abundance of caution, we are recommending that you AVOID the following sunscreens as they may contain the carcinogen, benzene:    Spray and gel sunscreens  Any [employer] or [employer] brands as well as Max Block and TopCare brands   Neutrogena Ultra Sheer Dry-touch Water Resistant Sunscreen LOTION SPF 70   Neutrogena Sheer Zinc Dry-touch Face Sunscreen LOTION SPF 50   5.   Aveeno Baby Continuous Protection Sensitive Skin Sunscreen LOTION - Broad Spectrum SPF 50    Please note that Benzene is not an ingredient or the degradation product of any ingredient in any sunscreen. This study suggested that the findings are a result of contamination in the manufacturing process. At this point, we don't know how effectively Benzene gets through the skin, if it gets absorbed systemically, and what effects it may have.     We do know that ultraviolet radiation is a well-established carcinogen. Please use daily sun protection/avoidance and use of at least SPF 30, broad-spectrum sunscreen not listed above.                       [facility] [address]  [address]  [facility] [address]  Dept: [phone]  Dept Fax: [phone]

## 2022-07-06 NOTE — PROGRESS NOTES
Subjective:       Patient ID:  Giovanni Guerrero is a 82 y.o. 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 Pt states that the area is healing well and pustules/blisters are now gone. Pt has been using TAC which has cleared area up tremendously. Pt was also prescribed mupirocin and ciclopirox topically in the past for open sores. Area still a little pink, but not as red as it was.     When asked about lesion on L upper eyelid, pt states it's been present x months. Asymptomatic and no prior treatment.    Review of Systems   Constitutional: Negative for fever, chills and malaise.   Skin: Positive for rash. Negative for itching.        Objective:    Physical Exam   Constitutional: He appears well-developed and well-nourished. No distress.   Eyes:       Neurological: He is alert and oriented to person, place, and time. He is not disoriented.   Psychiatric: He has a normal mood and affect.   Skin:   Areas Examined (abnormalities noted in diagram):   Head / Face Inspection Performed  Neck Inspection Performed  Abdomen Inspection Performed                          Diagram Legend     Erythematous scaling macule/papule c/w actinic keratosis       Vascular papule c/w angioma      Pigmented verrucoid papule/plaque c/w seborrheic keratosis      Yellow umbilicated papule c/w sebaceous hyperplasia      Irregularly shaped tan macule c/w lentigo     1-2 mm smooth white papules consistent with Milia      Movable subcutaneous cyst with punctum c/w epidermal inclusion cyst      Subcutaneous movable cyst c/w pilar cyst      Firm pink to brown papule c/w dermatofibroma      Pedunculated fleshy papule(s) c/w skin tag(s)      Evenly pigmented macule c/w junctional nevus     Mildly variegated pigmented, slightly irregular-bordered macule c/w mildly atypical nevus      Flesh colored to evenly pigmented papule  c/w intradermal nevus       Pink pearly papule/plaque c/w basal cell carcinoma      Erythematous hyperkeratotic cursted plaque c/w SCC      Surgical scar with no sign of skin cancer recurrence      Open and closed comedones      Inflammatory papules and pustules      Verrucoid papule consistent consistent with wart     Erythematous eczematous patches and plaques     Dystrophic onycholytic nail with subungual debris c/w onychomycosis     Umbilicated papule    Erythematous-base heme-crusted tan verrucoid plaque consistent with inflamed seborrheic keratosis     Erythematous Silvery Scaling Plaque c/w Psoriasis     See annotation      Assessment / Plan:    Neoplasm of uncertain behavior of skin  Shave biopsy procedure note:    Shave biopsy performed after verbal consent including risk of infection, scar, recurrence, need for additional treatment of site. Area prepped with alcohol, anesthetized with approximately 1.0cc of 1% lidocaine with epinephrine. Lesional tissue shaved with razor blade. Hemostasis achieved with application of aluminum chloride followed by hyfrecation. No complications. Dressing applied. Wound care explained.    -     Specimen to Pathology, Dermatology    Pathology Orders:     Normal Orders This Visit    Specimen to Pathology, Dermatology     Comments:    Number of Specimens:->1  ------------------------->-------------------------  Spec 1 Procedure:->Biopsy  Spec 1 Clinical Impression:->r/o HAK vs SCC vs inflamed SK vs  other  Spec 1 Source:->L upper eyelid    Questions:    Procedure Type: Dermatology and skin neoplasms    Number of Specimens: 1    ------------------------: -------------------------    Spec 1 Procedure: Biopsy    Spec 1 Clinical Impression: r/o HAK vs SCC vs inflamed SK vs other    Spec 1 Source: L upper eyelid    Release to patient:           Epidermolysis bullosa acquisita - significantly improved  One small superficial erosion present today - pt will use mupirocin here until  healed.  Continue TAC prn flares only (discontinue for now)    rtc 4 months for skin check or sooner for any concerns

## 2022-07-11 ENCOUNTER — LAB VISIT (OUTPATIENT)
Dept: LAB | Facility: OTHER | Age: 82
End: 2022-07-11
Attending: INTERNAL MEDICINE
Payer: MEDICARE

## 2022-07-11 DIAGNOSIS — E11.42 TYPE 2 DIABETES MELLITUS WITH DIABETIC POLYNEUROPATHY, WITH LONG-TERM CURRENT USE OF INSULIN: ICD-10-CM

## 2022-07-11 DIAGNOSIS — Z79.4 TYPE 2 DIABETES MELLITUS WITH DIABETIC POLYNEUROPATHY, WITH LONG-TERM CURRENT USE OF INSULIN: ICD-10-CM

## 2022-07-11 LAB
ALBUMIN SERPL BCP-MCNC: 3.5 G/DL (ref 3.5–5.2)
ALP SERPL-CCNC: 62 U/L (ref 55–135)
ALT SERPL W/O P-5'-P-CCNC: 18 U/L (ref 10–44)
ANION GAP SERPL CALC-SCNC: 17 MMOL/L (ref 8–16)
AST SERPL-CCNC: 15 U/L (ref 10–40)
BILIRUB SERPL-MCNC: 0.6 MG/DL (ref 0.1–1)
BUN SERPL-MCNC: 37 MG/DL (ref 8–23)
CALCIUM SERPL-MCNC: 9.5 MG/DL (ref 8.7–10.5)
CHLORIDE SERPL-SCNC: 99 MMOL/L (ref 95–110)
CO2 SERPL-SCNC: 25 MMOL/L (ref 23–29)
CREAT SERPL-MCNC: 1.9 MG/DL (ref 0.5–1.4)
EST. GFR  (AFRICAN AMERICAN): 38 ML/MIN/1.73 M^2
EST. GFR  (NON AFRICAN AMERICAN): 33 ML/MIN/1.73 M^2
ESTIMATED AVG GLUCOSE: 146 MG/DL (ref 68–131)
GLUCOSE SERPL-MCNC: 102 MG/DL (ref 70–110)
HBA1C MFR BLD: 6.7 % (ref 4–5.6)
POTASSIUM SERPL-SCNC: 4.3 MMOL/L (ref 3.5–5.1)
PROT SERPL-MCNC: 6.6 G/DL (ref 6–8.4)
SODIUM SERPL-SCNC: 141 MMOL/L (ref 136–145)

## 2022-07-11 PROCEDURE — 80053 COMPREHEN METABOLIC PANEL: CPT | Performed by: INTERNAL MEDICINE

## 2022-07-11 PROCEDURE — 83036 HEMOGLOBIN GLYCOSYLATED A1C: CPT | Performed by: INTERNAL MEDICINE

## 2022-07-11 PROCEDURE — 36415 COLL VENOUS BLD VENIPUNCTURE: CPT | Performed by: INTERNAL MEDICINE

## 2022-07-13 LAB
FINAL PATHOLOGIC DIAGNOSIS: NORMAL
GROSS: NORMAL
Lab: NORMAL
MICROSCOPIC EXAM: NORMAL

## 2022-07-13 NOTE — PROGRESS NOTES
Final Pathologic Diagnosis   1. Skin, left upper eyelid, shave biopsy:   - INFLAMED SEBORRHEIC KERATOSIS.     This is a benign growth and no further treatment of this site is necessary.

## 2022-07-27 ENCOUNTER — TELEPHONE (OUTPATIENT)
Dept: CARDIOLOGY | Facility: CLINIC | Age: 82
End: 2022-07-27
Payer: MEDICARE

## 2022-07-27 DIAGNOSIS — R00.2 PALPITATIONS: Primary | ICD-10-CM

## 2022-07-28 ENCOUNTER — OFFICE VISIT (OUTPATIENT)
Dept: CARDIOLOGY | Facility: CLINIC | Age: 82
End: 2022-07-28
Payer: MEDICARE

## 2022-07-28 ENCOUNTER — HOSPITAL ENCOUNTER (OUTPATIENT)
Dept: CARDIOLOGY | Facility: CLINIC | Age: 82
Discharge: HOME | End: 2022-07-28
Payer: MEDICARE

## 2022-07-28 VITALS
HEART RATE: 89 BPM | SYSTOLIC BLOOD PRESSURE: 148 MMHG | DIASTOLIC BLOOD PRESSURE: 83 MMHG | WEIGHT: 188.06 LBS | HEIGHT: 64 IN | BODY MASS INDEX: 32.11 KG/M2

## 2022-07-28 DIAGNOSIS — E11.42 TYPE 2 DIABETES MELLITUS WITH DIABETIC POLYNEUROPATHY, WITH LONG-TERM CURRENT USE OF INSULIN: ICD-10-CM

## 2022-07-28 DIAGNOSIS — E78.2 MIXED HYPERLIPIDEMIA: Chronic | ICD-10-CM

## 2022-07-28 DIAGNOSIS — Z79.4 TYPE 2 DIABETES MELLITUS WITH DIABETIC POLYNEUROPATHY, WITH LONG-TERM CURRENT USE OF INSULIN: ICD-10-CM

## 2022-07-28 DIAGNOSIS — I10 PRIMARY HYPERTENSION: Primary | ICD-10-CM

## 2022-07-28 DIAGNOSIS — E11.22 CKD STAGE 4 DUE TO TYPE 2 DIABETES MELLITUS: Chronic | ICD-10-CM

## 2022-07-28 DIAGNOSIS — R00.2 PALPITATIONS: ICD-10-CM

## 2022-07-28 DIAGNOSIS — N18.4 CKD STAGE 4 DUE TO TYPE 2 DIABETES MELLITUS: Chronic | ICD-10-CM

## 2022-07-28 DIAGNOSIS — E66.01 MORBID (SEVERE) OBESITY DUE TO EXCESS CALORIES: ICD-10-CM

## 2022-07-28 DIAGNOSIS — I48.19 PERSISTENT ATRIAL FIBRILLATION: ICD-10-CM

## 2022-07-28 DIAGNOSIS — I48.0 PAROXYSMAL ATRIAL FIBRILLATION: ICD-10-CM

## 2022-07-28 PROCEDURE — 93005 ELECTROCARDIOGRAM TRACING: CPT | Mod: S$GLB,,, | Performed by: PHYSICIAN ASSISTANT

## 2022-07-28 PROCEDURE — 99499 RISK ADDL DX/OHS AUDIT: ICD-10-PCS | Mod: S$GLB,,, | Performed by: PHYSICIAN ASSISTANT

## 2022-07-28 PROCEDURE — 93010 EKG 12-LEAD: ICD-10-PCS | Mod: S$GLB,,, | Performed by: INTERNAL MEDICINE

## 2022-07-28 PROCEDURE — 1101F PR PT FALLS ASSESS DOC 0-1 FALLS W/OUT INJ PAST YR: ICD-10-PCS | Mod: CPTII,S$GLB,, | Performed by: PHYSICIAN ASSISTANT

## 2022-07-28 PROCEDURE — 1159F PR MEDICATION LIST DOCUMENTED IN MEDICAL RECORD: ICD-10-PCS | Mod: CPTII,S$GLB,, | Performed by: PHYSICIAN ASSISTANT

## 2022-07-28 PROCEDURE — 1126F AMNT PAIN NOTED NONE PRSNT: CPT | Mod: CPTII,S$GLB,, | Performed by: PHYSICIAN ASSISTANT

## 2022-07-28 PROCEDURE — 99214 OFFICE O/P EST MOD 30 MIN: CPT | Mod: S$GLB,,, | Performed by: PHYSICIAN ASSISTANT

## 2022-07-28 PROCEDURE — 3079F DIAST BP 80-89 MM HG: CPT | Mod: CPTII,S$GLB,, | Performed by: PHYSICIAN ASSISTANT

## 2022-07-28 PROCEDURE — 93010 ELECTROCARDIOGRAM REPORT: CPT | Mod: S$GLB,,, | Performed by: INTERNAL MEDICINE

## 2022-07-28 PROCEDURE — 1160F PR REVIEW ALL MEDS BY PRESCRIBER/CLIN PHARMACIST DOCUMENTED: ICD-10-PCS | Mod: CPTII,S$GLB,, | Performed by: PHYSICIAN ASSISTANT

## 2022-07-28 PROCEDURE — 3288F PR FALLS RISK ASSESSMENT DOCUMENTED: ICD-10-PCS | Mod: CPTII,S$GLB,, | Performed by: PHYSICIAN ASSISTANT

## 2022-07-28 PROCEDURE — 1160F RVW MEDS BY RX/DR IN RCRD: CPT | Mod: CPTII,S$GLB,, | Performed by: PHYSICIAN ASSISTANT

## 2022-07-28 PROCEDURE — 99999 PR PBB SHADOW E&M-EST. PATIENT-LVL V: ICD-10-PCS | Mod: PBBFAC,,, | Performed by: PHYSICIAN ASSISTANT

## 2022-07-28 PROCEDURE — 3288F FALL RISK ASSESSMENT DOCD: CPT | Mod: CPTII,S$GLB,, | Performed by: PHYSICIAN ASSISTANT

## 2022-07-28 PROCEDURE — 99499 UNLISTED E&M SERVICE: CPT | Mod: S$GLB,,, | Performed by: PHYSICIAN ASSISTANT

## 2022-07-28 PROCEDURE — 3077F PR MOST RECENT SYSTOLIC BLOOD PRESSURE >= 140 MM HG: ICD-10-PCS | Mod: CPTII,S$GLB,, | Performed by: PHYSICIAN ASSISTANT

## 2022-07-28 PROCEDURE — 3079F PR MOST RECENT DIASTOLIC BLOOD PRESSURE 80-89 MM HG: ICD-10-PCS | Mod: CPTII,S$GLB,, | Performed by: PHYSICIAN ASSISTANT

## 2022-07-28 PROCEDURE — 1159F MED LIST DOCD IN RCRD: CPT | Mod: CPTII,S$GLB,, | Performed by: PHYSICIAN ASSISTANT

## 2022-07-28 PROCEDURE — 93005 EKG 12-LEAD: ICD-10-PCS | Mod: S$GLB,,, | Performed by: PHYSICIAN ASSISTANT

## 2022-07-28 PROCEDURE — 99214 PR OFFICE/OUTPT VISIT, EST, LEVL IV, 30-39 MIN: ICD-10-PCS | Mod: S$GLB,,, | Performed by: PHYSICIAN ASSISTANT

## 2022-07-28 PROCEDURE — 99999 PR PBB SHADOW E&M-EST. PATIENT-LVL V: CPT | Mod: PBBFAC,,, | Performed by: PHYSICIAN ASSISTANT

## 2022-07-28 PROCEDURE — 1126F PR PAIN SEVERITY QUANTIFIED, NO PAIN PRESENT: ICD-10-PCS | Mod: CPTII,S$GLB,, | Performed by: PHYSICIAN ASSISTANT

## 2022-07-28 PROCEDURE — 3077F SYST BP >= 140 MM HG: CPT | Mod: CPTII,S$GLB,, | Performed by: PHYSICIAN ASSISTANT

## 2022-07-28 PROCEDURE — 1101F PT FALLS ASSESS-DOCD LE1/YR: CPT | Mod: CPTII,S$GLB,, | Performed by: PHYSICIAN ASSISTANT

## 2022-07-28 NOTE — PROGRESS NOTES
General Cardiology Clinic Note  Reason for Visit: Annual follow up   Last Clinic Visit: 7/9/2021  General Cardiologist: Dr. Marin     HPI:   Giovanni Guerrero is a 82 y.o. male who presents for annual follow up.     Patient states he feels well. He denies symptoms of CAD, CHF, arrhythmia, hypotension, TIA, bleeding episodes. He walks up and down steps in his house. Does everything he needs to do around house without issue. He checks BP at home and it is usually 120s-130s systolic. He keeps a log but didn't bring it today.      Medical: afib s/p DCCV 1/2020, HTN, HLD, DM2, CKD 4 (Cr 2.0-2.4)  Surgical: Reviewed, as below.  Family: Reviewed, as below. Father with MI at 58yo. Brother with MI at 59yo.  Social: Reviewed, as below. Never smoked.    ROS:      Review of Systems   Constitutional: Negative for diaphoresis, malaise/fatigue, weight gain and weight loss.   HENT: Negative for nosebleeds.    Eyes: Negative for vision loss in left eye, vision loss in right eye and visual disturbance.   Cardiovascular: Negative for chest pain, claudication, dyspnea on exertion, irregular heartbeat, leg swelling, near-syncope, orthopnea, palpitations, paroxysmal nocturnal dyspnea and syncope.   Respiratory: Negative for cough, shortness of breath, sleep disturbances due to breathing, snoring and wheezing.    Hematologic/Lymphatic: Negative for bleeding problem. Does not bruise/bleed easily.   Skin: Negative for poor wound healing and rash.   Musculoskeletal: Negative for muscle cramps and myalgias.   Gastrointestinal: Negative for bloating, abdominal pain, diarrhea, heartburn, melena, nausea and vomiting.   Genitourinary: Negative for hematuria and nocturia.   Neurological: Negative for brief paralysis, dizziness, headaches, light-headedness, numbness and weakness.   Psychiatric/Behavioral: Negative for depression.   Allergic/Immunologic: Negative for hives.       PMH:     Past Medical History:   Diagnosis Date    Allergy      "Anticoagulant long-term use     Colon polyp     Diabetes mellitus type II     Gout, arthritis     Gout, unspecified     Hx of colonic polyps     Hyperlipidemia     Hypertension     Prostatic hypertrophy     Renal insufficiency      Past Surgical History:   Procedure Laterality Date    APPENDECTOMY      CATARACT EXTRACTION Left 2018    EYE SURGERY      cataract    HERNIA REPAIR      TONSILLECTOMY      TRANSURETHRAL RESECTION OF PROSTATE (TURP) USING LASER N/A 12/6/2019    Procedure: TURP, USING LASER;  Surgeon: Rafael Marquis Jr., MD;  [facility];  Service: Urology;  Laterality: N/A;  75min    TREATMENT OF CARDIAC ARRHYTHMIA N/A 1/28/2020    Procedure: CARDIOVERSION;  Surgeon: Sergio Bertrand MD;  Location: [facility] EP LAB;  Service: Cardiology;  Laterality: N/A;  AF, NCIOLE, DCCV, MAC, MI, 3 Prep     Allergies:     Review of patient's allergies indicates:   Allergen Reactions    Actos [pioglitazone] Other (See Comments)     constipation     Medications:     Current Outpatient Medications on File Prior to Visit   Medication Sig Dispense Refill    apixaban (ELIQUIS) 5 mg Tab Take 1 tablet (5 mg total) by mouth 2 (two) times daily. 180 tablet 3    aspirin (ECOTRIN) 81 MG EC tablet Take 81 mg by mouth once daily.      atorvastatin (LIPITOR) 40 MG tablet TAKE 1 TABLET(40 MG) BY MOUTH EVERY DAY 90 tablet 3    BD INSULIN SYRINGE ULTRA-FINE 0.5 mL 31 gauge x 5/16 Syrg USE WITH LANTUS INSULIN ONCE DAILY 100 each 3    BD ULTRA-FINE MINI PEN NEEDLE 31 gauge x 3/16" Ndle       blood sugar diagnostic Strp Check sugar tid as directed 300 each 3    blood-glucose meter Misc 1 kit by Misc.(Non-Drug; Combo Route) route 3 (three) times daily. Pt is to test blood sugar TID 1 each 0    carvediloL (COREG) 3.125 MG tablet TAKE 1 TABLET(3.125 MG) BY MOUTH TWICE DAILY WITH MEALS 180 tablet 2    cholecalciferol, vitamin D3, (VITAMIN D3) 50 mcg (2,000 unit) Tab Take by mouth once daily.      ciclopirox " "(LOPROX) 0.77 % Crea Apply topically 2 (two) times daily. To lower abdomen 90 g 3    finasteride (PROSCAR) 5 mg tablet TAKE 1 TABLET(5 MG) BY MOUTH EVERY DAY 90 tablet 3    FLUAD QUAD 2021-22,65Y UP,,PF, 60 mcg (15 mcg x 4)/0.5 mL Syrg       furosemide (LASIX) 40 MG tablet Take 1 tablet (40 mg total) by mouth once daily. 90 tablet 11    NIFEdipine (PROCARDIA-XL) 60 MG (OSM) 24 hr tablet TAKE 1 TABLET(60 MG) BY MOUTH EVERY DAY 90 tablet 2    OMEGA-3 ACID ETHYL ESTERS (OMACOR) 1 gram Cap Take 1 g by mouth. 1 Capsule Oral Twice a day      pen needle, diabetic (BD ULTRA-FINE SHORT PEN NEEDLE) 31 gauge x 5/16" Ndle USE WITH LANTUS INSULIN ONCE DAILY 100 each 3    tamsulosin (FLOMAX) 0.4 mg Cap TAKE 1 CAPSULE BY MOUTH TWICE A  capsule 3    TRESIBA FLEXTOUCH U-100 100 unit/mL (3 mL) insulin pen ADMINISTER 17 UNITS UNDER THE SKIN EVERY EVENING 3 pen 0    triamcinolone acetonide 0.1% (KENALOG) 0.1 % ointment AAA on lower abdomen BID if blisters form 80 g 1     Current Facility-Administered Medications on File Prior to Visit   Medication Dose Route Frequency Provider Last Rate Last Admin    0.9%  NaCl infusion   Intravenous Continuous Krishan Awan MD   Stopped at 01/28/20 0943    sodium chloride 0.9% flush 5 mL  5 mL Intravenous PRN Krishan Awan MD         Social History:     Social History     Tobacco Use    Smoking status: Never Smoker    Smokeless tobacco: Never Used   Substance Use Topics    Alcohol use: No     Family History:     Family History   Problem Relation Age of Onset    Cancer Mother         ovarian cancer    Heart disease Father         MI at 57    Diabetes Father     Cerebral aneurysm Sister     Heart disease Brother         MI at 60    Heart disease Brother         CABG    Anesthesia problems Neg Hx     Melanoma Neg Hx      Physical Exam:   BP (!) 148/83 (BP Location: Left arm, Patient Position: Sitting, BP Method: Medium (Automatic))   Pulse 89   Ht 5' 4" (1.626 m)   Wt " 85.3 kg (188 lb 0.8 oz)   BMI 32.28 kg/m²        Physical Exam  Vitals and nursing note reviewed.   Constitutional:       General: He is not in acute distress.     Appearance: Normal appearance.   HENT:      Head: Normocephalic and atraumatic.      Nose: Nose normal.   Eyes:      General: No scleral icterus.     Extraocular Movements: Extraocular movements intact.      Conjunctiva/sclera: Conjunctivae normal.   Neck:      Thyroid: No thyromegaly.      Vascular: No carotid bruit or JVD.   Cardiovascular:      Rate and Rhythm: Normal rate. Rhythm irregular.      Pulses: Normal pulses.      Heart sounds: Normal heart sounds. No murmur heard.    No friction rub. No gallop.   Pulmonary:      Effort: Pulmonary effort is normal.      Breath sounds: Normal breath sounds. No wheezing, rhonchi or rales.   Chest:      Chest wall: No tenderness.   Abdominal:      General: Bowel sounds are normal. There is no distension.      Palpations: Abdomen is soft.      Tenderness: There is no abdominal tenderness.   Musculoskeletal:      Cervical back: Neck supple.      Right lower leg: No edema.      Left lower leg: No edema.   Skin:     General: Skin is warm and dry.      Coloration: Skin is not pale.      Findings: No erythema or rash.      Nails: There is no clubbing.   Neurological:      Mental Status: He is alert and oriented to person, place, and time.   Psychiatric:         Mood and Affect: Mood and affect normal.         Behavior: Behavior normal.          Labs:     Lab Results   Component Value Date     07/11/2022    K 4.3 07/11/2022    CL 99 07/11/2022    CO2 25 07/11/2022    BUN 37 (H) 07/11/2022    CREATININE 1.9 (H) 07/11/2022    ANIONGAP 17 (H) 07/11/2022     Lab Results   Component Value Date    HGBA1C 6.7 (H) 07/11/2022     Lab Results   Component Value Date    BNP 1,088 (H) 01/29/2020    Lab Results   Component Value Date    WBC 8.41 12/16/2021    HGB 15.7 12/16/2021    HCT 45.0 12/16/2021     12/16/2021     "GRAN 3.8 2021    GRAN 45.7 2021     Lab Results   Component Value Date    CHOL 120 2021    HDL 28 (L) 2021    LDLCALC 54.4 (L) 2021    TRIG 188 (H) 2021          Imaging:   Echocardiogram  NICOLE 20  · Atrial fibrillation observed.  · NICOLE performed to assess for thrombus in the left atrial appendage prior to electrical cardioversion. Normal "windsock" appearing left atrial appendage. No thrombus is present in the appendage.  · Normal left ventricular systolic function. The estimated ejection fraction is 63%.  · Normal right ventricular systolic function.  · Biatrial enlargement.  · Mild mitral regurgitation.  · Mild to moderate tricuspid regurgitation.  · Trace pulmonary valve regurgitation.     TTE 19  · Normal left ventricular systolic function. The estimated ejection fraction is 60%  · Indeterminate left ventricular diastolic function.  · Aortic valve sclerosis without significant stenosis.  · Mild mitral regurgitation.  · Normal right ventricular systolic function.  · Estimated PA systolic pressure is at least 40mmHg.  · Biatrial enlargement.     Stress testing  None     Cath Lab  US aorta 20  No aneurysmal dilatation of the abdominal aorta.  Bilateral iliac arteries are not visualized.     Other  Event 20  · Sinus rhythm with sinus arrhythmia and at times sinus bradycarda and rare competing junctional/ventricular rhythm  · 1 7 beat episode of nonsustained VT     EK20 - NSR with 1st deg AVB, RAD, low anterior forces (personally reviewed)  2022 - atrial fibrillation, RBBB/LPFB    Assessment:     1. Primary hypertension    2. Mixed hyperlipidemia    3. Paroxysmal atrial fibrillation    4. CKD stage 4 due to type 2 diabetes mellitus    5. Type 2 diabetes mellitus with diabetic polyneuropathy, with long-term current use of insulin    6. Morbid (severe) obesity due to excess calories      Plan:     Paroxysmal atrial fibrillation  [id number] is at " least 4 (age x2, HTN, DM)  Decrease Eliquis to 2.5 mg bid (age > 80, Cr > 1.5)  Continue coreg  In afib today. No symptoms.     Essential hypertension  BP at goal  Continue Coreg, Nifedipine, Lasix  Low sodium diet     Mixed hyperlipidemia  LDL at goal  Continue meds     Type 2 diabetes mellitus with diabetic polyneuropathy, with long-term current use of insulin  Well controlled. Follows by PCP    CKD stage 4 due to type 2 diabetes mellitus  Stable. Baseline CR 1.8-1.9. Follows with nephrology     Follow up in one year.     Signed:  Shana Fairbanks PA-C  Cardiology   [phone] - General  7/28/2022 9:14 AM

## 2022-08-12 ENCOUNTER — PES CALL (OUTPATIENT)
Dept: ADMINISTRATIVE | Facility: CLINIC | Age: 82
End: 2022-08-12
Payer: MEDICARE

## 2022-08-18 DIAGNOSIS — Z79.4 TYPE 2 DIABETES MELLITUS WITH DIABETIC POLYNEUROPATHY, WITH LONG-TERM CURRENT USE OF INSULIN: ICD-10-CM

## 2022-08-18 DIAGNOSIS — E11.42 TYPE 2 DIABETES MELLITUS WITH DIABETIC POLYNEUROPATHY, WITH LONG-TERM CURRENT USE OF INSULIN: ICD-10-CM

## 2022-08-18 RX ORDER — INSULIN DEGLUDEC 100 U/ML
INJECTION, SOLUTION SUBCUTANEOUS
Qty: 3 PEN | Refills: 0 | Status: SHIPPED | OUTPATIENT
Start: 2022-08-18 | End: 2022-10-18 | Stop reason: SDUPTHER

## 2022-08-18 NOTE — TELEPHONE ENCOUNTER
No new care gaps identified.  [facility] Embedded Care Gaps. Reference number: [id number]. 8/18/2022   1:30:39 PM CDT

## 2022-08-18 NOTE — TELEPHONE ENCOUNTER
Refill Routing Note   Medication(s) are not appropriate for processing by [facility] for the following reason(s):      - Required laboratory values are abnormal - high Cr     [facility] action(s):  Defer          Medication reconciliation completed: No     Appointments  past 12m or future 3m with PCP    Date Provider   Last Visit   1/12/2022 Alan Webster MD   Next Visit   8/30/2022 Alan Webster MD   ED visits in past 90 days: 0        Note composed:1:48 PM 08/18/2022

## 2022-08-18 NOTE — TELEPHONE ENCOUNTER
.[facility] Treshiba Flextouch pen 17 U every evening                             Will send to Centralized Refill Pool. LOV  01/12/2022                              Thank You

## 2022-08-26 NOTE — PROGRESS NOTES
Subjective:       Patient ID: Giovanni Guerrero is a 82 y.o. male.    Chief Complaint: Diabetes    Pt here for f/u.     He has a-fib dx during preop assessment for TURP in 2019. He subsequently had ablation procedure complicated by sinus arrest requiring pressor support. He recovered and has had appropriate f/u with cardiology. He is on eliquis for anticoag. Denies cp/sob/palpitations.     DM2 is well controlled. Most recent A1C was 6.7. Pt reports sugars are in 110s-120s fasting, prandial sugars are generally less than 120s-130s. He has not had lows. He is on tresiba monotherapy at 17 units qhs. He does have nephropathy with CKD-4 and microalbuminuria and his creatinine has been fairly stable. He is followed by nephrology and has f/u tomorrow. He does have neuropathy but is not bothersome and manages without meds. He is up to date on related health maint.    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     Pt's BP is well controlled. Tolerating meds well. Home readings are controlled. Pt denies cp/sob/ha/vision or neuro changes.     HLD is tx with lipitor which he tolerates well.    Gout has been quiet. No recent attacks.      He has BPH with urinary obstruction for which he takes flomax and finasteride. He had TURP which has helped. He has f/u with urology.    Diabetes  Pertinent negatives for hypoglycemia include no headaches. Pertinent negatives for diabetes include no chest pain and no polyuria.   Hypertension  Pertinent negatives include no chest pain, headaches or shortness of breath.   Review of Systems   Constitutional:  Negative for appetite change, fever and unexpected weight change.   Eyes:  Negative for visual disturbance.   Respiratory:  Negative for shortness of breath.    Cardiovascular:  Negative for chest pain.   Gastrointestinal:  Negative for abdominal pain, blood in stool, constipation and diarrhea.   Endocrine: Negative for  polyuria.   Genitourinary:  Negative for frequency.   Neurological:  Negative for light-headedness and headaches.   Psychiatric/Behavioral:  Negative for dysphoric mood.      Objective:      Physical Exam  Constitutional:       Appearance: Normal appearance. He is well-developed.   HENT:      Head: Normocephalic and atraumatic.   Eyes:      Extraocular Movements: Extraocular movements intact.      Pupils: Pupils are equal, round, and reactive to light.   Neck:      Thyroid: No thyromegaly.      Vascular: No carotid bruit.   Cardiovascular:      Rate and Rhythm: Normal rate and regular rhythm.      Pulses:           Posterior tibial pulses are 2+ on the right side and 2+ on the left side.      Heart sounds: Normal heart sounds, S1 normal and S2 normal. No murmur heard.  Pulmonary:      Effort: Pulmonary effort is normal.      Breath sounds: Normal breath sounds. No wheezing.   Abdominal:      General: Bowel sounds are normal.      Palpations: Abdomen is soft. There is no mass.      Tenderness: There is no abdominal tenderness.   Musculoskeletal:      Cervical back: Neck supple.      Right foot: Normal range of motion. No deformity.      Left foot: Normal range of motion. No deformity.      Comments: Protective Sensation (w/ 10 gram monofilament):  Right: Intact  Left: Intact    Visual Inspection:  Normal -  Bilateral    Pedal Pulses:   Right: Present  Left: Present    Posterior tibialis:   Right:Present  Left: Present     Feet:      Right foot:      Protective Sensation: 6 sites tested.  6 sites sensed.      Skin integrity: No ulcer or blister.      Left foot:      Protective Sensation: 6 sites tested.  6 sites sensed.      Skin integrity: No ulcer or blister.   Lymphadenopathy:      Cervical: No cervical adenopathy.   Neurological:      Mental Status: He is alert and oriented to person, place, and time.      Cranial Nerves: No cranial nerve deficit.   Psychiatric:         Mood and Affect: Mood normal.          Behavior: Behavior normal.       Assessment:       1. Diabetic nephropathy associated with type 2 diabetes mellitus    2. Diabetic polyneuropathy associated with type 2 diabetes mellitus    3. Type 2 diabetes mellitus with diabetic polyneuropathy, with long-term current use of insulin    4. CKD stage 4 due to type 2 diabetes mellitus    5. Mixed hyperlipidemia    6. Primary hypertension    7. Gout, arthritis    8. Benign prostatic hyperplasia with urinary retention    9. Metabolic bone disease    10. Paroxysmal atrial fibrillation    11. Urinary retention    12. Morbid (severe) obesity due to excess calories    13. Secondary hyperparathyroidism of renal origin        Plan:       1. Recent labs reviewed    2. Keep f/u with specialists  3. Home BS and BP readings; reviewed hypoglycemia plan with pt

## 2022-08-30 ENCOUNTER — OFFICE VISIT (OUTPATIENT)
Dept: INTERNAL MEDICINE | Facility: CLINIC | Age: 82
End: 2022-08-30
Payer: MEDICARE

## 2022-08-30 ENCOUNTER — LAB VISIT (OUTPATIENT)
Dept: LAB | Facility: OTHER | Age: 82
End: 2022-08-30
Attending: INTERNAL MEDICINE
Payer: MEDICARE

## 2022-08-30 VITALS
OXYGEN SATURATION: 97 % | WEIGHT: 190.38 LBS | SYSTOLIC BLOOD PRESSURE: 142 MMHG | HEIGHT: 64 IN | DIASTOLIC BLOOD PRESSURE: 80 MMHG | BODY MASS INDEX: 32.5 KG/M2 | HEART RATE: 95 BPM

## 2022-08-30 DIAGNOSIS — M10.9 GOUT, ARTHRITIS: ICD-10-CM

## 2022-08-30 DIAGNOSIS — E78.2 MIXED HYPERLIPIDEMIA: ICD-10-CM

## 2022-08-30 DIAGNOSIS — Z79.4 TYPE 2 DIABETES MELLITUS WITH DIABETIC POLYNEUROPATHY, WITH LONG-TERM CURRENT USE OF INSULIN: ICD-10-CM

## 2022-08-30 DIAGNOSIS — N18.4 CKD STAGE 4 DUE TO TYPE 2 DIABETES MELLITUS: ICD-10-CM

## 2022-08-30 DIAGNOSIS — E11.42 TYPE 2 DIABETES MELLITUS WITH DIABETIC POLYNEUROPATHY, WITH LONG-TERM CURRENT USE OF INSULIN: ICD-10-CM

## 2022-08-30 DIAGNOSIS — E11.42 DIABETIC POLYNEUROPATHY ASSOCIATED WITH TYPE 2 DIABETES MELLITUS: ICD-10-CM

## 2022-08-30 DIAGNOSIS — I48.0 PAROXYSMAL ATRIAL FIBRILLATION: ICD-10-CM

## 2022-08-30 DIAGNOSIS — R33.9 URINARY RETENTION: ICD-10-CM

## 2022-08-30 DIAGNOSIS — N40.1 BENIGN PROSTATIC HYPERPLASIA WITH URINARY RETENTION: ICD-10-CM

## 2022-08-30 DIAGNOSIS — E11.22 CKD STAGE 4 DUE TO TYPE 2 DIABETES MELLITUS: ICD-10-CM

## 2022-08-30 DIAGNOSIS — R33.8 BENIGN PROSTATIC HYPERPLASIA WITH URINARY RETENTION: ICD-10-CM

## 2022-08-30 DIAGNOSIS — E66.01 MORBID (SEVERE) OBESITY DUE TO EXCESS CALORIES: ICD-10-CM

## 2022-08-30 DIAGNOSIS — E11.21 DIABETIC NEPHROPATHY ASSOCIATED WITH TYPE 2 DIABETES MELLITUS: ICD-10-CM

## 2022-08-30 DIAGNOSIS — I10 PRIMARY HYPERTENSION: ICD-10-CM

## 2022-08-30 DIAGNOSIS — E11.21 DIABETIC NEPHROPATHY ASSOCIATED WITH TYPE 2 DIABETES MELLITUS: Primary | ICD-10-CM

## 2022-08-30 DIAGNOSIS — N25.81 SECONDARY HYPERPARATHYROIDISM OF RENAL ORIGIN: ICD-10-CM

## 2022-08-30 DIAGNOSIS — M89.8X9 METABOLIC BONE DISEASE: ICD-10-CM

## 2022-08-30 PROBLEM — T83.9XXA FOLEY CATHETER PROBLEM, INITIAL ENCOUNTER: Status: RESOLVED | Noted: 2021-10-16 | Resolved: 2022-08-30

## 2022-08-30 LAB
ALBUMIN/CREAT UR: 123 UG/MG (ref 0–30)
CREAT UR-MCNC: 30.9 MG/DL (ref 23–375)
MICROALBUMIN UR DL<=1MG/L-MCNC: 38 UG/ML

## 2022-08-30 PROCEDURE — 99999 PR PBB SHADOW E&M-EST. PATIENT-LVL V: ICD-10-PCS | Mod: PBBFAC,,, | Performed by: INTERNAL MEDICINE

## 2022-08-30 PROCEDURE — 99214 OFFICE O/P EST MOD 30 MIN: CPT | Mod: S$GLB,,, | Performed by: INTERNAL MEDICINE

## 2022-08-30 PROCEDURE — 1126F AMNT PAIN NOTED NONE PRSNT: CPT | Mod: CPTII,S$GLB,, | Performed by: INTERNAL MEDICINE

## 2022-08-30 PROCEDURE — 1159F MED LIST DOCD IN RCRD: CPT | Mod: CPTII,S$GLB,, | Performed by: INTERNAL MEDICINE

## 2022-08-30 PROCEDURE — 99999 PR PBB SHADOW E&M-EST. PATIENT-LVL V: CPT | Mod: PBBFAC,,, | Performed by: INTERNAL MEDICINE

## 2022-08-30 PROCEDURE — 82570 ASSAY OF URINE CREATININE: CPT | Performed by: INTERNAL MEDICINE

## 2022-08-30 PROCEDURE — 3079F PR MOST RECENT DIASTOLIC BLOOD PRESSURE 80-89 MM HG: ICD-10-PCS | Mod: CPTII,S$GLB,, | Performed by: INTERNAL MEDICINE

## 2022-08-30 PROCEDURE — 3288F FALL RISK ASSESSMENT DOCD: CPT | Mod: CPTII,S$GLB,, | Performed by: INTERNAL MEDICINE

## 2022-08-30 PROCEDURE — 1160F RVW MEDS BY RX/DR IN RCRD: CPT | Mod: CPTII,S$GLB,, | Performed by: INTERNAL MEDICINE

## 2022-08-30 PROCEDURE — 3079F DIAST BP 80-89 MM HG: CPT | Mod: CPTII,S$GLB,, | Performed by: INTERNAL MEDICINE

## 2022-08-30 PROCEDURE — 99499 RISK ADDL DX/OHS AUDIT: ICD-10-PCS | Mod: S$GLB,,, | Performed by: INTERNAL MEDICINE

## 2022-08-30 PROCEDURE — 1159F PR MEDICATION LIST DOCUMENTED IN MEDICAL RECORD: ICD-10-PCS | Mod: CPTII,S$GLB,, | Performed by: INTERNAL MEDICINE

## 2022-08-30 PROCEDURE — 1101F PR PT FALLS ASSESS DOC 0-1 FALLS W/OUT INJ PAST YR: ICD-10-PCS | Mod: CPTII,S$GLB,, | Performed by: INTERNAL MEDICINE

## 2022-08-30 PROCEDURE — 3288F PR FALLS RISK ASSESSMENT DOCUMENTED: ICD-10-PCS | Mod: CPTII,S$GLB,, | Performed by: INTERNAL MEDICINE

## 2022-08-30 PROCEDURE — 1160F PR REVIEW ALL MEDS BY PRESCRIBER/CLIN PHARMACIST DOCUMENTED: ICD-10-PCS | Mod: CPTII,S$GLB,, | Performed by: INTERNAL MEDICINE

## 2022-08-30 PROCEDURE — 99499 UNLISTED E&M SERVICE: CPT | Mod: S$GLB,,, | Performed by: INTERNAL MEDICINE

## 2022-08-30 PROCEDURE — 1126F PR PAIN SEVERITY QUANTIFIED, NO PAIN PRESENT: ICD-10-PCS | Mod: CPTII,S$GLB,, | Performed by: INTERNAL MEDICINE

## 2022-08-30 PROCEDURE — 3077F PR MOST RECENT SYSTOLIC BLOOD PRESSURE >= 140 MM HG: ICD-10-PCS | Mod: CPTII,S$GLB,, | Performed by: INTERNAL MEDICINE

## 2022-08-30 PROCEDURE — 1101F PT FALLS ASSESS-DOCD LE1/YR: CPT | Mod: CPTII,S$GLB,, | Performed by: INTERNAL MEDICINE

## 2022-08-30 PROCEDURE — 99214 PR OFFICE/OUTPT VISIT, EST, LEVL IV, 30-39 MIN: ICD-10-PCS | Mod: S$GLB,,, | Performed by: INTERNAL MEDICINE

## 2022-08-30 PROCEDURE — 3077F SYST BP >= 140 MM HG: CPT | Mod: CPTII,S$GLB,, | Performed by: INTERNAL MEDICINE

## 2022-08-30 RX ORDER — MUPIROCIN 20 MG/G
OINTMENT TOPICAL
COMMUNITY
Start: 2022-05-26 | End: 2023-02-28

## 2022-08-31 ENCOUNTER — OFFICE VISIT (OUTPATIENT)
Dept: NEPHROLOGY | Facility: CLINIC | Age: 82
End: 2022-08-31
Payer: MEDICARE

## 2022-08-31 VITALS
WEIGHT: 191.38 LBS | OXYGEN SATURATION: 98 % | HEIGHT: 64 IN | SYSTOLIC BLOOD PRESSURE: 145 MMHG | DIASTOLIC BLOOD PRESSURE: 82 MMHG | HEART RATE: 87 BPM | BODY MASS INDEX: 32.67 KG/M2

## 2022-08-31 DIAGNOSIS — I10 ESSENTIAL HYPERTENSION: Primary | ICD-10-CM

## 2022-08-31 PROCEDURE — 3077F PR MOST RECENT SYSTOLIC BLOOD PRESSURE >= 140 MM HG: ICD-10-PCS | Mod: CPTII,S$GLB,, | Performed by: INTERNAL MEDICINE

## 2022-08-31 PROCEDURE — 3079F PR MOST RECENT DIASTOLIC BLOOD PRESSURE 80-89 MM HG: ICD-10-PCS | Mod: CPTII,S$GLB,, | Performed by: INTERNAL MEDICINE

## 2022-08-31 PROCEDURE — 1101F PR PT FALLS ASSESS DOC 0-1 FALLS W/OUT INJ PAST YR: ICD-10-PCS | Mod: CPTII,S$GLB,, | Performed by: INTERNAL MEDICINE

## 2022-08-31 PROCEDURE — 3079F DIAST BP 80-89 MM HG: CPT | Mod: CPTII,S$GLB,, | Performed by: INTERNAL MEDICINE

## 2022-08-31 PROCEDURE — 99213 OFFICE O/P EST LOW 20 MIN: CPT | Mod: S$GLB,,, | Performed by: INTERNAL MEDICINE

## 2022-08-31 PROCEDURE — 1159F MED LIST DOCD IN RCRD: CPT | Mod: CPTII,S$GLB,, | Performed by: INTERNAL MEDICINE

## 2022-08-31 PROCEDURE — 99213 PR OFFICE/OUTPT VISIT, EST, LEVL III, 20-29 MIN: ICD-10-PCS | Mod: S$GLB,,, | Performed by: INTERNAL MEDICINE

## 2022-08-31 PROCEDURE — 3288F PR FALLS RISK ASSESSMENT DOCUMENTED: ICD-10-PCS | Mod: CPTII,S$GLB,, | Performed by: INTERNAL MEDICINE

## 2022-08-31 PROCEDURE — 99999 PR PBB SHADOW E&M-EST. PATIENT-LVL IV: CPT | Mod: PBBFAC,,, | Performed by: INTERNAL MEDICINE

## 2022-08-31 PROCEDURE — 1126F PR PAIN SEVERITY QUANTIFIED, NO PAIN PRESENT: ICD-10-PCS | Mod: CPTII,S$GLB,, | Performed by: INTERNAL MEDICINE

## 2022-08-31 PROCEDURE — 3288F FALL RISK ASSESSMENT DOCD: CPT | Mod: CPTII,S$GLB,, | Performed by: INTERNAL MEDICINE

## 2022-08-31 PROCEDURE — 99999 PR PBB SHADOW E&M-EST. PATIENT-LVL IV: ICD-10-PCS | Mod: PBBFAC,,, | Performed by: INTERNAL MEDICINE

## 2022-08-31 PROCEDURE — 3077F SYST BP >= 140 MM HG: CPT | Mod: CPTII,S$GLB,, | Performed by: INTERNAL MEDICINE

## 2022-08-31 PROCEDURE — 1126F AMNT PAIN NOTED NONE PRSNT: CPT | Mod: CPTII,S$GLB,, | Performed by: INTERNAL MEDICINE

## 2022-08-31 PROCEDURE — 1101F PT FALLS ASSESS-DOCD LE1/YR: CPT | Mod: CPTII,S$GLB,, | Performed by: INTERNAL MEDICINE

## 2022-08-31 PROCEDURE — 1159F PR MEDICATION LIST DOCUMENTED IN MEDICAL RECORD: ICD-10-PCS | Mod: CPTII,S$GLB,, | Performed by: INTERNAL MEDICINE

## 2022-08-31 NOTE — PROGRESS NOTES
"Progress Note  Nephrology      Referring physician: Aaln Webster MD    Reason for visit: CKD     SUBJECTIVE:   82 y.o. male  has a past medical history of Allergy, Anticoagulant long-term use, Colon polyp, Diabetes mellitus type II, Gout, arthritis, Gout, unspecified, colonic polyps, Hyperlipidemia, Hypertension, Prostatic hypertrophy, and Renal insufficiency. who has been following up in renal clinic for ckd, his renal function has been syable for years, suffered from urinary retention in the past. Patient feels well today, no urinary or cardiac symptoms, no NSAIDs intake.       OBJECTIVE:     Vitals:    08/31/22 0902   BP: (!) 145/82   Pulse: 87          Physical Exam:  General: no distress, well nourished  HENT: PERRLA, Normal mouth nose and ears.  Neck: no JVD and thyroid not enlarged, symmetric, no tenderness/mass/nodules  Lungs: clear to auscultation bilaterally and normal respiratory effort  Cardiovascular: regular rate and rhythm, S1, S2 normal, no murmur, click, rub or gallop.   Abdomen: soft, non-tender non-distented; bowel sounds normal  Skin: No rashes or lesions  Musculoskeletal:no edema in LE, no deformities.   Lymph Nodes: No cervical or supraclavicular adenopathy  Neurologic: Normal strength and tone. No focal numbness or weakness    Dialysis Access: Not applicable.        Medications:    Current Outpatient Medications:     apixaban (ELIQUIS) 2.5 mg Tab, Take 1 tablet (2.5 mg total) by mouth 2 (two) times daily., Disp: 60 tablet, Rfl: 11    aspirin (ECOTRIN) 81 MG EC tablet, Take 81 mg by mouth once daily., Disp: , Rfl:     atorvastatin (LIPITOR) 40 MG tablet, TAKE 1 TABLET(40 MG) BY MOUTH EVERY DAY, Disp: 90 tablet, Rfl: 3    BD INSULIN SYRINGE ULTRA-FINE 0.5 mL 31 gauge x 5/16 Syrg, USE WITH LANTUS INSULIN ONCE DAILY, Disp: 100 each, Rfl: 3    BD ULTRA-FINE MINI PEN NEEDLE 31 gauge x 3/16" Ndle, , Disp: , Rfl:     blood sugar diagnostic Strp, Check sugar tid as directed, Disp: 300 each, Rfl: " "3    carvediloL (COREG) 3.125 MG tablet, TAKE 1 TABLET(3.125 MG) BY MOUTH TWICE DAILY WITH MEALS, Disp: 180 tablet, Rfl: 2    cholecalciferol, vitamin D3, (VITAMIN D3) 50 mcg (2,000 unit) Tab, Take by mouth once daily., Disp: , Rfl:     ciclopirox (LOPROX) 0.77 % Crea, Apply topically 2 (two) times daily. To lower abdomen, Disp: 90 g, Rfl: 3    finasteride (PROSCAR) 5 mg tablet, TAKE 1 TABLET(5 MG) BY MOUTH EVERY DAY, Disp: 90 tablet, Rfl: 3    FLUAD QUAD 2021-22,65Y UP,,PF, 60 mcg (15 mcg x 4)/0.5 mL Syrg, , Disp: , Rfl:     furosemide (LASIX) 40 MG tablet, TAKE 1 TABLET(40 MG) BY MOUTH EVERY DAY, Disp: 90 tablet, Rfl: 11    insulin degludec (TRESIBA FLEXTOUCH U-100) 100 unit/mL (3 mL) insulin pen, ADMINISTER 17 UNITS UNDER THE SKIN EVERY EVENING, Disp: 3 pen, Rfl: 0    mupirocin (BACTROBAN) 2 % ointment, , Disp: , Rfl:     NIFEdipine (PROCARDIA-XL) 60 MG (OSM) 24 hr tablet, TAKE 1 TABLET(60 MG) BY MOUTH EVERY DAY, Disp: 90 tablet, Rfl: 2    OMEGA-3 ACID ETHYL ESTERS (OMACOR) 1 gram Cap, Take 1 g by mouth. 1 Capsule Oral Twice a day, Disp: , Rfl:     pen needle, diabetic (BD ULTRA-FINE SHORT PEN NEEDLE) 31 gauge x 5/16" Ndle, USE WITH LANTUS INSULIN ONCE DAILY, Disp: 100 each, Rfl: 3    tamsulosin (FLOMAX) 0.4 mg Cap, TAKE 1 CAPSULE BY MOUTH TWICE A DAY, Disp: 180 capsule, Rfl: 3    triamcinolone acetonide 0.1% (KENALOG) 0.1 % ointment, AAA on lower abdomen BID if blisters form, Disp: 80 g, Rfl: 1    blood-glucose meter Misc, 1 kit by Misc.(Non-Drug; Combo Route) route 3 (three) times daily. Pt is to test blood sugar TID, Disp: 1 each, Rfl: 0  No current facility-administered medications for this visit.    Facility-Administered Medications Ordered in Other Visits:     0.9%  NaCl infusion, , Intravenous, Continuous, Krishan Awan MD, Stopped at 01/28/20 0943    sodium chloride 0.9% flush 5 mL, 5 mL, Intravenous, PRN, Krishan Awan MD         Laboratory:  Lab Results   Component Value Date    CREATININE 1.9 (H) " 07/11/2022       Prot/Creat Ratio, Urine   Date Value Ref Range Status   11/16/2021 0.26 (H) 0.00 - 0.20 Final   05/03/2021 0.18 0.00 - 0.20 Final   01/29/2020 Unable to calculate 0.00 - 0.20 Final       Lab Results   Component Value Date     07/11/2022    K 4.3 07/11/2022    CO2 25 07/11/2022       last PTH   Lab Results   Component Value Date    .5 (H) 11/16/2021    CALCIUM 9.5 07/11/2022    PHOS 3.8 11/16/2021       Lab Results   Component Value Date    HGB 15.7 12/16/2021        Lab Results   Component Value Date    HGBA1C 6.7 (H) 07/11/2022       Lab Results   Component Value Date    LDLCALC 54.4 (L) 12/16/2021       Other Labs were reviewed      ASSESSMENT/PLAN:     CKD IIIB/A2   -likely from DMII, HTN and probably recurrent urinary retention from BPH  -Cr baseline ~ 1.7-2.0 with eGFR ~ 35-40 ml/min  -UPCR 180 mg/g  -Renal US remotely with ckd and stones    HTN  -controlled    Anemia  -from ckd  -Hgb goal ~ 10  -Iron stores not available    Secondary Hyperparathyroidism  -Phos/Ca acceptable  -PTH acceptable  -Vit D wnl    Acid/Base  -No Metabolic acidosis    BPH  -with recurrent urinary retention in the past            PLAN:  -Continue CCB, won't start RASi due to advanced age.  -Avoid NSAIDs intake        RTC in 6 months with labs      JESSE MCNULTY MD  NEPHROLOGY ATTENDING

## 2022-09-08 ENCOUNTER — TELEPHONE (OUTPATIENT)
Dept: INTERNAL MEDICINE | Facility: CLINIC | Age: 82
End: 2022-09-08
Payer: MEDICARE

## 2022-09-08 NOTE — TELEPHONE ENCOUNTER
----- Message from Ellie Sprague MA sent at 8/30/2022  2:12 PM CDT -----  Regarding: bp  Past week

## 2022-09-14 ENCOUNTER — TELEPHONE (OUTPATIENT)
Dept: INTERNAL MEDICINE | Facility: CLINIC | Age: 82
End: 2022-09-14
Payer: MEDICARE

## 2022-09-14 VITALS — DIASTOLIC BLOOD PRESSURE: 86 MMHG | SYSTOLIC BLOOD PRESSURE: 136 MMHG

## 2022-09-14 NOTE — TELEPHONE ENCOUNTER
"Added 136/86 to remote Bp   Called pt and let him know "This looks good! Please continue to monitor at home, and let us know if you're consistently running at or above 140 on top, or 90 on the bottom."  Pt verbalized understanding and had no further concerns or questions.      "

## 2022-09-14 NOTE — TELEPHONE ENCOUNTER
----- Message from Tanesha Faulkner sent at 9/14/2022  1:03 PM CDT -----  Type: Patient Call Back    Who called:Self    What is the request in detail: Pt BP yesterday 138/84, this morning it was 136/86    Can the clinic reply by [facility]? no    Would the patient rather a call back or a response via [facility]? Call back    Best call back number: [phone] (home)

## 2022-09-19 ENCOUNTER — PATIENT OUTREACH (OUTPATIENT)
Dept: ADMINISTRATIVE | Facility: HOSPITAL | Age: 82
End: 2022-09-19
Payer: MEDICARE

## 2022-09-26 ENCOUNTER — PATIENT OUTREACH (OUTPATIENT)
Dept: ADMINISTRATIVE | Facility: HOSPITAL | Age: 82
End: 2022-09-26
Payer: MEDICARE

## 2022-10-18 DIAGNOSIS — Z79.4 TYPE 2 DIABETES MELLITUS WITH DIABETIC POLYNEUROPATHY, WITH LONG-TERM CURRENT USE OF INSULIN: ICD-10-CM

## 2022-10-18 DIAGNOSIS — E11.42 TYPE 2 DIABETES MELLITUS WITH DIABETIC POLYNEUROPATHY, WITH LONG-TERM CURRENT USE OF INSULIN: ICD-10-CM

## 2022-10-18 RX ORDER — INSULIN DEGLUDEC 100 U/ML
INJECTION, SOLUTION SUBCUTANEOUS
Qty: 5 PEN | Refills: 0 | Status: SHIPPED | OUTPATIENT
Start: 2022-10-18 | End: 2022-10-19 | Stop reason: SDUPTHER

## 2022-10-18 NOTE — TELEPHONE ENCOUNTER
Care Due:                  Date            Visit Type   Department     Provider  --------------------------------------------------------------------------------                                EP -                              PRIMARY      [facility] INTERNAL  Last Visit: 08-      CARE ([facility])   ALEXANDR Webster                              [facility]                              PRIMARY      [facility] INTERNAL  Next Visit: 02-      CARE ([facility])   ALEXANDR Webster                                                            Last  Test          Frequency    Reason                     Performed    Due Date  --------------------------------------------------------------------------------    HBA1C.......  6 months...  insulin..................  07- 01-    Lipid Panel.  12 months..  atorvastatin.............  12- 12-    [facility] Embedded Care Gaps. Reference number: [id number]. 10/18/2022   11:22:24 AM CDT

## 2022-10-19 RX ORDER — INSULIN DEGLUDEC 100 U/ML
INJECTION, SOLUTION SUBCUTANEOUS
Qty: 5 PEN | Refills: 0 | Status: SHIPPED | OUTPATIENT
Start: 2022-10-19 | End: 2023-01-12 | Stop reason: SDUPTHER

## 2022-10-19 NOTE — TELEPHONE ENCOUNTER
Refill Routing Note   Medication(s) are not appropriate for processing by [facility] for the following reason(s):      - Required laboratory values are abnormal    [facility] action(s):  Defer       Medication Therapy Plan: FOV, FLOS(A1C, Lipid Panel)  Medication reconciliation completed: No     Appointments  past 12m or future 3m with PCP    Date Provider   Last Visit   8/30/2022 Alan Webster MD   Next Visit   2/28/2023 Alan Webster MD   ED visits in past 90 days: 0        Note composed:9:07 PM 10/18/2022

## 2022-10-19 NOTE — TELEPHONE ENCOUNTER
Called [facility] x 2 with very, very long wait times. Eventually spoke with pharmacist/staff who states they do not carry Tresiba (and hadn't heard of this med?). Informed me that they are trying to get it from another location or if not have it shipped to them and that they told pt this earlier.    Called pt to discuss. Pt states he has contacted them a number of days ago and got all his other meds filled, but told pt that they hadn't heard of Tresiba and don't carry it. Asked pt if it's ok for me to check with [facility] to see if they have in stock and can fill for pt? Pt agreed and said he'd appreciate that.    Called and spoke with pharmacy and gave verbal order for Tresiba as written ... however, Tresiba only comes in packs of 5. Called in for 5 pens as they do not break pack. Co-pay is $105. Called pt to inform. Pended Rx so it will be in Epic correctly.      Faxed [facility] to cancel RX there.

## 2022-10-19 NOTE — TELEPHONE ENCOUNTER
Faxed Prescription Refill Request received from   [facility] fpr Tresiba Flextouch Pen                            LOV    08/30/2022                           Thank You

## 2022-10-19 NOTE — TELEPHONE ENCOUNTER
----- Message from Paras Pierre sent at 10/19/2022 11:16 AM CDT -----  Regarding: CAll BAck  Name of Who is Calling:LUCHO SHARMA [[id number]]              What is the request in detail: Patient requesting a call back about medication insulin degludec (TRESIBA FLEXTOUCH U-100) 100 unit/mL (3 mL) insulin pen. states the pharmacy doesn't carry that medication. Patient states it's been 3 days since he had his medication  Please assist              Can the clinic reply by [facility]: No              What Number to Call Back if not in [facility]:[phone]

## 2022-10-20 ENCOUNTER — PES CALL (OUTPATIENT)
Dept: ADMINISTRATIVE | Facility: CLINIC | Age: 82
End: 2022-10-20
Payer: MEDICARE

## 2022-11-03 ENCOUNTER — PATIENT OUTREACH (OUTPATIENT)
Dept: ADMINISTRATIVE | Facility: HOSPITAL | Age: 82
End: 2022-11-03
Payer: MEDICARE

## 2022-12-05 ENCOUNTER — PES CALL (OUTPATIENT)
Dept: ADMINISTRATIVE | Facility: CLINIC | Age: 82
End: 2022-12-05
Payer: MEDICARE

## 2023-01-12 DIAGNOSIS — E11.42 TYPE 2 DIABETES MELLITUS WITH DIABETIC POLYNEUROPATHY, WITH LONG-TERM CURRENT USE OF INSULIN: ICD-10-CM

## 2023-01-12 DIAGNOSIS — Z79.4 TYPE 2 DIABETES MELLITUS WITH DIABETIC POLYNEUROPATHY, WITH LONG-TERM CURRENT USE OF INSULIN: ICD-10-CM

## 2023-01-12 RX ORDER — INSULIN DEGLUDEC 100 U/ML
INJECTION, SOLUTION SUBCUTANEOUS
Qty: 5 PEN | Refills: 0 | Status: SHIPPED | OUTPATIENT
Start: 2023-01-12 | End: 2023-02-28

## 2023-01-12 NOTE — TELEPHONE ENCOUNTER
"Checked allergies/ included last office visit date  Made sure Rx was set to "normal" or "print" if needed  Checked to make sure pharmacy was correct per pt request  Pt came into clinic asking for it and I let him know I will send to provider as high priority but provider is in clinic so may take some time     Routing Rx request to provider   LOV 08.30.2022  "

## 2023-01-12 NOTE — TELEPHONE ENCOUNTER
No new care gaps identified.  [facility] Embedded Care Gaps. Reference number: [id number]. 1/12/2023   10:02:02 AM CST

## 2023-01-13 DIAGNOSIS — E78.2 MIXED HYPERLIPIDEMIA: ICD-10-CM

## 2023-01-13 RX ORDER — ATORVASTATIN CALCIUM 40 MG/1
TABLET, FILM COATED ORAL
Qty: 90 TABLET | Refills: 2 | Status: SHIPPED | OUTPATIENT
Start: 2023-01-13 | End: 2023-09-29 | Stop reason: SDUPTHER

## 2023-01-13 NOTE — TELEPHONE ENCOUNTER
Refill Routing Note   Medication(s) are not appropriate for processing by [facility] for the following reason(s):      - Required laboratory values are outdated    [facility] action(s):  Defer          Medication reconciliation completed: No     Appointments  past 12m or future 3m with PCP    Date Provider   Last Visit   8/30/2022 Alan Webster MD   Next Visit   2/28/2023 Alan Webster MD   ED visits in past 90 days: 0        Note composed:1:18 PM 01/13/2023

## 2023-01-13 NOTE — TELEPHONE ENCOUNTER
No new care gaps identified.  [facility] Embedded Care Gaps. Reference number: [id number]. 1/13/2023   9:02:38 AM CST

## 2023-01-17 ENCOUNTER — TELEPHONE (OUTPATIENT)
Dept: NEPHROLOGY | Facility: CLINIC | Age: 83
End: 2023-01-17
Payer: MEDICARE

## 2023-01-17 NOTE — TELEPHONE ENCOUNTER
----- Message from Domonique Brooks sent at 1/17/2023 12:56 PM CST -----  Regarding: Appt  Contact: Linda [phone]  Patient daughter (Linda)  called to schedule new patient appt on behalf of patient due to prior doctor no longer practices Please call to discuss further

## 2023-01-18 ENCOUNTER — HOSPITAL ENCOUNTER (EMERGENCY)
Facility: OTHER | Age: 83
Discharge: HOME | End: 2023-01-18
Attending: EMERGENCY MEDICINE
Payer: MEDICARE

## 2023-01-18 VITALS
TEMPERATURE: 98 F | BODY MASS INDEX: 34.96 KG/M2 | HEIGHT: 62 IN | DIASTOLIC BLOOD PRESSURE: 77 MMHG | OXYGEN SATURATION: 97 % | RESPIRATION RATE: 16 BRPM | WEIGHT: 190 LBS | HEART RATE: 78 BPM | SYSTOLIC BLOOD PRESSURE: 136 MMHG

## 2023-01-18 DIAGNOSIS — R33.9 URINARY RETENTION: Primary | ICD-10-CM

## 2023-01-18 LAB
ANION GAP SERPL CALC-SCNC: 11 MMOL/L (ref 8–16)
BASOPHILS # BLD AUTO: 0.03 K/UL (ref 0–0.2)
BASOPHILS NFR BLD: 0.4 % (ref 0–1.9)
BILIRUB UR QL STRIP: NEGATIVE
BUN SERPL-MCNC: 40 MG/DL (ref 8–23)
CALCIUM SERPL-MCNC: 10.1 MG/DL (ref 8.7–10.5)
CHLORIDE SERPL-SCNC: 100 MMOL/L (ref 95–110)
CLARITY UR: CLEAR
CO2 SERPL-SCNC: 26 MMOL/L (ref 23–29)
COLOR UR: YELLOW
CREAT SERPL-MCNC: 1.9 MG/DL (ref 0.5–1.4)
DIFFERENTIAL METHOD: ABNORMAL
EOSINOPHIL # BLD AUTO: 0.1 K/UL (ref 0–0.5)
EOSINOPHIL NFR BLD: 1.4 % (ref 0–8)
ERYTHROCYTE [DISTWIDTH] IN BLOOD BY AUTOMATED COUNT: 13 % (ref 11.5–14.5)
EST. GFR  (NO RACE VARIABLE): 35 ML/MIN/1.73 M^2
GLUCOSE SERPL-MCNC: 179 MG/DL (ref 70–110)
GLUCOSE UR QL STRIP: ABNORMAL
HCT VFR BLD AUTO: 49.3 % (ref 40–54)
HGB BLD-MCNC: 17.6 G/DL (ref 14–18)
HGB UR QL STRIP: NEGATIVE
IMM GRANULOCYTES # BLD AUTO: 0.03 K/UL (ref 0–0.04)
IMM GRANULOCYTES NFR BLD AUTO: 0.4 % (ref 0–0.5)
KETONES UR QL STRIP: NEGATIVE
LEUKOCYTE ESTERASE UR QL STRIP: NEGATIVE
LYMPHOCYTES # BLD AUTO: 2 K/UL (ref 1–4.8)
LYMPHOCYTES NFR BLD: 27.5 % (ref 18–48)
MCH RBC QN AUTO: 33 PG (ref 27–31)
MCHC RBC AUTO-ENTMCNC: 35.7 G/DL (ref 32–36)
MCV RBC AUTO: 93 FL (ref 82–98)
MONOCYTES # BLD AUTO: 0.5 K/UL (ref 0.3–1)
MONOCYTES NFR BLD: 7.1 % (ref 4–15)
NEUTROPHILS # BLD AUTO: 4.6 K/UL (ref 1.8–7.7)
NEUTROPHILS NFR BLD: 63.2 % (ref 38–73)
NITRITE UR QL STRIP: NEGATIVE
NRBC BLD-RTO: 0 /100 WBC
PH UR STRIP: 5 [PH] (ref 5–8)
PLATELET # BLD AUTO: 195 K/UL (ref 150–450)
PMV BLD AUTO: 9.5 FL (ref 9.2–12.9)
POTASSIUM SERPL-SCNC: 3.9 MMOL/L (ref 3.5–5.1)
PROT UR QL STRIP: NEGATIVE
RBC # BLD AUTO: 5.33 M/UL (ref 4.6–6.2)
SODIUM SERPL-SCNC: 137 MMOL/L (ref 136–145)
SP GR UR STRIP: 1.01 (ref 1–1.03)
URN SPEC COLLECT METH UR: ABNORMAL
UROBILINOGEN UR STRIP-ACNC: NEGATIVE EU/DL
WBC # BLD AUTO: 7.28 K/UL (ref 3.9–12.7)

## 2023-01-18 PROCEDURE — 99283 EMERGENCY DEPT VISIT LOW MDM: CPT | Mod: 25

## 2023-01-18 PROCEDURE — 85025 COMPLETE CBC W/AUTO DIFF WBC: CPT | Performed by: EMERGENCY MEDICINE

## 2023-01-18 PROCEDURE — 80048 BASIC METABOLIC PNL TOTAL CA: CPT | Performed by: EMERGENCY MEDICINE

## 2023-01-18 PROCEDURE — 81003 URINALYSIS AUTO W/O SCOPE: CPT | Performed by: EMERGENCY MEDICINE

## 2023-01-18 NOTE — ED PROVIDER NOTES
Source of History:  The patient    Chief complaint:  Urinary Retention (Patient presented to ER with complaint of urinary retention. Patient stated last time he urinated was last night around 2330.Patient stated he feels a lot of pressure but cannot void. )      HPI:  Giovanni Guerrero is a 82 y.o. male with history of urinary retention in the past including a TURP procedure proximally 4 years ago presenting with complain of difficulty urinating earlier this morning at 3:00 a.m. which is typical for him.  He felt some pressure this morning and so his daughter brought him in for further evaluation.  Stated he urinated at 11:00 pm. last night and felt that he had fully voided.  When he got up at 3:00 a.m. he was unable to void.  This morning was also unable to avoid so came in for further evaluation.    He states he had a TURP procedure due to BPH as well as urinary retention about 4 years ago and since then has had no issues with urinary retention requiring emergency department visit and Lozano catheter.  He also denies any fevers or chills nausea vomiting.    Right before I came into the room the patient stated that he was able to urinate completely and very easily and now has no complaints.    This is the extent to the patients complaints today here in the emergency department.    ROS:   See HPI.    Review of patient's allergies indicates:   Allergen Reactions    Actos [pioglitazone] Other (See Comments)     constipation       PMH:  As per HPI and below:  Past Medical History:   Diagnosis Date    Allergy     Anticoagulant long-term use     Colon polyp     Diabetes mellitus type II     Gout, arthritis     Gout, unspecified     Hx of colonic polyps     Hyperlipidemia     Hypertension     Prostatic hypertrophy     Renal insufficiency      Past Surgical History:   Procedure Laterality Date    APPENDECTOMY      CATARACT EXTRACTION Left 2018    EYE SURGERY      cataract    HERNIA REPAIR      TONSILLECTOMY       "TRANSURETHRAL RESECTION OF PROSTATE (TURP) USING LASER N/A 12/6/2019    Procedure: TURP, USING LASER;  Surgeon: Rafael Marquis Jr., MD;  Location: [facility] OR [address] [place] [address];  Service: Urology;  Laterality: N/A;  75min    TREATMENT OF CARDIAC ARRHYTHMIA N/A 1/28/2020    Procedure: CARDIOVERSION;  Surgeon: Sergio Bertrand MD;  Location: [facility] EP LAB;  Service: Cardiology;  Laterality: N/A;  AF, NICOLE, DCCV, MAC, MT, 3 Prep       Social History     Tobacco Use    Smoking status: Never    Smokeless tobacco: Never   Substance Use Topics    Alcohol use: No    Drug use: No       Physical Exam:    BP (!) 147/69 (BP Location: Left arm, Patient Position: Sitting)   Pulse 91   Temp 97.7 °F (36.5 °C) (Oral)   Resp 18   Ht 5' 2" (1.575 m)   Wt 86.2 kg (190 lb)   SpO2 98%   BMI 34.75 kg/m²   Nurse's notes vitals reviewed  Constitutional: No acute distress.  Nontoxic  GI: Soft nontender nondistended bowel sounds normal, no guarding, no rebound, on peritoneal  Musculoskeletal: No obvious deformities  Skin: warm and dry. No rashes seen.  Neuro: alert and oriented x3    I decided to obtain the patient's medical records.  Summary of Medical Records:  08/31/2022 office visit with nephrology as a follow-up and referral from the patient's primary care physician for chronic kidney disease.  Plan to continue calcium channel blockers stop any NSAIDs and no further advanced care indicated.  Creatinine baseline proximally 1.7-2.0.    MDM/ Differential Dx:  82-year-old male history of BPH and previous TURP presents with urinary retention.  Initial bladder scan by the nurse was 250 mL.  I went into do my initial evaluation the patient had just urinated approximately 300 mL.  Feeling much better.  Examination is currently benign.  Will get basic blood work to evaluate for kidney function as well as urinalysis.  Pending these results do not feel any further workup is indicated.    ED Course as of 01/18/23 1056   Wed Jan 18, 2023   1032 WBC: 7.28 [SM] "   1032 Hemoglobin: 17.6 [SM]   1032 Platelets: 195 [SM]   1045 Creatinine(!): 1.9  Patient's baseline is 1.7-2.0. [SM]   1052 No further workup is indicated in the emergency department today.  I updated pt regarding results and I counseled pt regarding supportive care measures.  Diagnosis and treatment plan explained to patient. I have answered all questions and the patient is satisfied with the plan of care. Patient discharged home in stable condition.  []      ED Course User Index  [] Roge Peña DO               Diagnostic Impression:    1. Urinary retention         ED Disposition Condition    Discharge Stable            ED Prescriptions    None       Follow-up Information       Follow up With Specialties Details Why Contact Info Additional Information    [facility] - Urology [facility] Urology Schedule an appointment as soon as possible for a visit in 2 weeks  [address] [facility] [address] [phone] Main Building, 4th Floor Please park in [facility] and take Atrium elevator    [facility] - Emergency Dept Emergency Medicine  If symptoms worsen [address] [facility] [address] [phone]              Roge Peña DO  01/18/23 1054

## 2023-02-14 ENCOUNTER — PATIENT OUTREACH (OUTPATIENT)
Dept: ADMINISTRATIVE | Facility: HOSPITAL | Age: 83
End: 2023-02-14
Payer: MEDICARE

## 2023-02-25 NOTE — PROGRESS NOTES
Subjective:       Patient ID: Giovanni Guerrero is a 83 y.o. male.    Chief Complaint: Nephropathy      Pt here for f/u.     He has a-fib dx during preop assessment for TURP in 2019. He subsequently had ablation procedure complicated by sinus arrest requiring pressor support. He recovered and has had appropriate f/u with cardiology. He is on eliquis for anticoag. Denies cp/sob/palpitations.     DM2 is well controlled. Most recent A1C was 7.0. Pt reports sugars are in 120s-130s fasting, prandial sugars are generally less than 130s-140s. He has not had lows. He is on tresiba monotherapy at 17 units qhs. He does have nephropathy with CKD-4 and microalbuminuria and his creatinine has been fairly stable. He is followed by nephrology. He does have neuropathy but is not bothersome and manages without meds. He is up to date on related health maint.    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     Pt's BP is well controlled. Tolerating meds well. Home readings are controlled. Pt denies cp/sob/ha/vision or neuro changes.     HLD is tx with lipitor which he tolerates well.    Gout has been quiet. No recent attacks.      He has BPH with urinary obstruction for which he takes flomax and finasteride. He had TURP which has helped. He has f/u with urology.    Diabetes  Pertinent negatives for hypoglycemia include no headaches. Pertinent negatives for diabetes include no chest pain and no polyuria.   Hypertension  Pertinent negatives include no chest pain, headaches or shortness of breath.   Review of Systems   Constitutional:  Negative for appetite change, fever and unexpected weight change.   Eyes:  Negative for visual disturbance.   Respiratory:  Negative for shortness of breath.    Cardiovascular:  Negative for chest pain.   Gastrointestinal:  Negative for abdominal pain, blood in stool, constipation and diarrhea.   Endocrine: Negative for polyuria.    Genitourinary:  Negative for frequency.   Neurological:  Negative for light-headedness and headaches.   Psychiatric/Behavioral:  Negative for dysphoric mood.      Objective:      Physical Exam  Constitutional:       Appearance: Normal appearance. He is well-developed.   HENT:      Head: Normocephalic and atraumatic.   Eyes:      Extraocular Movements: Extraocular movements intact.      Pupils: Pupils are equal, round, and reactive to light.   Neck:      Thyroid: No thyromegaly.      Vascular: No carotid bruit.   Cardiovascular:      Rate and Rhythm: Normal rate and regular rhythm.      Pulses:           Posterior tibial pulses are 2+ on the right side and 2+ on the left side.      Heart sounds: Normal heart sounds, S1 normal and S2 normal. No murmur heard.  Pulmonary:      Effort: Pulmonary effort is normal.      Breath sounds: Normal breath sounds. No wheezing.   Abdominal:      General: Bowel sounds are normal.      Palpations: Abdomen is soft. There is no mass.      Tenderness: There is no abdominal tenderness.   Musculoskeletal:      Cervical back: Neck supple.      Right foot: Normal range of motion. No deformity.      Left foot: Normal range of motion. No deformity.      Comments: Protective Sensation (w/ 10 gram monofilament):  Right: Intact  Left: Intact    Visual Inspection:  Normal -  Bilateral    Pedal Pulses:   Right: Present  Left: Present    Posterior tibialis:   Right:Present  Left: Present     Feet:      Right foot:      Protective Sensation: 6 sites tested.  6 sites sensed.      Skin integrity: No ulcer or blister.      Left foot:      Protective Sensation: 6 sites tested.  6 sites sensed.      Skin integrity: No ulcer or blister.   Lymphadenopathy:      Cervical: No cervical adenopathy.   Neurological:      Mental Status: He is alert and oriented to person, place, and time.      Cranial Nerves: No cranial nerve deficit.   Psychiatric:         Mood and Affect: Mood normal.         Behavior:  Behavior normal.       Assessment:       1. Primary hypertension    2. Mixed hyperlipidemia    3. Gout, arthritis    4. Diabetic nephropathy associated with type 2 diabetes mellitus    5. Diabetic polyneuropathy associated with type 2 diabetes mellitus    6. Type 2 diabetes mellitus with diabetic polyneuropathy, with long-term current use of insulin    7. CKD stage 4 due to type 2 diabetes mellitus    8. Paroxysmal atrial fibrillation    9. Secondary hyperparathyroidism of renal origin    10. Benign prostatic hyperplasia with urinary retention    11. Metabolic bone disease    12. Morbid (severe) obesity due to excess calories        Plan:       1. appropriate labs    2. Keep f/u with specialists  3. Home BS and BP readings; reviewed hypoglycemia plan with pt  4. Modernize DM regimen by adding jardiance 10mg daily (recent GFR 35); reduce tresiba to 15 units; advised to let us know if sugars are low and will drop tresiba; check BMP in 2 wks

## 2023-02-27 ENCOUNTER — LAB VISIT (OUTPATIENT)
Dept: LAB | Facility: OTHER | Age: 83
End: 2023-02-27
Attending: INTERNAL MEDICINE
Payer: MEDICARE

## 2023-02-27 DIAGNOSIS — E78.2 MIXED HYPERLIPIDEMIA: ICD-10-CM

## 2023-02-27 DIAGNOSIS — E11.21 DIABETIC NEPHROPATHY ASSOCIATED WITH TYPE 2 DIABETES MELLITUS: ICD-10-CM

## 2023-02-27 DIAGNOSIS — I10 PRIMARY HYPERTENSION: ICD-10-CM

## 2023-02-27 LAB
ALBUMIN SERPL BCP-MCNC: 3.2 G/DL (ref 3.5–5.2)
ALP SERPL-CCNC: 66 U/L (ref 55–135)
ALT SERPL W/O P-5'-P-CCNC: 22 U/L (ref 10–44)
ANION GAP SERPL CALC-SCNC: 9 MMOL/L (ref 8–16)
AST SERPL-CCNC: 21 U/L (ref 10–40)
BASOPHILS # BLD AUTO: 0.02 K/UL (ref 0–0.2)
BASOPHILS NFR BLD: 0.3 % (ref 0–1.9)
BILIRUB SERPL-MCNC: 0.7 MG/DL (ref 0.1–1)
BUN SERPL-MCNC: 30 MG/DL (ref 8–23)
CALCIUM SERPL-MCNC: 8.9 MG/DL (ref 8.7–10.5)
CHLORIDE SERPL-SCNC: 102 MMOL/L (ref 95–110)
CHOLEST SERPL-MCNC: 101 MG/DL (ref 120–199)
CHOLEST/HDLC SERPL: 3.6 {RATIO} (ref 2–5)
CO2 SERPL-SCNC: 27 MMOL/L (ref 23–29)
CREAT SERPL-MCNC: 1.6 MG/DL (ref 0.5–1.4)
DIFFERENTIAL METHOD: ABNORMAL
EOSINOPHIL # BLD AUTO: 0.1 K/UL (ref 0–0.5)
EOSINOPHIL NFR BLD: 1.4 % (ref 0–8)
ERYTHROCYTE [DISTWIDTH] IN BLOOD BY AUTOMATED COUNT: 12.9 % (ref 11.5–14.5)
EST. GFR  (NO RACE VARIABLE): 43 ML/MIN/1.73 M^2
ESTIMATED AVG GLUCOSE: 154 MG/DL (ref 68–131)
GLUCOSE SERPL-MCNC: 124 MG/DL (ref 70–110)
HBA1C MFR BLD: 7 % (ref 4–5.6)
HCT VFR BLD AUTO: 43.2 % (ref 40–54)
HDLC SERPL-MCNC: 28 MG/DL (ref 40–75)
HDLC SERPL: 27.7 % (ref 20–50)
HGB BLD-MCNC: 15.1 G/DL (ref 14–18)
IMM GRANULOCYTES # BLD AUTO: 0.02 K/UL (ref 0–0.04)
IMM GRANULOCYTES NFR BLD AUTO: 0.3 % (ref 0–0.5)
LDLC SERPL CALC-MCNC: 43.6 MG/DL (ref 63–159)
LYMPHOCYTES # BLD AUTO: 2.3 K/UL (ref 1–4.8)
LYMPHOCYTES NFR BLD: 32 % (ref 18–48)
MCH RBC QN AUTO: 32.8 PG (ref 27–31)
MCHC RBC AUTO-ENTMCNC: 35 G/DL (ref 32–36)
MCV RBC AUTO: 94 FL (ref 82–98)
MONOCYTES # BLD AUTO: 0.6 K/UL (ref 0.3–1)
MONOCYTES NFR BLD: 8 % (ref 4–15)
NEUTROPHILS # BLD AUTO: 4.2 K/UL (ref 1.8–7.7)
NEUTROPHILS NFR BLD: 58 % (ref 38–73)
NONHDLC SERPL-MCNC: 73 MG/DL
NRBC BLD-RTO: 0 /100 WBC
PLATELET # BLD AUTO: 182 K/UL (ref 150–450)
PMV BLD AUTO: 9.7 FL (ref 9.2–12.9)
POTASSIUM SERPL-SCNC: 3.7 MMOL/L (ref 3.5–5.1)
PROT SERPL-MCNC: 6.6 G/DL (ref 6–8.4)
RBC # BLD AUTO: 4.61 M/UL (ref 4.6–6.2)
SODIUM SERPL-SCNC: 138 MMOL/L (ref 136–145)
TRIGL SERPL-MCNC: 147 MG/DL (ref 30–150)
WBC # BLD AUTO: 7.27 K/UL (ref 3.9–12.7)

## 2023-02-27 PROCEDURE — 36415 COLL VENOUS BLD VENIPUNCTURE: CPT | Performed by: INTERNAL MEDICINE

## 2023-02-27 PROCEDURE — 83036 HEMOGLOBIN GLYCOSYLATED A1C: CPT | Performed by: INTERNAL MEDICINE

## 2023-02-27 PROCEDURE — 80061 LIPID PANEL: CPT | Performed by: INTERNAL MEDICINE

## 2023-02-27 PROCEDURE — 80053 COMPREHEN METABOLIC PANEL: CPT | Performed by: INTERNAL MEDICINE

## 2023-02-27 PROCEDURE — 85025 COMPLETE CBC W/AUTO DIFF WBC: CPT | Performed by: INTERNAL MEDICINE

## 2023-02-28 ENCOUNTER — OFFICE VISIT (OUTPATIENT)
Dept: INTERNAL MEDICINE | Facility: CLINIC | Age: 83
End: 2023-02-28
Payer: MEDICARE

## 2023-02-28 VITALS
WEIGHT: 192.13 LBS | BODY MASS INDEX: 35.36 KG/M2 | HEART RATE: 100 BPM | OXYGEN SATURATION: 97 % | HEIGHT: 62 IN | SYSTOLIC BLOOD PRESSURE: 132 MMHG | DIASTOLIC BLOOD PRESSURE: 76 MMHG

## 2023-02-28 DIAGNOSIS — R33.8 BENIGN PROSTATIC HYPERPLASIA WITH URINARY RETENTION: ICD-10-CM

## 2023-02-28 DIAGNOSIS — E66.01 MORBID (SEVERE) OBESITY DUE TO EXCESS CALORIES: ICD-10-CM

## 2023-02-28 DIAGNOSIS — E11.22 CKD STAGE 4 DUE TO TYPE 2 DIABETES MELLITUS: Chronic | ICD-10-CM

## 2023-02-28 DIAGNOSIS — E78.2 MIXED HYPERLIPIDEMIA: Chronic | ICD-10-CM

## 2023-02-28 DIAGNOSIS — N25.81 SECONDARY HYPERPARATHYROIDISM OF RENAL ORIGIN: ICD-10-CM

## 2023-02-28 DIAGNOSIS — N18.4 CKD STAGE 4 DUE TO TYPE 2 DIABETES MELLITUS: Chronic | ICD-10-CM

## 2023-02-28 DIAGNOSIS — M10.9 GOUT, ARTHRITIS: ICD-10-CM

## 2023-02-28 DIAGNOSIS — N40.1 BENIGN PROSTATIC HYPERPLASIA WITH URINARY RETENTION: ICD-10-CM

## 2023-02-28 DIAGNOSIS — E11.42 TYPE 2 DIABETES MELLITUS WITH DIABETIC POLYNEUROPATHY, WITH LONG-TERM CURRENT USE OF INSULIN: ICD-10-CM

## 2023-02-28 DIAGNOSIS — I10 PRIMARY HYPERTENSION: Primary | ICD-10-CM

## 2023-02-28 DIAGNOSIS — I48.0 PAROXYSMAL ATRIAL FIBRILLATION: ICD-10-CM

## 2023-02-28 DIAGNOSIS — M89.8X9 METABOLIC BONE DISEASE: ICD-10-CM

## 2023-02-28 DIAGNOSIS — E11.42 DIABETIC POLYNEUROPATHY ASSOCIATED WITH TYPE 2 DIABETES MELLITUS: Chronic | ICD-10-CM

## 2023-02-28 DIAGNOSIS — Z79.4 TYPE 2 DIABETES MELLITUS WITH DIABETIC POLYNEUROPATHY, WITH LONG-TERM CURRENT USE OF INSULIN: ICD-10-CM

## 2023-02-28 DIAGNOSIS — E11.21 DIABETIC NEPHROPATHY ASSOCIATED WITH TYPE 2 DIABETES MELLITUS: Chronic | ICD-10-CM

## 2023-02-28 PROCEDURE — 99214 OFFICE O/P EST MOD 30 MIN: CPT | Mod: S$GLB,,, | Performed by: INTERNAL MEDICINE

## 2023-02-28 PROCEDURE — 1159F MED LIST DOCD IN RCRD: CPT | Mod: CPTII,S$GLB,, | Performed by: INTERNAL MEDICINE

## 2023-02-28 PROCEDURE — 1160F RVW MEDS BY RX/DR IN RCRD: CPT | Mod: CPTII,S$GLB,, | Performed by: INTERNAL MEDICINE

## 2023-02-28 PROCEDURE — 99999 PR PBB SHADOW E&M-EST. PATIENT-LVL IV: ICD-10-PCS | Mod: PBBFAC,,, | Performed by: INTERNAL MEDICINE

## 2023-02-28 PROCEDURE — 1159F PR MEDICATION LIST DOCUMENTED IN MEDICAL RECORD: ICD-10-PCS | Mod: CPTII,S$GLB,, | Performed by: INTERNAL MEDICINE

## 2023-02-28 PROCEDURE — 3288F FALL RISK ASSESSMENT DOCD: CPT | Mod: CPTII,S$GLB,, | Performed by: INTERNAL MEDICINE

## 2023-02-28 PROCEDURE — 1101F PT FALLS ASSESS-DOCD LE1/YR: CPT | Mod: CPTII,S$GLB,, | Performed by: INTERNAL MEDICINE

## 2023-02-28 PROCEDURE — 99214 PR OFFICE/OUTPT VISIT, EST, LEVL IV, 30-39 MIN: ICD-10-PCS | Mod: S$GLB,,, | Performed by: INTERNAL MEDICINE

## 2023-02-28 PROCEDURE — 3078F PR MOST RECENT DIASTOLIC BLOOD PRESSURE < 80 MM HG: ICD-10-PCS | Mod: CPTII,S$GLB,, | Performed by: INTERNAL MEDICINE

## 2023-02-28 PROCEDURE — 1160F PR REVIEW ALL MEDS BY PRESCRIBER/CLIN PHARMACIST DOCUMENTED: ICD-10-PCS | Mod: CPTII,S$GLB,, | Performed by: INTERNAL MEDICINE

## 2023-02-28 PROCEDURE — 1126F AMNT PAIN NOTED NONE PRSNT: CPT | Mod: CPTII,S$GLB,, | Performed by: INTERNAL MEDICINE

## 2023-02-28 PROCEDURE — 1126F PR PAIN SEVERITY QUANTIFIED, NO PAIN PRESENT: ICD-10-PCS | Mod: CPTII,S$GLB,, | Performed by: INTERNAL MEDICINE

## 2023-02-28 PROCEDURE — 1101F PR PT FALLS ASSESS DOC 0-1 FALLS W/OUT INJ PAST YR: ICD-10-PCS | Mod: CPTII,S$GLB,, | Performed by: INTERNAL MEDICINE

## 2023-02-28 PROCEDURE — 99999 PR PBB SHADOW E&M-EST. PATIENT-LVL IV: CPT | Mod: PBBFAC,,, | Performed by: INTERNAL MEDICINE

## 2023-02-28 PROCEDURE — 3075F PR MOST RECENT SYSTOLIC BLOOD PRESS GE 130-139MM HG: ICD-10-PCS | Mod: CPTII,S$GLB,, | Performed by: INTERNAL MEDICINE

## 2023-02-28 PROCEDURE — 3078F DIAST BP <80 MM HG: CPT | Mod: CPTII,S$GLB,, | Performed by: INTERNAL MEDICINE

## 2023-02-28 PROCEDURE — 3288F PR FALLS RISK ASSESSMENT DOCUMENTED: ICD-10-PCS | Mod: CPTII,S$GLB,, | Performed by: INTERNAL MEDICINE

## 2023-02-28 PROCEDURE — 3075F SYST BP GE 130 - 139MM HG: CPT | Mod: CPTII,S$GLB,, | Performed by: INTERNAL MEDICINE

## 2023-02-28 RX ORDER — INSULIN DEGLUDEC 100 U/ML
INJECTION, SOLUTION SUBCUTANEOUS
Qty: 5 PEN | Refills: 0
Start: 2023-02-28 | End: 2023-06-30 | Stop reason: SDUPTHER

## 2023-02-28 NOTE — PATIENT INSTRUCTIONS
--Decrease tresiba to 15 units nightly  --start jardiance 10mg every morning  --get lab in 2 weeks

## 2023-03-14 ENCOUNTER — PATIENT MESSAGE (OUTPATIENT)
Dept: INTERNAL MEDICINE | Facility: CLINIC | Age: 83
End: 2023-03-14
Payer: MEDICARE

## 2023-03-14 ENCOUNTER — LAB VISIT (OUTPATIENT)
Dept: LAB | Facility: OTHER | Age: 83
End: 2023-03-14
Attending: INTERNAL MEDICINE
Payer: MEDICARE

## 2023-03-14 DIAGNOSIS — E11.22 CKD STAGE 4 DUE TO TYPE 2 DIABETES MELLITUS: Primary | Chronic | ICD-10-CM

## 2023-03-14 DIAGNOSIS — N18.4 CKD STAGE 4 DUE TO TYPE 2 DIABETES MELLITUS: Primary | Chronic | ICD-10-CM

## 2023-03-14 DIAGNOSIS — E11.21 DIABETIC NEPHROPATHY ASSOCIATED WITH TYPE 2 DIABETES MELLITUS: Chronic | ICD-10-CM

## 2023-03-14 LAB
ANION GAP SERPL CALC-SCNC: 9 MMOL/L (ref 8–16)
BUN SERPL-MCNC: 41 MG/DL (ref 8–23)
CALCIUM SERPL-MCNC: 9.9 MG/DL (ref 8.7–10.5)
CHLORIDE SERPL-SCNC: 100 MMOL/L (ref 95–110)
CO2 SERPL-SCNC: 29 MMOL/L (ref 23–29)
CREAT SERPL-MCNC: 2 MG/DL (ref 0.5–1.4)
EST. GFR  (NO RACE VARIABLE): 33 ML/MIN/1.73 M^2
GLUCOSE SERPL-MCNC: 157 MG/DL (ref 70–110)
POTASSIUM SERPL-SCNC: 3.8 MMOL/L (ref 3.5–5.1)
SODIUM SERPL-SCNC: 138 MMOL/L (ref 136–145)

## 2023-03-14 PROCEDURE — 36415 COLL VENOUS BLD VENIPUNCTURE: CPT | Performed by: INTERNAL MEDICINE

## 2023-03-14 PROCEDURE — 80048 BASIC METABOLIC PNL TOTAL CA: CPT | Performed by: INTERNAL MEDICINE

## 2023-03-15 ENCOUNTER — OFFICE VISIT (OUTPATIENT)
Dept: NEPHROLOGY | Facility: CLINIC | Age: 83
End: 2023-03-15
Payer: MEDICARE

## 2023-03-15 VITALS
OXYGEN SATURATION: 99 % | HEART RATE: 94 BPM | WEIGHT: 185.19 LBS | SYSTOLIC BLOOD PRESSURE: 148 MMHG | BODY MASS INDEX: 34.08 KG/M2 | HEIGHT: 62 IN | DIASTOLIC BLOOD PRESSURE: 80 MMHG

## 2023-03-15 DIAGNOSIS — I10 ESSENTIAL HYPERTENSION: ICD-10-CM

## 2023-03-15 DIAGNOSIS — E11.21 DIABETIC NEPHROPATHY ASSOCIATED WITH TYPE 2 DIABETES MELLITUS: ICD-10-CM

## 2023-03-15 DIAGNOSIS — N18.32 STAGE 3B CHRONIC KIDNEY DISEASE: Primary | ICD-10-CM

## 2023-03-15 PROCEDURE — 3077F PR MOST RECENT SYSTOLIC BLOOD PRESSURE >= 140 MM HG: ICD-10-PCS | Mod: CPTII,S$GLB,, | Performed by: INTERNAL MEDICINE

## 2023-03-15 PROCEDURE — 3288F PR FALLS RISK ASSESSMENT DOCUMENTED: ICD-10-PCS | Mod: CPTII,S$GLB,, | Performed by: INTERNAL MEDICINE

## 2023-03-15 PROCEDURE — 99999 PR PBB SHADOW E&M-EST. PATIENT-LVL III: CPT | Mod: PBBFAC,,, | Performed by: INTERNAL MEDICINE

## 2023-03-15 PROCEDURE — 3288F FALL RISK ASSESSMENT DOCD: CPT | Mod: CPTII,S$GLB,, | Performed by: INTERNAL MEDICINE

## 2023-03-15 PROCEDURE — 1101F PR PT FALLS ASSESS DOC 0-1 FALLS W/OUT INJ PAST YR: ICD-10-PCS | Mod: CPTII,S$GLB,, | Performed by: INTERNAL MEDICINE

## 2023-03-15 PROCEDURE — 3079F DIAST BP 80-89 MM HG: CPT | Mod: CPTII,S$GLB,, | Performed by: INTERNAL MEDICINE

## 2023-03-15 PROCEDURE — 99999 PR PBB SHADOW E&M-EST. PATIENT-LVL III: ICD-10-PCS | Mod: PBBFAC,,, | Performed by: INTERNAL MEDICINE

## 2023-03-15 PROCEDURE — 1101F PT FALLS ASSESS-DOCD LE1/YR: CPT | Mod: CPTII,S$GLB,, | Performed by: INTERNAL MEDICINE

## 2023-03-15 PROCEDURE — 3077F SYST BP >= 140 MM HG: CPT | Mod: CPTII,S$GLB,, | Performed by: INTERNAL MEDICINE

## 2023-03-15 PROCEDURE — 1159F MED LIST DOCD IN RCRD: CPT | Mod: CPTII,S$GLB,, | Performed by: INTERNAL MEDICINE

## 2023-03-15 PROCEDURE — 1126F AMNT PAIN NOTED NONE PRSNT: CPT | Mod: CPTII,S$GLB,, | Performed by: INTERNAL MEDICINE

## 2023-03-15 PROCEDURE — 1126F PR PAIN SEVERITY QUANTIFIED, NO PAIN PRESENT: ICD-10-PCS | Mod: CPTII,S$GLB,, | Performed by: INTERNAL MEDICINE

## 2023-03-15 PROCEDURE — 99214 OFFICE O/P EST MOD 30 MIN: CPT | Mod: S$GLB,,, | Performed by: INTERNAL MEDICINE

## 2023-03-15 PROCEDURE — 99214 PR OFFICE/OUTPT VISIT, EST, LEVL IV, 30-39 MIN: ICD-10-PCS | Mod: S$GLB,,, | Performed by: INTERNAL MEDICINE

## 2023-03-15 PROCEDURE — 3079F PR MOST RECENT DIASTOLIC BLOOD PRESSURE 80-89 MM HG: ICD-10-PCS | Mod: CPTII,S$GLB,, | Performed by: INTERNAL MEDICINE

## 2023-03-15 PROCEDURE — 1159F PR MEDICATION LIST DOCUMENTED IN MEDICAL RECORD: ICD-10-PCS | Mod: CPTII,S$GLB,, | Performed by: INTERNAL MEDICINE

## 2023-03-15 NOTE — PROGRESS NOTES
Progress Note  Nephrology      Referring physician: Alan Webster MD    Reason for visit: CKD     SUBJECTIVE:   83 y.o. male  has a past medical history of Allergy, Anticoagulant long-term use, Colon polyp, Diabetes mellitus type II, Gout, arthritis, Gout, unspecified, colonic polyps, Hyperlipidemia, Hypertension, Prostatic hypertrophy, and Renal insufficiency. who has been following up in renal clinic for ckd management   The patient denies taking NSAIDs or new antibiotics, recreational drugs, recent episode of dehydration, diarrhea, nausea or vomiting, acute illness, hospitalization or exposure to IV radiocontrast.     ROS:  General: negative for chills, or fatigue  ENT: No epistaxis or headaches  Hematological and Lymphatic: No bleeding problems or blood clots.  Endocrine: No skin changes or temperature intolerance  Respiratory: No cough, shortness of breath, or wheezing  Cardiovascular: No chest pain or dyspnea   Gastrointestinal: No abdominal pain, change in bowel habits  Genito-Urinary: No dysuria, trouble voiding, or hematuria  Musculoskeletal: ROS: negative for - joint pain, joint stiffness, joint swelling, muscle pain or muscular weakness  Neurological: No focal weakness, no numbness  Dermatological: No rash or ulcers    OBJECTIVE:     Vitals:    03/15/23 1121   Pulse: 94          Physical Exam:  General: no distress, well nourished  HENT: PERRLA, Normal mouth nose and ears.  Neck: no JVD and thyroid not enlarged, symmetric, no tenderness/mass/nodules  Lungs: clear to auscultation bilaterally and normal respiratory effort  Cardiovascular: regular rate and rhythm, S1, S2 normal, no murmur, click, rub or gallop.   Abdomen: soft, non-tender non-distented; bowel sounds normal  Skin: No rashes or lesions  Musculoskeletal:no edema in LE, no deformities.   Lymph Nodes: No cervical or supraclavicular adenopathy  Neurologic: Normal strength and tone. No focal numbness or  "weakness          Medications:    Current Outpatient Medications:     apixaban (ELIQUIS) 2.5 mg Tab, Take 1 tablet (2.5 mg total) by mouth 2 (two) times daily., Disp: 60 tablet, Rfl: 11    aspirin (ECOTRIN) 81 MG EC tablet, Take 81 mg by mouth once daily., Disp: , Rfl:     atorvastatin (LIPITOR) 40 MG tablet, TAKE 1 TABLET(40 MG) BY MOUTH EVERY DAY, Disp: 90 tablet, Rfl: 2    BD INSULIN SYRINGE ULTRA-FINE 0.5 mL 31 gauge x 5/16 Syrg, USE WITH LANTUS INSULIN ONCE DAILY, Disp: 100 each, Rfl: 3    BD ULTRA-FINE MINI PEN NEEDLE 31 gauge x 3/16" Ndle, , Disp: , Rfl:     blood sugar diagnostic Strp, Check sugar tid as directed, Disp: 300 each, Rfl: 3    blood-glucose meter Misc, 1 kit by Misc.(Non-Drug; Combo Route) route 3 (three) times daily. Pt is to test blood sugar TID, Disp: 1 each, Rfl: 0    carvediloL (COREG) 3.125 MG tablet, TAKE 1 TABLET(3.125 MG) BY MOUTH TWICE DAILY WITH MEALS, Disp: 180 tablet, Rfl: 3    cholecalciferol, vitamin D3, (VITAMIN D3) 50 mcg (2,000 unit) Tab, Take by mouth once daily., Disp: , Rfl:     empagliflozin (JARDIANCE) 10 mg tablet, Take 1 tablet (10 mg total) by mouth once daily., Disp: 90 tablet, Rfl: 1    finasteride (PROSCAR) 5 mg tablet, TAKE 1 TABLET(5 MG) BY MOUTH EVERY DAY, Disp: 90 tablet, Rfl: 3    furosemide (LASIX) 40 MG tablet, TAKE 1 TABLET(40 MG) BY MOUTH EVERY DAY, Disp: 90 tablet, Rfl: 11    insulin degludec (TRESIBA FLEXTOUCH U-100) 100 unit/mL (3 mL) insulin pen, ADMINISTER 15 UNITS UNDER THE SKIN EVERY EVENING, Disp: 5 pen, Rfl: 0    NIFEdipine (PROCARDIA-XL) 60 MG (OSM) 24 hr tablet, TAKE 1 TABLET(60 MG) BY MOUTH EVERY DAY, Disp: 90 tablet, Rfl: 2    OMEGA-3 ACID ETHYL ESTERS (OMACOR) 1 gram Cap, Take 1 g by mouth. 1 Capsule Oral Twice a day, Disp: , Rfl:     pen needle, diabetic (BD ULTRA-FINE SHORT PEN NEEDLE) 31 gauge x 5/16" Ndle, USE WITH LANTUS INSULIN ONCE DAILY, Disp: 100 each, Rfl: 3    tamsulosin (FLOMAX) 0.4 mg Cap, TAKE 1 CAPSULE BY MOUTH TWICE A DAY, Disp: " 180 capsule, Rfl: 3  No current facility-administered medications for this visit.    Facility-Administered Medications Ordered in Other Visits:     0.9%  NaCl infusion, , Intravenous, Continuous, Krishan Awan MD, Stopped at 01/28/20 0943    sodium chloride 0.9% flush 5 mL, 5 mL, Intravenous, PRN, Krishan Awan MD         Laboratory:  Lab Results   Component Value Date    CREATININE 2.0 (H) 03/14/2023       Prot/Creat Ratio, Urine   Date Value Ref Range Status   11/16/2021 0.26 (H) 0.00 - 0.20 Final   05/03/2021 0.18 0.00 - 0.20 Final   01/29/2020 Unable to calculate 0.00 - 0.20 Final       Lab Results   Component Value Date     03/14/2023    K 3.8 03/14/2023    CO2 29 03/14/2023       last PTH   Lab Results   Component Value Date    .5 (H) 11/16/2021    CALCIUM 9.9 03/14/2023    PHOS 3.8 11/16/2021       Lab Results   Component Value Date    HGB 15.1 02/27/2023        Lab Results   Component Value Date    HGBA1C 7.0 (H) 02/27/2023       Lab Results   Component Value Date    LDLCALC 43.6 (L) 02/27/2023       Other Labs were reviewed      ASSESSMENT/PLAN:        CKD IIIB/A2   -likely from DMII, HTN and probably recurrent urinary retention from BPH  -Cr baseline ~ 1.7-2.0 with eGFR ~ 35-40 ml/min  -UA unremarkable  -Renal US remotely with ckd and stones     HTN  -controlled     Anemia  -from ckd  -Hgb goal ~ 10  -Iron stores not available     Secondary Hyperparathyroidism  -Phos/Ca acceptable  -PTH acceptable  -Vit D wnl     Acid/Base  -No Metabolic acidosis     BPH  -with recurrent urinary retention in the past        RTC in 1 yr      SARAH RICE MD  NEPHROLOGY ATTENDING

## 2023-03-23 NOTE — TELEPHONE ENCOUNTER
I call MsFernando Linda and speak to her regarding dad's lab results. Follow-up lab is scheduled on 3/28 at 10:40 AM.

## 2023-03-28 ENCOUNTER — LAB VISIT (OUTPATIENT)
Dept: LAB | Facility: OTHER | Age: 83
End: 2023-03-28
Attending: INTERNAL MEDICINE
Payer: MEDICARE

## 2023-03-28 DIAGNOSIS — N18.4 CKD STAGE 4 DUE TO TYPE 2 DIABETES MELLITUS: Chronic | ICD-10-CM

## 2023-03-28 DIAGNOSIS — E11.22 CKD STAGE 4 DUE TO TYPE 2 DIABETES MELLITUS: Chronic | ICD-10-CM

## 2023-03-28 LAB
ANION GAP SERPL CALC-SCNC: 10 MMOL/L (ref 8–16)
BUN SERPL-MCNC: 38 MG/DL (ref 8–23)
CALCIUM SERPL-MCNC: 9.5 MG/DL (ref 8.7–10.5)
CHLORIDE SERPL-SCNC: 103 MMOL/L (ref 95–110)
CO2 SERPL-SCNC: 26 MMOL/L (ref 23–29)
CREAT SERPL-MCNC: 2 MG/DL (ref 0.5–1.4)
EST. GFR  (NO RACE VARIABLE): 33 ML/MIN/1.73 M^2
GLUCOSE SERPL-MCNC: 120 MG/DL (ref 70–110)
POTASSIUM SERPL-SCNC: 3.7 MMOL/L (ref 3.5–5.1)
SODIUM SERPL-SCNC: 139 MMOL/L (ref 136–145)

## 2023-03-28 PROCEDURE — 36415 COLL VENOUS BLD VENIPUNCTURE: CPT | Performed by: INTERNAL MEDICINE

## 2023-03-28 PROCEDURE — 80048 BASIC METABOLIC PNL TOTAL CA: CPT | Performed by: INTERNAL MEDICINE

## 2023-04-04 ENCOUNTER — PATIENT MESSAGE (OUTPATIENT)
Dept: INTERNAL MEDICINE | Facility: CLINIC | Age: 83
End: 2023-04-04
Payer: MEDICARE

## 2023-04-10 ENCOUNTER — PES CALL (OUTPATIENT)
Dept: ADMINISTRATIVE | Facility: CLINIC | Age: 83
End: 2023-04-10
Payer: MEDICARE

## 2023-04-21 ENCOUNTER — PATIENT MESSAGE (OUTPATIENT)
Dept: ADMINISTRATIVE | Facility: HOSPITAL | Age: 83
End: 2023-04-21
Payer: MEDICARE

## 2023-04-25 DIAGNOSIS — E11.42 TYPE 2 DIABETES MELLITUS WITH DIABETIC POLYNEUROPATHY, WITH LONG-TERM CURRENT USE OF INSULIN: ICD-10-CM

## 2023-04-25 DIAGNOSIS — Z79.4 TYPE 2 DIABETES MELLITUS WITH DIABETIC POLYNEUROPATHY, WITH LONG-TERM CURRENT USE OF INSULIN: ICD-10-CM

## 2023-04-25 RX ORDER — INSULIN DEGLUDEC 100 U/ML
INJECTION, SOLUTION SUBCUTANEOUS
Qty: 5 PEN | Refills: 0 | Status: SHIPPED | OUTPATIENT
Start: 2023-04-25 | End: 2023-08-03 | Stop reason: SDUPTHER

## 2023-04-25 NOTE — TELEPHONE ENCOUNTER
"Refill Routing Note   Medication(s) are not appropriate for processing by [facility] for the following reason(s):      New or recently adjusted medication: dose decreased to 15 units QPM but order set to "No Print"    [facility] action(s):  Defer            Appointments  past 12m or future 3m with PCP    Date Provider   Last Visit   2/28/2023 Alan Webster MD   Next Visit   6/30/2023 Alan Webster MD   ED visits in past 90 days: 0        Note composed:2:59 PM 04/25/2023          "

## 2023-04-25 NOTE — TELEPHONE ENCOUNTER
No new care gaps identified.  [facility] Embedded Care Gaps. Reference number: [id number]. 4/25/2023   9:16:26 AM CDT

## 2023-05-04 ENCOUNTER — PES CALL (OUTPATIENT)
Dept: ADMINISTRATIVE | Facility: CLINIC | Age: 83
End: 2023-05-04
Payer: MEDICARE

## 2023-06-16 ENCOUNTER — PATIENT OUTREACH (OUTPATIENT)
Dept: ADMINISTRATIVE | Facility: HOSPITAL | Age: 83
End: 2023-06-16
Payer: MEDICARE

## 2023-06-26 ENCOUNTER — LAB VISIT (OUTPATIENT)
Dept: LAB | Facility: OTHER | Age: 83
End: 2023-06-26
Attending: INTERNAL MEDICINE
Payer: MEDICARE

## 2023-06-26 DIAGNOSIS — E11.21 DIABETIC NEPHROPATHY ASSOCIATED WITH TYPE 2 DIABETES MELLITUS: Chronic | ICD-10-CM

## 2023-06-26 LAB
ALBUMIN SERPL BCP-MCNC: 3.4 G/DL (ref 3.5–5.2)
ALP SERPL-CCNC: 64 U/L (ref 55–135)
ALT SERPL W/O P-5'-P-CCNC: 14 U/L (ref 10–44)
ANION GAP SERPL CALC-SCNC: 11 MMOL/L (ref 8–16)
AST SERPL-CCNC: 13 U/L (ref 10–40)
BILIRUB SERPL-MCNC: 0.5 MG/DL (ref 0.1–1)
BUN SERPL-MCNC: 46 MG/DL (ref 8–23)
CALCIUM SERPL-MCNC: 10.1 MG/DL (ref 8.7–10.5)
CHLORIDE SERPL-SCNC: 98 MMOL/L (ref 95–110)
CO2 SERPL-SCNC: 28 MMOL/L (ref 23–29)
CREAT SERPL-MCNC: 2.2 MG/DL (ref 0.5–1.4)
EST. GFR  (NO RACE VARIABLE): 29 ML/MIN/1.73 M^2
ESTIMATED AVG GLUCOSE: 148 MG/DL (ref 68–131)
GLUCOSE SERPL-MCNC: 133 MG/DL (ref 70–110)
HBA1C MFR BLD: 6.8 % (ref 4–5.6)
POTASSIUM SERPL-SCNC: 3.4 MMOL/L (ref 3.5–5.1)
PROT SERPL-MCNC: 6.9 G/DL (ref 6–8.4)
SODIUM SERPL-SCNC: 137 MMOL/L (ref 136–145)

## 2023-06-26 PROCEDURE — 83036 HEMOGLOBIN GLYCOSYLATED A1C: CPT | Performed by: INTERNAL MEDICINE

## 2023-06-26 PROCEDURE — 36415 COLL VENOUS BLD VENIPUNCTURE: CPT | Performed by: INTERNAL MEDICINE

## 2023-06-26 PROCEDURE — 80053 COMPREHEN METABOLIC PANEL: CPT | Performed by: INTERNAL MEDICINE

## 2023-06-26 NOTE — PROGRESS NOTES
Subjective:       Patient ID: Giovanni Guerrero is a 83 y.o. male.    Chief Complaint: Hypertension      Pt here for f/u.     He has a-fib dx during preop assessment for TURP in 2019. He subsequently had ablation procedure complicated by sinus arrest requiring pressor support. He recovered and has had appropriate f/u with cardiology. He is on eliquis for anticoag. Denies cp/sob/palpitations.     DM2 is well controlled. Most recent A1C was 6.8. Pt reports sugars are in 120s-130s fasting, prandial sugars are generally less than 130s-140s. He has not had lows. He is on tresiba at 15 units qhs and jardiance 10mg daily. He has nephropathy with CKD-4 and microalbuminuria and his creatinine has been fairly stable. He is followed by nephrology. He has neuropathy but is not bothersome and manages without meds. He is up to date on related health maint except eye exam which he will schedule.    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     Pt's BP is well controlled. Tolerating meds well. Home readings are controlled. Pt denies cp/sob/ha/vision or neuro changes.     HLD is tx with lipitor which he tolerates well.    Gout has been quiet. No recent attacks.      He has BPH with urinary obstruction for which he takes flomax and finasteride. He had TURP which has helped. He has f/u with urology.    Diabetes  Pertinent negatives for hypoglycemia include no headaches. Pertinent negatives for diabetes include no chest pain and no polyuria.   Hypertension  Pertinent negatives include no chest pain, headaches or shortness of breath.   Review of Systems   Constitutional:  Negative for appetite change, fever and unexpected weight change.   Eyes:  Negative for visual disturbance.   Respiratory:  Negative for shortness of breath.    Cardiovascular:  Negative for chest pain.   Gastrointestinal:  Negative for abdominal pain, blood in stool, constipation and diarrhea.    Endocrine: Negative for polyuria.   Genitourinary:  Negative for frequency.   Neurological:  Negative for light-headedness and headaches.   Psychiatric/Behavioral:  Negative for dysphoric mood.      Objective:      Physical Exam  Constitutional:       Appearance: Normal appearance. He is well-developed.   HENT:      Head: Normocephalic and atraumatic.   Eyes:      Extraocular Movements: Extraocular movements intact.      Pupils: Pupils are equal, round, and reactive to light.   Neck:      Thyroid: No thyromegaly.      Vascular: No carotid bruit.   Cardiovascular:      Rate and Rhythm: Normal rate and regular rhythm.      Pulses:           Posterior tibial pulses are 2+ on the right side and 2+ on the left side.      Heart sounds: Normal heart sounds, S1 normal and S2 normal. No murmur heard.  Pulmonary:      Effort: Pulmonary effort is normal.      Breath sounds: Normal breath sounds. No wheezing.   Abdominal:      General: Bowel sounds are normal.      Palpations: Abdomen is soft. There is no mass.      Tenderness: There is no abdominal tenderness.   Musculoskeletal:      Cervical back: Neck supple.      Right foot: Normal range of motion. No deformity.      Left foot: Normal range of motion. No deformity.      Comments: Protective Sensation (w/ 10 gram monofilament):  Right: Intact  Left: Intact    Visual Inspection:  Normal -  Bilateral    Pedal Pulses:   Right: Present  Left: Present    Posterior tibialis:   Right:Present  Left: Present     Feet:      Right foot:      Protective Sensation: 6 sites tested.  6 sites sensed.      Skin integrity: No ulcer or blister.      Left foot:      Protective Sensation: 6 sites tested.  6 sites sensed.      Skin integrity: No ulcer or blister.   Lymphadenopathy:      Cervical: No cervical adenopathy.   Neurological:      Mental Status: He is alert and oriented to person, place, and time.      Cranial Nerves: No cranial nerve deficit.   Psychiatric:         Mood and Affect:  Mood normal.         Behavior: Behavior normal.       Assessment:       1. Primary hypertension    2. Mixed hyperlipidemia    3. CKD stage 4 due to type 2 diabetes mellitus    4. Diabetic nephropathy associated with type 2 diabetes mellitus    5. Diabetic polyneuropathy associated with type 2 diabetes mellitus    6. Type 2 diabetes mellitus with diabetic polyneuropathy, with long-term current use of insulin    7. Benign prostatic hyperplasia with urinary retention    8. Gout, arthritis    9. Metabolic bone disease    10. Morbid (severe) obesity due to excess calories    11. Paroxysmal atrial fibrillation    12. Secondary hyperparathyroidism of renal origin    13. Urinary retention        Plan:       1. recent labs reviewed    2. Keep f/u with specialists  3. Home BS and BP readings; reviewed hypoglycemia plan with pt

## 2023-06-30 ENCOUNTER — OFFICE VISIT (OUTPATIENT)
Dept: INTERNAL MEDICINE | Facility: CLINIC | Age: 83
End: 2023-06-30
Payer: MEDICARE

## 2023-06-30 VITALS
DIASTOLIC BLOOD PRESSURE: 80 MMHG | SYSTOLIC BLOOD PRESSURE: 134 MMHG | WEIGHT: 185.75 LBS | HEIGHT: 62 IN | OXYGEN SATURATION: 96 % | HEART RATE: 98 BPM | BODY MASS INDEX: 34.18 KG/M2

## 2023-06-30 DIAGNOSIS — E78.2 MIXED HYPERLIPIDEMIA: Chronic | ICD-10-CM

## 2023-06-30 DIAGNOSIS — I10 PRIMARY HYPERTENSION: Primary | ICD-10-CM

## 2023-06-30 DIAGNOSIS — M89.8X9 METABOLIC BONE DISEASE: ICD-10-CM

## 2023-06-30 DIAGNOSIS — E11.42 TYPE 2 DIABETES MELLITUS WITH DIABETIC POLYNEUROPATHY, WITH LONG-TERM CURRENT USE OF INSULIN: ICD-10-CM

## 2023-06-30 DIAGNOSIS — I48.0 PAROXYSMAL ATRIAL FIBRILLATION: ICD-10-CM

## 2023-06-30 DIAGNOSIS — R33.9 URINARY RETENTION: ICD-10-CM

## 2023-06-30 DIAGNOSIS — N40.1 BENIGN PROSTATIC HYPERPLASIA WITH URINARY RETENTION: ICD-10-CM

## 2023-06-30 DIAGNOSIS — N25.81 SECONDARY HYPERPARATHYROIDISM OF RENAL ORIGIN: ICD-10-CM

## 2023-06-30 DIAGNOSIS — Z79.4 TYPE 2 DIABETES MELLITUS WITH DIABETIC POLYNEUROPATHY, WITH LONG-TERM CURRENT USE OF INSULIN: ICD-10-CM

## 2023-06-30 DIAGNOSIS — E11.21 DIABETIC NEPHROPATHY ASSOCIATED WITH TYPE 2 DIABETES MELLITUS: Chronic | ICD-10-CM

## 2023-06-30 DIAGNOSIS — E11.22 CKD STAGE 4 DUE TO TYPE 2 DIABETES MELLITUS: Chronic | ICD-10-CM

## 2023-06-30 DIAGNOSIS — E11.42 DIABETIC POLYNEUROPATHY ASSOCIATED WITH TYPE 2 DIABETES MELLITUS: Chronic | ICD-10-CM

## 2023-06-30 DIAGNOSIS — M10.9 GOUT, ARTHRITIS: ICD-10-CM

## 2023-06-30 DIAGNOSIS — E66.01 MORBID (SEVERE) OBESITY DUE TO EXCESS CALORIES: ICD-10-CM

## 2023-06-30 DIAGNOSIS — R33.8 BENIGN PROSTATIC HYPERPLASIA WITH URINARY RETENTION: ICD-10-CM

## 2023-06-30 DIAGNOSIS — N18.4 CKD STAGE 4 DUE TO TYPE 2 DIABETES MELLITUS: Chronic | ICD-10-CM

## 2023-06-30 PROCEDURE — 1159F PR MEDICATION LIST DOCUMENTED IN MEDICAL RECORD: ICD-10-PCS | Mod: CPTII,S$GLB,, | Performed by: INTERNAL MEDICINE

## 2023-06-30 PROCEDURE — 3075F PR MOST RECENT SYSTOLIC BLOOD PRESS GE 130-139MM HG: ICD-10-PCS | Mod: CPTII,S$GLB,, | Performed by: INTERNAL MEDICINE

## 2023-06-30 PROCEDURE — 99214 PR OFFICE/OUTPT VISIT, EST, LEVL IV, 30-39 MIN: ICD-10-PCS | Mod: S$GLB,,, | Performed by: INTERNAL MEDICINE

## 2023-06-30 PROCEDURE — 3288F FALL RISK ASSESSMENT DOCD: CPT | Mod: CPTII,S$GLB,, | Performed by: INTERNAL MEDICINE

## 2023-06-30 PROCEDURE — 99999 PR PBB SHADOW E&M-EST. PATIENT-LVL IV: ICD-10-PCS | Mod: PBBFAC,,, | Performed by: INTERNAL MEDICINE

## 2023-06-30 PROCEDURE — 1101F PR PT FALLS ASSESS DOC 0-1 FALLS W/OUT INJ PAST YR: ICD-10-PCS | Mod: CPTII,S$GLB,, | Performed by: INTERNAL MEDICINE

## 2023-06-30 PROCEDURE — 3075F SYST BP GE 130 - 139MM HG: CPT | Mod: CPTII,S$GLB,, | Performed by: INTERNAL MEDICINE

## 2023-06-30 PROCEDURE — 3079F PR MOST RECENT DIASTOLIC BLOOD PRESSURE 80-89 MM HG: ICD-10-PCS | Mod: CPTII,S$GLB,, | Performed by: INTERNAL MEDICINE

## 2023-06-30 PROCEDURE — 3288F PR FALLS RISK ASSESSMENT DOCUMENTED: ICD-10-PCS | Mod: CPTII,S$GLB,, | Performed by: INTERNAL MEDICINE

## 2023-06-30 PROCEDURE — 1101F PT FALLS ASSESS-DOCD LE1/YR: CPT | Mod: CPTII,S$GLB,, | Performed by: INTERNAL MEDICINE

## 2023-06-30 PROCEDURE — 1160F PR REVIEW ALL MEDS BY PRESCRIBER/CLIN PHARMACIST DOCUMENTED: ICD-10-PCS | Mod: CPTII,S$GLB,, | Performed by: INTERNAL MEDICINE

## 2023-06-30 PROCEDURE — 1126F AMNT PAIN NOTED NONE PRSNT: CPT | Mod: CPTII,S$GLB,, | Performed by: INTERNAL MEDICINE

## 2023-06-30 PROCEDURE — 1126F PR PAIN SEVERITY QUANTIFIED, NO PAIN PRESENT: ICD-10-PCS | Mod: CPTII,S$GLB,, | Performed by: INTERNAL MEDICINE

## 2023-06-30 PROCEDURE — 99999 PR PBB SHADOW E&M-EST. PATIENT-LVL IV: CPT | Mod: PBBFAC,,, | Performed by: INTERNAL MEDICINE

## 2023-06-30 PROCEDURE — 3079F DIAST BP 80-89 MM HG: CPT | Mod: CPTII,S$GLB,, | Performed by: INTERNAL MEDICINE

## 2023-06-30 PROCEDURE — 1159F MED LIST DOCD IN RCRD: CPT | Mod: CPTII,S$GLB,, | Performed by: INTERNAL MEDICINE

## 2023-06-30 PROCEDURE — 1160F RVW MEDS BY RX/DR IN RCRD: CPT | Mod: CPTII,S$GLB,, | Performed by: INTERNAL MEDICINE

## 2023-06-30 PROCEDURE — 99214 OFFICE O/P EST MOD 30 MIN: CPT | Mod: S$GLB,,, | Performed by: INTERNAL MEDICINE

## 2023-07-13 ENCOUNTER — PES CALL (OUTPATIENT)
Dept: ADMINISTRATIVE | Facility: CLINIC | Age: 83
End: 2023-07-13
Payer: MEDICARE

## 2023-07-31 ENCOUNTER — OFFICE VISIT (OUTPATIENT)
Dept: CARDIOLOGY | Facility: CLINIC | Age: 83
End: 2023-07-31
Payer: MEDICARE

## 2023-07-31 ENCOUNTER — OFFICE VISIT (OUTPATIENT)
Dept: UROLOGY | Facility: CLINIC | Age: 83
End: 2023-07-31
Payer: MEDICARE

## 2023-07-31 VITALS
HEART RATE: 96 BPM | BODY MASS INDEX: 34.04 KG/M2 | SYSTOLIC BLOOD PRESSURE: 139 MMHG | HEIGHT: 62 IN | DIASTOLIC BLOOD PRESSURE: 86 MMHG | WEIGHT: 185 LBS

## 2023-07-31 VITALS
WEIGHT: 182.31 LBS | SYSTOLIC BLOOD PRESSURE: 163 MMHG | HEIGHT: 62 IN | HEART RATE: 100 BPM | DIASTOLIC BLOOD PRESSURE: 98 MMHG | BODY MASS INDEX: 33.55 KG/M2

## 2023-07-31 DIAGNOSIS — E66.01 MORBID (SEVERE) OBESITY DUE TO EXCESS CALORIES: ICD-10-CM

## 2023-07-31 DIAGNOSIS — N18.4 CKD STAGE 4 DUE TO TYPE 2 DIABETES MELLITUS: Chronic | ICD-10-CM

## 2023-07-31 DIAGNOSIS — Z79.4 TYPE 2 DIABETES MELLITUS WITH DIABETIC POLYNEUROPATHY, WITH LONG-TERM CURRENT USE OF INSULIN: ICD-10-CM

## 2023-07-31 DIAGNOSIS — N13.8 BPH WITH OBSTRUCTION/LOWER URINARY TRACT SYMPTOMS: ICD-10-CM

## 2023-07-31 DIAGNOSIS — I10 PRIMARY HYPERTENSION: ICD-10-CM

## 2023-07-31 DIAGNOSIS — N40.1 BPH WITH URINARY OBSTRUCTION: ICD-10-CM

## 2023-07-31 DIAGNOSIS — N13.8 BPH WITH URINARY OBSTRUCTION: ICD-10-CM

## 2023-07-31 DIAGNOSIS — E11.22 CKD STAGE 4 DUE TO TYPE 2 DIABETES MELLITUS: Chronic | ICD-10-CM

## 2023-07-31 DIAGNOSIS — E11.42 TYPE 2 DIABETES MELLITUS WITH DIABETIC POLYNEUROPATHY, WITH LONG-TERM CURRENT USE OF INSULIN: ICD-10-CM

## 2023-07-31 DIAGNOSIS — I48.19 PERSISTENT ATRIAL FIBRILLATION: Primary | ICD-10-CM

## 2023-07-31 DIAGNOSIS — E78.2 MIXED HYPERLIPIDEMIA: Chronic | ICD-10-CM

## 2023-07-31 DIAGNOSIS — N40.1 BPH WITH OBSTRUCTION/LOWER URINARY TRACT SYMPTOMS: ICD-10-CM

## 2023-07-31 DIAGNOSIS — R33.8 ACUTE URINARY RETENTION: ICD-10-CM

## 2023-07-31 PROCEDURE — 1159F PR MEDICATION LIST DOCUMENTED IN MEDICAL RECORD: ICD-10-PCS | Mod: CPTII,S$GLB,, | Performed by: UROLOGY

## 2023-07-31 PROCEDURE — 99999 PR PBB SHADOW E&M-EST. PATIENT-LVL III: ICD-10-PCS | Mod: PBBFAC,,, | Performed by: UROLOGY

## 2023-07-31 PROCEDURE — 99214 OFFICE O/P EST MOD 30 MIN: CPT | Mod: S$GLB,,, | Performed by: UROLOGY

## 2023-07-31 PROCEDURE — 99214 PR OFFICE/OUTPT VISIT, EST, LEVL IV, 30-39 MIN: ICD-10-PCS | Mod: S$GLB,,, | Performed by: UROLOGY

## 2023-07-31 PROCEDURE — 3075F SYST BP GE 130 - 139MM HG: CPT | Mod: CPTII,S$GLB,, | Performed by: UROLOGY

## 2023-07-31 PROCEDURE — 3075F PR MOST RECENT SYSTOLIC BLOOD PRESS GE 130-139MM HG: ICD-10-PCS | Mod: CPTII,S$GLB,, | Performed by: UROLOGY

## 2023-07-31 PROCEDURE — 1126F PR PAIN SEVERITY QUANTIFIED, NO PAIN PRESENT: ICD-10-PCS | Mod: CPTII,S$GLB,, | Performed by: UROLOGY

## 2023-07-31 PROCEDURE — 1101F PT FALLS ASSESS-DOCD LE1/YR: CPT | Mod: CPTII,S$GLB,, | Performed by: UROLOGY

## 2023-07-31 PROCEDURE — 1126F AMNT PAIN NOTED NONE PRSNT: CPT | Mod: CPTII,S$GLB,, | Performed by: PHYSICIAN ASSISTANT

## 2023-07-31 PROCEDURE — 1160F RVW MEDS BY RX/DR IN RCRD: CPT | Mod: CPTII,S$GLB,, | Performed by: UROLOGY

## 2023-07-31 PROCEDURE — 1101F PR PT FALLS ASSESS DOC 0-1 FALLS W/OUT INJ PAST YR: ICD-10-PCS | Mod: CPTII,S$GLB,, | Performed by: PHYSICIAN ASSISTANT

## 2023-07-31 PROCEDURE — 1160F RVW MEDS BY RX/DR IN RCRD: CPT | Mod: CPTII,S$GLB,, | Performed by: PHYSICIAN ASSISTANT

## 2023-07-31 PROCEDURE — 3288F PR FALLS RISK ASSESSMENT DOCUMENTED: ICD-10-PCS | Mod: CPTII,S$GLB,, | Performed by: UROLOGY

## 2023-07-31 PROCEDURE — 99214 OFFICE O/P EST MOD 30 MIN: CPT | Mod: S$GLB,,, | Performed by: PHYSICIAN ASSISTANT

## 2023-07-31 PROCEDURE — 3288F FALL RISK ASSESSMENT DOCD: CPT | Mod: CPTII,S$GLB,, | Performed by: UROLOGY

## 2023-07-31 PROCEDURE — 3080F DIAST BP >= 90 MM HG: CPT | Mod: CPTII,S$GLB,, | Performed by: PHYSICIAN ASSISTANT

## 2023-07-31 PROCEDURE — 3288F FALL RISK ASSESSMENT DOCD: CPT | Mod: CPTII,S$GLB,, | Performed by: PHYSICIAN ASSISTANT

## 2023-07-31 PROCEDURE — 1126F PR PAIN SEVERITY QUANTIFIED, NO PAIN PRESENT: ICD-10-PCS | Mod: CPTII,S$GLB,, | Performed by: PHYSICIAN ASSISTANT

## 2023-07-31 PROCEDURE — 3077F SYST BP >= 140 MM HG: CPT | Mod: CPTII,S$GLB,, | Performed by: PHYSICIAN ASSISTANT

## 2023-07-31 PROCEDURE — 3079F PR MOST RECENT DIASTOLIC BLOOD PRESSURE 80-89 MM HG: ICD-10-PCS | Mod: CPTII,S$GLB,, | Performed by: UROLOGY

## 2023-07-31 PROCEDURE — 99999 PR PBB SHADOW E&M-EST. PATIENT-LVL III: CPT | Mod: PBBFAC,,, | Performed by: UROLOGY

## 2023-07-31 PROCEDURE — 99999 PR PBB SHADOW E&M-EST. PATIENT-LVL V: ICD-10-PCS | Mod: PBBFAC,,, | Performed by: PHYSICIAN ASSISTANT

## 2023-07-31 PROCEDURE — 3288F PR FALLS RISK ASSESSMENT DOCUMENTED: ICD-10-PCS | Mod: CPTII,S$GLB,, | Performed by: PHYSICIAN ASSISTANT

## 2023-07-31 PROCEDURE — 3080F PR MOST RECENT DIASTOLIC BLOOD PRESSURE >= 90 MM HG: ICD-10-PCS | Mod: CPTII,S$GLB,, | Performed by: PHYSICIAN ASSISTANT

## 2023-07-31 PROCEDURE — 99999 PR PBB SHADOW E&M-EST. PATIENT-LVL V: CPT | Mod: PBBFAC,,, | Performed by: PHYSICIAN ASSISTANT

## 2023-07-31 PROCEDURE — 1101F PT FALLS ASSESS-DOCD LE1/YR: CPT | Mod: CPTII,S$GLB,, | Performed by: PHYSICIAN ASSISTANT

## 2023-07-31 PROCEDURE — 1159F PR MEDICATION LIST DOCUMENTED IN MEDICAL RECORD: ICD-10-PCS | Mod: CPTII,S$GLB,, | Performed by: PHYSICIAN ASSISTANT

## 2023-07-31 PROCEDURE — 99214 PR OFFICE/OUTPT VISIT, EST, LEVL IV, 30-39 MIN: ICD-10-PCS | Mod: S$GLB,,, | Performed by: PHYSICIAN ASSISTANT

## 2023-07-31 PROCEDURE — 1159F MED LIST DOCD IN RCRD: CPT | Mod: CPTII,S$GLB,, | Performed by: PHYSICIAN ASSISTANT

## 2023-07-31 PROCEDURE — 1126F AMNT PAIN NOTED NONE PRSNT: CPT | Mod: CPTII,S$GLB,, | Performed by: UROLOGY

## 2023-07-31 PROCEDURE — 1159F MED LIST DOCD IN RCRD: CPT | Mod: CPTII,S$GLB,, | Performed by: UROLOGY

## 2023-07-31 PROCEDURE — 1160F PR REVIEW ALL MEDS BY PRESCRIBER/CLIN PHARMACIST DOCUMENTED: ICD-10-PCS | Mod: CPTII,S$GLB,, | Performed by: PHYSICIAN ASSISTANT

## 2023-07-31 PROCEDURE — 1160F PR REVIEW ALL MEDS BY PRESCRIBER/CLIN PHARMACIST DOCUMENTED: ICD-10-PCS | Mod: CPTII,S$GLB,, | Performed by: UROLOGY

## 2023-07-31 PROCEDURE — 3077F PR MOST RECENT SYSTOLIC BLOOD PRESSURE >= 140 MM HG: ICD-10-PCS | Mod: CPTII,S$GLB,, | Performed by: PHYSICIAN ASSISTANT

## 2023-07-31 PROCEDURE — 1101F PR PT FALLS ASSESS DOC 0-1 FALLS W/OUT INJ PAST YR: ICD-10-PCS | Mod: CPTII,S$GLB,, | Performed by: UROLOGY

## 2023-07-31 PROCEDURE — 3079F DIAST BP 80-89 MM HG: CPT | Mod: CPTII,S$GLB,, | Performed by: UROLOGY

## 2023-07-31 RX ORDER — FINASTERIDE 5 MG/1
5 TABLET, FILM COATED ORAL DAILY
Qty: 90 TABLET | Refills: 3 | Status: SHIPPED | OUTPATIENT
Start: 2023-07-31

## 2023-07-31 RX ORDER — TAMSULOSIN HYDROCHLORIDE 0.4 MG/1
0.4 CAPSULE ORAL DAILY
Qty: 90 CAPSULE | Refills: 3 | Status: SHIPPED | OUTPATIENT
Start: 2023-07-31 | End: 2024-07-25

## 2023-07-31 RX ORDER — FUROSEMIDE 20 MG/1
20 TABLET ORAL DAILY
Qty: 90 TABLET | Refills: 3 | Status: SHIPPED | OUTPATIENT
Start: 2023-07-31 | End: 2024-07-25

## 2023-07-31 RX ORDER — CARVEDILOL 6.25 MG/1
6.25 TABLET ORAL 2 TIMES DAILY WITH MEALS
Qty: 180 TABLET | Refills: 3 | Status: SHIPPED | OUTPATIENT
Start: 2023-07-31 | End: 2024-07-25

## 2023-07-31 RX ORDER — TAMSULOSIN HYDROCHLORIDE 0.4 MG/1
0.4 CAPSULE ORAL DAILY
Qty: 30 CAPSULE | Refills: 12 | Status: CANCELLED | OUTPATIENT
Start: 2023-07-31

## 2023-07-31 NOTE — PROGRESS NOTES
Urology - [facility]  Clinic Note    SUBJECTIVE:     Chief Complaint: BPH Med Refill    History of Present Illness:  Giovanni Guerrero is a 83 y.o. male who presents with Hx of BPH s/p TURP in 2019.    Benign Prostatic Hypertrophy  Patient complains of lower urinary tract symptoms. He reports nocturia one time a night. He denies frequency, incomplete emptying, intermittency, straining, urgency, and weak stream. Patient states symptoms are of mild severity. He has no personal history of prostate cancer. He reports a history of no complicating symptoms. He denies flank pain, gross hematuria, kidney stones, and recurrent UTI.    Patient takes Flomax BID and Finasteride for BPH. No issues with urination     PATIENT HISTORY:  Past Medical History:   Diagnosis Date    Allergy     Anticoagulant long-term use     Colon polyp     Diabetes mellitus type II     Gout, arthritis     Gout, unspecified     Hx of colonic polyps     Hyperlipidemia     Hypertension     Prostatic hypertrophy     Renal insufficiency      Past Surgical History:   Procedure Laterality Date    APPENDECTOMY      CATARACT EXTRACTION Left 2018    EYE SURGERY      cataract    HERNIA REPAIR      TONSILLECTOMY      TRANSURETHRAL RESECTION OF PROSTATE (TURP) USING LASER N/A 12/6/2019    Procedure: TURP, USING LASER;  Surgeon: Rafael Marquis Jr., MD;  Location: [facility] OR [address] [place] [address];  Service: Urology;  Laterality: N/A;  75min    TREATMENT OF CARDIAC ARRHYTHMIA N/A 1/28/2020    Procedure: CARDIOVERSION;  Surgeon: Sergio Bertrand MD;  Location: [facility] EP LAB;  Service: Cardiology;  Laterality: N/A;  AF, NICOLE, DCCV, MAC, SC, 3 Prep     Family History   Problem Relation Age of Onset    Cancer Mother         ovarian cancer    Heart disease Father         MI at 57    Diabetes Father     Cerebral aneurysm Sister     Heart disease Brother         MI at 60    Heart disease Brother         CABG    Anesthesia problems Neg Hx     Melanoma Neg Hx      Social History  "    Socioeconomic History    Marital status:    Tobacco Use    Smoking status: Never    Smokeless tobacco: Never   Substance and Sexual Activity    Alcohol use: No    Drug use: No    Sexual activity: Not Currently     Partners: Female     Birth control/protection: None   Social History Narrative     52 yrs, 3 childre (two living), 11 grandchildren & 4 great grandchildren       Medications:  Outpatient Encounter Medications as of 7/31/2023   Medication Sig Dispense Refill    atorvastatin (LIPITOR) 40 MG tablet TAKE 1 TABLET(40 MG) BY MOUTH EVERY DAY 90 tablet 2    BD INSULIN SYRINGE ULTRA-FINE 0.5 mL 31 gauge x 5/16 Syrg USE WITH LANTUS INSULIN ONCE DAILY 100 each 3    BD ULTRA-FINE MINI PEN NEEDLE 31 gauge x 3/16" Ndle USE TO INJECT LANTUS ONCE DAILY 100 each 3    blood sugar diagnostic Strp Check sugar tid as directed 300 each 3    carvediloL (COREG) 3.125 MG tablet TAKE 1 TABLET(3.125 MG) BY MOUTH TWICE DAILY WITH MEALS 180 tablet 3    cholecalciferol, vitamin D3, (VITAMIN D3) 50 mcg (2,000 unit) Tab Take by mouth once daily.      empagliflozin (JARDIANCE) 10 mg tablet Take 1 tablet (10 mg total) by mouth once daily. 90 tablet 1    furosemide (LASIX) 40 MG tablet TAKE 1 TABLET(40 MG) BY MOUTH EVERY DAY 90 tablet 11    insulin degludec (TRESIBA FLEXTOUCH U-100) 100 unit/mL (3 mL) insulin pen ADMINISTER 15 UNITS UNDER THE SKIN EVERY EVENING 5 pen 0    NIFEdipine (PROCARDIA-XL) 60 MG (OSM) 24 hr tablet TAKE 1 TABLET(60 MG) BY MOUTH EVERY DAY 90 tablet 2    OMEGA-3 ACID ETHYL ESTERS (OMACOR) 1 gram Cap Take 1 g by mouth. 1 Capsule Oral Twice a day      pen needle, diabetic (BD ULTRA-FINE SHORT PEN NEEDLE) 31 gauge x 5/16" Ndle USE WITH LANTUS INSULIN ONCE DAILY 100 each 3    tamsulosin (FLOMAX) 0.4 mg Cap Take 1 capsule (0.4 mg total) by mouth once daily. TAKE 1 CAPSULE BY MOUTH TWICE A DAY 90 capsule 3    [DISCONTINUED] finasteride (PROSCAR) 5 mg tablet TAKE 1 TABLET(5 MG) BY MOUTH EVERY DAY 90 tablet 3 " "   [DISCONTINUED] tamsulosin (FLOMAX) 0.4 mg Cap TAKE 1 CAPSULE BY MOUTH TWICE A DAY (Patient taking differently: 0.4 mg once daily. TAKE 1 CAPSULE BY MOUTH TWICE A DAY) 180 capsule 3    aspirin (ECOTRIN) 81 MG EC tablet Take 81 mg by mouth once daily.      blood-glucose meter Misc 1 kit by Misc.(Non-Drug; Combo Route) route 3 (three) times daily. Pt is to test blood sugar TID 1 each 0    finasteride (PROSCAR) 5 mg tablet Take 1 tablet (5 mg total) by mouth once daily. 90 tablet 3     Facility-Administered Encounter Medications as of 7/31/2023   Medication Dose Route Frequency Provider Last Rate Last Admin    0.9%  NaCl infusion   Intravenous Continuous Krishan Awan MD   Stopped at 01/28/20 0943    sodium chloride 0.9% flush 5 mL  5 mL Intravenous PRN Krishan Awan MD         Allergies:  Actos [pioglitazone]    Review of Systems:  Review of Systems   Constitutional:  Negative for chills and fever.   Respiratory:  Negative for shortness of breath.    Gastrointestinal:  Negative for abdominal pain and vomiting.   Genitourinary:  Negative for dysuria, flank pain, frequency, hematuria and urgency.     OBJECTIVE:     Estimated body mass index is 33.84 kg/m² as calculated from the following:    Height as of this encounter: 5' 2" (1.575 m).    Weight as of this encounter: 83.9 kg (185 lb).    Vital Signs (Most Recent)  Pulse: 96 (07/31/23 0734)  BP: 139/86 (07/31/23 0734)    Physical Exam  Constitutional:       General: He is not in acute distress.     Appearance: Normal appearance.   HENT:      Head: Normocephalic and atraumatic.   Eyes:      Extraocular Movements: Extraocular movements intact.      Pupils: Pupils are equal, round, and reactive to light.   Cardiovascular:      Rate and Rhythm: Normal rate.   Pulmonary:      Effort: Pulmonary effort is normal. No respiratory distress.   Abdominal:      General: Abdomen is flat. There is no distension.      Tenderness: There is no abdominal tenderness. There is no " right CVA tenderness or left CVA tenderness.   Genitourinary:     Comments: Normal RUBEN. No nodules or induration.  Skin:     Coloration: Skin is not jaundiced.   Neurological:      General: No focal deficit present.      Mental Status: He is alert and oriented to person, place, and time.   Psychiatric:         Mood and Affect: Mood normal.         Behavior: Behavior normal.         Labs:  Lab Results   Component Value Date    BUN 46 (H) 06/26/2023    CREATININE 2.2 (H) 06/26/2023    WBC 7.27 02/27/2023    HGB 15.1 02/27/2023    HCT 43.2 02/27/2023     02/27/2023    AST 13 06/26/2023    ALT 14 06/26/2023    ALKPHOS 64 06/26/2023    ALBUMIN 3.4 (L) 06/26/2023    HGBA1C 6.8 (H) 06/26/2023        Lab Results   Component Value Date    PSA 1.1 04/15/2014    PSA 1.88 08/22/2012    PSA 1.4 08/08/2011    PSA 1.2 08/05/2010    PSA 1.5 08/07/2009    PSA 1.3 08/29/2008    PSA 1.5 01/08/2007    PSA 1.2 03/06/2006         ASSESSMENT     1. BPH with urinary obstruction    2. BPH with obstruction/lower urinary tract symptoms    3. Acute urinary retention        PLAN:   Giovanni was seen today for follow-up.    Diagnoses and all orders for this visit:    BPH with urinary obstruction  -     tamsulosin (FLOMAX) 0.4 mg Cap; Take 1 capsule (0.4 mg total) by mouth once daily. TAKE 1 CAPSULE BY MOUTH TWICE A DAY    BPH with obstruction/lower urinary tract symptoms  -     finasteride (PROSCAR) 5 mg tablet; Take 1 tablet (5 mg total) by mouth once daily.    Acute urinary retention  -     finasteride (PROSCAR) 5 mg tablet; Take 1 tablet (5 mg total) by mouth once daily.    Other orders  The following orders have not been finalized:  -     Cancel: tamsulosin (FLOMAX) 0.4 mg Cap     Follow up in 1 year, or sooner if needed.     Urbano Moore DO     Letter to Alan Webster MD, Self, Aaareferral     I was present entire procedure and agree with above  LPrkrystian DEL ANGEL

## 2023-07-31 NOTE — PROGRESS NOTES
General Cardiology Clinic Note  Reason for Visit: Annual follow up   Last Clinic Visit: 7/28/2022  General Cardiologist: Dr. Marin     HPI:   Giovanni Guerrero is a 83 y.o. male who presents for annual follow up.     Patient states he feels well. He denies symptoms of CAD, CHF, arrhythmia, hypotension, TIA, bleeding episodes. He walks up and down steps in his house. Does everything he needs to do around house without issue. He checks BP at home and it is usually 130s systolic.      Medical: afib s/p DCCV 1/2020, HTN, HLD, DM2, CKD 4 (Cr 2.0-2.4)  Surgical: Reviewed, as below.  Family: Reviewed, as below. Father with MI at 58yo. Brother with MI at 59yo.  Social: Reviewed, as below. Never smoked.    ROS:      Review of Systems   Constitutional: Negative for diaphoresis, malaise/fatigue, weight gain and weight loss.   HENT:  Negative for nosebleeds.    Eyes:  Negative for vision loss in left eye, vision loss in right eye and visual disturbance.   Cardiovascular:  Negative for chest pain, claudication, dyspnea on exertion, irregular heartbeat, leg swelling, near-syncope, orthopnea, palpitations, paroxysmal nocturnal dyspnea and syncope.   Respiratory:  Negative for cough, shortness of breath, sleep disturbances due to breathing, snoring and wheezing.    Hematologic/Lymphatic: Negative for bleeding problem. Does not bruise/bleed easily.   Skin:  Negative for poor wound healing and rash.   Musculoskeletal:  Negative for muscle cramps and myalgias.   Gastrointestinal:  Negative for bloating, abdominal pain, diarrhea, heartburn, melena, nausea and vomiting.   Genitourinary:  Negative for hematuria and nocturia.   Neurological:  Negative for brief paralysis, dizziness, headaches, light-headedness, numbness and weakness.   Psychiatric/Behavioral:  Negative for depression.    Allergic/Immunologic: Negative for hives.       PMH:     Past Medical History:   Diagnosis Date    Allergy     Anticoagulant long-term use     Colon  "polyp     Diabetes mellitus type II     Gout, arthritis     Gout, unspecified     Hx of colonic polyps     Hyperlipidemia     Hypertension     Prostatic hypertrophy     Renal insufficiency      Past Surgical History:   Procedure Laterality Date    APPENDECTOMY      CATARACT EXTRACTION Left 2018    EYE SURGERY      cataract    HERNIA REPAIR      TONSILLECTOMY      TRANSURETHRAL RESECTION OF PROSTATE (TURP) USING LASER N/A 12/6/2019    Procedure: TURP, USING LASER;  Surgeon: Rafael Marquis Jr., MD;  Location: [facility] OR [address] [place] [address];  Service: Urology;  Laterality: N/A;  75min    TREATMENT OF CARDIAC ARRHYTHMIA N/A 1/28/2020    Procedure: CARDIOVERSION;  Surgeon: Sergio Bertrand MD;  Location: [facility] EP LAB;  Service: Cardiology;  Laterality: N/A;  AF, NICOLE, DCCV, MAC, [place], 3 Prep     Allergies:     Review of patient's allergies indicates:   Allergen Reactions    Actos [pioglitazone] Other (See Comments)     constipation     Medications:     Current Outpatient Medications on File Prior to Visit   Medication Sig Dispense Refill    aspirin (ECOTRIN) 81 MG EC tablet Take 81 mg by mouth once daily.      atorvastatin (LIPITOR) 40 MG tablet TAKE 1 TABLET(40 MG) BY MOUTH EVERY DAY 90 tablet 2    BD INSULIN SYRINGE ULTRA-FINE 0.5 mL 31 gauge x 5/16 Syrg USE WITH LANTUS INSULIN ONCE DAILY 100 each 3    BD ULTRA-FINE MINI PEN NEEDLE 31 gauge x 3/16" Ndle USE TO INJECT LANTUS ONCE DAILY 100 each 3    blood sugar diagnostic Strp Check sugar tid as directed 300 each 3    blood-glucose meter Misc 1 kit by Misc.(Non-Drug; Combo Route) route 3 (three) times daily. Pt is to test blood sugar TID 1 each 0    carvediloL (COREG) 3.125 MG tablet TAKE 1 TABLET(3.125 MG) BY MOUTH TWICE DAILY WITH MEALS 180 tablet 3    cholecalciferol, vitamin D3, (VITAMIN D3) 50 mcg (2,000 unit) Tab Take by mouth once daily.      empagliflozin (JARDIANCE) 10 mg tablet Take 1 tablet (10 mg total) by mouth once daily. 90 tablet 1    furosemide (LASIX) 40 MG tablet TAKE 1 " "TABLET(40 MG) BY MOUTH EVERY DAY 90 tablet 11    insulin degludec (TRESIBA FLEXTOUCH U-100) 100 unit/mL (3 mL) insulin pen ADMINISTER 15 UNITS UNDER THE SKIN EVERY EVENING 5 pen 0    NIFEdipine (PROCARDIA-XL) 60 MG (OSM) 24 hr tablet TAKE 1 TABLET(60 MG) BY MOUTH EVERY DAY 90 tablet 2    OMEGA-3 ACID ETHYL ESTERS (OMACOR) 1 gram Cap Take 1 g by mouth. 1 Capsule Oral Twice a day      pen needle, diabetic (BD ULTRA-FINE SHORT PEN NEEDLE) 31 gauge x 5/16" Ndle USE WITH LANTUS INSULIN ONCE DAILY 100 each 3    [DISCONTINUED] finasteride (PROSCAR) 5 mg tablet TAKE 1 TABLET(5 MG) BY MOUTH EVERY DAY 90 tablet 3    [DISCONTINUED] tamsulosin (FLOMAX) 0.4 mg Cap TAKE 1 CAPSULE BY MOUTH TWICE A DAY (Patient taking differently: 0.4 mg once daily. TAKE 1 CAPSULE BY MOUTH TWICE A DAY) 180 capsule 3     Current Facility-Administered Medications on File Prior to Visit   Medication Dose Route Frequency Provider Last Rate Last Admin    0.9%  NaCl infusion   Intravenous Continuous Krishan Awan MD   Stopped at 01/28/20 0943    sodium chloride 0.9% flush 5 mL  5 mL Intravenous PRN Krishan wAan MD         Social History:     Social History     Tobacco Use    Smoking status: Never    Smokeless tobacco: Never   Substance Use Topics    Alcohol use: No     Family History:     Family History   Problem Relation Age of Onset    Cancer Mother         ovarian cancer    Heart disease Father         MI at 57    Diabetes Father     Cerebral aneurysm Sister     Heart disease Brother         MI at 60    Heart disease Brother         CABG    Anesthesia problems Neg Hx     Melanoma Neg Hx      Physical Exam:   BP (!) 163/98   Pulse 100   Ht 5' 2" (1.575 m)   Wt 82.7 kg (182 lb 5.1 oz)   BMI 33.35 kg/m²        Physical Exam  Vitals and nursing note reviewed.   Constitutional:       General: He is not in acute distress.     Appearance: Normal appearance.   HENT:      Head: Normocephalic and atraumatic.      Nose: Nose normal.   Eyes:      " General: No scleral icterus.     Extraocular Movements: Extraocular movements intact.      Conjunctiva/sclera: Conjunctivae normal.   Neck:      Thyroid: No thyromegaly.      Vascular: No carotid bruit or JVD.   Cardiovascular:      Rate and Rhythm: Tachycardia present. Rhythm irregular.      Pulses: Normal pulses.      Heart sounds: Normal heart sounds. No murmur heard.     No friction rub. No gallop.   Pulmonary:      Effort: Pulmonary effort is normal.      Breath sounds: Normal breath sounds. No wheezing, rhonchi or rales.   Chest:      Chest wall: No tenderness.   Abdominal:      General: Bowel sounds are normal. There is no distension.      Palpations: Abdomen is soft.      Tenderness: There is no abdominal tenderness.   Musculoskeletal:      Cervical back: Neck supple.      Right lower leg: No edema.      Left lower leg: No edema.   Skin:     General: Skin is warm and dry.      Coloration: Skin is not pale.      Findings: No erythema or rash.      Nails: There is no clubbing.   Neurological:      Mental Status: He is alert and oriented to person, place, and time.   Psychiatric:         Mood and Affect: Mood and affect normal.         Behavior: Behavior normal.          Labs:     Lab Results   Component Value Date     06/26/2023    K 3.4 (L) 06/26/2023    CL 98 06/26/2023    CO2 28 06/26/2023    BUN 46 (H) 06/26/2023    CREATININE 2.2 (H) 06/26/2023    ANIONGAP 11 06/26/2023     Lab Results   Component Value Date    HGBA1C 6.8 (H) 06/26/2023     Lab Results   Component Value Date    BNP 1,088 (H) 01/29/2020    Lab Results   Component Value Date    WBC 7.27 02/27/2023    HGB 15.1 02/27/2023    HCT 43.2 02/27/2023     02/27/2023    GRAN 4.2 02/27/2023    GRAN 58.0 02/27/2023     Lab Results   Component Value Date    CHOL 101 (L) 02/27/2023    HDL 28 (L) 02/27/2023    LDLCALC 43.6 (L) 02/27/2023    TRIG 147 02/27/2023          Imaging:   Echocardiogram  NICOLE 1/28/20  Atrial fibrillation observed.  NICOLE  "performed to assess for thrombus in the left atrial appendage prior to electrical cardioversion. Normal "windsock" appearing left atrial appendage. No thrombus is present in the appendage.  Normal left ventricular systolic function. The estimated ejection fraction is 63%.  Normal right ventricular systolic function.  Biatrial enlargement.  Mild mitral regurgitation.  Mild to moderate tricuspid regurgitation.  Trace pulmonary valve regurgitation.     TTE 19  Normal left ventricular systolic function. The estimated ejection fraction is 60%  Indeterminate left ventricular diastolic function.  Aortic valve sclerosis without significant stenosis.  Mild mitral regurgitation.  Normal right ventricular systolic function.  Estimated PA systolic pressure is at least 40mmHg.  Biatrial enlargement.     Stress testing  None     Cath Lab  US aorta 20  No aneurysmal dilatation of the abdominal aorta.  Bilateral iliac arteries are not visualized.     Other  Event 20  Sinus rhythm with sinus arrhythmia and at times sinus bradycarda and rare competing junctional/ventricular rhythm  1 7 beat episode of nonsustained VT     EK20 - NSR with 1st deg AVB, RAD, low anterior forces (personally reviewed)  2022 - atrial fibrillation, RBBB/LPFB    Assessment:     1. Persistent atrial fibrillation    2. Primary hypertension    3. Mixed hyperlipidemia    4. CKD stage 4 due to type 2 diabetes mellitus    5. Type 2 diabetes mellitus with diabetic polyneuropathy, with long-term current use of insulin    6. Morbid (severe) obesity due to excess calories      Plan:     Persistent atrial fibrillation  Asymptomatic   [id number] is at least 4 (age x2, HTN, DM)  Increase Coreg to 6.25 mg bid for better rate control  Continue Eliquis 2.5 mg bid for CVA prophylaxis      Essential hypertension  BP mildly elevated at home   Increase Coreg to 6.25 mg bid   Continue Nifedipine 60 mg daily   Decrease Lasix to 20 mg given renal " function   Low sodium diet     Mixed hyperlipidemia  LDL at goal  Continue meds     Type 2 diabetes mellitus with diabetic polyneuropathy, with long-term current use of insulin  Well controlled. Follows by PCP    CKD stage 4 due to type 2 diabetes mellitus  Slightly worse on recent labs with hypokalemia. Following with Nephrology   Decrease Lasix to 20 mg daily.     Follow up in one year.     Signed:  Shana Fairbanks PA-C  Cardiology   [phone] - General  7/31/2023 9:14 AM

## 2023-08-03 DIAGNOSIS — E11.42 TYPE 2 DIABETES MELLITUS WITH DIABETIC POLYNEUROPATHY, WITH LONG-TERM CURRENT USE OF INSULIN: ICD-10-CM

## 2023-08-03 DIAGNOSIS — Z79.4 TYPE 2 DIABETES MELLITUS WITH DIABETIC POLYNEUROPATHY, WITH LONG-TERM CURRENT USE OF INSULIN: ICD-10-CM

## 2023-08-03 RX ORDER — INSULIN DEGLUDEC 100 U/ML
INJECTION, SOLUTION SUBCUTANEOUS
Qty: 15 ML | Refills: 0 | Status: SHIPPED | OUTPATIENT
Start: 2023-08-03 | End: 2023-11-16 | Stop reason: SDUPTHER

## 2023-08-03 NOTE — TELEPHONE ENCOUNTER
LOV-6/30/23  
Refill Routing Note   Medication(s) are not appropriate for processing by [facility] for the following reason(s):      Required labs abnormal    [facility] action(s):  Defer Care Due:  None identified            Appointments  past 12m or future 3m with PCP    Date Provider   Last Visit   Visit date not found Kenroy Trinidad MD   Next Visit   Visit date not found Kenroy Trinidad MD   ED visits in past 90 days: 0        Note composed:11:16 AM 08/03/2023          
no

## 2023-08-22 DIAGNOSIS — E11.21 DIABETIC NEPHROPATHY ASSOCIATED WITH TYPE 2 DIABETES MELLITUS: Chronic | ICD-10-CM

## 2023-08-22 NOTE — TELEPHONE ENCOUNTER
Refill Routing Note   Medication(s) are not appropriate for processing by [facility] for the following reason(s):      Non-participating provider    [facility] action(s):  Route Care Due:  None identified              Appointments  past 12m or future 3m with PCP    Date Provider   Last Visit   6/30/2023 Alan Webster MD   Next Visit   Visit date not found Alan Webster MD   ED visits in past 90 days: 0        Note composed:4:17 PM 08/22/2023

## 2023-08-23 RX ORDER — EMPAGLIFLOZIN 10 MG/1
10 TABLET, FILM COATED ORAL
Qty: 90 TABLET | Refills: 1 | Status: SHIPPED | OUTPATIENT
Start: 2023-08-23 | End: 2024-02-26

## 2023-09-05 ENCOUNTER — PATIENT OUTREACH (OUTPATIENT)
Dept: ADMINISTRATIVE | Facility: HOSPITAL | Age: 83
End: 2023-09-05
Payer: MEDICARE

## 2023-09-06 ENCOUNTER — PATIENT OUTREACH (OUTPATIENT)
Dept: ADMINISTRATIVE | Facility: HOSPITAL | Age: 83
End: 2023-09-06
Payer: MEDICARE

## 2023-09-29 VITALS — DIASTOLIC BLOOD PRESSURE: 82 MMHG | SYSTOLIC BLOOD PRESSURE: 130 MMHG

## 2023-09-29 DIAGNOSIS — E78.2 MIXED HYPERLIPIDEMIA: ICD-10-CM

## 2023-09-29 DIAGNOSIS — I10 PRIMARY HYPERTENSION: Primary | ICD-10-CM

## 2023-09-29 RX ORDER — ATORVASTATIN CALCIUM 40 MG/1
40 TABLET, FILM COATED ORAL DAILY
Qty: 90 TABLET | Refills: 2 | Status: SHIPPED | OUTPATIENT
Start: 2023-09-29

## 2023-09-29 RX ORDER — NIFEDIPINE 60 MG/1
60 TABLET, EXTENDED RELEASE ORAL DAILY
Qty: 90 TABLET | Refills: 2 | Status: SHIPPED | OUTPATIENT
Start: 2023-09-29

## 2023-09-29 NOTE — TELEPHONE ENCOUNTER
----- Message from Kamron Escobar sent at 9/29/2023 11:27 AM CDT -----   Name of Who is Calling:     What is the request in detail:   patient request call back in reference to  refill medications  atorvastatin (LIPITOR) 40 MG tablet and   NIFEdipine (PROCARDIA-XL) 60 MG (OSM) 24 hr tablet  (patient was dr junior patient ) Please contact to further discuss and advise  patient is scheduled for 01/04/23 but only have 5 days of medication left     Can the clinic reply by [facility]:     What Number to Call Back if not in [facility]: [phone]      [id number] - [facility] [address]    
Need remote bp reading
Refill Encounter Pt is out of medication. Scheduled to [facility] in January     PCP Visits: Recent Visits  Date Type Provider Dept   06/30/23 Office Visit Alan Webster MD [facility] Internal Medicine   02/28/23 Office Visit Alan Webstre MD [facility] Medicine   Showing recent visits within past 360 days and meeting all other requirements  Future Appointments  No visits were found meeting these conditions.  Showing future appointments within next 720 days and meeting all other requirements     Last 3 Blood Pressure:   BP Readings from Last 3 Encounters:   07/31/23 (!) 163/98   07/31/23 139/86   06/30/23 134/80     Preferred Pharmacy:   [id number] [place] [address] [facility] [address] [place] [address] [facility] [address]  Phone: [phone] Fax: [phone]    Requested RX:  Requested Prescriptions      No prescriptions requested or ordered in this encounter      RX Route: Normal   
Spoke w pt and documented BP.  
all questions answered

## 2023-11-16 DIAGNOSIS — Z79.4 TYPE 2 DIABETES MELLITUS WITH DIABETIC POLYNEUROPATHY, WITH LONG-TERM CURRENT USE OF INSULIN: ICD-10-CM

## 2023-11-16 DIAGNOSIS — E11.42 TYPE 2 DIABETES MELLITUS WITH DIABETIC POLYNEUROPATHY, WITH LONG-TERM CURRENT USE OF INSULIN: ICD-10-CM

## 2023-11-16 RX ORDER — INSULIN DEGLUDEC 100 U/ML
INJECTION, SOLUTION SUBCUTANEOUS
Qty: 15 ML | Refills: 0 | Status: SHIPPED | OUTPATIENT
Start: 2023-11-16 | End: 2024-02-27 | Stop reason: SDUPTHER

## 2023-11-16 NOTE — TELEPHONE ENCOUNTER
Refill Encounter scheduled to [facility]-1/4/24    PCP Visits: Recent Visits  Date Type Provider Dept   06/30/23 Office Visit Alan Webster MD [facility] Internal Medicine   02/28/23 Office Visit Alna Webster MD [facility] Internal Medicine   Showing recent visits within past 360 days and meeting all other requirements  Future Appointments  No visits were found meeting these conditions.  Showing future appointments within next 720 days and meeting all other requirements     Last 3 Blood Pressure:   BP Readings from Last 3 Encounters:   09/29/23 130/82   07/31/23 (!) 163/98   07/31/23 139/86     Preferred Pharmacy: [facility]    Requested RX:  Requested Prescriptions     Pending Prescriptions Disp Refills    insulin degludec (TRESIBA FLEXTOUCH U-100) 100 unit/mL (3 mL) insulin pen 15 mL 0     Sig: ADMINISTER 15 UNITS UNDER THE SKIN EVERY EVENING      RX Route: Normal

## 2023-12-08 ENCOUNTER — TELEPHONE (OUTPATIENT)
Dept: INTERNAL MEDICINE | Facility: CLINIC | Age: 83
End: 2023-12-08
Payer: MEDICARE

## 2023-12-08 NOTE — TELEPHONE ENCOUNTER
----- Message from Sana Mejia sent at 12/8/2023 11:01 AM CST -----  Regarding: appointment  Name of Who is Calling: Giovanni           What is the request in detail: Patient is requesting a call back to reschedule his appointment sooner the June 1st available.            Can the clinic reply by [facility]: No           What Number to Call Back if not in [facility]:  [phone]

## 2023-12-08 NOTE — TELEPHONE ENCOUNTER
Lvm stating pt scheduled on 3/28/2024 at 11:30am with Dr. Stephen. Message left for patient to return my call.

## 2024-01-11 DIAGNOSIS — Z00.00 ENCOUNTER FOR MEDICARE ANNUAL WELLNESS EXAM: ICD-10-CM

## 2024-02-26 DIAGNOSIS — E11.42 TYPE 2 DIABETES MELLITUS WITH DIABETIC POLYNEUROPATHY, WITH LONG-TERM CURRENT USE OF INSULIN: ICD-10-CM

## 2024-02-26 DIAGNOSIS — Z79.4 TYPE 2 DIABETES MELLITUS WITH DIABETIC POLYNEUROPATHY, WITH LONG-TERM CURRENT USE OF INSULIN: ICD-10-CM

## 2024-02-26 DIAGNOSIS — E11.21 DIABETIC NEPHROPATHY ASSOCIATED WITH TYPE 2 DIABETES MELLITUS: Chronic | ICD-10-CM

## 2024-02-26 RX ORDER — EMPAGLIFLOZIN 10 MG/1
10 TABLET, FILM COATED ORAL
Qty: 90 TABLET | Refills: 1 | Status: SHIPPED | OUTPATIENT
Start: 2024-02-26

## 2024-02-26 RX ORDER — INSULIN GLARGINE 300 U/ML
15 INJECTION, SOLUTION SUBCUTANEOUS DAILY
Qty: 1.5 ML | Refills: 11 | Status: SHIPPED | OUTPATIENT
Start: 2024-02-26 | End: 2024-02-28

## 2024-02-26 RX ORDER — INSULIN DEGLUDEC 100 U/ML
INJECTION, SOLUTION SUBCUTANEOUS
Qty: 15 ML | Refills: 0 | Status: CANCELLED | OUTPATIENT
Start: 2024-02-26

## 2024-02-26 NOTE — TELEPHONE ENCOUNTER
Refill Routing Note   Medication(s) are not appropriate for processing by [facility] for the following reason(s):        Required labs outdated  Patient not seen by provider within 15 months    [facility] action(s):  Defer     Requires labs : Yes             Appointments  past 12m or future 3m with PCP    Date Provider   Last Visit   Visit date not found Philippe Stephen, DO   Next Visit   2/26/2024 Philippe Stephen, DO   ED visits in past 90 days: 0        Note composed:11:25 AM 02/26/2024

## 2024-02-26 NOTE — TELEPHONE ENCOUNTER
No care due was identified.  Health [facility] Embedded Care Due Messages. Reference number: [id number].   2/26/2024 9:12:25 AM CST

## 2024-02-26 NOTE — TELEPHONE ENCOUNTER
Care Due:                  Date            Visit Type   Department     Provider  --------------------------------------------------------------------------------    Last Visit: None Found      None         None Found                                           [facility] INTERNAL  Next Visit: 03-      [facility]       Philippe Stephen                                                            Last  Test          Frequency    Reason                     Performed    Due Date  --------------------------------------------------------------------------------    HBA1C.......  6 months...  JARDIANCE, insulin.......  06- 12-    Lipid Panel.  12 months..  atorvastatin.............  02- 02-    Health [facility] Embedded Care Due Messages. Reference number: [id number].   2/26/2024 8:48:50 AM CST

## 2024-02-27 DIAGNOSIS — E11.42 TYPE 2 DIABETES MELLITUS WITH DIABETIC POLYNEUROPATHY, WITH LONG-TERM CURRENT USE OF INSULIN: ICD-10-CM

## 2024-02-27 DIAGNOSIS — Z79.4 TYPE 2 DIABETES MELLITUS WITH DIABETIC POLYNEUROPATHY, WITH LONG-TERM CURRENT USE OF INSULIN: ICD-10-CM

## 2024-02-27 RX ORDER — INSULIN GLARGINE 300 U/ML
15 INJECTION, SOLUTION SUBCUTANEOUS DAILY
Qty: 1.5 ML | Refills: 11 | Status: CANCELLED | OUTPATIENT
Start: 2024-02-27 | End: 2025-02-26

## 2024-02-27 NOTE — TELEPHONE ENCOUNTER
No care due was identified.  Health [facility] Embedded Care Due Messages. Reference number: [id number].   2/27/2024 4:29:01 PM CST

## 2024-02-27 NOTE — TELEPHONE ENCOUNTER
----- Message from John Dia sent at 2/27/2024  3:36 PM CST -----  Type: Patient Call Back         Who called: LUCHO SHARMA [[id number]]         What is the request in detail: Pt would like to speak to office about pins for insulin.          Can the clinic reply by [facility]? No         Would the patient rather a call back or a response via [facility]? Call back         Best call back number: Telephone Information:  Mobile          [phone]             Additional Information:         Thank you.

## 2024-02-28 DIAGNOSIS — E11.42 TYPE 2 DIABETES MELLITUS WITH DIABETIC POLYNEUROPATHY, WITH LONG-TERM CURRENT USE OF INSULIN: ICD-10-CM

## 2024-02-28 DIAGNOSIS — Z79.4 TYPE 2 DIABETES MELLITUS WITH DIABETIC POLYNEUROPATHY, WITH LONG-TERM CURRENT USE OF INSULIN: ICD-10-CM

## 2024-02-28 RX ORDER — INSULIN DEGLUDEC 100 U/ML
INJECTION, SOLUTION SUBCUTANEOUS
Qty: 15 ML | Refills: 0 | Status: SHIPPED | OUTPATIENT
Start: 2024-02-28 | End: 2024-06-06 | Stop reason: SDUPTHER

## 2024-02-28 RX ORDER — INSULIN DEGLUDEC 100 U/ML
INJECTION, SOLUTION SUBCUTANEOUS
Qty: 15 ML | Refills: 0 | OUTPATIENT
Start: 2024-02-28

## 2024-02-28 NOTE — TELEPHONE ENCOUNTER
No care due was identified.  Health [facility] Embedded Care Due Messages. Reference number: [id number].   2/28/2024 9:02:11 AM CST

## 2024-03-05 ENCOUNTER — TELEPHONE (OUTPATIENT)
Dept: NEPHROLOGY | Facility: CLINIC | Age: 84
End: 2024-03-05
Payer: MEDICARE

## 2024-03-05 DIAGNOSIS — N18.32 STAGE 3B CHRONIC KIDNEY DISEASE: Primary | ICD-10-CM

## 2024-03-05 NOTE — TELEPHONE ENCOUNTER
----- Message from Germain Huber sent at 3/5/2024 11:18 AM CST -----  Regarding: appt  Contact: [phone]  Pt received letter in the mail for scheduling an appt in March . Please call would like to discuss

## 2024-03-07 ENCOUNTER — LAB VISIT (OUTPATIENT)
Dept: LAB | Facility: OTHER | Age: 84
End: 2024-03-07
Attending: INTERNAL MEDICINE
Payer: MEDICARE

## 2024-03-07 DIAGNOSIS — N18.32 STAGE 3B CHRONIC KIDNEY DISEASE: ICD-10-CM

## 2024-03-07 LAB
ALBUMIN SERPL BCP-MCNC: 3.5 G/DL (ref 3.5–5.2)
ANION GAP SERPL CALC-SCNC: 11 MMOL/L (ref 8–16)
ANION GAP SERPL CALC-SCNC: 11 MMOL/L (ref 8–16)
BASOPHILS # BLD AUTO: 0.03 K/UL (ref 0–0.2)
BASOPHILS NFR BLD: 0.5 % (ref 0–1.9)
BUN SERPL-MCNC: 32 MG/DL (ref 8–23)
BUN SERPL-MCNC: 32 MG/DL (ref 8–23)
CALCIUM SERPL-MCNC: 9.5 MG/DL (ref 8.7–10.5)
CALCIUM SERPL-MCNC: 9.5 MG/DL (ref 8.7–10.5)
CHLORIDE SERPL-SCNC: 102 MMOL/L (ref 95–110)
CHLORIDE SERPL-SCNC: 102 MMOL/L (ref 95–110)
CO2 SERPL-SCNC: 26 MMOL/L (ref 23–29)
CO2 SERPL-SCNC: 26 MMOL/L (ref 23–29)
CREAT SERPL-MCNC: 1.8 MG/DL (ref 0.5–1.4)
CREAT SERPL-MCNC: 1.8 MG/DL (ref 0.5–1.4)
DIFFERENTIAL METHOD BLD: NORMAL
EOSINOPHIL # BLD AUTO: 0.2 K/UL (ref 0–0.5)
EOSINOPHIL NFR BLD: 2.7 % (ref 0–8)
ERYTHROCYTE [DISTWIDTH] IN BLOOD BY AUTOMATED COUNT: 13.7 % (ref 11.5–14.5)
EST. GFR  (NO RACE VARIABLE): 37 ML/MIN/1.73 M^2
EST. GFR  (NO RACE VARIABLE): 37 ML/MIN/1.73 M^2
GLUCOSE SERPL-MCNC: 122 MG/DL (ref 70–110)
GLUCOSE SERPL-MCNC: 122 MG/DL (ref 70–110)
HCT VFR BLD AUTO: 50.7 % (ref 40–54)
HGB BLD-MCNC: 16.9 G/DL (ref 14–18)
IMM GRANULOCYTES # BLD AUTO: 0.01 K/UL (ref 0–0.04)
IMM GRANULOCYTES NFR BLD AUTO: 0.2 % (ref 0–0.5)
LYMPHOCYTES # BLD AUTO: 2.7 K/UL (ref 1–4.8)
LYMPHOCYTES NFR BLD: 41.2 % (ref 18–48)
MCH RBC QN AUTO: 30.8 PG (ref 27–31)
MCHC RBC AUTO-ENTMCNC: 33.3 G/DL (ref 32–36)
MCV RBC AUTO: 93 FL (ref 82–98)
MONOCYTES # BLD AUTO: 0.6 K/UL (ref 0.3–1)
MONOCYTES NFR BLD: 9.8 % (ref 4–15)
NEUTROPHILS # BLD AUTO: 3 K/UL (ref 1.8–7.7)
NEUTROPHILS NFR BLD: 45.6 % (ref 38–73)
NRBC BLD-RTO: 0 /100 WBC
PHOSPHATE SERPL-MCNC: 3.5 MG/DL (ref 2.7–4.5)
PLATELET # BLD AUTO: 177 K/UL (ref 150–450)
PMV BLD AUTO: 10 FL (ref 9.2–12.9)
POTASSIUM SERPL-SCNC: 3.8 MMOL/L (ref 3.5–5.1)
POTASSIUM SERPL-SCNC: 3.8 MMOL/L (ref 3.5–5.1)
RBC # BLD AUTO: 5.48 M/UL (ref 4.6–6.2)
SODIUM SERPL-SCNC: 139 MMOL/L (ref 136–145)
SODIUM SERPL-SCNC: 139 MMOL/L (ref 136–145)
WBC # BLD AUTO: 6.56 K/UL (ref 3.9–12.7)

## 2024-03-07 PROCEDURE — 85025 COMPLETE CBC W/AUTO DIFF WBC: CPT | Performed by: INTERNAL MEDICINE

## 2024-03-07 PROCEDURE — 36415 COLL VENOUS BLD VENIPUNCTURE: CPT | Performed by: INTERNAL MEDICINE

## 2024-03-07 PROCEDURE — 80069 RENAL FUNCTION PANEL: CPT | Performed by: INTERNAL MEDICINE

## 2024-03-13 ENCOUNTER — OFFICE VISIT (OUTPATIENT)
Dept: NEPHROLOGY | Facility: CLINIC | Age: 84
End: 2024-03-13
Payer: MEDICARE

## 2024-03-13 VITALS
SYSTOLIC BLOOD PRESSURE: 128 MMHG | OXYGEN SATURATION: 97 % | DIASTOLIC BLOOD PRESSURE: 85 MMHG | HEART RATE: 80 BPM | HEIGHT: 62 IN | BODY MASS INDEX: 35.22 KG/M2 | RESPIRATION RATE: 18 BRPM | WEIGHT: 191.38 LBS

## 2024-03-13 DIAGNOSIS — E66.01 MORBID (SEVERE) OBESITY DUE TO EXCESS CALORIES: ICD-10-CM

## 2024-03-13 DIAGNOSIS — E11.21 DIABETIC NEPHROPATHY ASSOCIATED WITH TYPE 2 DIABETES MELLITUS: ICD-10-CM

## 2024-03-13 DIAGNOSIS — N18.32 STAGE 3B CHRONIC KIDNEY DISEASE: Primary | ICD-10-CM

## 2024-03-13 DIAGNOSIS — N18.4 CKD STAGE 4 DUE TO TYPE 2 DIABETES MELLITUS: ICD-10-CM

## 2024-03-13 DIAGNOSIS — E11.22 CKD STAGE 4 DUE TO TYPE 2 DIABETES MELLITUS: ICD-10-CM

## 2024-03-13 DIAGNOSIS — I10 ESSENTIAL HYPERTENSION: ICD-10-CM

## 2024-03-13 PROCEDURE — 99999 PR PBB SHADOW E&M-EST. PATIENT-LVL III: CPT | Mod: PBBFAC,,, | Performed by: INTERNAL MEDICINE

## 2024-03-13 PROCEDURE — 99214 OFFICE O/P EST MOD 30 MIN: CPT | Mod: S$GLB,,, | Performed by: INTERNAL MEDICINE

## 2024-03-13 NOTE — PROGRESS NOTES
Progress Note  Nephrology      Referring physician: Philippe Stephen DO    Reason for visit: CKD     SUBJECTIVE:   84 y.o. male  has a past medical history of Allergy, Anticoagulant long-term use, Colon polyp, Diabetes mellitus type II, Gout, arthritis, Gout, unspecified, colonic polyps, Hyperlipidemia, Hypertension, Prostatic hypertrophy, and Renal insufficiency. who has been following up in renal clinic for ckd management   The patient denies taking NSAIDs or new antibiotics, recreational drugs, recent episode of dehydration, diarrhea, nausea or vomiting, acute illness, hospitalization or exposure to IV radiocontrast.     ROS:  General: negative for chills, or fatigue  ENT: No epistaxis or headaches  Hematological and Lymphatic: No bleeding problems or blood clots.  Endocrine: No skin changes or temperature intolerance  Respiratory: No cough, shortness of breath, or wheezing  Cardiovascular: No chest pain or dyspnea   Gastrointestinal: No abdominal pain, change in bowel habits  Genito-Urinary: No dysuria, trouble voiding, or hematuria  Musculoskeletal: ROS: negative for - joint pain, joint stiffness, joint swelling, muscle pain or muscular weakness  Neurological: No focal weakness, no numbness  Dermatological: No rash or ulcers    OBJECTIVE:     There were no vitals filed for this visit.       Physical Exam:  General: no distress, well nourished  HENT: PERRLA, Normal mouth nose and ears.  Neck: no JVD and thyroid not enlarged, symmetric, no tenderness/mass/nodules  Lungs: clear to auscultation bilaterally and normal respiratory effort  Cardiovascular: regular rate and rhythm, S1, S2 normal, no murmur, click, rub or gallop.   Abdomen: soft, non-tender non-distented; bowel sounds normal  Skin: No rashes or lesions  Musculoskeletal:no edema in LE, no deformities.   Lymph Nodes: No cervical or supraclavicular adenopathy  Neurologic: Normal strength and tone. No focal numbness or  "weakness          Medications:    Current Outpatient Medications:     apixaban (ELIQUIS) 2.5 mg Tab, Take 1 tablet (2.5 mg total) by mouth 2 (two) times daily., Disp: 60 tablet, Rfl: 11    aspirin (ECOTRIN) 81 MG EC tablet, Take 81 mg by mouth once daily., Disp: , Rfl:     atorvastatin (LIPITOR) 40 MG tablet, Take 1 tablet (40 mg total) by mouth once daily., Disp: 90 tablet, Rfl: 2    BD INSULIN SYRINGE ULTRA-FINE 0.5 mL 31 gauge x 5/16 Syrg, USE WITH LANTUS INSULIN ONCE DAILY, Disp: 100 each, Rfl: 3    BD ULTRA-FINE MINI PEN NEEDLE 31 gauge x 3/16" Ndle, USE TO INJECT LANTUS ONCE DAILY, Disp: 100 each, Rfl: 3    blood sugar diagnostic Strp, Check sugar tid as directed, Disp: 300 each, Rfl: 3    carvediloL (COREG) 6.25 MG tablet, Take 1 tablet (6.25 mg total) by mouth 2 (two) times daily with meals., Disp: 180 tablet, Rfl: 3    cholecalciferol, vitamin D3, (VITAMIN D3) 50 mcg (2,000 unit) Tab, Take by mouth once daily., Disp: , Rfl:     finasteride (PROSCAR) 5 mg tablet, Take 1 tablet (5 mg total) by mouth once daily., Disp: 90 tablet, Rfl: 3    furosemide (LASIX) 20 MG tablet, Take 1 tablet (20 mg total) by mouth once daily., Disp: 90 tablet, Rfl: 3    insulin degludec (TRESIBA FLEXTOUCH U-100) 100 unit/mL (3 mL) insulin pen, ADMINISTER 15 UNITS UNDER THE SKIN EVERY EVENING, Disp: 15 mL, Rfl: 0    JARDIANCE 10 mg tablet, TAKE 1 TABLET(10 MG) BY MOUTH EVERY DAY, Disp: 90 tablet, Rfl: 1    NIFEdipine (PROCARDIA-XL) 60 MG (OSM) 24 hr tablet, Take 1 tablet (60 mg total) by mouth once daily., Disp: 90 tablet, Rfl: 2    OMEGA-3 ACID ETHYL ESTERS (OMACOR) 1 gram Cap, Take 1 g by mouth. 1 Capsule Oral Twice a day, Disp: , Rfl:     pen needle, diabetic (BD ULTRA-FINE SHORT PEN NEEDLE) 31 gauge x 5/16" Ndle, USE WITH LANTUS INSULIN ONCE DAILY, Disp: 100 each, Rfl: 3    tamsulosin (FLOMAX) 0.4 mg Cap, Take 1 capsule (0.4 mg total) by mouth once daily. TAKE 1 CAPSULE BY MOUTH TWICE A DAY, Disp: 90 capsule, Rfl: 3    " blood-glucose meter Misc, 1 kit by Misc.(Non-Drug; Combo Route) route 3 (three) times daily. Pt is to test blood sugar TID, Disp: 1 each, Rfl: 0  No current facility-administered medications for this visit.    Facility-Administered Medications Ordered in Other Visits:     0.9%  NaCl infusion, , Intravenous, Continuous, Krishan Awan MD, Stopped at 01/28/20 0943    sodium chloride 0.9% flush 5 mL, 5 mL, Intravenous, PRN, Krishan Awan MD         Laboratory:  Lab Results   Component Value Date    CREATININE 1.8 (H) 03/07/2024    CREATININE 1.8 (H) 03/07/2024       Prot/Creat Ratio, Urine   Date Value Ref Range Status   03/07/2024 0.20 0.00 - 0.20 Final   11/16/2021 0.26 (H) 0.00 - 0.20 Final   05/03/2021 0.18 0.00 - 0.20 Final       Lab Results   Component Value Date     03/07/2024     03/07/2024    K 3.8 03/07/2024    K 3.8 03/07/2024    CO2 26 03/07/2024    CO2 26 03/07/2024       last PTH   Lab Results   Component Value Date    .5 (H) 11/16/2021    CALCIUM 9.5 03/07/2024    CALCIUM 9.5 03/07/2024    PHOS 3.5 03/07/2024       Lab Results   Component Value Date    HGB 16.9 03/07/2024        Lab Results   Component Value Date    HGBA1C 6.8 (H) 06/26/2023       Lab Results   Component Value Date    LDLCALC 43.6 (L) 02/27/2023       Other Labs were reviewed      ASSESSMENT/PLAN:     CKD IIIB/A2   -likely from DMII, HTN and probably recurrent urinary retention from BPH  -Cr baseline ~ 1.7-2.0 with eGFR ~ 35-40 ml/min  -UA unremarkable  -Renal US remotely with ckd and stones  - on jardiance      HTN  -controlled     Anemia  -from ckd  -Hgb goal ~ 10  -Iron stores not available     Secondary Hyperparathyroidism  -Phos/Ca acceptable  -PTH acceptable  -Vit D wnl     Acid/Base  -No Metabolic acidosis     BPH  -with recurrent urinary retention in the past        RTC in 6m      SARAH RICE MD  NEPHROLOGY ATTENDING

## 2024-03-25 ENCOUNTER — OFFICE VISIT (OUTPATIENT)
Dept: INTERNAL MEDICINE | Facility: CLINIC | Age: 84
End: 2024-03-25
Payer: MEDICARE

## 2024-03-25 ENCOUNTER — LAB VISIT (OUTPATIENT)
Dept: LAB | Facility: OTHER | Age: 84
End: 2024-03-25
Attending: FAMILY MEDICINE
Payer: MEDICARE

## 2024-03-25 VITALS
DIASTOLIC BLOOD PRESSURE: 84 MMHG | HEIGHT: 62 IN | HEART RATE: 70 BPM | BODY MASS INDEX: 34.48 KG/M2 | WEIGHT: 187.38 LBS | SYSTOLIC BLOOD PRESSURE: 132 MMHG | OXYGEN SATURATION: 95 %

## 2024-03-25 DIAGNOSIS — N18.4 CKD STAGE 4 DUE TO TYPE 2 DIABETES MELLITUS: Primary | Chronic | ICD-10-CM

## 2024-03-25 DIAGNOSIS — E11.22 CKD STAGE 4 DUE TO TYPE 2 DIABETES MELLITUS: Chronic | ICD-10-CM

## 2024-03-25 DIAGNOSIS — E11.22 CKD STAGE 4 DUE TO TYPE 2 DIABETES MELLITUS: Primary | Chronic | ICD-10-CM

## 2024-03-25 DIAGNOSIS — N25.81 SECONDARY HYPERPARATHYROIDISM OF RENAL ORIGIN: ICD-10-CM

## 2024-03-25 DIAGNOSIS — I10 PRIMARY HYPERTENSION: ICD-10-CM

## 2024-03-25 DIAGNOSIS — I48.19 PERSISTENT ATRIAL FIBRILLATION: ICD-10-CM

## 2024-03-25 DIAGNOSIS — N18.4 CKD STAGE 4 DUE TO TYPE 2 DIABETES MELLITUS: Chronic | ICD-10-CM

## 2024-03-25 PROBLEM — I48.0 PAROXYSMAL ATRIAL FIBRILLATION: Status: RESOLVED | Noted: 2020-02-27 | Resolved: 2024-03-25

## 2024-03-25 LAB
CHOLEST SERPL-MCNC: 107 MG/DL (ref 120–199)
CHOLEST/HDLC SERPL: 4 {RATIO} (ref 2–5)
ESTIMATED AVG GLUCOSE: 151 MG/DL (ref 68–131)
HBA1C MFR BLD: 6.9 % (ref 4–5.6)
HDLC SERPL-MCNC: 27 MG/DL (ref 40–75)
HDLC SERPL: 25.2 % (ref 20–50)
LDLC SERPL CALC-MCNC: 31 MG/DL (ref 63–159)
NONHDLC SERPL-MCNC: 80 MG/DL
TRIGL SERPL-MCNC: 245 MG/DL (ref 30–150)

## 2024-03-25 PROCEDURE — 80061 LIPID PANEL: CPT | Performed by: FAMILY MEDICINE

## 2024-03-25 PROCEDURE — 99214 OFFICE O/P EST MOD 30 MIN: CPT | Mod: S$GLB,,, | Performed by: FAMILY MEDICINE

## 2024-03-25 PROCEDURE — 83036 HEMOGLOBIN GLYCOSYLATED A1C: CPT | Performed by: FAMILY MEDICINE

## 2024-03-25 PROCEDURE — 99999 PR PBB SHADOW E&M-EST. PATIENT-LVL IV: CPT | Mod: PBBFAC,,, | Performed by: FAMILY MEDICINE

## 2024-03-25 PROCEDURE — 36415 COLL VENOUS BLD VENIPUNCTURE: CPT | Performed by: FAMILY MEDICINE

## 2024-03-25 NOTE — PROGRESS NOTES
"Subjective:       Patient ID: Giovanni Guerrero is a 84 y.o. male.    Chief Complaint: Establish Care    HPI  History of Present Illness    Came in today to establish care with me.  Here today with his daughter.  Overall feels well and has no acute concerns at this time.  Managing AFib stage IV CKD with diabetes.  Has consistent follow-up with nephrology every 6 months.  Taking all the medications as listed below.  He is due for hemoglobin A1c evaluation    All of your core healthy metrics are met.      Social History     Social History Narrative     52 yrs, 3 childre (two living), 11 grandchildren & 4 great grandchildren       Family History   Problem Relation Age of Onset    Cancer Mother         ovarian cancer    Heart disease Father         MI at 57    Diabetes Father     Cerebral aneurysm Sister     Heart disease Brother         MI at 60    Heart disease Brother         CABG    Anesthesia problems Neg Hx     Melanoma Neg Hx        Current Outpatient Medications:     apixaban (ELIQUIS) 2.5 mg Tab, Take 1 tablet (2.5 mg total) by mouth 2 (two) times daily., Disp: 60 tablet, Rfl: 11    aspirin (ECOTRIN) 81 MG EC tablet, Take 81 mg by mouth once daily., Disp: , Rfl:     atorvastatin (LIPITOR) 40 MG tablet, Take 1 tablet (40 mg total) by mouth once daily., Disp: 90 tablet, Rfl: 2    BD INSULIN SYRINGE ULTRA-FINE 0.5 mL 31 gauge x 5/16 Syrg, USE WITH LANTUS INSULIN ONCE DAILY, Disp: 100 each, Rfl: 3    BD ULTRA-FINE MINI PEN NEEDLE 31 gauge x 3/16" Ndle, USE TO INJECT LANTUS ONCE DAILY, Disp: 100 each, Rfl: 3    blood sugar diagnostic Strp, Check sugar tid as directed, Disp: 300 each, Rfl: 3    carvediloL (COREG) 6.25 MG tablet, Take 1 tablet (6.25 mg total) by mouth 2 (two) times daily with meals., Disp: 180 tablet, Rfl: 3    cholecalciferol, vitamin D3, (VITAMIN D3) 50 mcg (2,000 unit) Tab, Take by mouth once daily., Disp: , Rfl:     finasteride (PROSCAR) 5 mg tablet, Take 1 tablet (5 mg total) by mouth once " "daily., Disp: 90 tablet, Rfl: 3    furosemide (LASIX) 20 MG tablet, Take 1 tablet (20 mg total) by mouth once daily., Disp: 90 tablet, Rfl: 3    insulin degludec (TRESIBA FLEXTOUCH U-100) 100 unit/mL (3 mL) insulin pen, ADMINISTER 15 UNITS UNDER THE SKIN EVERY EVENING, Disp: 15 mL, Rfl: 0    JARDIANCE 10 mg tablet, TAKE 1 TABLET(10 MG) BY MOUTH EVERY DAY, Disp: 90 tablet, Rfl: 1    NIFEdipine (PROCARDIA-XL) 60 MG (OSM) 24 hr tablet, Take 1 tablet (60 mg total) by mouth once daily., Disp: 90 tablet, Rfl: 2    OMEGA-3 ACID ETHYL ESTERS (OMACOR) 1 gram Cap, Take 1 g by mouth. 1 Capsule Oral Twice a day, Disp: , Rfl:     pen needle, diabetic (BD ULTRA-FINE SHORT PEN NEEDLE) 31 gauge x 5/16" Ndle, USE WITH LANTUS INSULIN ONCE DAILY, Disp: 100 each, Rfl: 3    tamsulosin (FLOMAX) 0.4 mg Cap, Take 1 capsule (0.4 mg total) by mouth once daily. TAKE 1 CAPSULE BY MOUTH TWICE A DAY, Disp: 90 capsule, Rfl: 3    blood-glucose meter Misc, 1 kit by Misc.(Non-Drug; Combo Route) route 3 (three) times daily. Pt is to test blood sugar TID, Disp: 1 each, Rfl: 0  No current facility-administered medications for this visit.    Facility-Administered Medications Ordered in Other Visits:     0.9%  NaCl infusion, , Intravenous, Continuous, Krishan Awan MD, Stopped at 01/28/20 0943    sodium chloride 0.9% flush 5 mL, 5 mL, Intravenous, PRN, Krishan Awan MD    Review of Systems   Constitutional:  Negative for chills and fever.   Eyes:  Negative for visual disturbance.   Respiratory:  Negative for cough and shortness of breath.    Cardiovascular:  Negative for chest pain.   Gastrointestinal:  Negative for abdominal pain.   Neurological:  Negative for dizziness.       Objective:   /84 Comment: home reading yesterda  Pulse 70   Ht 5' 2" (1.575 m)   Wt 85 kg (187 lb 6.3 oz)   SpO2 95%   BMI 34.27 kg/m²      Physical Exam  Vitals reviewed.   Constitutional:       General: He is not in acute distress.     Appearance: He is " well-developed. He is not diaphoretic.   HENT:      Head: Normocephalic and atraumatic.      Nose: Nose normal.   Eyes:      General:         Right eye: No discharge.         Left eye: No discharge.      Conjunctiva/sclera: Conjunctivae normal.      Pupils: Pupils are equal, round, and reactive to light.   Neck:      Thyroid: No thyromegaly.   Cardiovascular:      Rate and Rhythm: Normal rate and regular rhythm.      Heart sounds: Normal heart sounds. No murmur heard.  Pulmonary:      Effort: Pulmonary effort is normal. No respiratory distress.      Breath sounds: Normal breath sounds. No wheezing.   Abdominal:      General: There is no distension.      Palpations: Abdomen is soft.   Skin:     General: Skin is warm.      Findings: No rash.   Neurological:      Mental Status: He is alert and oriented to person, place, and time.   Psychiatric:         Behavior: Behavior normal.         Physical Exam      @resultssec@  Assessment & Plan   Assessment & Plan      Problem List Items Addressed This Visit          Cardiac/Vascular    Hypertension    Current Assessment & Plan     Blood pressure well controlled.  Sometimes elevated whenever he comes to the doctor secondary to hypertension.  Continue same medications.         Persistent atrial fibrillation    Current Assessment & Plan     Rate controlled.  Continues on Eliquis.            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         Relevant Orders    Lipid Panel    Hemoglobin A1C       Endocrine    Secondary hyperparathyroidism of renal origin    Current Assessment & Plan     Recent calcium levels fine.              Immunizations Administered on Date of Encounter - 3/25/2024       No immunizations on file.             No follow-ups on file.    This note was generated with the assistance of ambient listening technology. Verbal consent was obtained by the patient and  accompanying visitor(s) for the recording of patient appointment to facilitate this note. I attest to having reviewed and edited the generated note for accuracy, though some syntax or spelling errors may persist. Please contact the author of this note for any clarification.      Disclaimer:  This note may have been prepared using voice recognition software, it may have not been extensively proofed, as such there could be errors within the text such as sound alike errors.

## 2024-03-25 NOTE — ASSESSMENT & PLAN NOTE
Blood pressure well controlled.  Sometimes elevated whenever he comes to the doctor secondary to hypertension.  Continue same medications.

## 2024-03-27 ENCOUNTER — TELEPHONE (OUTPATIENT)
Dept: INTERNAL MEDICINE | Facility: CLINIC | Age: 84
End: 2024-03-27
Payer: MEDICARE

## 2024-03-27 NOTE — TELEPHONE ENCOUNTER
----- Message from Philippe Stephen DO sent at 3/26/2024  5:08 PM CDT -----  Diabetes well controlled. Keep up the good work!

## 2024-05-01 RX ORDER — NAPROXEN SODIUM 220 MG
1 TABLET ORAL DAILY
Qty: 100 EACH | Refills: 3 | Status: SHIPPED | OUTPATIENT
Start: 2024-05-01

## 2024-05-01 NOTE — TELEPHONE ENCOUNTER
Care Due:                  Date            Visit Type   Department     Provider  --------------------------------------------------------------------------------                                             [facility]  Last Visit: 03-      [facility]          MEDICINE       Philippe Stephen  Next Visit: None Scheduled  None         None Found                                                            Last  Test          Frequency    Reason                     Performed    Due Date  --------------------------------------------------------------------------------    CMP.........  12 months..  atorvastatin.............  06- 06-    [facility] Embedded Care Due Messages. Reference number: [id number].   5/01/2024 1:44:00 PM CDT

## 2024-05-06 ENCOUNTER — TELEPHONE (OUTPATIENT)
Dept: INTERNAL MEDICINE | Facility: CLINIC | Age: 84
End: 2024-05-06
Payer: MEDICARE

## 2024-05-06 NOTE — TELEPHONE ENCOUNTER
----- Message from Kamron Escobar sent at 5/3/2024  4:42 PM CDT -----   Name of Who is Calling:     What is the request in detail: patient request call back in reference to prior authorization is needed for pen needles  Please contact to further discuss and advise      Can the clinic reply by [facility]:     What Number to Call Back if not in [facility]:   [phone]

## 2024-05-14 RX ORDER — CARVEDILOL 6.25 MG/1
6.25 TABLET ORAL 2 TIMES DAILY WITH MEALS
Qty: 180 TABLET | Refills: 3 | Status: SHIPPED | OUTPATIENT
Start: 2024-05-14 | End: 2025-05-09

## 2024-05-14 RX ORDER — FUROSEMIDE 20 MG/1
20 TABLET ORAL DAILY
Qty: 90 TABLET | Refills: 3 | Status: SHIPPED | OUTPATIENT
Start: 2024-05-14 | End: 2025-05-09

## 2024-06-06 DIAGNOSIS — Z79.4 TYPE 2 DIABETES MELLITUS WITH DIABETIC POLYNEUROPATHY, WITH LONG-TERM CURRENT USE OF INSULIN: ICD-10-CM

## 2024-06-06 DIAGNOSIS — E11.42 TYPE 2 DIABETES MELLITUS WITH DIABETIC POLYNEUROPATHY, WITH LONG-TERM CURRENT USE OF INSULIN: ICD-10-CM

## 2024-06-06 RX ORDER — INSULIN DEGLUDEC 100 U/ML
15 INJECTION, SOLUTION SUBCUTANEOUS NIGHTLY
Qty: 15 ML | Refills: 1 | Status: SHIPPED | OUTPATIENT
Start: 2024-06-06

## 2024-06-06 NOTE — TELEPHONE ENCOUNTER
Refill Routing Note   Medication(s) are not appropriate for processing by [facility] for the following reason(s):        Required labs abnormal  CREATININE 1.8 (H) 03/07/2024       OR action(s):  Defer             Pharmacist review requested: Yes     Appointments  past 12m or future 3m with PCP    Date Provider   Last Visit   3/25/2024 Philippe Stephen, DO   Next Visit   Visit date not found Philippe Stephen, DO   ED visits in past 90 days: 0        Note composed:12:59 PM 06/06/2024

## 2024-06-06 NOTE — TELEPHONE ENCOUNTER
No care due was identified.  [facility] Embedded Care Due Messages. Reference number: [id number].   6/06/2024 11:52:48 AM CDT

## 2024-06-06 NOTE — TELEPHONE ENCOUNTER
Refill Decision Note   Pedrolouis Guerrero  is requesting a refill authorization.  Brief Assessment and Rationale for Refill:  Approve     Medication Therapy Plan:  No dose adjustment recommended per renal fxn.       Pharmacist review requested: Yes   Extended chart review required: Yes   Comments:     Note composed:1:37 PM 06/06/2024

## 2024-06-10 ENCOUNTER — PATIENT MESSAGE (OUTPATIENT)
Dept: INTERNAL MEDICINE | Facility: CLINIC | Age: 84
End: 2024-06-10
Payer: MEDICARE

## 2024-06-10 DIAGNOSIS — I10 PRIMARY HYPERTENSION: ICD-10-CM

## 2024-06-10 RX ORDER — NIFEDIPINE 60 MG/1
60 TABLET, EXTENDED RELEASE ORAL DAILY
Qty: 90 TABLET | Refills: 2 | Status: SHIPPED | OUTPATIENT
Start: 2024-06-10

## 2024-06-10 NOTE — TELEPHONE ENCOUNTER
No care due was identified.  [facility] Embedded Care Due Messages. Reference number: [id number].   6/10/2024 9:16:41 AM CDT

## 2024-07-10 DIAGNOSIS — E78.2 MIXED HYPERLIPIDEMIA: ICD-10-CM

## 2024-07-10 NOTE — TELEPHONE ENCOUNTER
Care Due:                  Date            Visit Type   Department     Provider  --------------------------------------------------------------------------------                                             [facility]  Last Visit: 03-      [facility]       Philippe Stephen  Next Visit: None Scheduled  None         None Found                                                            Last  Test          Frequency    Reason                     Performed    Due Date  --------------------------------------------------------------------------------    CMP.........  12 months..  atorvastatin.............  06- 06-    HBA1C.......  6 months...  JARDIANCE, insulin.......  03- 09-    [facility] Embedded Care Due Messages. Reference number: [id number].   7/10/2024 3:31:52 PM CDT

## 2024-07-10 NOTE — TELEPHONE ENCOUNTER
Refill Routing Note   Medication(s) are not appropriate for processing by [facility] for the following reason(s):        Required labs outdated    [facility] action(s):  Approve   Requires appointment : Yes   Requires labs : Yes             Appointments  past 12m or future 3m with PCP    Date Provider   Last Visit   3/25/2024 Philippe Stephen, DO   Next Visit   Visit date not found Philippe Stephen, DO   ED visits in past 90 days: 0        Note composed:5:23 PM 07/10/2024

## 2024-07-11 RX ORDER — ATORVASTATIN CALCIUM 40 MG/1
40 TABLET, FILM COATED ORAL
Qty: 90 TABLET | Refills: 0 | Status: SHIPPED | OUTPATIENT
Start: 2024-07-11

## 2024-07-23 DIAGNOSIS — E11.42 TYPE 2 DIABETES MELLITUS WITH DIABETIC POLYNEUROPATHY, WITH LONG-TERM CURRENT USE OF INSULIN: Primary | ICD-10-CM

## 2024-07-23 DIAGNOSIS — Z79.4 TYPE 2 DIABETES MELLITUS WITH DIABETIC POLYNEUROPATHY, WITH LONG-TERM CURRENT USE OF INSULIN: Primary | ICD-10-CM

## 2024-07-23 NOTE — TELEPHONE ENCOUNTER
----- Message from Radha Otto sent at 7/23/2024  1:29 PM CDT -----  Regarding: testing strips  Type:  Patient Returning Call      Name of who is calling:Patient        What is request in detail:patient is requesting a call back. Shirin is requesting a authorization for testing strips and needles to keep being supplied for patient ( [phone] fax #) please assist.        Can clinic reply by [facility]:no        [facility][phone]

## 2024-07-23 NOTE — TELEPHONE ENCOUNTER
No care due was identified.  [facility] Embedded Care Due Messages. Reference number: [id number].   7/23/2024 4:33:58 PM CDT

## 2024-07-24 RX ORDER — PEN NEEDLE, DIABETIC 30 GX3/16"
1 NEEDLE, DISPOSABLE MISCELLANEOUS DAILY
Qty: 100 EACH | Refills: 3 | Status: SHIPPED | OUTPATIENT
Start: 2024-07-24

## 2024-07-25 ENCOUNTER — OFFICE VISIT (OUTPATIENT)
Dept: UROLOGY | Facility: CLINIC | Age: 84
End: 2024-07-25
Payer: MEDICARE

## 2024-07-25 VITALS — WEIGHT: 187.38 LBS | HEIGHT: 62 IN | BODY MASS INDEX: 34.48 KG/M2

## 2024-07-25 DIAGNOSIS — R33.8 ACUTE URINARY RETENTION: ICD-10-CM

## 2024-07-25 DIAGNOSIS — N13.8 BPH WITH URINARY OBSTRUCTION: ICD-10-CM

## 2024-07-25 DIAGNOSIS — N40.1 BPH WITH URINARY OBSTRUCTION: ICD-10-CM

## 2024-07-25 DIAGNOSIS — N40.0 BENIGN NON-NODULAR PROSTATIC HYPERPLASIA WITHOUT LOWER URINARY TRACT SYMPTOMS: Primary | ICD-10-CM

## 2024-07-25 DIAGNOSIS — N13.8 BPH WITH OBSTRUCTION/LOWER URINARY TRACT SYMPTOMS: ICD-10-CM

## 2024-07-25 DIAGNOSIS — N40.1 BPH WITH OBSTRUCTION/LOWER URINARY TRACT SYMPTOMS: ICD-10-CM

## 2024-07-25 PROCEDURE — 1101F PT FALLS ASSESS-DOCD LE1/YR: CPT | Mod: CPTII,S$GLB,, | Performed by: UROLOGY

## 2024-07-25 PROCEDURE — 99999 PR PBB SHADOW E&M-EST. PATIENT-LVL III: CPT | Mod: PBBFAC,,, | Performed by: UROLOGY

## 2024-07-25 PROCEDURE — 3288F FALL RISK ASSESSMENT DOCD: CPT | Mod: CPTII,S$GLB,, | Performed by: UROLOGY

## 2024-07-25 PROCEDURE — 1126F AMNT PAIN NOTED NONE PRSNT: CPT | Mod: CPTII,S$GLB,, | Performed by: UROLOGY

## 2024-07-25 PROCEDURE — 99213 OFFICE O/P EST LOW 20 MIN: CPT | Mod: S$GLB,,, | Performed by: UROLOGY

## 2024-07-25 PROCEDURE — 1159F MED LIST DOCD IN RCRD: CPT | Mod: CPTII,S$GLB,, | Performed by: UROLOGY

## 2024-07-25 RX ORDER — TAMSULOSIN HYDROCHLORIDE 0.4 MG/1
0.8 CAPSULE ORAL DAILY
Qty: 180 CAPSULE | Refills: 3 | Status: SHIPPED | OUTPATIENT
Start: 2024-07-25 | End: 2025-07-20

## 2024-07-25 RX ORDER — FINASTERIDE 5 MG/1
5 TABLET, FILM COATED ORAL DAILY
Qty: 90 TABLET | Refills: 3 | Status: SHIPPED | OUTPATIENT
Start: 2024-07-25

## 2024-07-25 NOTE — PROGRESS NOTES
Subjective:       Patient ID: Giovanni Guerrero is a 84 y.o. male.    Chief Complaint: Medication Refill (Flomax and finesteride. No urinary complaints)    HPI patient is here for prostate check he is status post TURP in 2019 he has an excellent stream and empties his bladder he is very happy with his outcome urine has been clear.  He empties his bladder.    Past Medical History:   Diagnosis Date    Allergy     Anticoagulant long-term use     Colon polyp     Diabetes mellitus type II     Gout, arthritis     Gout, unspecified     Hx of colonic polyps     Hyperlipidemia     Hypertension     Prostatic hypertrophy     Renal insufficiency        Past Surgical History:   Procedure Laterality Date    APPENDECTOMY      CATARACT EXTRACTION Left 2018    EYE SURGERY      cataract    HERNIA REPAIR      TONSILLECTOMY      TRANSURETHRAL RESECTION OF PROSTATE (TURP) USING LASER N/A 12/6/2019    Procedure: TURP, USING LASER;  Surgeon: Rafael Marquis Jr., MD;  Location: [facility] OR [address] [place] [address];  Service: Urology;  Laterality: N/A;  75min    TREATMENT OF CARDIAC ARRHYTHMIA N/A 1/28/2020    Procedure: CARDIOVERSION;  Surgeon: Sergio Bertrand MD;  Location: [facility] EP LAB;  Service: Cardiology;  Laterality: N/A;  AF, NICOLE, DCCV, MAC, ND, 3 Prep       Family History   Problem Relation Name Age of Onset    Cancer Mother          ovarian cancer    Heart disease Father          MI at 57    Diabetes Father      Cerebral aneurysm Sister      Heart disease Brother          MI at 60    Heart disease Brother          CABG    Anesthesia problems Neg Hx      Melanoma Neg Hx         Social History     Socioeconomic History    Marital status:    Tobacco Use    Smoking status: Never    Smokeless tobacco: Never   Substance and Sexual Activity    Alcohol use: No    Drug use: No    Sexual activity: Not Currently     Partners: Female     Birth control/protection: None   Social History Narrative     52 yrs, 3 childre (two living), 11 grandchildren & 4  "great grandchildren       Allergies:  Actos [pioglitazone]    Medications:    Current Outpatient Medications:     apixaban (ELIQUIS) 2.5 mg Tab, Take 1 tablet (2.5 mg total) by mouth 2 (two) times daily., Disp: 60 tablet, Rfl: 11    aspirin (ECOTRIN) 81 MG EC tablet, Take 81 mg by mouth once daily., Disp: , Rfl:     atorvastatin (LIPITOR) 40 MG tablet, TAKE 1 TABLET(40 MG) BY MOUTH EVERY DAY, Disp: 90 tablet, Rfl: 0    blood sugar diagnostic Strp, Check sugar tid as directed, Disp: 300 each, Rfl: 3    carvediloL (COREG) 6.25 MG tablet, Take 1 tablet (6.25 mg total) by mouth 2 (two) times daily with meals., Disp: 180 tablet, Rfl: 3    cholecalciferol, vitamin D3, (VITAMIN D3) 50 mcg (2,000 unit) Tab, Take by mouth once daily., Disp: , Rfl:     finasteride (PROSCAR) 5 mg tablet, Take 1 tablet (5 mg total) by mouth once daily., Disp: 90 tablet, Rfl: 3    furosemide (LASIX) 20 MG tablet, Take 1 tablet (20 mg total) by mouth once daily., Disp: 90 tablet, Rfl: 3    insulin degludec (TRESIBA FLEXTOUCH U-100) 100 unit/mL (3 mL) insulin pen, Inject 15 Units into the skin every evening., Disp: 15 mL, Rfl: 1    insulin syringe-needle U-100 (BD INSULIN SYRINGE ULTRA-FINE) 0.5 mL 31 gauge x 5/16" Syrg, Inject 1 each into the skin once daily., Disp: 100 each, Rfl: 3    JARDIANCE 10 mg tablet, TAKE 1 TABLET(10 MG) BY MOUTH EVERY DAY, Disp: 90 tablet, Rfl: 1    NIFEdipine (PROCARDIA-XL) 60 MG (OSM) 24 hr tablet, Take 1 tablet (60 mg total) by mouth once daily., Disp: 90 tablet, Rfl: 2    OMEGA-3 ACID ETHYL ESTERS (OMACOR) 1 gram Cap, Take 1 g by mouth. 1 Capsule Oral Twice a day, Disp: , Rfl:     pen needle, diabetic (BD ULTRA-FINE MINI PEN NEEDLE) 31 gauge x 3/16" Ndle, Inject 1 each into the skin once daily., Disp: 100 each, Rfl: 3    pen needle, diabetic (BD ULTRA-FINE SHORT PEN NEEDLE) 31 gauge x 5/16" Ndle, USE WITH LANTUS INSULIN ONCE DAILY, Disp: 100 each, Rfl: 3    tamsulosin (FLOMAX) 0.4 mg Cap, Take 1 capsule (0.4 mg " total) by mouth once daily. TAKE 1 CAPSULE BY MOUTH TWICE A DAY, Disp: 90 capsule, Rfl: 3    blood-glucose meter Misc, 1 kit by Misc.(Non-Drug; Combo Route) route 3 (three) times daily. Pt is to test blood sugar TID, Disp: 1 each, Rfl: 0  No current facility-administered medications for this visit.    Facility-Administered Medications Ordered in Other Visits:     0.9%  NaCl infusion, , Intravenous, Continuous, Krishan Awan MD, Stopped at 01/28/20 0943    sodium chloride 0.9% flush 5 mL, 5 mL, Intravenous, PRN, Krishan Awan MD    Review of Systems    Objective:      Physical Exam    Assessment:       1. Benign non-nodular prostatic hyperplasia without lower urinary tract symptoms        Plan:       Giovanni was seen today for medication refill.    Diagnoses and all orders for this visit:    Benign non-nodular prostatic hyperplasia without lower urinary tract symptoms    Proscar and tamsulosin refilled.  Return to clinic 1 year for refills in [facility]

## 2024-07-29 ENCOUNTER — TELEPHONE (OUTPATIENT)
Dept: INTERNAL MEDICINE | Facility: CLINIC | Age: 84
End: 2024-07-29
Payer: MEDICARE

## 2024-07-29 NOTE — TELEPHONE ENCOUNTER
----- Message from Neva Sutton sent at 7/29/2024 11:38 AM CDT -----  Name of Who is calling :  LUCHO SHARMA [[id number]]      What is the request in detail:    Pt would like a auth sent over for his supplies at [id number].    Can the clinic reply by [facility]:no            [facility][phone]

## 2024-07-29 NOTE — TELEPHONE ENCOUNTER
"Hey, can you please reprint these?    blood sugar diagnostic Strp 300 each 3 7/24/2024 -- No   Sig: Check sugar tid as directed   Class: Print   Notes to Pharmacy: Or brand covered by insurance   Order: [id number]   Date/Time Signed: 7/24/2024 15:17          Associated Diagnoses    Type 2 diabetes mellitus with diabetic polyneuropathy, with long-term current use of insulin  - Primary            Disp Refills Start End NIKUNJ   pen needle, diabetic (BD ULTRA-FINE MINI PEN NEEDLE) 31 gauge x 3/16" Ndle 100 each 3 7/24/2024 -- No   Sig - Route: Inject 1 each into the skin once daily. - Subcutaneous   Class: Print   Order: [id number]   Date/Time Signed: 7/24/2024 15:17          Associated Diagnoses    Type 2 diabetes mellitus with diabetic polyneuropathy, with long-term current use of insulin  - Primary           "

## 2024-08-06 ENCOUNTER — TELEPHONE (OUTPATIENT)
Dept: INTERNAL MEDICINE | Facility: CLINIC | Age: 84
End: 2024-08-06
Payer: MEDICARE

## 2024-08-11 DIAGNOSIS — E11.21 DIABETIC NEPHROPATHY ASSOCIATED WITH TYPE 2 DIABETES MELLITUS: Chronic | ICD-10-CM

## 2024-08-11 NOTE — TELEPHONE ENCOUNTER
No care due was identified.  [facility] Embedded Care Due Messages. Reference number: [id number].   8/11/2024 5:35:44 AM CDT

## 2024-08-12 DIAGNOSIS — I48.19 PERSISTENT ATRIAL FIBRILLATION: ICD-10-CM

## 2024-08-12 RX ORDER — EMPAGLIFLOZIN 10 MG/1
10 TABLET, FILM COATED ORAL
Qty: 90 TABLET | Refills: 0 | Status: SHIPPED | OUTPATIENT
Start: 2024-08-12

## 2024-08-12 NOTE — TELEPHONE ENCOUNTER
Refill Decision Note   Pedrolouis Guerrero  is requesting a refill authorization.  Brief Assessment and Rationale for Refill:  Approve     Medication Therapy Plan:         Comments:     Note composed:2:02 AM 08/12/2024

## 2024-08-26 ENCOUNTER — OFFICE VISIT (OUTPATIENT)
Dept: CARDIOLOGY | Facility: CLINIC | Age: 84
End: 2024-08-26
Payer: MEDICARE

## 2024-08-26 VITALS
HEART RATE: 92 BPM | OXYGEN SATURATION: 96 % | SYSTOLIC BLOOD PRESSURE: 147 MMHG | HEIGHT: 62 IN | WEIGHT: 196.19 LBS | DIASTOLIC BLOOD PRESSURE: 88 MMHG | BODY MASS INDEX: 36.1 KG/M2

## 2024-08-26 DIAGNOSIS — I48.19 PERSISTENT ATRIAL FIBRILLATION: Primary | ICD-10-CM

## 2024-08-26 DIAGNOSIS — Z79.4 TYPE 2 DIABETES MELLITUS WITH DIABETIC POLYNEUROPATHY, WITH LONG-TERM CURRENT USE OF INSULIN: ICD-10-CM

## 2024-08-26 DIAGNOSIS — E66.01 MORBID (SEVERE) OBESITY DUE TO EXCESS CALORIES: ICD-10-CM

## 2024-08-26 DIAGNOSIS — E11.22 CKD STAGE 4 DUE TO TYPE 2 DIABETES MELLITUS: Chronic | ICD-10-CM

## 2024-08-26 DIAGNOSIS — E11.42 TYPE 2 DIABETES MELLITUS WITH DIABETIC POLYNEUROPATHY, WITH LONG-TERM CURRENT USE OF INSULIN: ICD-10-CM

## 2024-08-26 DIAGNOSIS — I10 PRIMARY HYPERTENSION: ICD-10-CM

## 2024-08-26 DIAGNOSIS — N18.4 CKD STAGE 4 DUE TO TYPE 2 DIABETES MELLITUS: Chronic | ICD-10-CM

## 2024-08-26 DIAGNOSIS — E78.2 MIXED HYPERLIPIDEMIA: Chronic | ICD-10-CM

## 2024-08-26 PROCEDURE — 99214 OFFICE O/P EST MOD 30 MIN: CPT | Mod: S$GLB,,, | Performed by: PHYSICIAN ASSISTANT

## 2024-08-26 PROCEDURE — 1159F MED LIST DOCD IN RCRD: CPT | Mod: CPTII,S$GLB,, | Performed by: PHYSICIAN ASSISTANT

## 2024-08-26 PROCEDURE — 1126F AMNT PAIN NOTED NONE PRSNT: CPT | Mod: CPTII,S$GLB,, | Performed by: PHYSICIAN ASSISTANT

## 2024-08-26 PROCEDURE — 3077F SYST BP >= 140 MM HG: CPT | Mod: CPTII,S$GLB,, | Performed by: PHYSICIAN ASSISTANT

## 2024-08-26 PROCEDURE — 99999 PR PBB SHADOW E&M-EST. PATIENT-LVL V: CPT | Mod: PBBFAC,,, | Performed by: PHYSICIAN ASSISTANT

## 2024-08-26 PROCEDURE — 3079F DIAST BP 80-89 MM HG: CPT | Mod: CPTII,S$GLB,, | Performed by: PHYSICIAN ASSISTANT

## 2024-08-26 PROCEDURE — 1160F RVW MEDS BY RX/DR IN RCRD: CPT | Mod: CPTII,S$GLB,, | Performed by: PHYSICIAN ASSISTANT

## 2024-08-26 RX ORDER — CARVEDILOL 12.5 MG/1
12.5 TABLET ORAL 2 TIMES DAILY WITH MEALS
Qty: 180 TABLET | Refills: 3 | Status: SHIPPED | OUTPATIENT
Start: 2024-08-26 | End: 2025-08-21

## 2024-08-26 NOTE — PROGRESS NOTES
General Cardiology Clinic Note  Reason for Visit: Annual follow up   Last Clinic Visit: 7/31/2023  General Cardiologist: Dr. Marin     HPI:   Giovanni Guerrero is a 84 y.o. male who presents for annual follow up.     Problems:  Afib s/p DCCV 1/2020   HTN  HLD   DM2  CKD 4     Interval HPI  Patient presents for annual follow up. He feels well. He reports if he walks a long distance, his legs start to get tired, but denies any leg pain. He stays busy around his two story house and likes to clean. He denies symptoms of CAD, CHF, arrhythmia, hypotension, TIA, bleeding episodes. His BP at home is 130s systolic.     7/31/2023 HPI:  Patient states he feels well. He denies symptoms of CAD, CHF, arrhythmia, hypotension, TIA, bleeding episodes. He walks up and down steps in his house. Does everything he needs to do around house without issue. He checks BP at home and it is usually 130s systolic.     ROS:      Review of Systems   Constitutional: Negative for diaphoresis, malaise/fatigue, weight gain and weight loss.   HENT:  Negative for nosebleeds.    Eyes:  Negative for vision loss in left eye, vision loss in right eye and visual disturbance.   Cardiovascular:  Negative for chest pain, claudication, dyspnea on exertion, irregular heartbeat, leg swelling, near-syncope, orthopnea, palpitations, paroxysmal nocturnal dyspnea and syncope.   Respiratory:  Negative for cough, shortness of breath, sleep disturbances due to breathing, snoring and wheezing.    Hematologic/Lymphatic: Negative for bleeding problem. Does not bruise/bleed easily.   Skin:  Negative for poor wound healing and rash.   Musculoskeletal:  Negative for muscle cramps and myalgias.   Gastrointestinal:  Negative for bloating, abdominal pain, diarrhea, heartburn, melena, nausea and vomiting.   Genitourinary:  Negative for hematuria and nocturia.   Neurological:  Negative for brief paralysis, dizziness, headaches, light-headedness, numbness and weakness.    Psychiatric/Behavioral:  Negative for depression.    Allergic/Immunologic: Negative for hives.       PMH:     Past Medical History:   Diagnosis Date    Allergy     Anticoagulant long-term use     Colon polyp     Diabetes mellitus type II     Gout, arthritis     Gout, unspecified     Hx of colonic polyps     Hyperlipidemia     Hypertension     Prostatic hypertrophy     Renal insufficiency      Past Surgical History:   Procedure Laterality Date    APPENDECTOMY      CATARACT EXTRACTION Left 2018    EYE SURGERY      cataract    HERNIA REPAIR      TONSILLECTOMY      TRANSURETHRAL RESECTION OF PROSTATE (TURP) USING LASER N/A 12/6/2019    Procedure: TURP, USING LASER;  Surgeon: Rafael Marquis Jr., MD;  [facility];  Service: Urology;  Laterality: N/A;  75min    TREATMENT OF CARDIAC ARRHYTHMIA N/A 1/28/2020    Procedure: CARDIOVERSION;  Surgeon: Sergio Bertrand MD;  Location: [facility] EP LAB;  Service: Cardiology;  Laterality: N/A;  AF, NICOLE, DCCV, MAC, AL, 3 Prep     Allergies:     Review of patient's allergies indicates:   Allergen Reactions    Actos [pioglitazone] Other (See Comments)     constipation     Medications:     Current Outpatient Medications on File Prior to Visit   Medication Sig Dispense Refill    apixaban (ELIQUIS) 2.5 mg Tab Take 1 tablet (2.5 mg total) by mouth 2 (two) times daily. 60 tablet 11    aspirin (ECOTRIN) 81 MG EC tablet Take 81 mg by mouth once daily.      atorvastatin (LIPITOR) 40 MG tablet TAKE 1 TABLET(40 MG) BY MOUTH EVERY DAY 90 tablet 0    blood sugar diagnostic Strp Check sugar tid as directed 300 each 3    blood-glucose meter Misc 1 kit by Misc.(Non-Drug; Combo Route) route 3 (three) times daily. Pt is to test blood sugar TID for 90 doses 1 each 0    carvediloL (COREG) 6.25 MG tablet Take 1 tablet (6.25 mg total) by mouth 2 (two) times daily with meals. 180 tablet 3    cholecalciferol, vitamin D3, (VITAMIN D3) 50 mcg (2,000 unit) Tab Take by mouth once daily.      finasteride  "(PROSCAR) 5 mg tablet Take 1 tablet (5 mg total) by mouth once daily. 90 tablet 3    furosemide (LASIX) 20 MG tablet Take 1 tablet (20 mg total) by mouth once daily. 90 tablet 3    insulin degludec (TRESIBA FLEXTOUCH U-100) 100 unit/mL (3 mL) insulin pen Inject 15 Units into the skin every evening. 15 mL 1    insulin syringe-needle U-100 (BD INSULIN SYRINGE ULTRA-FINE) 0.5 mL 31 gauge x 5/16" Syrg Inject 1 each into the skin once daily. 100 each 3    JARDIANCE 10 mg tablet TAKE 1 TABLET(10 MG) BY MOUTH EVERY DAY 90 tablet 0    lancets (ONETOUCH ULTRASOFT LANCETS) Mis Pt to check BG up to TID. Dispense lancets compatible with pt brand meter 300 each 3    NIFEdipine (PROCARDIA-XL) 60 MG (OSM) 24 hr tablet Take 1 tablet (60 mg total) by mouth once daily. 90 tablet 2    OMEGA-3 ACID ETHYL ESTERS (OMACOR) 1 gram Cap Take 1 g by mouth. 1 Capsule Oral Twice a day      pen needle, diabetic (BD ULTRA-FINE MINI PEN NEEDLE) 31 gauge x 3/16" Ndle Inject 1 each into the skin once daily. 100 each 3    tamsulosin (FLOMAX) 0.4 mg Cap Take 2 capsules (0.8 mg total) by mouth once daily. TAKE 1 CAPSULE BY MOUTH TWICE A  capsule 3     Current Facility-Administered Medications on File Prior to Visit   Medication Dose Route Frequency Provider Last Rate Last Admin    0.9%  NaCl infusion   Intravenous Continuous Krishan Awan MD   Stopped at 01/28/20 0943    sodium chloride 0.9% flush 5 mL  5 mL Intravenous PRN Krishan Awan MD         Social History:     Social History     Tobacco Use    Smoking status: Never    Smokeless tobacco: Never   Substance Use Topics    Alcohol use: No     Family History:     Family History   Problem Relation Name Age of Onset    Cancer Mother          ovarian cancer    Heart disease Father          MI at 57    Diabetes Father      Cerebral aneurysm Sister      Heart disease Brother          MI at 60    Heart disease Brother          CABG    Anesthesia problems Neg Hx      Melanoma Neg Hx   " "    Physical Exam:   BP (!) 147/88   Pulse 92   Ht 5' 2" (1.575 m)   Wt 89 kg (196 lb 3.4 oz)   SpO2 96%   BMI 35.89 kg/m²        Physical Exam  Vitals and nursing note reviewed.   Constitutional:       General: He is not in acute distress.     Appearance: Normal appearance.   HENT:      Head: Normocephalic and atraumatic.      Nose: Nose normal.   Eyes:      General: No scleral icterus.     Extraocular Movements: Extraocular movements intact.      Conjunctiva/sclera: Conjunctivae normal.   Neck:      Thyroid: No thyromegaly.      Vascular: No carotid bruit or JVD.   Cardiovascular:      Rate and Rhythm: Tachycardia present. Rhythm irregular.      Pulses: Normal pulses.      Heart sounds: Normal heart sounds. No murmur heard.     No friction rub. No gallop.   Pulmonary:      Effort: Pulmonary effort is normal.      Breath sounds: Normal breath sounds. No wheezing, rhonchi or rales.   Chest:      Chest wall: No tenderness.   Abdominal:      General: Bowel sounds are normal. There is no distension.      Palpations: Abdomen is soft.      Tenderness: There is no abdominal tenderness.   Musculoskeletal:      Cervical back: Neck supple.      Right lower le+ Pitting Edema present.      Left lower le+ Pitting Edema present.   Skin:     General: Skin is warm and dry.      Coloration: Skin is not pale.      Findings: No erythema or rash.      Nails: There is no clubbing.   Neurological:      Mental Status: He is alert and oriented to person, place, and time.   Psychiatric:         Mood and Affect: Mood and affect normal.         Behavior: Behavior normal.          Labs:     Lab Results   Component Value Date     2024     2024    K 3.8 2024    K 3.8 2024     2024     2024    CO2 26 2024    CO2 26 2024    BUN 32 (H) 2024    BUN 32 (H) 2024    CREATININE 1.8 (H) 2024    CREATININE 1.8 (H) 2024    ANIONGAP 11 2024    " "ANIONGAP 11 2024     Lab Results   Component Value Date    HGBA1C 6.9 (H) 2024     Lab Results   Component Value Date    BNP 1,088 (H) 2020    Lab Results   Component Value Date    WBC 6.56 2024    HGB 16.9 2024    HCT 50.7 2024     2024    GRAN 3.0 2024    GRAN 45.6 2024     Lab Results   Component Value Date    CHOL 107 (L) 2024    HDL 27 (L) 2024    LDLCALC 31.0 (L) 2024    TRIG 245 (H) 2024          Imaging:   Echocardiogram  NICOLE 20  Atrial fibrillation observed.  NICOLE performed to assess for thrombus in the left atrial appendage prior to electrical cardioversion. Normal "windsock" appearing left atrial appendage. No thrombus is present in the appendage.  Normal left ventricular systolic function. The estimated ejection fraction is 63%.  Normal right ventricular systolic function.  Biatrial enlargement.  Mild mitral regurgitation.  Mild to moderate tricuspid regurgitation.  Trace pulmonary valve regurgitation.     TTE 19  Normal left ventricular systolic function. The estimated ejection fraction is 60%  Indeterminate left ventricular diastolic function.  Aortic valve sclerosis without significant stenosis.  Mild mitral regurgitation.  Normal right ventricular systolic function.  Estimated PA systolic pressure is at least 40mmHg.  Biatrial enlargement.     Stress testing  None     Cath Lab  US aorta 20  No aneurysmal dilatation of the abdominal aorta.  Bilateral iliac arteries are not visualized.     Other  Event 20  Sinus rhythm with sinus arrhythmia and at times sinus bradycarda and rare competing junctional/ventricular rhythm  1 7 beat episode of nonsustained VT     EK20 - NSR with 1st deg AVB, RAD, low anterior forces (personally reviewed)  2022 - atrial fibrillation, RBBB/LPFB    Assessment:     1. Persistent atrial fibrillation    2. Primary hypertension    3. Mixed hyperlipidemia    4. " CKD stage 4 due to type 2 diabetes mellitus    5. Type 2 diabetes mellitus with diabetic polyneuropathy, with long-term current use of insulin    6. Morbid (severe) obesity due to excess calories        Plan:     Persistent atrial fibrillation  Asymptomatic   LPYNP2BTUp is at least 4 (age x2, HTN, DM)  Increase Coreg to 12.5 mg bid for better HR/BP control  Continue Eliquis 2.5 mg bid for CVA prophylaxis      Essential hypertension  BP mildly elevated at home   Increase Coreg to 12.5 mg bid   Continue Nifedipine 60 mg daily   Decrease Lasix to 20 mg given renal function   Low sodium diet     Mixed hyperlipidemia  LDL at goal  Continue statin, fish oil     Type 2 diabetes mellitus with diabetic polyneuropathy, with long-term current use of insulin  Well controlled. Follows by PCP    CKD stage 4 due to type 2 diabetes mellitus  Stable.Cr 1.8. Follows with Nephrology      Follow up in one year.     Signed:  Shana Fairbanks PA-C  Cardiology   [phone] - General  8/26/2024 9:14 AM

## 2024-09-12 ENCOUNTER — PATIENT MESSAGE (OUTPATIENT)
Dept: INTERNAL MEDICINE | Facility: CLINIC | Age: 84
End: 2024-09-12
Payer: MEDICARE

## 2024-09-16 LAB
LEFT EYE DM RETINOPATHY: POSITIVE
RIGHT EYE DM RETINOPATHY: POSITIVE

## 2024-09-17 ENCOUNTER — PATIENT OUTREACH (OUTPATIENT)
Dept: ADMINISTRATIVE | Facility: HOSPITAL | Age: 84
End: 2024-09-17
Payer: MEDICARE

## 2024-10-05 DIAGNOSIS — E78.2 MIXED HYPERLIPIDEMIA: ICD-10-CM

## 2024-10-07 RX ORDER — ATORVASTATIN CALCIUM 40 MG/1
40 TABLET, FILM COATED ORAL
Qty: 90 TABLET | Refills: 0 | Status: SHIPPED | OUTPATIENT
Start: 2024-10-07

## 2024-11-06 DIAGNOSIS — E11.21 DIABETIC NEPHROPATHY ASSOCIATED WITH TYPE 2 DIABETES MELLITUS: Chronic | ICD-10-CM

## 2024-11-06 RX ORDER — EMPAGLIFLOZIN 10 MG/1
10 TABLET, FILM COATED ORAL
Qty: 90 TABLET | Refills: 0 | Status: SHIPPED | OUTPATIENT
Start: 2024-11-06

## 2024-11-06 NOTE — TELEPHONE ENCOUNTER
Refill Routing Note   Medication(s) are not appropriate for processing by [facility] for the following reason(s):        Required labs outdated  Required labs abnormal    [facility] action(s):  Defer             Appointments  past 12m or future 3m with PCP    Date Provider   Last Visit   3/25/2024 Philippe Stephen, DO   Next Visit   Visit date not found Philippe Stephen, DO   ED visits in past 90 days: 0        Note composed:10:23 AM 11/06/2024

## 2024-11-06 NOTE — TELEPHONE ENCOUNTER
No care due was identified.  [facility] Embedded Care Due Messages. Reference number: [id number].   11/06/2024 5:41:02 AM CST

## 2024-11-07 NOTE — TELEPHONE ENCOUNTER
No new care gaps identified.  [facility] Embedded Care Gaps. Reference number: [id number]. 6/15/2022   9:11:20 AM CDT  
Refill Decision Note   Giovanni Guerrero  is requesting a refill authorization.  Brief Assessment and Rationale for Refill:  Approve     Medication Therapy Plan:       Medication Reconciliation Completed: No   Comments:     No Care Gaps recommended.     Note composed:12:12 PM 06/15/2022          
no

## 2024-11-13 ENCOUNTER — PATIENT MESSAGE (OUTPATIENT)
Dept: ADMINISTRATIVE | Facility: HOSPITAL | Age: 84
End: 2024-11-13
Payer: MEDICARE

## 2024-12-03 DIAGNOSIS — E11.22 CKD STAGE 4 DUE TO TYPE 2 DIABETES MELLITUS: Primary | ICD-10-CM

## 2024-12-03 DIAGNOSIS — N18.4 CKD STAGE 4 DUE TO TYPE 2 DIABETES MELLITUS: Primary | ICD-10-CM

## 2024-12-04 ENCOUNTER — LAB VISIT (OUTPATIENT)
Dept: LAB | Facility: OTHER | Age: 84
End: 2024-12-04
Attending: INTERNAL MEDICINE
Payer: MEDICARE

## 2024-12-04 DIAGNOSIS — N18.4 CKD STAGE 4 DUE TO TYPE 2 DIABETES MELLITUS: ICD-10-CM

## 2024-12-04 DIAGNOSIS — E11.22 CKD STAGE 4 DUE TO TYPE 2 DIABETES MELLITUS: ICD-10-CM

## 2024-12-04 LAB
ALBUMIN SERPL BCP-MCNC: 3.6 G/DL (ref 3.5–5.2)
ANION GAP SERPL CALC-SCNC: 10 MMOL/L (ref 8–16)
BASOPHILS # BLD AUTO: 0.04 K/UL (ref 0–0.2)
BASOPHILS NFR BLD: 0.3 % (ref 0–1.9)
BUN SERPL-MCNC: 31 MG/DL (ref 8–23)
CALCIUM SERPL-MCNC: 9.6 MG/DL (ref 8.7–10.5)
CHLORIDE SERPL-SCNC: 101 MMOL/L (ref 95–110)
CO2 SERPL-SCNC: 27 MMOL/L (ref 23–29)
CREAT SERPL-MCNC: 1.8 MG/DL (ref 0.5–1.4)
DIFFERENTIAL METHOD BLD: ABNORMAL
EOSINOPHIL # BLD AUTO: 0.2 K/UL (ref 0–0.5)
EOSINOPHIL NFR BLD: 1.7 % (ref 0–8)
ERYTHROCYTE [DISTWIDTH] IN BLOOD BY AUTOMATED COUNT: 13.2 % (ref 11.5–14.5)
EST. GFR  (NO RACE VARIABLE): 37 ML/MIN/1.73 M^2
GLUCOSE SERPL-MCNC: 112 MG/DL (ref 70–110)
HCT VFR BLD AUTO: 51.8 % (ref 40–54)
HGB BLD-MCNC: 18.2 G/DL (ref 14–18)
IMM GRANULOCYTES # BLD AUTO: 0.03 K/UL (ref 0–0.04)
IMM GRANULOCYTES NFR BLD AUTO: 0.2 % (ref 0–0.5)
LYMPHOCYTES # BLD AUTO: 2 K/UL (ref 1–4.8)
LYMPHOCYTES NFR BLD: 15.4 % (ref 18–48)
MCH RBC QN AUTO: 32.9 PG (ref 27–31)
MCHC RBC AUTO-ENTMCNC: 35.1 G/DL (ref 32–36)
MCV RBC AUTO: 94 FL (ref 82–98)
MONOCYTES # BLD AUTO: 0.7 K/UL (ref 0.3–1)
MONOCYTES NFR BLD: 5.4 % (ref 4–15)
NEUTROPHILS # BLD AUTO: 10 K/UL (ref 1.8–7.7)
NEUTROPHILS NFR BLD: 77 % (ref 38–73)
NRBC BLD-RTO: 0 /100 WBC
PHOSPHATE SERPL-MCNC: 3.5 MG/DL (ref 2.7–4.5)
PLATELET # BLD AUTO: 173 K/UL (ref 150–450)
PMV BLD AUTO: 9.7 FL (ref 9.2–12.9)
POTASSIUM SERPL-SCNC: 4.1 MMOL/L (ref 3.5–5.1)
RBC # BLD AUTO: 5.54 M/UL (ref 4.6–6.2)
SODIUM SERPL-SCNC: 138 MMOL/L (ref 136–145)
WBC # BLD AUTO: 12.95 K/UL (ref 3.9–12.7)

## 2024-12-04 PROCEDURE — 80069 RENAL FUNCTION PANEL: CPT | Performed by: INTERNAL MEDICINE

## 2024-12-04 PROCEDURE — 85025 COMPLETE CBC W/AUTO DIFF WBC: CPT | Performed by: INTERNAL MEDICINE

## 2024-12-04 PROCEDURE — 36415 COLL VENOUS BLD VENIPUNCTURE: CPT | Performed by: INTERNAL MEDICINE

## 2024-12-11 ENCOUNTER — PATIENT MESSAGE (OUTPATIENT)
Dept: ADMINISTRATIVE | Facility: HOSPITAL | Age: 84
End: 2024-12-11
Payer: MEDICARE

## 2024-12-24 ENCOUNTER — PATIENT MESSAGE (OUTPATIENT)
Dept: ADMINISTRATIVE | Facility: HOSPITAL | Age: 84
End: 2024-12-24
Payer: MEDICARE

## 2024-12-30 DIAGNOSIS — E11.42 TYPE 2 DIABETES MELLITUS WITH DIABETIC POLYNEUROPATHY, WITH LONG-TERM CURRENT USE OF INSULIN: ICD-10-CM

## 2024-12-30 DIAGNOSIS — Z79.4 TYPE 2 DIABETES MELLITUS WITH DIABETIC POLYNEUROPATHY, WITH LONG-TERM CURRENT USE OF INSULIN: ICD-10-CM

## 2024-12-30 NOTE — TELEPHONE ENCOUNTER
Care Due:                  Date            Visit Type   Department     Provider  --------------------------------------------------------------------------------                                             [facility] INTERNAL  Last Visit: 03-      [facility]       Philippe Stephen  Next Visit: None Scheduled  None         None Found                                                            Last  Test          Frequency    Reason                     Performed    Due Date  --------------------------------------------------------------------------------    CMP.........  12 months..  atorvastatin.............  06- 06-    HBA1C.......  6 months...  JARDIANCE, insulin.......  03- 09-    Lipid Panel.  12 months..  atorvastatin.............  03- 03-    [facility] Embedded Care Due Messages. Reference number: [id number].   12/30/2024 9:14:48 AM CST

## 2024-12-31 RX ORDER — INSULIN DEGLUDEC 100 U/ML
15 INJECTION, SOLUTION SUBCUTANEOUS NIGHTLY
Qty: 15 ML | Refills: 1 | Status: SHIPPED | OUTPATIENT
Start: 2024-12-31

## 2025-01-03 DIAGNOSIS — E78.2 MIXED HYPERLIPIDEMIA: ICD-10-CM

## 2025-01-03 NOTE — TELEPHONE ENCOUNTER
No care due was identified.  Health [facility] Embedded Care Due Messages. Reference number: [id number].   1/03/2025 5:37:14 AM CST

## 2025-01-04 RX ORDER — ATORVASTATIN CALCIUM 40 MG/1
40 TABLET, FILM COATED ORAL
Qty: 90 TABLET | Refills: 0 | Status: SHIPPED | OUTPATIENT
Start: 2025-01-04

## 2025-01-04 NOTE — TELEPHONE ENCOUNTER
Refill Routing Note   Medication(s) are not appropriate for processing by [facility] for the following reason(s):      Required labs outdated    [facility] action(s):  Defer Care Due:  None identified            Appointments  past 12m or future 3m with PCP    Date Provider   Last Visit   3/25/2024 Philippe Stephen DO   Next Visit   Visit date not found Philippe Stephen, DO   ED visits in past 90 days: 0        Note composed:7:17 PM 01/03/2025

## 2025-01-15 ENCOUNTER — OFFICE VISIT (OUTPATIENT)
Dept: NEPHROLOGY | Facility: CLINIC | Age: 85
End: 2025-01-15
Payer: MEDICARE

## 2025-01-15 VITALS
HEIGHT: 62 IN | BODY MASS INDEX: 35.89 KG/M2 | OXYGEN SATURATION: 96 % | RESPIRATION RATE: 18 BRPM | DIASTOLIC BLOOD PRESSURE: 86 MMHG | HEART RATE: 81 BPM | SYSTOLIC BLOOD PRESSURE: 132 MMHG

## 2025-01-15 DIAGNOSIS — E66.01 MORBID (SEVERE) OBESITY DUE TO EXCESS CALORIES: ICD-10-CM

## 2025-01-15 DIAGNOSIS — E11.21 DIABETIC NEPHROPATHY ASSOCIATED WITH TYPE 2 DIABETES MELLITUS: ICD-10-CM

## 2025-01-15 DIAGNOSIS — I10 ESSENTIAL HYPERTENSION: ICD-10-CM

## 2025-01-15 DIAGNOSIS — N18.32 STAGE 3B CHRONIC KIDNEY DISEASE: Primary | ICD-10-CM

## 2025-01-15 PROCEDURE — 99214 OFFICE O/P EST MOD 30 MIN: CPT | Mod: S$GLB,,, | Performed by: INTERNAL MEDICINE

## 2025-01-15 PROCEDURE — 1126F AMNT PAIN NOTED NONE PRSNT: CPT | Mod: CPTII,S$GLB,, | Performed by: INTERNAL MEDICINE

## 2025-01-15 PROCEDURE — 1101F PT FALLS ASSESS-DOCD LE1/YR: CPT | Mod: CPTII,S$GLB,, | Performed by: INTERNAL MEDICINE

## 2025-01-15 PROCEDURE — 3079F DIAST BP 80-89 MM HG: CPT | Mod: CPTII,S$GLB,, | Performed by: INTERNAL MEDICINE

## 2025-01-15 PROCEDURE — 3075F SYST BP GE 130 - 139MM HG: CPT | Mod: CPTII,S$GLB,, | Performed by: INTERNAL MEDICINE

## 2025-01-15 PROCEDURE — 3288F FALL RISK ASSESSMENT DOCD: CPT | Mod: CPTII,S$GLB,, | Performed by: INTERNAL MEDICINE

## 2025-01-15 PROCEDURE — 1159F MED LIST DOCD IN RCRD: CPT | Mod: CPTII,S$GLB,, | Performed by: INTERNAL MEDICINE

## 2025-01-15 PROCEDURE — 99999 PR PBB SHADOW E&M-EST. PATIENT-LVL III: CPT | Mod: PBBFAC,,, | Performed by: INTERNAL MEDICINE

## 2025-01-15 NOTE — PROGRESS NOTES
Progress Note  Nephrology      Referring physician: Philippe Stephen DO    Reason for visit: CKD     SUBJECTIVE:   84 y.o. male  has a past medical history of Allergy, Anticoagulant long-term use, Colon polyp, Diabetes mellitus type II, Gout, arthritis, Gout, unspecified, colonic polyps, Hyperlipidemia, Hypertension, Prostatic hypertrophy, and Renal insufficiency. who has been following up in renal clinic for ckd management   The patient denies taking NSAIDs or new antibiotics, recreational drugs, recent episode of dehydration, diarrhea, nausea or vomiting, acute illness, hospitalization or exposure to IV radiocontrast.     ROS:  General: negative for chills, or fatigue  ENT: No epistaxis or headaches  Hematological and Lymphatic: No bleeding problems or blood clots.  Endocrine: No skin changes or temperature intolerance  Respiratory: No cough, shortness of breath, or wheezing  Cardiovascular: No chest pain or dyspnea   Gastrointestinal: No abdominal pain, change in bowel habits  Genito-Urinary: No dysuria, trouble voiding, or hematuria  Musculoskeletal: ROS: negative for - joint pain, joint stiffness, joint swelling, muscle pain or muscular weakness  Neurological: No focal weakness, no numbness  Dermatological: No rash or ulcers    OBJECTIVE:     There were no vitals filed for this visit.       Physical Exam:  General: no distress, well nourished  HENT: PERRLA, Normal mouth nose and ears.  Neck: no JVD and thyroid not enlarged, symmetric, no tenderness/mass/nodules  Lungs: clear to auscultation bilaterally and normal respiratory effort  Cardiovascular: regular rate and rhythm, S1, S2 normal, no murmur, click, rub or gallop.   Abdomen: soft, non-tender non-distented; bowel sounds normal  Skin: No rashes or lesions  Musculoskeletal:no edema in LE, no deformities.   Lymph Nodes: No cervical or supraclavicular adenopathy  Neurologic: Normal strength and tone. No focal numbness or  "weakness          Medications:    Current Outpatient Medications:     apixaban (ELIQUIS) 2.5 mg Tab, Take 1 tablet (2.5 mg total) by mouth 2 (two) times daily., Disp: 60 tablet, Rfl: 11    aspirin (ECOTRIN) 81 MG EC tablet, Take 81 mg by mouth once daily., Disp: , Rfl:     atorvastatin (LIPITOR) 40 MG tablet, TAKE 1 TABLET(40 MG) BY MOUTH EVERY DAY, Disp: 90 tablet, Rfl: 0    blood sugar diagnostic Strp, Check sugar tid as directed, Disp: 300 each, Rfl: 3    blood-glucose meter Misc, 1 kit by Misc.(Non-Drug; Combo Route) route 3 (three) times daily. Pt is to test blood sugar TID for 90 doses, Disp: 1 each, Rfl: 0    carvediloL (COREG) 12.5 MG tablet, Take 1 tablet (12.5 mg total) by mouth 2 (two) times daily with meals., Disp: 180 tablet, Rfl: 3    cholecalciferol, vitamin D3, (VITAMIN D3) 50 mcg (2,000 unit) Tab, Take by mouth once daily., Disp: , Rfl:     finasteride (PROSCAR) 5 mg tablet, Take 1 tablet (5 mg total) by mouth once daily., Disp: 90 tablet, Rfl: 3    furosemide (LASIX) 20 MG tablet, Take 1 tablet (20 mg total) by mouth once daily., Disp: 90 tablet, Rfl: 3    insulin degludec (TRESIBA FLEXTOUCH U-100) 100 unit/mL (3 mL) insulin pen, Inject 15 Units into the skin every evening., Disp: 15 mL, Rfl: 1    insulin syringe-needle U-100 (BD INSULIN SYRINGE ULTRA-FINE) 0.5 mL 31 gauge x 5/16" Syrg, Inject 1 each into the skin once daily., Disp: 100 each, Rfl: 3    JARDIANCE 10 mg tablet, TAKE 1 TABLET(10 MG) BY MOUTH EVERY DAY, Disp: 90 tablet, Rfl: 0    lancets (ONETOUCH ULTRASOFT LANCETS) [facility], Pt to check BG up to TID. Dispense lancets compatible with pt brand meter, Disp: 300 each, Rfl: 3    NIFEdipine (PROCARDIA-XL) 60 MG (OSM) 24 hr tablet, Take 1 tablet (60 mg total) by mouth once daily., Disp: 90 tablet, Rfl: 2    OMEGA-3 ACID ETHYL ESTERS (OMACOR) 1 gram Cap, Take 1 g by mouth. 1 Capsule Oral Twice a day, Disp: , Rfl:     pen needle, diabetic (BD ULTRA-FINE MINI PEN NEEDLE) 31 gauge x 3/16" Ndle, " Inject 1 each into the skin once daily., Disp: 100 each, Rfl: 3    tamsulosin (FLOMAX) 0.4 mg Cap, Take 2 capsules (0.8 mg total) by mouth once daily. TAKE 1 CAPSULE BY MOUTH TWICE A DAY, Disp: 180 capsule, Rfl: 3  No current facility-administered medications for this visit.    Facility-Administered Medications Ordered in Other Visits:     0.9%  NaCl infusion, , Intravenous, Continuous, Krishan Awan MD, Stopped at 01/28/20 0943    sodium chloride 0.9% flush 5 mL, 5 mL, Intravenous, PRN, Krishan Awan MD         Laboratory:  Lab Results   Component Value Date    CREATININE 1.8 (H) 12/04/2024       Prot/Creat Ratio, Urine   Date Value Ref Range Status   12/04/2024 0.33 (H) 0.00 - 0.20 Final   03/07/2024 0.20 0.00 - 0.20 Final   11/16/2021 0.26 (H) 0.00 - 0.20 Final       Lab Results   Component Value Date     12/04/2024    K 4.1 12/04/2024    CO2 27 12/04/2024       last PTH   Lab Results   Component Value Date    .5 (H) 11/16/2021    CALCIUM 9.6 12/04/2024    PHOS 3.5 12/04/2024       Lab Results   Component Value Date    HGB 18.2 (H) 12/04/2024        Lab Results   Component Value Date    HGBA1C 6.9 (H) 03/25/2024       Lab Results   Component Value Date    LDLCALC 31.0 (L) 03/25/2024       Other Labs were reviewed      ASSESSMENT/PLAN:     CKD IIIB/A2 (stable)  -likely from DMII, HTN and probably recurrent urinary retention from BPH  -Cr baseline ~ 1.7-2.0 with eGFR ~ 35-40 ml/min  -UA unremarkable  -Renal US remotely with ckd and stones  - on jardiance      HTN  -controlled     Anemia  -from ckd  -Hgb goal ~ 10  -Iron stores not available     Secondary Hyperparathyroidism  -Phos/Ca acceptable  -PTH acceptable  -Vit D wnl     Acid/Base  -No Metabolic acidosis     BPH  -with recurrent urinary retention in the past        RTC in 8m      SARAH RICE MD  NEPHROLOGY ATTENDING

## 2025-01-25 ENCOUNTER — TELEPHONE (OUTPATIENT)
Dept: UROLOGY | Facility: CLINIC | Age: 85
End: 2025-01-25
Payer: MEDICARE

## 2025-01-25 NOTE — TELEPHONE ENCOUNTER
Vm left asking pt to check pt portal for appt in July. Apt with dr hansen was changed to appt with np because dr hansen is out of  the office indefinitely. All contact info left on  for any questions/concerns

## 2025-02-04 DIAGNOSIS — E11.21 DIABETIC NEPHROPATHY ASSOCIATED WITH TYPE 2 DIABETES MELLITUS: Chronic | ICD-10-CM

## 2025-02-04 RX ORDER — FUROSEMIDE 20 MG/1
20 TABLET ORAL DAILY
Qty: 90 TABLET | Refills: 3 | Status: SHIPPED | OUTPATIENT
Start: 2025-02-04 | End: 2026-01-30

## 2025-02-04 NOTE — TELEPHONE ENCOUNTER
No care due was identified.  Health [facility] Embedded Care Due Messages. Reference number: [id number].   2/04/2025 5:34:27 AM CST

## 2025-02-04 NOTE — TELEPHONE ENCOUNTER
Refill Routing Note   Medication(s) are not appropriate for processing by [facility] for the following reason(s):        Required labs abnormal  Required labs outdated    [facility] action(s):  Defer             Appointments  past 12m or future 3m with PCP    Date Provider   Last Visit   3/25/2024 Philippe Stephen, DO   Next Visit   Visit date not found Philippe Stephen, DO   ED visits in past 90 days: 0        Note composed:9:23 AM 02/04/2025

## 2025-02-05 RX ORDER — EMPAGLIFLOZIN 10 MG/1
10 TABLET, FILM COATED ORAL
Qty: 90 TABLET | Refills: 3 | Status: SHIPPED | OUTPATIENT
Start: 2025-02-05

## 2025-03-11 DIAGNOSIS — I10 PRIMARY HYPERTENSION: ICD-10-CM

## 2025-03-11 NOTE — TELEPHONE ENCOUNTER
Care Due:                  Date            Visit Type   Department     Provider  --------------------------------------------------------------------------------                                             [facility] INTERNAL  Last Visit: 03-      [facility]          MEDICINE       Philippe Stephen                              EP -                              PRIMARY      [facility] INTERNAL  Next Visit: 03-      CARE ([facility])   MEDICINE       Philippe Stephen                                                            Last  Test          Frequency    Reason                     Performed    Due Date  --------------------------------------------------------------------------------    CMP.........  12 months..  atorvastatin.............  06- 06-    HBA1C.......  6 months...  empagliflozin, insulin...  03- 09-    Lipid Panel.  12 months..  atorvastatin.............  03- 03-    Health [facility] Embedded Care Due Messages. Reference number: [id number].   3/11/2025 5:14:17 PM CDT

## 2025-03-12 RX ORDER — NIFEDIPINE 60 MG/1
TABLET, EXTENDED RELEASE ORAL
Qty: 90 TABLET | Refills: 2 | Status: SHIPPED | OUTPATIENT
Start: 2025-03-12

## 2025-03-12 NOTE — TELEPHONE ENCOUNTER
Refill Encounter    PCP Visits: Recent Visits  Date Type Provider Dept   03/25/24 Office Visit Weeks, Philippe HURD DO [facility] Internal Medicine   Showing recent visits within past 360 days and meeting all other requirements  Future Appointments  Date Type Provider Dept   03/21/25 Appointment Weeks, Philippe HURD DO [facility] Internal Medicine   Showing future appointments within next 720 days and meeting all other requirements      Last 3 Blood Pressure:   BP Readings from Last 3 Encounters:   01/15/25 132/86   08/26/24 (!) 147/88   03/25/24 132/84     Preferred Pharmacy:   [id number] [place] [address] [email or web address] [facility] [address] [place] [address] [email or web address] [facility] [address]  Phone: [phone] Fax: [phone]    Requested RX:  Requested Prescriptions     Pending Prescriptions Disp Refills    NIFEdipine (PROCARDIA-XL) 60 MG (OSM) 24 hr tablet [Pharmacy Med Name: NIFEDIPINE 60MG ER (XL/OS) TABLETS] 90 tablet 2     Sig: TAKE 1 TABLET(60 MG) BY MOUTH DAILY      RX Route: Normal

## 2025-03-21 ENCOUNTER — OFFICE VISIT (OUTPATIENT)
Dept: INTERNAL MEDICINE | Facility: CLINIC | Age: 85
End: 2025-03-21
Payer: MEDICARE

## 2025-03-21 ENCOUNTER — LAB VISIT (OUTPATIENT)
Dept: LAB | Facility: OTHER | Age: 85
End: 2025-03-21
Attending: FAMILY MEDICINE
Payer: MEDICARE

## 2025-03-21 VITALS
HEART RATE: 80 BPM | DIASTOLIC BLOOD PRESSURE: 72 MMHG | OXYGEN SATURATION: 97 % | SYSTOLIC BLOOD PRESSURE: 126 MMHG | HEIGHT: 62 IN | WEIGHT: 191.38 LBS | BODY MASS INDEX: 35.22 KG/M2

## 2025-03-21 DIAGNOSIS — E11.22 CKD STAGE 4 DUE TO TYPE 2 DIABETES MELLITUS: ICD-10-CM

## 2025-03-21 DIAGNOSIS — N18.4 CKD STAGE 4 DUE TO TYPE 2 DIABETES MELLITUS: ICD-10-CM

## 2025-03-21 DIAGNOSIS — I48.19 PERSISTENT ATRIAL FIBRILLATION: ICD-10-CM

## 2025-03-21 LAB
CHOLEST SERPL-MCNC: 97 MG/DL (ref 120–199)
CHOLEST/HDLC SERPL: 3.6 {RATIO} (ref 2–5)
ESTIMATED AVG GLUCOSE: 151 MG/DL (ref 68–131)
HBA1C MFR BLD: 6.9 % (ref 4–5.6)
HDLC SERPL-MCNC: 27 MG/DL (ref 40–75)
HDLC SERPL: 27.8 % (ref 20–50)
LDLC SERPL CALC-MCNC: 41.4 MG/DL (ref 63–159)
NONHDLC SERPL-MCNC: 70 MG/DL
TRIGL SERPL-MCNC: 143 MG/DL (ref 30–150)

## 2025-03-21 PROCEDURE — 36415 COLL VENOUS BLD VENIPUNCTURE: CPT | Performed by: FAMILY MEDICINE

## 2025-03-21 PROCEDURE — 83036 HEMOGLOBIN GLYCOSYLATED A1C: CPT | Performed by: FAMILY MEDICINE

## 2025-03-21 PROCEDURE — 80061 LIPID PANEL: CPT | Performed by: FAMILY MEDICINE

## 2025-03-21 NOTE — PROGRESS NOTES
Subjective:       Patient ID: Giovanni Guerrero is a 85 y.o. male.    Chief Complaint: Follow-up    Follow-up      History of Present Illness    CHIEF COMPLAINT:  Giovanni presents today for follow up.    VITAL SIGNS:  He reports home blood pressure of 126/72, noting it is lower than usual. Blood sugar reading was 188.    CURRENT MEDICATIONS:  He takes Eliquis for atrial fibrillation, Atorvastatin for cholesterol management, Carvedilol for blood pressure (recently doubled from half dose to full dose), Jardiance for diabetes and kidney management, Finasteride and Tamsulosin for prostate, Furosemide daily, and Tresiba insulin 15 units at bedtime.    FAMILY HISTORY:  He has a family history of hypertension, including his father and two brothers.      ROS:  General: -fever, -chills, -fatigue, -weight gain, -weight loss  Eyes: -vision changes, -redness, -discharge  ENT: -ear pain, -nasal congestion, -sore throat  Cardiovascular: -chest pain, +palpitations, -lower extremity edema, +feeling of skipped beats  Respiratory: -cough, -shortness of breath  Gastrointestinal: -abdominal pain, -nausea, -vomiting, -diarrhea, -constipation, -blood in stool  Genitourinary: -dysuria, -hematuria, -frequency  Musculoskeletal: -joint pain, -muscle pain  Skin: -rash, -lesion, +dry skin  Neurological: -headache, -dizziness, -numbness, -tingling  Psychiatric: -anxiety, -depression, -sleep difficulty         Diabetes Management Status    Statin: Taking  ACE/ARB: Not taking    Screening or Prevention Patient's value Goal Complete/Controlled?   HgA1C Testing and Control   Lab Results   Component Value Date    HGBA1C 6.9 (H) 03/25/2024      Annually/Less than 8% Yes   Lipid profile : 03/25/2024 Annually Yes   LDL control Lab Results   Component Value Date    LDLCALC 31.0 (L) 03/25/2024    Annually/Less than 100 mg/dl  Yes   Nephropathy screening Lab Results   Component Value Date    LABMICR 38.0 08/30/2022     Lab Results   Component Value Date     "PROTEINUA Trace (A) 12/04/2024     Lab Results   Component Value Date    UTPCR 0.33 (H) 12/04/2024      Annually Yes   Blood pressure BP Readings from Last 1 Encounters:   03/21/25 126/72    Less than 140/90 Yes   Dilated retinal exam : 09/16/2024 Annually Yes   Foot exam   : 06/30/2023 Annually No       All of your core healthy metrics are met.      Social History     Social History Narrative     52 yrs, 3 childre (two living), 11 grandchildren & 4 great grandchildren       Family History   Problem Relation Name Age of Onset    Cancer Mother          ovarian cancer    Heart disease Father          MI at 57    Diabetes Father      Cerebral aneurysm Sister      Heart disease Brother          MI at 60    Heart disease Brother          CABG    Anesthesia problems Neg Hx      Melanoma Neg Hx       Current Medications[1]    Review of Systems    Objective:   /72 (BP Location: Left arm, Patient Position: Sitting)   Pulse 80   Ht 5' 2" (1.575 m)   Wt 86.8 kg (191 lb 5.8 oz)   SpO2 97%   BMI 35.00 kg/m²      Physical Exam  Vitals reviewed.   Constitutional:       Appearance: He is well-developed.   HENT:      Head: Normocephalic and atraumatic.   Eyes:      Conjunctiva/sclera: Conjunctivae normal.   Cardiovascular:      Rate and Rhythm: Normal rate.   Pulmonary:      Effort: Pulmonary effort is normal. No respiratory distress.   Skin:     General: Skin is warm and dry.      Findings: No rash.   Neurological:      Mental Status: He is alert and oriented to person, place, and time.      Coordination: Coordination normal.   Psychiatric:         Behavior: Behavior normal.         Physical Exam            FOOT EVALUATION: 10 gram monofilament exam with protective sensation intact bilaterally. Nails appropriately trimmed. No ulcers. Distal pulses palpable.    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  - Noted recent doubling of Carvedilol dosage by Dr. Manzano (nurse practitioner), which appears effective given improved blood pressure reading of 126/72.  - Assessed feet for diabetic neuropathy; found no significant issues except for a small scab on 1 toe.  - Considered RSV vaccination due to age (over 60) and potential respiratory risks.    PLAN SUMMARY:   Order A1C test   Order lipid panel   Continue Tresiba insulin 15 units at night   Continue Eliquis for atrial fibrillation   Continue Finasteride and Tamsulosin for prostate management   Continue Furosemide (Lasix) daily   Continue Atorvastatin for cholesterol management   Continue Carvedilol at recently doubled dosage   Continue Jardiance for diabetes and kidney management   Apply lotion to feet for dryness   Recommend RSV vaccination   Introduce alternating future visits between doctor and nurse practitioner RJ   Follow up on October 21st as scheduled    ATRIAL FIBRILLATION:   Discussed the nature of atrial fibrillation as an irregular heart rhythm and the purpose of Eliquis in managing this condition.   Continued Eliquis for atrial fibrillation management.   Auscultated the patient's heart, noting an irregular rhythm consistent with atrial fibrillation.   Instructed the patient to continue taking Eliquis as prescribed.    HYPERTENSION:   Measured blood pressure at 126/72, which is lower than usual for the patient and evaluated as good.   Continued Carvedilol for blood pressure management at the recently doubled dosage.   Noted family history of hypertension.    TYPE 2 DIABETES WITH  CHRONIC KIDNEY DISEASE:   Continued Jardiance for diabetes and kidney management.   Ordered A1C test to assess glycemic control.   Examined the patient's feet for diabetic complications, noting good sensation and overall condition with one small scab.   Continued insulin therapy with Tresiba 15 units at night.   Continued Jardiance for diabetes and kidney management.   Continued Furosemide (Lasix) daily for fluid management.   Reviewed recent kidney function tests, which show stable results.   Confirmed the patient is under the care of a nephrologist.   Continued Tresiba insulin 15 units at night for long-term diabetes management.   Wenceles to continue applying lotion to feet to address dryness.    HYPERLIPIDEMIA:   Continued Atorvastatin for cholesterol management.   Ordered lipid panel to assess current cholesterol levels.    BENIGN PROSTATIC HYPERPLASIA:   Continued Finasteride and Tamsulosin for prostate management.    PREVENTIVE CARE:   Emphasized the importance of RSV vaccination for individuals over 60, highlighting that RSV can cause illness as significant as the flu and affect the lungs.    FOLLOW-UP:   Follow up on October 21st as scheduled.   Introduced the concept of alternating future visits between the doctor and nurse practitioner RJ   for continuity of care.         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    This note was generated with the assistance of ambient listening technology. Verbal consent was obtained by the patient and accompanying visitor(s) for the recording of patient appointment to facilitate this note. I attest to having reviewed and edited the generated note for accuracy, though some syntax or spelling errors may persist. Please contact the author of this note for any clarification.      Disclaimer:  This note may have been prepared using voice recognition software, it may have not been extensively proofed, as such there could be errors within the text such as sound alike errors.         [1]   Current Outpatient Medications:     apixaban (ELIQUIS) 2.5 mg Tab, Take 1 tablet (2.5 mg total) by mouth 2 (two) times daily., Disp: 60 tablet, Rfl: 11    aspirin (ECOTRIN) 81 MG EC tablet, Take 81 mg by mouth once daily., Disp: , Rfl:     atorvastatin (LIPITOR) 40 MG tablet, TAKE 1 TABLET(40 MG) BY MOUTH EVERY DAY, Disp: 90 tablet, Rfl: 0    blood sugar diagnostic Strp, Check sugar tid as directed, Disp: 300 each, Rfl: 3    carvediloL (COREG) 12.5 MG tablet, Take 1 tablet (12.5 mg total) by mouth 2 (two) times daily with meals., Disp: 180 tablet, Rfl: 3    cholecalciferol, vitamin D3, (VITAMIN D3) 50 mcg (2,000 unit) Tab, Take by mouth once daily., Disp: , Rfl:     empagliflozin (JARDIANCE) 10 mg tablet, TAKE 1 TABLET(10 MG) BY MOUTH EVERY DAY, Disp: 90 tablet, Rfl: 3    finasteride (PROSCAR) 5 mg tablet, Take 1 tablet (5 mg total) by mouth once daily., Disp: 90 tablet, Rfl: 3    furosemide (LASIX) 20 MG tablet, Take 1 tablet (20 mg total) by mouth once daily., Disp: 90 tablet, Rfl: 3    insulin degludec (TRESIBA FLEXTOUCH U-100) 100 unit/mL (3 mL) insulin pen, Inject 15 Units into the skin every evening., Disp: 15 mL, Rfl: 1    insulin syringe-needle U-100 (BD INSULIN SYRINGE ULTRA-FINE) 0.5 mL 31 gauge x 5/16" Syrg, Inject 1 each into the skin once daily., Disp: 100 each, Rfl: 3    lancets " "(ONETOUCH ULTRASOFT LANCETS) [facility] [place] to [place] BG up to TID. Dispense lancets compatible with pt brand meter, Disp: 300 each, Rfl: 3    NIFEdipine (PROCARDIA-XL) 60 MG (OSM) 24 hr tablet, TAKE 1 TABLET(60 MG) BY MOUTH DAILY, Disp: 90 tablet, Rfl: 2    OMEGA-3 ACID ETHYL ESTERS (OMACOR) 1 gram Cap, Take 1 g by mouth. 1 Capsule Oral Twice a day, Disp: , Rfl:     pen needle, diabetic (BD ULTRA-FINE MINI PEN NEEDLE) 31 gauge x 3/16" Ndle, Inject 1 each into the skin once daily., Disp: 100 each, Rfl: 3    tamsulosin (FLOMAX) 0.4 mg Cap, Take 2 capsules (0.8 mg total) by mouth once daily. TAKE 1 CAPSULE BY MOUTH TWICE A DAY, Disp: 180 capsule, Rfl: 3    blood-glucose meter Misc, 1 kit by Misc.(Non-Drug; Combo Route) route 3 (three) times daily. Pt is to test blood sugar TID for 90 doses, Disp: 1 each, Rfl: 0    RSV, preF A and preF B,PF, (ABRYSVO) 120 mcg/0.5 mL SolR vaccine, Inject 0.5 mLs (120 mcg total) into the muscle once. for 1 dose, Disp: 1 each, Rfl: 0  No current facility-administered medications for this visit.    Facility-Administered Medications Ordered in Other Visits:     0.9%  NaCl infusion, , Intravenous, Continuous, Krishan Awan MD, Stopped at 01/28/20 0943    sodium chloride 0.9% flush 5 mL, 5 mL, Intravenous, PRN, Krishan Awan MD    "

## 2025-03-24 ENCOUNTER — RESULTS FOLLOW-UP (OUTPATIENT)
Dept: INTERNAL MEDICINE | Facility: CLINIC | Age: 85
End: 2025-03-24

## 2025-03-24 DIAGNOSIS — Z00.00 ENCOUNTER FOR MEDICARE ANNUAL WELLNESS EXAM: ICD-10-CM

## 2025-04-06 DIAGNOSIS — E78.2 MIXED HYPERLIPIDEMIA: ICD-10-CM

## 2025-04-06 NOTE — TELEPHONE ENCOUNTER
No care due was identified.  Health [facility] Embedded Care Due Messages. Reference number: [id number].   4/06/2025 5:37:09 AM CDT

## 2025-04-06 NOTE — TELEPHONE ENCOUNTER
Refill Routing Note   Medication(s) are not appropriate for processing by [facility] for the following reason(s):        Required labs outdated    [facility] action(s):  Defer               Appointments  past 12m or future 3m with PCP    Date Provider   Last Visit   3/21/2025 Philippe Stephen, DO   Next Visit   Visit date not found Philippe Stephen, DO   ED visits in past 90 days: 0        Note composed:2:45 PM 04/06/2025

## 2025-04-07 RX ORDER — ATORVASTATIN CALCIUM 40 MG/1
40 TABLET, FILM COATED ORAL
Qty: 90 TABLET | Refills: 0 | Status: SHIPPED | OUTPATIENT
Start: 2025-04-07

## 2025-05-19 ENCOUNTER — PATIENT OUTREACH (OUTPATIENT)
Dept: ADMINISTRATIVE | Facility: HOSPITAL | Age: 85
End: 2025-05-19
Payer: MEDICARE

## 2025-05-19 DIAGNOSIS — E11.9 TYPE 2 DIABETES MELLITUS WITHOUT COMPLICATION, WITHOUT LONG-TERM CURRENT USE OF INSULIN: Primary | ICD-10-CM

## 2025-06-16 DIAGNOSIS — E11.42 TYPE 2 DIABETES MELLITUS WITH DIABETIC POLYNEUROPATHY, WITH LONG-TERM CURRENT USE OF INSULIN: ICD-10-CM

## 2025-06-16 DIAGNOSIS — Z79.4 TYPE 2 DIABETES MELLITUS WITH DIABETIC POLYNEUROPATHY, WITH LONG-TERM CURRENT USE OF INSULIN: ICD-10-CM

## 2025-06-16 RX ORDER — PEN NEEDLE, DIABETIC 30 GX3/16"
1 NEEDLE, DISPOSABLE MISCELLANEOUS DAILY
Qty: 100 EACH | Refills: 3 | Status: SHIPPED | OUTPATIENT
Start: 2025-06-16

## 2025-06-16 NOTE — TELEPHONE ENCOUNTER
".Refill Encounter    PCP Visits: Recent Visits  Date Type Provider Dept   03/21/25 Office Visit Zafar, Philippe HURD DO [facility] Internal Medicine   Showing recent visits within past 360 days and meeting all other requirements  Future Appointments  Date Type Provider Dept   09/22/25 Appointment Miguel Coombs NP [facility] Internal Medicine   Showing future appointments within next 720 days and meeting all other requirements      Last 3 Blood Pressure:   BP Readings from Last 3 Encounters:   03/21/25 126/72   01/15/25 132/86   08/26/24 (!) 147/88     Preferred Pharmacy:   [id number] [place] [address] [address] [facility] & [place] [address] [address] [facility] [address]  Phone: [phone] Fax: [phone]    Requested RX:  Requested Prescriptions     Pending Prescriptions Disp Refills    pen needle, diabetic (BD ULTRA-FINE MINI PEN NEEDLE) 31 gauge x 3/16" Ndle 100 each 3     Sig: Inject 1 each into the skin once daily.      RX Route: Normal    "

## 2025-06-16 NOTE — TELEPHONE ENCOUNTER
Care Due:                  Date            Visit Type   Department     Provider  --------------------------------------------------------------------------------                                EP -                              PRIMARY      [facility] INTER[facility]  Last Visit: 03-      CARE ([facility])   MEDICINE       Philippe Stephen  Next Visit: None Scheduled  None         None Found                                                            Last  Test          Frequency    Reason                     Performed    Due Date  --------------------------------------------------------------------------------    CMP.........  12 months..  atorvastatin.............  06- 06-    Health [facility] Embedded Care Due Messages. Reference number: [id number].   6/16/2025 4:12:22 PM CDT

## 2025-07-10 DIAGNOSIS — N13.8 BPH WITH URINARY OBSTRUCTION: ICD-10-CM

## 2025-07-10 DIAGNOSIS — N40.1 BPH WITH URINARY OBSTRUCTION: ICD-10-CM

## 2025-07-10 DIAGNOSIS — E78.2 MIXED HYPERLIPIDEMIA: ICD-10-CM

## 2025-07-10 RX ORDER — ATORVASTATIN CALCIUM 40 MG/1
40 TABLET, FILM COATED ORAL
Qty: 90 TABLET | Refills: 4 | Status: SHIPPED | OUTPATIENT
Start: 2025-07-10

## 2025-07-10 RX ORDER — TAMSULOSIN HYDROCHLORIDE 0.4 MG/1
2 CAPSULE ORAL
Qty: 180 CAPSULE | Refills: 3 | Status: SHIPPED | OUTPATIENT
Start: 2025-07-10

## 2025-07-10 NOTE — TELEPHONE ENCOUNTER
Refill Routing Note   Medication(s) are not appropriate for processing by [facility] for the following reason(s):        Required labs outdated    [facility] action(s):  Defer   Requires labs : Yes             Appointments  past 12m or future 3m with PCP    Date Provider   Last Visit   3/21/2025 Philippe Stephen, DO   Next Visit   Visit date not found Philippe Stephen, DO   ED visits in past 90 days: 0        Note composed:6:44 AM 07/10/2025

## 2025-07-30 ENCOUNTER — OFFICE VISIT (OUTPATIENT)
Dept: UROLOGY | Facility: CLINIC | Age: 85
End: 2025-07-30
Payer: MEDICARE

## 2025-07-30 VITALS
DIASTOLIC BLOOD PRESSURE: 74 MMHG | WEIGHT: 192 LBS | HEART RATE: 79 BPM | SYSTOLIC BLOOD PRESSURE: 125 MMHG | BODY MASS INDEX: 35.33 KG/M2 | HEIGHT: 62 IN

## 2025-07-30 DIAGNOSIS — N13.8 BPH WITH OBSTRUCTION/LOWER URINARY TRACT SYMPTOMS: Primary | ICD-10-CM

## 2025-07-30 DIAGNOSIS — N40.1 BPH WITH OBSTRUCTION/LOWER URINARY TRACT SYMPTOMS: Primary | ICD-10-CM

## 2025-07-30 DIAGNOSIS — N13.8 BPH WITH URINARY OBSTRUCTION: ICD-10-CM

## 2025-07-30 DIAGNOSIS — N40.1 BPH WITH URINARY OBSTRUCTION: ICD-10-CM

## 2025-07-30 DIAGNOSIS — R33.8 ACUTE URINARY RETENTION: ICD-10-CM

## 2025-07-30 PROCEDURE — 99999 PR PBB SHADOW E&M-EST. PATIENT-LVL III: CPT | Mod: PBBFAC,,,

## 2025-07-30 RX ORDER — FINASTERIDE 5 MG/1
5 TABLET, FILM COATED ORAL DAILY
Qty: 90 TABLET | Refills: 3 | Status: SHIPPED | OUTPATIENT
Start: 2025-07-30

## 2025-07-30 RX ORDER — TAMSULOSIN HYDROCHLORIDE 0.4 MG/1
0.4 CAPSULE ORAL DAILY
Qty: 90 CAPSULE | Refills: 3 | Status: SHIPPED | OUTPATIENT
Start: 2025-07-30 | End: 2026-07-30

## 2025-07-30 NOTE — PROGRESS NOTES
[facility]  Urology Clinic Note    Date of Service: 07/30/2025     CHIEF COMPLAINT: Annual Visit for BPH    History of Present Illness:   Giovanni Guerrero is a 85 y.o. male who presents to today for annual visit. He is established to our clinic but new to me.  He is here today for annual visit.  He takes daily Flomax and Finasteride. He has not problems urinating and states he empties his bladder well. He reports he previously had issues with urinary retention, but no problem since TURP.   No gross hematuria or dysuria. No pelvic or flank discomfort.     Urologic History:   status post TURP in 2019 with Dr. Marquis      Review of Symptoms:  Review of Systems   Constitutional:  Negative for chills and fever.   Respiratory:  Negative for shortness of breath and wheezing.    Cardiovascular:  Negative for chest pain.   Gastrointestinal:  Negative for abdominal pain, constipation, diarrhea, nausea and vomiting.   Genitourinary:  Negative for dysuria, flank pain, frequency, hematuria and urgency.     Patient History:  Past Medical History:   Diagnosis Date    Allergy     Anticoagulant long-term use     Colon polyp     Diabetes mellitus type II     Gout, arthritis     Gout, unspecified     Hx of colonic polyps     Hyperlipidemia     Hypertension     Prostatic hypertrophy     Renal insufficiency      Past Surgical History:   Procedure Laterality Date    APPENDECTOMY      CATARACT EXTRACTION Left 2018    EYE SURGERY      cataract    HERNIA REPAIR      TONSILLECTOMY      TRANSURETHRAL RESECTION OF PROSTATE (TURP) USING LASER N/A 12/6/2019    Procedure: TURP, USING LASER;  Surgeon: Rafael Marquis Jr., MD;  Location: [address];  Service: Urology;  Laterality: N/A;  75min    TREATMENT OF CARDIAC ARRHYTHMIA N/A 1/28/2020    Procedure: CARDIOVERSION;  Surgeon: Sergio Bertrand MD;  Location: [facility] EP LAB;  Service: Cardiology;  Laterality: N/A;  AF, NICOLE, DCCV, MAC, [place], 3 Prep     Family History   Problem Relation Name  "Age of Onset    Cancer Mother          ovarian cancer    Heart disease Father          MI at 57    Diabetes Father      Cerebral aneurysm Sister      Heart disease Brother          MI at 60    Heart disease Brother          CABG    Anesthesia problems Neg Hx      Melanoma Neg Hx       Social History[1]  Allergies:  Actos [pioglitazone]  Medications:  Current Medications[2]    OBJECTIVE:     Vitals:    07/30/25 0834   BP: 125/74   BP Location: Left arm   Patient Position: Sitting   Pulse: 79   Weight: 87.1 kg (192 lb 0.3 oz)   Height: 5' 2" (1.575 m)      Physical Exam  Constitutional:       Appearance: Normal appearance.   HENT:      Head: Normocephalic.   Pulmonary:      Effort: Pulmonary effort is normal. No respiratory distress.      Breath sounds: No wheezing.   Abdominal:      General: There is no distension.      Tenderness: There is no abdominal tenderness.   Genitourinary:     Comments: RUBEN: no nodules palpated  Neurological:      Mental Status: He is alert.   Psychiatric:         Mood and Affect: Mood normal.     LAB:      All laboratory values listed below was/were independently reviewed with patient at this clinic visit.    In office UA today was negative for infection, trace blood.     Lab Results   Component Value Date    BUN 31 (H) 12/04/2024    CREATININE 1.8 (H) 12/04/2024    WBC 12.95 (H) 12/04/2024    HGB 18.2 (H) 12/04/2024    HCT 51.8 12/04/2024     12/04/2024    AST 13 06/26/2023    ALT 14 06/26/2023    ALKPHOS 64 06/26/2023    ALBUMIN 3.6 12/04/2024    HGBA1C 6.9 (H) 03/21/2025     Lab Results   Component Value Date    PSA 1.1 04/15/2014    PSA 1.88 08/22/2012    PSA 1.4 08/08/2011     Lab Results   Component Value Date    CREATININE 1.8 (H) 12/04/2024    EGFRNORACEVR 37 (A) 12/04/2024     IMPRESSION:  Encounter Diagnoses   Name Primary?    BPH with obstruction/lower urinary tract symptoms Yes    Acute urinary retention     BPH with urinary obstruction      ASSESSMENT/PLAN:     Plan: I " spent a total of 30 minutes on the day of the visit. This includes face to face time and non-face to face time preparing to see the patient (eg, review of tests), obtaining and/or reviewing separately obtained history, documenting clinical information in the electronic or other health record, independently interpreting results and communicating results to the patient/family/caregiver, or care coordinator.  Reviewed the possible contributory factors.  Reviewed possible management if needed.  Recommendations for PCP follow up visit; any need to go to the ER.    Recommended lifestyle modifications with a proper, healthy diet, good hydration but during the day.  Benefits of regular exercise and weight loss.     Reassurance with today's in office UA.   Refilled Flomax and Finasteride for one year.   RTC in one year.     The visit today is associated with current or anticipated ongoing medical care related to this patient's single serious condition/complex condition.     RICKEY Frye-ASHLY           [1]   Social History  Socioeconomic History    Marital status:    Tobacco Use    Smoking status: Never    Smokeless tobacco: Never   Substance and Sexual Activity    Alcohol use: No    Drug use: No    Sexual activity: Not Currently     Partners: Female     Birth control/protection: None   Social History Narrative     52 yrs, 3 childre (two living), 11 grandchildren & 4 great grandchildren   [2]   Current Outpatient Medications:     apixaban (ELIQUIS) 2.5 mg Tab, Take 1 tablet (2.5 mg total) by mouth 2 (two) times daily., Disp: 60 tablet, Rfl: 11    aspirin (ECOTRIN) 81 MG EC tablet, Take 81 mg by mouth once daily., Disp: , Rfl:     atorvastatin (LIPITOR) 40 MG tablet, TAKE 1 TABLET(40 MG) BY MOUTH EVERY DAY, Disp: 90 tablet, Rfl: 4    blood sugar diagnostic Strp, Check sugar tid as directed, Disp: 300 each, Rfl: 3    carvediloL (COREG) 12.5 MG tablet, Take 1 tablet (12.5 mg total) by mouth 2 (two) times daily  "with meals., Disp: 180 tablet, Rfl: 3    cholecalciferol, vitamin D3, (VITAMIN D3) 50 mcg (2,000 unit) Tab, Take by mouth once daily., Disp: , Rfl:     empagliflozin (JARDIANCE) 10 mg tablet, TAKE 1 TABLET(10 MG) BY MOUTH EVERY DAY, Disp: 90 tablet, Rfl: 3    furosemide (LASIX) 20 MG tablet, Take 1 tablet (20 mg total) by mouth once daily., Disp: 90 tablet, Rfl: 3    insulin degludec (TRESIBA FLEXTOUCH U-100) 100 unit/mL (3 mL) insulin pen, Inject 15 Units into the skin every evening., Disp: 15 mL, Rfl: 1    insulin syringe-needle U-100 (BD INSULIN SYRINGE ULTRA-FINE) 0.5 mL 31 gauge x 5/16" Syrg, Inject 1 each into the skin once daily., Disp: 100 each, Rfl: 3    lancets (ONETOUCH ULTRASOFT LANCETS) [facility], Pt to check BG up to TID. Dispense lancets compatible with pt brand meter, Disp: 300 each, Rfl: 3    NIFEdipine (PROCARDIA-XL) 60 MG (OSM) 24 hr tablet, TAKE 1 TABLET(60 MG) BY MOUTH DAILY, Disp: 90 tablet, Rfl: 2    OMEGA-3 ACID ETHYL ESTERS (OMACOR) 1 gram Cap, Take 1 g by mouth. 1 Capsule Oral Twice a day, Disp: , Rfl:     pen needle, diabetic (BD ULTRA-FINE MINI PEN NEEDLE) 31 gauge x 3/16" Ndle, Inject 1 each into the skin once daily., Disp: 100 each, Rfl: 3    blood-glucose meter Misc, 1 kit by Misc.(Non-Drug; Combo Route) route 3 (three) times daily. Pt is to test blood sugar TID for 90 doses, Disp: 1 each, Rfl: 0    finasteride (PROSCAR) 5 mg tablet, Take 1 tablet (5 mg total) by mouth once daily., Disp: 90 tablet, Rfl: 3    tamsulosin (FLOMAX) 0.4 mg Cap, Take 1 capsule (0.4 mg total) by mouth once daily., Disp: 90 capsule, Rfl: 3  No current facility-administered medications for this visit.    Facility-Administered Medications Ordered in Other Visits:     0.9%  NaCl infusion, , Intravenous, Continuous, Krishan Awan MD, Stopped at 01/28/20 0943    sodium chloride 0.9% flush 5 mL, 5 mL, Intravenous, PRN, Krishan Awan MD    "

## 2025-08-06 DIAGNOSIS — Z79.4 TYPE 2 DIABETES MELLITUS WITH DIABETIC POLYNEUROPATHY, WITH LONG-TERM CURRENT USE OF INSULIN: ICD-10-CM

## 2025-08-06 DIAGNOSIS — E11.42 TYPE 2 DIABETES MELLITUS WITH DIABETIC POLYNEUROPATHY, WITH LONG-TERM CURRENT USE OF INSULIN: ICD-10-CM

## 2025-08-06 RX ORDER — INSULIN DEGLUDEC 100 U/ML
15 INJECTION, SOLUTION SUBCUTANEOUS NIGHTLY
Qty: 15 ML | Refills: 1 | Status: CANCELLED | OUTPATIENT
Start: 2025-08-06

## 2025-08-06 RX ORDER — INSULIN GLARGINE 100 [IU]/ML
15 INJECTION, SOLUTION SUBCUTANEOUS NIGHTLY
Qty: 15 ML | Refills: 3 | Status: SHIPPED | OUTPATIENT
Start: 2025-08-06 | End: 2026-08-06

## 2025-08-06 NOTE — TELEPHONE ENCOUNTER
No care due was identified.  Health [facility] Embedded Care Due Messages. Reference number: [id number].   8/06/2025 8:59:09 AM CDT

## 2025-08-06 NOTE — TELEPHONE ENCOUNTER
.Refill Encounter    PCP Visits: Recent Visits  Date Type Provider Dept   03/21/25 Office Visit Zafar, Philippe HURD DO [facility] Internal Medicine   Showing recent visits within past 360 days and meeting all other requirements  Future Appointments  Date Type Provider Dept   09/22/25 Appointment Miguel Coombs NP [facility] Internal Medicine   Showing future appointments within next 720 days and meeting all other requirements      Last 3 Blood Pressure:   BP Readings from Last 3 Encounters:   07/30/25 125/74   03/21/25 126/72   01/15/25 132/86     Preferred Pharmacy:   [id number] [place] [address] [address] [facility] & [place] [address] [facility] [facility] [address]  Phone: [phone] Fax: [phone]    Requested RX:  Requested Prescriptions     Pending Prescriptions Disp Refills    insulin degludec (TRESIBA FLEXTOUCH U-100) 100 unit/mL (3 mL) insulin pen 15 mL 1     Sig: Inject 15 Units into the skin every evening.      RX Route: Normal

## 2025-08-07 DIAGNOSIS — I48.19 PERSISTENT ATRIAL FIBRILLATION: ICD-10-CM

## 2025-08-11 RX ORDER — CARVEDILOL 12.5 MG/1
12.5 TABLET ORAL 2 TIMES DAILY WITH MEALS
Qty: 180 TABLET | Refills: 3 | Status: SHIPPED | OUTPATIENT
Start: 2025-08-11 | End: 2026-08-06

## 2025-08-13 RX ORDER — CARVEDILOL 12.5 MG/1
12.5 TABLET ORAL 2 TIMES DAILY WITH MEALS
Qty: 180 TABLET | Refills: 3 | OUTPATIENT
Start: 2025-08-13 | End: 2026-08-08

## 2025-08-15 DIAGNOSIS — N18.32 STAGE 3B CHRONIC KIDNEY DISEASE: Primary | ICD-10-CM

## 2025-08-18 ENCOUNTER — OFFICE VISIT (OUTPATIENT)
Dept: CARDIOLOGY | Facility: CLINIC | Age: 85
End: 2025-08-18
Payer: MEDICARE

## 2025-08-18 ENCOUNTER — LAB VISIT (OUTPATIENT)
Dept: LAB | Facility: OTHER | Age: 85
End: 2025-08-18
Attending: INTERNAL MEDICINE
Payer: MEDICARE

## 2025-08-18 VITALS
OXYGEN SATURATION: 92 % | DIASTOLIC BLOOD PRESSURE: 74 MMHG | WEIGHT: 193.31 LBS | SYSTOLIC BLOOD PRESSURE: 133 MMHG | HEART RATE: 84 BPM | BODY MASS INDEX: 35.57 KG/M2 | HEIGHT: 62 IN

## 2025-08-18 DIAGNOSIS — E78.2 MIXED HYPERLIPIDEMIA: Chronic | ICD-10-CM

## 2025-08-18 DIAGNOSIS — N18.32 STAGE 3B CHRONIC KIDNEY DISEASE: ICD-10-CM

## 2025-08-18 DIAGNOSIS — Z79.4 TYPE 2 DIABETES MELLITUS WITH DIABETIC POLYNEUROPATHY, WITH LONG-TERM CURRENT USE OF INSULIN: ICD-10-CM

## 2025-08-18 DIAGNOSIS — E11.42 TYPE 2 DIABETES MELLITUS WITH DIABETIC POLYNEUROPATHY, WITH LONG-TERM CURRENT USE OF INSULIN: ICD-10-CM

## 2025-08-18 DIAGNOSIS — I48.19 PERSISTENT ATRIAL FIBRILLATION: Primary | ICD-10-CM

## 2025-08-18 DIAGNOSIS — I10 PRIMARY HYPERTENSION: ICD-10-CM

## 2025-08-18 DIAGNOSIS — E66.01 MORBID (SEVERE) OBESITY DUE TO EXCESS CALORIES: ICD-10-CM

## 2025-08-18 LAB
ABSOLUTE EOSINOPHIL (OHS): 0.07 K/UL
ABSOLUTE MONOCYTE (OHS): 0.93 K/UL (ref 0.3–1)
ABSOLUTE NEUTROPHIL COUNT (OHS): 10.77 K/UL (ref 1.8–7.7)
ALBUMIN SERPL BCP-MCNC: 3.4 G/DL (ref 3.5–5.2)
ANION GAP (OHS): 10 MMOL/L (ref 8–16)
BACTERIA #/AREA URNS AUTO: NORMAL /HPF
BASOPHILS # BLD AUTO: 0.04 K/UL
BASOPHILS NFR BLD AUTO: 0.3 %
BILIRUB UR QL STRIP.AUTO: NEGATIVE
BUN SERPL-MCNC: 32 MG/DL (ref 8–23)
CALCIUM SERPL-MCNC: 9.7 MG/DL (ref 8.7–10.5)
CHLORIDE SERPL-SCNC: 99 MMOL/L (ref 95–110)
CLARITY UR: CLEAR
CO2 SERPL-SCNC: 25 MMOL/L (ref 23–29)
COLOR UR AUTO: YELLOW
CREAT SERPL-MCNC: 1.9 MG/DL (ref 0.5–1.4)
CREAT UR-MCNC: 36 MG/DL (ref 23–375)
ERYTHROCYTE [DISTWIDTH] IN BLOOD BY AUTOMATED COUNT: 13.2 % (ref 11.5–14.5)
GFR SERPLBLD CREATININE-BSD FMLA CKD-EPI: 35 ML/MIN/1.73/M2
GLUCOSE SERPL-MCNC: 156 MG/DL (ref 70–110)
GLUCOSE UR QL STRIP: ABNORMAL
HCT VFR BLD AUTO: 50.1 % (ref 40–54)
HGB BLD-MCNC: 17.5 GM/DL (ref 14–18)
HGB UR QL STRIP: NEGATIVE
IMM GRANULOCYTES # BLD AUTO: 0.07 K/UL (ref 0–0.04)
IMM GRANULOCYTES NFR BLD AUTO: 0.5 % (ref 0–0.5)
KETONES UR QL STRIP: NEGATIVE
LEUKOCYTE ESTERASE UR QL STRIP: NEGATIVE
LYMPHOCYTES # BLD AUTO: 1.57 K/UL (ref 1–4.8)
MCH RBC QN AUTO: 32 PG (ref 27–31)
MCHC RBC AUTO-ENTMCNC: 34.9 G/DL (ref 32–36)
MCV RBC AUTO: 92 FL (ref 82–98)
MICROSCOPIC COMMENT: NORMAL
NITRITE UR QL STRIP: NEGATIVE
NUCLEATED RBC (/100WBC) (OHS): 0 /100 WBC
PH UR STRIP: 5 [PH]
PHOSPHATE SERPL-MCNC: 3.4 MG/DL (ref 2.7–4.5)
PLATELET # BLD AUTO: 180 K/UL (ref 150–450)
PMV BLD AUTO: 9.5 FL (ref 9.2–12.9)
POTASSIUM SERPL-SCNC: 3.8 MMOL/L (ref 3.5–5.1)
PROT UR QL STRIP: NEGATIVE
PROT UR-MCNC: <7 MG/DL
PROT/CREAT UR: NORMAL MG/G{CREAT}
RBC # BLD AUTO: 5.47 M/UL (ref 4.6–6.2)
RBC #/AREA URNS AUTO: <1 /HPF (ref 0–4)
RELATIVE EOSINOPHIL (OHS): 0.5 %
RELATIVE LYMPHOCYTE (OHS): 11.7 % (ref 18–48)
RELATIVE MONOCYTE (OHS): 6.9 % (ref 4–15)
RELATIVE NEUTROPHIL (OHS): 80.1 % (ref 38–73)
SODIUM SERPL-SCNC: 134 MMOL/L (ref 136–145)
SP GR UR STRIP: 1.01
SQUAMOUS #/AREA URNS AUTO: <1 /HPF
UROBILINOGEN UR STRIP-ACNC: NEGATIVE EU/DL
WBC # BLD AUTO: 13.45 K/UL (ref 3.9–12.7)
WBC #/AREA URNS AUTO: <1 /HPF (ref 0–5)
YEAST UR QL AUTO: NORMAL /HPF

## 2025-08-18 PROCEDURE — 99214 OFFICE O/P EST MOD 30 MIN: CPT | Mod: S$GLB,,, | Performed by: PHYSICIAN ASSISTANT

## 2025-08-18 PROCEDURE — 3288F FALL RISK ASSESSMENT DOCD: CPT | Mod: CPTII,S$GLB,, | Performed by: PHYSICIAN ASSISTANT

## 2025-08-18 PROCEDURE — 36415 COLL VENOUS BLD VENIPUNCTURE: CPT

## 2025-08-18 PROCEDURE — 85025 COMPLETE CBC W/AUTO DIFF WBC: CPT

## 2025-08-18 PROCEDURE — 1159F MED LIST DOCD IN RCRD: CPT | Mod: CPTII,S$GLB,, | Performed by: PHYSICIAN ASSISTANT

## 2025-08-18 PROCEDURE — 82565 ASSAY OF CREATININE: CPT

## 2025-08-18 PROCEDURE — 84156 ASSAY OF PROTEIN URINE: CPT

## 2025-08-18 PROCEDURE — 81003 URINALYSIS AUTO W/O SCOPE: CPT

## 2025-08-18 PROCEDURE — 3075F SYST BP GE 130 - 139MM HG: CPT | Mod: CPTII,S$GLB,, | Performed by: PHYSICIAN ASSISTANT

## 2025-08-18 PROCEDURE — 99999 PR PBB SHADOW E&M-EST. PATIENT-LVL V: CPT | Mod: PBBFAC,,, | Performed by: PHYSICIAN ASSISTANT

## 2025-08-18 PROCEDURE — 1101F PT FALLS ASSESS-DOCD LE1/YR: CPT | Mod: CPTII,S$GLB,, | Performed by: PHYSICIAN ASSISTANT

## 2025-08-18 PROCEDURE — 3078F DIAST BP <80 MM HG: CPT | Mod: CPTII,S$GLB,, | Performed by: PHYSICIAN ASSISTANT

## 2025-08-18 PROCEDURE — 1160F RVW MEDS BY RX/DR IN RCRD: CPT | Mod: CPTII,S$GLB,, | Performed by: PHYSICIAN ASSISTANT

## 2025-08-18 PROCEDURE — 1126F AMNT PAIN NOTED NONE PRSNT: CPT | Mod: CPTII,S$GLB,, | Performed by: PHYSICIAN ASSISTANT

## 2025-08-20 ENCOUNTER — OFFICE VISIT (OUTPATIENT)
Dept: NEPHROLOGY | Facility: CLINIC | Age: 85
End: 2025-08-20
Payer: MEDICARE

## 2025-08-20 VITALS
BODY MASS INDEX: 35.25 KG/M2 | WEIGHT: 191.56 LBS | DIASTOLIC BLOOD PRESSURE: 75 MMHG | HEIGHT: 62 IN | SYSTOLIC BLOOD PRESSURE: 123 MMHG | OXYGEN SATURATION: 93 % | HEART RATE: 92 BPM

## 2025-08-20 DIAGNOSIS — I10 ESSENTIAL HYPERTENSION: ICD-10-CM

## 2025-08-20 DIAGNOSIS — E66.01 MORBID (SEVERE) OBESITY DUE TO EXCESS CALORIES: ICD-10-CM

## 2025-08-20 DIAGNOSIS — N18.32 STAGE 3B CHRONIC KIDNEY DISEASE: Primary | ICD-10-CM

## 2025-08-20 PROCEDURE — 3074F SYST BP LT 130 MM HG: CPT | Mod: CPTII,S$GLB,, | Performed by: INTERNAL MEDICINE

## 2025-08-20 PROCEDURE — 99999 PR PBB SHADOW E&M-EST. PATIENT-LVL III: CPT | Mod: PBBFAC,,, | Performed by: INTERNAL MEDICINE

## 2025-08-20 PROCEDURE — G2211 COMPLEX E/M VISIT ADD ON: HCPCS | Mod: S$GLB,,, | Performed by: INTERNAL MEDICINE

## 2025-08-20 PROCEDURE — 99214 OFFICE O/P EST MOD 30 MIN: CPT | Mod: S$GLB,,, | Performed by: INTERNAL MEDICINE

## 2025-08-20 PROCEDURE — 1126F AMNT PAIN NOTED NONE PRSNT: CPT | Mod: CPTII,S$GLB,, | Performed by: INTERNAL MEDICINE

## 2025-08-20 PROCEDURE — 1101F PT FALLS ASSESS-DOCD LE1/YR: CPT | Mod: CPTII,S$GLB,, | Performed by: INTERNAL MEDICINE

## 2025-08-20 PROCEDURE — 3288F FALL RISK ASSESSMENT DOCD: CPT | Mod: CPTII,S$GLB,, | Performed by: INTERNAL MEDICINE

## 2025-08-20 PROCEDURE — 1159F MED LIST DOCD IN RCRD: CPT | Mod: CPTII,S$GLB,, | Performed by: INTERNAL MEDICINE

## 2025-08-20 PROCEDURE — 3078F DIAST BP <80 MM HG: CPT | Mod: CPTII,S$GLB,, | Performed by: INTERNAL MEDICINE

## (undated) DEVICE — DEVICE STAT LOCK CATH SECURE

## (undated) DEVICE — PAD DEFIB CADENCE ADULT R2

## (undated) DEVICE — TRAY CYSTO BASIN

## (undated) DEVICE — SEE L#95700

## (undated) DEVICE — PACK CYSTO

## (undated) DEVICE — SYR 10CC LUER LOCK

## (undated) DEVICE — SYR 50ML CATH TIP

## (undated) DEVICE — SOL IRR NACL .9% 3000ML

## (undated) DEVICE — SET TUR BLADDER IRRIG Y TYPE

## (undated) DEVICE — GOWN SMARTGOWN LVL4 X-LONG XL

## (undated) DEVICE — SOL NS 1000CC